# Patient Record
Sex: MALE | Race: WHITE | NOT HISPANIC OR LATINO | Employment: OTHER | URBAN - METROPOLITAN AREA
[De-identification: names, ages, dates, MRNs, and addresses within clinical notes are randomized per-mention and may not be internally consistent; named-entity substitution may affect disease eponyms.]

---

## 2017-03-08 ENCOUNTER — NON-PROVIDER VISIT (OUTPATIENT)
Dept: CARDIOLOGY | Facility: MEDICAL CENTER | Age: 67
End: 2017-03-08
Attending: FAMILY MEDICINE
Payer: MEDICARE

## 2017-03-08 DIAGNOSIS — I10 ESSENTIAL HYPERTENSION, MALIGNANT: ICD-10-CM

## 2017-03-08 DIAGNOSIS — Z13.6 SCREENING FOR ISCHEMIC HEART DISEASE: ICD-10-CM

## 2017-03-08 LAB — TREADMILL STRESS: NORMAL

## 2017-03-08 PROCEDURE — 93015 CV STRESS TEST SUPVJ I&R: CPT | Performed by: INTERNAL MEDICINE

## 2017-04-05 ENCOUNTER — OFFICE VISIT (OUTPATIENT)
Dept: CARDIOLOGY | Facility: MEDICAL CENTER | Age: 67
End: 2017-04-05
Payer: MEDICARE

## 2017-04-05 VITALS
HEART RATE: 59 BPM | HEIGHT: 71 IN | SYSTOLIC BLOOD PRESSURE: 150 MMHG | BODY MASS INDEX: 34.02 KG/M2 | DIASTOLIC BLOOD PRESSURE: 82 MMHG | OXYGEN SATURATION: 94 % | WEIGHT: 243 LBS

## 2017-04-05 DIAGNOSIS — I10 ESSENTIAL HYPERTENSION: ICD-10-CM

## 2017-04-05 DIAGNOSIS — I49.3 PVC (PREMATURE VENTRICULAR CONTRACTION): ICD-10-CM

## 2017-04-05 DIAGNOSIS — R94.39 ABNORMAL STRESS TEST: Primary | ICD-10-CM

## 2017-04-05 DIAGNOSIS — R53.81 PHYSICAL DECONDITIONING: ICD-10-CM

## 2017-04-05 PROCEDURE — G8432 DEP SCR NOT DOC, RNG: HCPCS | Performed by: INTERNAL MEDICINE

## 2017-04-05 PROCEDURE — 4004F PT TOBACCO SCREEN RCVD TLK: CPT | Mod: 8P | Performed by: INTERNAL MEDICINE

## 2017-04-05 PROCEDURE — 1101F PT FALLS ASSESS-DOCD LE1/YR: CPT | Mod: 8P | Performed by: INTERNAL MEDICINE

## 2017-04-05 PROCEDURE — 4040F PNEUMOC VAC/ADMIN/RCVD: CPT | Mod: 8P | Performed by: INTERNAL MEDICINE

## 2017-04-05 PROCEDURE — G8419 CALC BMI OUT NRM PARAM NOF/U: HCPCS | Performed by: INTERNAL MEDICINE

## 2017-04-05 PROCEDURE — 99204 OFFICE O/P NEW MOD 45 MIN: CPT | Performed by: INTERNAL MEDICINE

## 2017-04-05 RX ORDER — METOPROLOL SUCCINATE 25 MG/1
TABLET, EXTENDED RELEASE ORAL
Refills: 2 | COMMUNITY
Start: 2017-03-20 | End: 2018-10-30

## 2017-04-05 RX ORDER — ERGOCALCIFEROL 1.25 MG/1
CAPSULE ORAL
Refills: 11 | COMMUNITY
Start: 2017-03-10 | End: 2019-03-26

## 2017-04-05 RX ORDER — LOSARTAN POTASSIUM 25 MG/1
50 TABLET ORAL DAILY
Qty: 30 TAB | Refills: 3 | Status: SHIPPED | OUTPATIENT
Start: 2017-04-05 | End: 2018-07-26 | Stop reason: SDUPTHER

## 2017-04-05 NOTE — MR AVS SNAPSHOT
"        Laureen Munoz   2017 8:40 AM   Office Visit   MRN: [id number]    Department:  [facility]   Dept Phone:  [phone]    Description:  Male : 1950   Provider:  Dima Wilks M.D.           Reason for Visit     New Patient           Allergies as of 2017     Allergen Noted Reactions    Nkda [No Known Drug Allergy] 2007         You were diagnosed with     Essential hypertension   [[id number]]         Vital Signs     Blood Pressure Pulse Height Weight Body Mass Index Oxygen Saturation    150/82 mmHg 59 1.803 m (5' 10.98\") 110.224 kg (243 lb) 33.91 kg/m2 94%    Smoking Status                   Light Tobacco Smoker           Basic Information     Date Of Birth Sex Race Ethnicity Preferred Language    1950 Male White Non- English      Problem List              ICD-10-CM Priority Class Noted - Resolved    Neuropathy of foot G57.90   2012 - Present    HTN (hypertension) I10   2012 - Present    Hypercholesteremia E78.00   2012 - Present    BPH (benign prostatic hyperplasia) N40.0   2012 - Present    Tinea inguinalis B35.6   Unknown - Present    Vitamin D deficiency E55.9   2014 - Present    Migraine aura without headache G43.109   2014 - Present      Health Maintenance        Date Due Completion Dates    IMM ZOSTER VACCINE 2011 ---    IMM PNEUMOCOCCAL 65+ (ADULT) LOW/MEDIUM RISK SERIES (1 of 2 - PCV13) 2016 ---    IMM DTaP/Tdap/Td Vaccine (2 - Td) 2023    COLONOSCOPY 2024            Results       Current Immunizations     Influenza TIV (IM) 2014    Tdap Vaccine 2013      Below and/or attached are the medications your provider expects you to take. Review all of your home medications and newly ordered medications with your provider and/or pharmacist. Follow medication instructions as directed by your provider and/or pharmacist. Please keep your medication list with you and share with your provider. Update " the information when medications are discontinued, doses are changed, or new medications (including over-the-counter products) are added; and carry medication information at all times in the event of emergency situations     Allergies:  NKDA - (reactions not documented)               Medications  Valid as of: April 05, 2017 -  9:13 AM    Generic Name Brand Name Tablet Size Instructions for use    Cyclobenzaprine HCl (Tab) FLEXERIL 5 MG Take 1-2 Tabs by mouth 3 times a day as needed for Mild Pain.        Doxazosin Mesylate (Tab) CARDURA 2 MG TAKE 1 TABLET BY MOUTH EVERY DAY        Dutasteride (Cap) AVODART 0.5 MG Take 1 Cap by mouth every day.        Ergocalciferol (Cap) DRISDOL 69153 UNITS         Metoprolol Succinate (TABLET SR 24 HR) TOPROL XL 25 MG         Mupirocin (Ointment) BACTROBAN 2 % Apply bid to effected toenail.        Naproxen (Tab) NAPROSYN 500 MG Take 1 Tab by mouth 2 times a day, with meals.        Tadalafil (Tab) CIALIS 20 MG Take 1 Tab by mouth as needed for Erectile Dysfunction. Take one q 2-3 days prn ED.        Tamsulosin HCl (Cap) FLOMAX 0.4 MG Take 1 Cap by mouth ONE-HALF HOUR AFTER BREAKFAST.        Zolpidem Tartrate (Tab) AMBIEN 10 MG Take 1 Tab by mouth at bedtime as needed for Sleep.        .                 Medicines prescribed today were sent to:     [facility] [address] [address]    Phone: [phone] Fax: [phone]    Open 24 Hours?: No      Medication refill instructions:       If your prescription bottle indicates you have medication refills left, it is not necessary to call your provider’s office. Please contact your pharmacy and they will refill your medication.    If your prescription bottle indicates you do not have any refills left, you may request refills at any time through one of the following ways: The online [employer] system (except Urgent Care), by calling your provider’s office, or by asking your pharmacy to contact your  provider’s office with a refill request. Medication refills are processed only during regular business hours and may not be available until the next business day. Your provider may request additional information or to have a follow-up visit with you prior to refilling your medication.   *Please Note: Medication refills are assigned a new Rx number when refilled electronically. Your pharmacy may indicate that no refills were authorized even though a new prescription for the same medication is available at the pharmacy. Please request the medicine by name with the pharmacy before contacting your provider for a refill.           MyChart Access Code: Activation code not generated  Current MyChart Status: Active          Quit Tobacco Information     Do you want to quit using tobacco?    Quitting tobacco decreases risks of cancer, heart and lung disease, increases life expectancy, improves sense of taste and smell, and increases spending money, among other benefits.    If you are thinking about quitting, we can help.  • [facility] Quit Tobacco Program: [phone]  o Program occurs weekly for four weeks and includes pharmacist consultation on products to support quitting smoking or chewing tobacco. A provider referral is needed for pharmacist consultation.  • Tobacco Users Help Hotline: 4-795-QUIT-NOW (691-4473) or https://[place].quitlogix.org/  o Free, confidential telephone and online coaching for [place] residents. Sessions are designed on a schedule that is convenient for you. Eligible clients receive free nicotine replacement therapy.  • Nationally: www.smokefree.gov  o Information and professional assistance to support both immediate and long-term needs as you become, and remain, a non-smoker. Smokefree.gov allows you to choose the help that best fits your needs.

## 2017-04-05 NOTE — PROGRESS NOTES
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 He recently got a ETT done for feeling more fatigued with exertion. The ETT was \"abnormal\" in that it showed he had a low exercise tolerance (reaching just > 7 METS) with HTNsive response. He had a few rare PVCs. He was referred for abnormal stress. He denies chest pain. Says since the stress test he has been exercise more and this has improved his energy quite a bit. Says he was also feeling occasional dizziness which has also improved with exercise. Says he just takes metoprolol at home for BP. Had been on lisinopril but could not tolerate due to cough.    ROS (+ highlighted in red):  Constitutional: Fevers/chills/fatigue/weightloss  HEENT: Blurry vision/eye pain/sore throat/hearing loss  Respiratory: Shortness of breath/cough  Cardiovascular: Chest pain/palpitations/edema/orthopnea/syncope  GI: Nausea/vomitting/diarrhea  MSK: Arthralgias/myagias/muscle weakness  Skin: Rash/sores  Neurological: Numbness/tremors/vertigo  Endocrine: Excessive thirst/polyuria/cold intolerance/heat intolerance  Psych: Depression/anxiety    Past Medical History   Diagnosis Date   • Hypertension    • Tinea inguinalis        Past Surgical History   Procedure Laterality Date   • Other abdominal surgery       gallbladder       Social History     Social History   • Marital Status:      Spouse Name: N/A   • Number of Children: N/A   • Years of Education: N/A     Occupational History   • Not on file.     Social History Main Topics   • Smoking status: Light Tobacco Smoker     Types: Cigars   • Smokeless tobacco: Never Used      Comment: cigar, smokes one a day.   • Alcohol Use: Yes      Comment: minimal   • Drug Use: No   • Sexual Activity: Not on file " "    Other Topics Concern   • Not on file     Social History Narrative       History reviewed. No pertinent family history.    Allergies   Allergen Reactions   • Nkda [No Known Drug Allergy]        Current Outpatient Prescriptions   Medication Sig Dispense Refill   • metoprolol SR (TOPROL XL) 25 MG TABLET SR 24 HR   2   • vitamin D, Ergocalciferol, (DRISDOL) 88114 UNITS Cap capsule   11   • losartan (COZAAR) 25 MG Tab Take 2 Tabs by mouth every day. 30 Tab 3   • tamsulosin (FLOMAX) 0.4 MG capsule Take 1 Cap by mouth ONE-HALF HOUR AFTER BREAKFAST. 30 Cap 0   • tadalafil (CIALIS) 20 MG tablet Take 1 Tab by mouth as needed for Erectile Dysfunction. Take one q 2-3 days prn ED. 30 Tab 3   • cyclobenzaprine (FLEXERIL) 5 MG tablet Take 1-2 Tabs by mouth 3 times a day as needed for Mild Pain. 30 Tab 0   • naproxen (NAPROSYN) 500 MG TABS Take 1 Tab by mouth 2 times a day, with meals. 60 Tab 0   • zolpidem (AMBIEN) 10 MG TABS Take 1 Tab by mouth at bedtime as needed for Sleep. 30 Tab 1   • mupirocin (BACTROBAN) 2 % OINT Apply bid to effected toenail. 1 Tube 1   • dutasteride (AVODART) 0.5 MG capsule Take 1 Cap by mouth every day. 30 Each 11   • doxazosin (CARDURA) 2 MG TABS TAKE 1 TABLET BY MOUTH EVERY DAY 30 Tab 7     No current facility-administered medications for this visit.       Physical Exam:  Filed Vitals:    04/05/17 0828   BP: 150/82   Pulse: 59   Height: 1.803 m (5' 10.98\")   Weight: 110.224 kg (243 lb)   SpO2: 94%     General appearance: NAD, conversant   Eyes: anicteric sclerae, moist conjunctivae; no lid-lag; PERRLA  HENT: Atraumatic; oropharynx clear with moist mucous membranes and no mucosal ulcerations; normal hard and soft palate  Neck: Trachea midline; FROM, supple, no thyromegaly or lymphadenopathy  Lungs: CTA, with normal respiratory effort and no intercostal retractions  CV: RRR, no MRGs, no JVD   Abdomen: Soft, non-tender; no masses or HSM  Extremities: No peripheral edema or extremity " lymphadenopathy  Skin: Normal temperature, turgor and texture; no rash, ulcers or subcutaneous nodules  Psych: Appropriate affect, alert and oriented to person, place and time    Data:  Labs reviewed    Prior stress test reviewed    EKG interpreted by me:  Sinus, PAC    Impression/Plan:  1. Abnormal stress test     2. Physical deconditioning     3. PVC (premature ventricular contraction)     4. Essential hypertension  EKG    losartan (COZAAR) 25 MG Tab     -I have reviewed the tracings in the ETT  -There is no evidence of ischemia  -The test is abnormal for HTNsive response and low exercise tolerance suggesting physical deconditioning  -I will add losartan to his BP regimen  -I recommended weight loss and exercise  -He can f/u with his PCP    Dima Wilks MD

## 2017-04-06 LAB — EKG IMPRESSION: NORMAL

## 2017-08-15 ENCOUNTER — HOSPITAL ENCOUNTER (OUTPATIENT)
Dept: RADIOLOGY | Facility: MEDICAL CENTER | Age: 67
End: 2017-08-15
Attending: FAMILY MEDICINE
Payer: MEDICARE

## 2017-08-15 DIAGNOSIS — R22.9 LOCALIZED SUPERFICIAL SWELLING, MASS, OR LUMP: ICD-10-CM

## 2017-08-15 PROCEDURE — 76536 US EXAM OF HEAD AND NECK: CPT

## 2018-04-11 ENCOUNTER — APPOINTMENT (RX ONLY)
Dept: URBAN - METROPOLITAN AREA CLINIC 20 | Facility: CLINIC | Age: 68
Setting detail: DERMATOLOGY
End: 2018-04-11

## 2018-04-11 DIAGNOSIS — D18.0 HEMANGIOMA: ICD-10-CM

## 2018-04-11 DIAGNOSIS — D22 MELANOCYTIC NEVI: ICD-10-CM

## 2018-04-11 DIAGNOSIS — L57.0 ACTINIC KERATOSIS: ICD-10-CM

## 2018-04-11 DIAGNOSIS — Z85.828 PERSONAL HISTORY OF OTHER MALIGNANT NEOPLASM OF SKIN: ICD-10-CM

## 2018-04-11 DIAGNOSIS — L82.1 OTHER SEBORRHEIC KERATOSIS: ICD-10-CM

## 2018-04-11 DIAGNOSIS — L57.8 OTHER SKIN CHANGES DUE TO CHRONIC EXPOSURE TO NONIONIZING RADIATION: ICD-10-CM

## 2018-04-11 DIAGNOSIS — L21.8 OTHER SEBORRHEIC DERMATITIS: ICD-10-CM

## 2018-04-11 DIAGNOSIS — L30.0 NUMMULAR DERMATITIS: ICD-10-CM

## 2018-04-11 DIAGNOSIS — L81.4 OTHER MELANIN HYPERPIGMENTATION: ICD-10-CM

## 2018-04-11 PROBLEM — I10 ESSENTIAL (PRIMARY) HYPERTENSION: Status: ACTIVE | Noted: 2018-04-11

## 2018-04-11 PROBLEM — D18.01 HEMANGIOMA OF SKIN AND SUBCUTANEOUS TISSUE: Status: ACTIVE | Noted: 2018-04-11

## 2018-04-11 PROBLEM — D22.5 MELANOCYTIC NEVI OF TRUNK: Status: ACTIVE | Noted: 2018-04-11

## 2018-04-11 PROCEDURE — ? PRESCRIPTION

## 2018-04-11 PROCEDURE — ? PRESCRIPTION SAMPLES PROVIDED

## 2018-04-11 PROCEDURE — ? LIQUID NITROGEN

## 2018-04-11 PROCEDURE — 99214 OFFICE O/P EST MOD 30 MIN: CPT | Mod: 25

## 2018-04-11 PROCEDURE — 17000 DESTRUCT PREMALG LESION: CPT

## 2018-04-11 PROCEDURE — ? COUNSELING

## 2018-04-11 PROCEDURE — 17003 DESTRUCT PREMALG LES 2-14: CPT

## 2018-04-11 RX ORDER — KETOCONAZOLE 20.5 MG/ML
SHAMPOO, SUSPENSION TOPICAL BID
Qty: 1 | Refills: 6 | Status: ERX | COMMUNITY
Start: 2018-04-11

## 2018-04-11 RX ORDER — KETOCONAZOLE 20 MG/G
CREAM TOPICAL BID
Qty: 1 | Refills: 6 | Status: ERX | COMMUNITY
Start: 2018-04-11

## 2018-04-11 RX ORDER — TRIAMCINOLONE ACETONIDE 1 MG/G
CREAM TOPICAL BID
Qty: 1 | Refills: 3 | Status: ERX | COMMUNITY
Start: 2018-04-11

## 2018-04-11 RX ADMIN — KETOCONAZOLE: 20 CREAM TOPICAL at 00:00

## 2018-04-11 RX ADMIN — TRIAMCINOLONE ACETONIDE: 1 CREAM TOPICAL at 00:00

## 2018-04-11 RX ADMIN — KETOCONAZOLE: 20.5 SHAMPOO, SUSPENSION TOPICAL at 00:00

## 2018-04-11 ASSESSMENT — LOCATION DETAILED DESCRIPTION DERM
LOCATION DETAILED: LEFT MEDIAL FRONTAL SCALP
LOCATION DETAILED: LEFT ANTERIOR DISTAL THIGH
LOCATION DETAILED: LEFT MEDIAL SUPERIOR CHEST
LOCATION DETAILED: RIGHT ANTERIOR DISTAL THIGH
LOCATION DETAILED: RIGHT SUPERIOR LATERAL FOREHEAD
LOCATION DETAILED: RIGHT MID TEMPLE
LOCATION DETAILED: LEFT INFERIOR LATERAL FOREHEAD
LOCATION DETAILED: RIGHT SUPERIOR MEDIAL UPPER BACK
LOCATION DETAILED: RIGHT CENTRAL MALAR CHEEK
LOCATION DETAILED: LEFT CENTRAL EYEBROW
LOCATION DETAILED: RIGHT LATERAL ABDOMEN
LOCATION DETAILED: RIGHT CENTRAL EYEBROW
LOCATION DETAILED: RIGHT SUPERIOR FOREHEAD
LOCATION DETAILED: LEFT PROXIMAL DORSAL FOREARM
LOCATION DETAILED: RIGHT MID-UPPER BACK
LOCATION DETAILED: LEFT SUPERIOR LATERAL FOREHEAD
LOCATION DETAILED: RIGHT ANTERIOR PROXIMAL UPPER ARM
LOCATION DETAILED: LEFT LATERAL ABDOMEN
LOCATION DETAILED: LEFT LATERAL TEMPLE
LOCATION DETAILED: RIGHT PROXIMAL DORSAL FOREARM
LOCATION DETAILED: LEFT INFERIOR CENTRAL MALAR CHEEK
LOCATION DETAILED: RIGHT FOREHEAD
LOCATION DETAILED: LEFT ANTERIOR PROXIMAL UPPER ARM
LOCATION DETAILED: RIGHT INFERIOR UPPER BACK

## 2018-04-11 ASSESSMENT — LOCATION SIMPLE DESCRIPTION DERM
LOCATION SIMPLE: RIGHT TEMPLE
LOCATION SIMPLE: RIGHT FOREARM
LOCATION SIMPLE: LEFT EYEBROW
LOCATION SIMPLE: RIGHT CHEEK
LOCATION SIMPLE: LEFT TEMPLE
LOCATION SIMPLE: LEFT CHEEK
LOCATION SIMPLE: LEFT UPPER ARM
LOCATION SIMPLE: ABDOMEN
LOCATION SIMPLE: RIGHT EYEBROW
LOCATION SIMPLE: RIGHT UPPER BACK
LOCATION SIMPLE: LEFT SCALP
LOCATION SIMPLE: LEFT THIGH
LOCATION SIMPLE: RIGHT THIGH
LOCATION SIMPLE: RIGHT FOREHEAD
LOCATION SIMPLE: LEFT FOREHEAD
LOCATION SIMPLE: RIGHT UPPER ARM
LOCATION SIMPLE: CHEST
LOCATION SIMPLE: LEFT FOREARM

## 2018-04-11 ASSESSMENT — LOCATION ZONE DERM
LOCATION ZONE: TRUNK
LOCATION ZONE: LEG
LOCATION ZONE: ARM
LOCATION ZONE: FACE
LOCATION ZONE: SCALP

## 2018-04-26 ENCOUNTER — RX ONLY (OUTPATIENT)
Age: 68
Setting detail: RX ONLY
End: 2018-04-26

## 2018-04-26 RX ORDER — EMOLLIENT COMBINATION NO.60
SPRAY GEL TOPICAL
Qty: 1 | Refills: 3 | Status: ERX | COMMUNITY
Start: 2018-04-26

## 2018-06-19 ENCOUNTER — OFFICE VISIT (OUTPATIENT)
Dept: URGENT CARE | Facility: CLINIC | Age: 68
End: 2018-06-19
Payer: MEDICARE

## 2018-06-19 VITALS
BODY MASS INDEX: 35.06 KG/M2 | HEART RATE: 71 BPM | SYSTOLIC BLOOD PRESSURE: 140 MMHG | WEIGHT: 250.4 LBS | OXYGEN SATURATION: 95 % | HEIGHT: 71 IN | RESPIRATION RATE: 18 BRPM | DIASTOLIC BLOOD PRESSURE: 84 MMHG | TEMPERATURE: 98.4 F

## 2018-06-19 DIAGNOSIS — I10 HYPERTENSION, UNSPECIFIED TYPE: ICD-10-CM

## 2018-06-19 PROCEDURE — 99214 OFFICE O/P EST MOD 30 MIN: CPT | Performed by: PHYSICIAN ASSISTANT

## 2018-06-19 RX ORDER — METOPROLOL SUCCINATE 25 MG/1
25 TABLET, EXTENDED RELEASE ORAL DAILY
Qty: 30 TAB | Refills: 0 | Status: SHIPPED | OUTPATIENT
Start: 2018-06-19 | End: 2018-07-26 | Stop reason: SDUPTHER

## 2018-06-19 ASSESSMENT — ENCOUNTER SYMPTOMS
FATIGUE: 0
HEADACHES: 0
SORE THROAT: 0
PALPITATIONS: 0
CHILLS: 0
VOMITING: 0
DIARRHEA: 0
VISUAL CHANGE: 0
BLURRED VISION: 0
ABDOMINAL PAIN: 0
FEVER: 0
DIAPHORESIS: 0
SHORTNESS OF BREATH: 0
EYE REDNESS: 0
DIZZINESS: 0
COUGH: 0

## 2018-06-19 NOTE — PROGRESS NOTES
Subjective:      Laureen Munoz is a 67 y.o. male who presents with Blood Pressure Problem (high primary Dr closed office 3 months ago)            Patient is a 67-year-old male who presents today with concern regarding elevated blood pressure yesterday. Patient reports that he used to be a patient of Dr. Lester however he has since closed his practice approx. 3 months ago.   Patient readily admits that he feels that his an active lifestyle and recent weight gain has made his blood pressure increased over time. Patient is currently on metoprolol 25 mg daily along with losartan 500 mg daily. He does report that his blood pressure is been consistently 140-130 /90-80- however yesterday his blood pressure was 190/110. He readily admits that he took his blood pressure after a few cups of coffee and was watching the news of which he was getting frustrated with the news story. He denies any chest pain, shortness of breath, VA changes, lightheadedness or dizziness. Patient does not have a primary care appointment at this time. Patient will run out of metoprolol in the next few days. He denies any other medical problems of DMII, or CAD.      Other   This is a new problem. The current episode started yesterday. The problem occurs constantly. The problem has been resolved. Pertinent negatives include no abdominal pain, chills, congestion, coughing, diaphoresis, fatigue, fever, headaches, sore throat, visual change or vomiting. Treatments tried: As above.        Review of Systems   Constitutional: Negative for chills, diaphoresis, fatigue and fever.   HENT: Negative for congestion and sore throat.    Eyes: Negative for blurred vision and redness.   Respiratory: Negative for cough and shortness of breath.    Cardiovascular: Negative for palpitations and leg swelling.   Gastrointestinal: Negative for abdominal pain, diarrhea and vomiting.   Neurological: Negative for dizziness and headaches.   All other systems reviewed and are  "negative.         Objective:     /84 Comment: /90  Pulse 71   Temp 36.9 °C (98.4 °F)   Resp 18   Ht 1.803 m (5' 11\")   Wt 113.6 kg (250 lb 6.4 oz)   SpO2 95%   BMI 34.92 kg/m²    PMH:  has a past medical history of Hypertension and Tinea inguinalis.  MEDS:   Current Outpatient Prescriptions:   •  metoprolol SR (TOPROL XL) 25 MG TABLET SR 24 HR, Take 1 Tab by mouth every day., Disp: 30 Tab, Rfl: 0  •  metoprolol SR (TOPROL XL) 25 MG TABLET SR 24 HR, , Disp: , Rfl: 2  •  vitamin D, Ergocalciferol, (DRISDOL) 16529 UNITS Cap capsule, , Disp: , Rfl: 11  •  losartan (COZAAR) 25 MG Tab, Take 2 Tabs by mouth every day., Disp: 30 Tab, Rfl: 3  •  tadalafil (CIALIS) 20 MG tablet, Take 1 Tab by mouth as needed for Erectile Dysfunction. Take one q 2-3 days prn ED., Disp: 30 Tab, Rfl: 3  •  tamsulosin (FLOMAX) 0.4 MG capsule, Take 1 Cap by mouth ONE-HALF HOUR AFTER BREAKFAST., Disp: 30 Cap, Rfl: 0  •  cyclobenzaprine (FLEXERIL) 5 MG tablet, Take 1-2 Tabs by mouth 3 times a day as needed for Mild Pain., Disp: 30 Tab, Rfl: 0  •  naproxen (NAPROSYN) 500 MG TABS, Take 1 Tab by mouth 2 times a day, with meals., Disp: 60 Tab, Rfl: 0  •  zolpidem (AMBIEN) 10 MG TABS, Take 1 Tab by mouth at bedtime as needed for Sleep., Disp: 30 Tab, Rfl: 1  •  mupirocin (BACTROBAN) 2 % OINT, Apply bid to effected toenail., Disp: 1 Tube, Rfl: 1  •  dutasteride (AVODART) 0.5 MG capsule, Take 1 Cap by mouth every day., Disp: 30 Each, Rfl: 11  •  doxazosin (CARDURA) 2 MG TABS, TAKE 1 TABLET BY MOUTH EVERY DAY, Disp: 30 Tab, Rfl: 7  ALLERGIES:   Allergies   Allergen Reactions   • Nkda [No Known Drug Allergy]      SURGHX:   Past Surgical History:   Procedure Laterality Date   • OTHER ABDOMINAL SURGERY      gallbladder     SOCHX:  reports that he has been smoking Cigars.  He has never used smokeless tobacco. He reports that he drinks alcohol. He reports that he does not use drugs.  FH: Family history was reviewed, no pertinent findings to " report    Physical Exam   Constitutional: He is oriented to person, place, and time. He appears well-developed and well-nourished. No distress.   HENT:   Head: Normocephalic and atraumatic.   Right Ear: External ear normal.   Left Ear: External ear normal.   Mouth/Throat: Oropharynx is clear and moist. No oropharyngeal exudate.   Eyes: Conjunctivae and EOM are normal. Pupils are equal, round, and reactive to light.   Neck: Normal range of motion. Neck supple. No tracheal deviation present.   Cardiovascular: Normal rate and regular rhythm.    No murmur heard.  Pulmonary/Chest: Effort normal and breath sounds normal. No respiratory distress.   Abdominal:   Obese     Musculoskeletal: Normal range of motion. He exhibits no edema.   Neurological: He is alert and oriented to person, place, and time. Coordination normal.   Skin: Skin is warm. No rash noted.   Psychiatric: He has a normal mood and affect. His behavior is normal. Judgment and thought content normal.   Vitals reviewed.              Assessment/Plan:     1. Hypertension, unspecified type  - metoprolol SR (TOPROL XL) 25 MG TABLET SR 24 HR; Take 1 Tab by mouth every day.  Dispense: 30 Tab; Refill: 0    I'm happy to refill patient's metoprolol at this time. I discussed the importance of diet changes and routine exercise to include a low-sodium diet. Patient currently has plenty of Losartan, of which I encouraged patient to increase dose from 50 mg to 100 mg to take at nighttime. Patient is to make a primary care appointment [facility] and is to bring with him his blood pressure log.  Patient given precautionary s/sx that mandate immediate follow up and evaluation in the ED. Advised of risks of not doing so.    DDX, Supportive care, and indications for immediate follow-up discussed with patient.    Instructed to return to clinic or nearest emergency department if we are not available for any change in condition, further concerns, or worsening of symptoms.    The patient  demonstrated a good understanding and agreed with the treatment plan.  Please note that this dictation was created using voice recognition software. I have made every reasonable attempt to correct obvious errors, but I expect that there are errors of grammar and possibly content that I did not discover before finalizing the note.

## 2018-07-26 ENCOUNTER — OFFICE VISIT (OUTPATIENT)
Dept: MEDICAL GROUP | Facility: MEDICAL CENTER | Age: 68
End: 2018-07-26
Payer: MEDICARE

## 2018-07-26 VITALS
HEIGHT: 70 IN | OXYGEN SATURATION: 94 % | DIASTOLIC BLOOD PRESSURE: 74 MMHG | HEART RATE: 60 BPM | TEMPERATURE: 98 F | BODY MASS INDEX: 35.63 KG/M2 | WEIGHT: 248.9 LBS | SYSTOLIC BLOOD PRESSURE: 132 MMHG

## 2018-07-26 DIAGNOSIS — E55.9 HYPOVITAMINOSIS D: ICD-10-CM

## 2018-07-26 DIAGNOSIS — Z00.00 HEALTH CARE MAINTENANCE: ICD-10-CM

## 2018-07-26 DIAGNOSIS — Z12.5 SCREENING FOR MALIGNANT NEOPLASM OF PROSTATE: ICD-10-CM

## 2018-07-26 DIAGNOSIS — E78.00 HYPERCHOLESTEREMIA: ICD-10-CM

## 2018-07-26 DIAGNOSIS — E66.9 OBESITY (BMI 30-39.9): ICD-10-CM

## 2018-07-26 DIAGNOSIS — I10 ESSENTIAL HYPERTENSION: ICD-10-CM

## 2018-07-26 DIAGNOSIS — Z76.89 ENCOUNTER TO ESTABLISH CARE: ICD-10-CM

## 2018-07-26 DIAGNOSIS — N40.0 BENIGN PROSTATIC HYPERPLASIA, UNSPECIFIED WHETHER LOWER URINARY TRACT SYMPTOMS PRESENT: ICD-10-CM

## 2018-07-26 PROBLEM — N52.9 ERECTILE DYSFUNCTION: Status: RESOLVED | Noted: 2018-07-26 | Resolved: 2018-07-26

## 2018-07-26 PROBLEM — N52.9 ERECTILE DYSFUNCTION: Status: ACTIVE | Noted: 2018-07-26

## 2018-07-26 PROCEDURE — 99204 OFFICE O/P NEW MOD 45 MIN: CPT | Performed by: INTERNAL MEDICINE

## 2018-07-26 RX ORDER — LOSARTAN POTASSIUM 50 MG/1
50 TABLET ORAL DAILY
Qty: 90 TAB | Refills: 1 | Status: SHIPPED | OUTPATIENT
Start: 2018-07-26 | End: 2018-10-30 | Stop reason: SDUPTHER

## 2018-07-26 RX ORDER — METOPROLOL SUCCINATE 25 MG/1
25 TABLET, EXTENDED RELEASE ORAL DAILY
Qty: 90 TAB | Refills: 1 | Status: SHIPPED | OUTPATIENT
Start: 2018-07-26 | End: 2018-10-30 | Stop reason: SDUPTHER

## 2018-07-26 RX ORDER — TADALAFIL 5 MG/1
5 TABLET ORAL PRN
Qty: 90 TAB | Refills: 1 | Status: SHIPPED | OUTPATIENT
Start: 2018-07-26 | End: 2018-10-30

## 2018-07-26 ASSESSMENT — PATIENT HEALTH QUESTIONNAIRE - PHQ9: CLINICAL INTERPRETATION OF PHQ2 SCORE: 0

## 2018-07-26 NOTE — PROGRESS NOTES
CHIEF COMPLAINT  Chief Complaint   Patient presents with   • Establish Care   • Medication Refill     metoprolol     HPI  Patient is a 67 y.o. male patient who presents today for the following     HYPERTENSION  Meds: Metoprolol 25 mg QD, losartan, 50 mg QD; taking as prescribed.   Measuring BP at home: yes. It has been < 140/90  Denies:  -  headaches, vision problems, tinnitus.                 -  chest pain/pressure, palpitations, irregular heart beats, exertional, dyspnea, peripheral edema.      - medication side effect: unusual fatigue, slow heartbeat, foot/leg swelling, cough.  Low salt diet: no  Diet: regular  Exercise: < 4 d/w  BMI: 35.63 kg/m².  FH of HTN: neg    Hypercholesterolemia  The patient had slightly elevated cholesterol/triglycerides.   No meds.  Diet / Exercise/BMI:  As above  FH:     BPH  Nocturia: x 1  Urgency  Weak stream/dribbling  Bladder fullness / incomplete emptying  Hesitancy.    Denies:   · Frequency    · On cialis.     Hypovitaminosis D  Patient had low vitamin D level.  On prescription dose of vitamin D.    OBESITY, Body mass index is 35.63 kg/m².  Onset: Remote  Diet: Regular; likes to, especially after retired  Exercise: Limited  No temperature intolerance. No change in hair/skin quality, BMs.   No HTN, buffalo hump, purple striae, flushing.  FH of obesity: Unknown    Reviewed PMH, PSH, FH, SH, ALL, HCM/IMM  Reviewed MEDs    Patient Active Problem List    Diagnosis Date Noted   • Erectile dysfunction 07/26/2018   • Vitamin D deficiency 04/11/2014   • Migraine aura without headache 04/11/2014   • Tinea inguinalis    • Neuropathy of foot 07/20/2012   • Hypercholesteremia 07/20/2012   • BPH (benign prostatic hyperplasia) 07/20/2012     CURRENT MEDICATIONS  Current Outpatient Prescriptions   Medication Sig Dispense Refill   • metoprolol SR (TOPROL XL) 25 MG TABLET SR 24 HR Take 1 Tab by mouth every day. 30 Tab 0   • metoprolol SR (TOPROL XL) 25 MG TABLET SR 24 HR   2   • vitamin D,  Ergocalciferol, (DRISDOL) 80740 UNITS Cap capsule   11   • losartan (COZAAR) 25 MG Tab Take 2 Tabs by mouth every day. 30 Tab 3   • tamsulosin (FLOMAX) 0.4 MG capsule Take 1 Cap by mouth ONE-HALF HOUR AFTER BREAKFAST. 30 Cap 0   • tadalafil (CIALIS) 20 MG tablet Take 1 Tab by mouth as needed for Erectile Dysfunction. Take one q 2-3 days prn ED. 30 Tab 3   • cyclobenzaprine (FLEXERIL) 5 MG tablet Take 1-2 Tabs by mouth 3 times a day as needed for Mild Pain. 30 Tab 0   • naproxen (NAPROSYN) 500 MG TABS Take 1 Tab by mouth 2 times a day, with meals. 60 Tab 0   • zolpidem (AMBIEN) 10 MG TABS Take 1 Tab by mouth at bedtime as needed for Sleep. 30 Tab 1   • mupirocin (BACTROBAN) 2 % OINT Apply bid to effected toenail. 1 Tube 1   • dutasteride (AVODART) 0.5 MG capsule Take 1 Cap by mouth every day. 30 Each 11   • doxazosin (CARDURA) 2 MG TABS TAKE 1 TABLET BY MOUTH EVERY DAY 30 Tab 7     No current facility-administered medications for this visit.      ALLERGIES  Allergies: Nkda [no known drug allergy]  PAST MEDICAL HISTORY  Past Medical History:   Diagnosis Date   • Hypercholesterolemia    • Hypertension    • Tinea inguinalis      SURGICAL HISTORY  He  has a past surgical history that includes other abdominal surgery.  SOCIAL HISTORY  Social History   Substance Use Topics   • Smoking status: Former Smoker     Types: Cigars   • Smokeless tobacco: Never Used      Comment: cigar, smokes one a day.   • Alcohol use Yes      Comment: minimal     Social History     Social History Narrative   • No narrative on file     FAMILY HISTORY  Family History   Problem Relation Age of Onset   • Heart Disease Mother    • Heart Disease Father    • Cancer Sister         leukemia, breast cancer   • Stroke Sister    • Hypertension Neg Hx    • Hyperlipidemia Neg Hx      Family Status   Relation Status   • Mo    • Fa    • Sis    • Sis (Not Specified)   • Neg Hx (Not Specified)     ROS   Constitutional: Negative for fever,  "chills.  HENT: Negative for congestion, sore throat.  Eyes: Negative for blurred vision.   Respiratory: Negative for cough, shortness of breath.  Cardiovascular: Negative for chest pain, palpitations.  And per HPI.  Gastrointestinal: Negative for heartburn, nausea, abdominal pain.   Genitourinary: Negative for dysuria. And per HPI.  Musculoskeletal: Negative for significant myalgias, back pain and joint pain.   Skin: Negative for rash and itching.   Neuro: Negative for dizziness, weakness and headaches.   Endo/Heme/Allergies: Does not bruise/bleed easily.   Psychiatric/Behavioral: Negative for depression, anxiety    PHYSICAL EXAM   Blood pressure 132/74, pulse 60, temperature 36.7 °C (98 °F), height 1.78 m (5' 10.08\"), weight 112.9 kg (248 lb 14.4 oz), SpO2 94 %. Body mass index is 35.63 kg/m².  General:  NAD, well appearing  HEENT:   NC/AT, PERRLA, EOMI, TMs are clear. Oropharyngeal mucosa is pink,  without lesions;  no cervical / supraclavicular  lymphadenopathy, no thyromegaly.    Cardiovascular: RRR.   No m/r/g. No carotid bruits .      Lungs:   CTAB, no w/r/r, no respiratory distress.  Abdomen: Soft, NT/ND + BS; unable to palpate liver/spleen due to obesity   Extremities:  2+ DP and radial pulses bilaterally.  No c/c/e.   Skin:  Warm, dry.  No erythema. No rash.   Neurologic: Alert & oriented x 3. No focal deficits.  Psychiatric:  Affect normal, mood normal, judgment normal.    LABS     Labs are reviewed and discussed with a patient  Lab Results   Component Value Date/Time    CHOLSTRLTOT 205 (H) 04/11/2014 09:21 AM     (H) 04/11/2014 09:21 AM    HDL 45 04/11/2014 09:21 AM    TRIGLYCERIDE 164 (H) 04/11/2014 09:21 AM       Lab Results   Component Value Date/Time    SODIUM 135 04/11/2014 09:21 AM    POTASSIUM 4.0 04/11/2014 09:21 AM    CHLORIDE 104 04/11/2014 09:21 AM    CO2 23 04/11/2014 09:21 AM    GLUCOSE 86 04/11/2014 09:21 AM    BUN 13 04/11/2014 09:21 AM    CREATININE 0.74 04/11/2014 09:21 AM    " CREATININE 0.92 03/01/2010 04:54 PM    BUNCREATRAT 21 03/01/2010 04:54 PM    GLOMRATE >59 03/01/2010 04:54 PM     Lab Results   Component Value Date/Time    ALKPHOSPHAT 83 04/11/2014 09:21 AM    ASTSGOT 21 04/11/2014 09:21 AM    ALTSGPT 23 04/11/2014 09:21 AM    TBILIRUBIN 1.6 (H) 04/11/2014 09:21 AM      Lab Results   Component Value Date/Time    WBC 10.3 04/11/2014 09:21 AM    WBC 9.4 03/01/2010 04:54 PM    RBC 5.08 04/11/2014 09:21 AM    RBC 5.39 03/01/2010 04:54 PM    HEMOGLOBIN 15.5 04/11/2014 09:21 AM    HEMATOCRIT 44.8 04/11/2014 09:21 AM    MCV 88.2 04/11/2014 09:21 AM    MCV 89 03/01/2010 04:54 PM    MCH 30.5 04/11/2014 09:21 AM    MCH 29.3 03/01/2010 04:54 PM    MCHC 34.6 04/11/2014 09:21 AM    MPV 9.2 04/11/2014 09:21 AM    NEUTSPOLYS 78.3 (H) 04/11/2014 09:21 AM    LYMPHOCYTES 12.7 (L) 04/11/2014 09:21 AM    MONOCYTES 8.1 04/11/2014 09:21 AM    EOSINOPHILS 0.5 04/11/2014 09:21 AM    BASOPHILS 0.4 04/11/2014 09:21 AM    HYPOCHROMIA 1+ 07/15/2013 10:35 AM      IMAGING     None    ASSESMENT AND PLAN        1. Essential hypertension  Controlled, continue current treatment  - metoprolol SR (TOPROL XL) 25 MG TABLET SR 24 HR; Take 1 Tab by mouth every day.  Dispense: 90 Tab; Refill: 1  - losartan (COZAAR) 50 MG Tab; Take 1 Tab by mouth every day.  Dispense: 90 Tab; Refill: 1  - COMP METABOLIC PANEL; Future    2. Hypercholesteremia  Pending labs, continue current treatment  - COMP METABOLIC PANEL; Future  - LIPID PROFILE; Future    3. Benign prostatic hyperplasia, unspecified whether lower urinary tract symptoms present  Controlled, continue current treatment    4. Hypovitaminosis D  Finish current supply of prescription dose of vitamin D, follow-up labs  - VITAMIN D,25 HYDROXY; Future    5. Obesity (BMI 30-39.9)  - Patient identified as having weight management issue.  Appropriate orders and counseling given.    6. Screening for malignant neoplasm of prostate  - PROSTATE SPECIFIC AG SCREENING; Future    7.  Health care maintenance  Advised pneumonia shot    8. Encounter to establish care  Reviewed PMH, PSH, FH, SH, ALL, MEDS, HCM/IMM.     Counseling:   - Smoking:  Nonsmoker    Followup: Return in about 3 months (around 10/26/2018) for Short.    All questions are answered.    Please note that this dictation was created using voice recognition software, and that there might be errors of barbara and possibly content.

## 2018-10-27 ENCOUNTER — HOSPITAL ENCOUNTER (OUTPATIENT)
Dept: LAB | Facility: MEDICAL CENTER | Age: 68
End: 2018-10-27
Attending: INTERNAL MEDICINE
Payer: MEDICARE

## 2018-10-27 DIAGNOSIS — I10 ESSENTIAL HYPERTENSION: ICD-10-CM

## 2018-10-27 DIAGNOSIS — E78.00 HYPERCHOLESTEREMIA: ICD-10-CM

## 2018-10-27 DIAGNOSIS — E55.9 HYPOVITAMINOSIS D: ICD-10-CM

## 2018-10-27 DIAGNOSIS — Z12.5 SCREENING FOR MALIGNANT NEOPLASM OF PROSTATE: ICD-10-CM

## 2018-10-27 LAB
25(OH)D3 SERPL-MCNC: 37 NG/ML (ref 30–100)
ALBUMIN SERPL BCP-MCNC: 4.3 G/DL (ref 3.2–4.9)
ALBUMIN/GLOB SERPL: 1.4 G/DL
ALP SERPL-CCNC: 90 U/L (ref 30–99)
ALT SERPL-CCNC: 30 U/L (ref 2–50)
ANION GAP SERPL CALC-SCNC: 7 MMOL/L (ref 0–11.9)
AST SERPL-CCNC: 27 U/L (ref 12–45)
BILIRUB SERPL-MCNC: 1 MG/DL (ref 0.1–1.5)
BUN SERPL-MCNC: 28 MG/DL (ref 8–22)
CALCIUM SERPL-MCNC: 9.3 MG/DL (ref 8.5–10.5)
CHLORIDE SERPL-SCNC: 106 MMOL/L (ref 96–112)
CHOLEST SERPL-MCNC: 173 MG/DL (ref 100–199)
CO2 SERPL-SCNC: 25 MMOL/L (ref 20–33)
CREAT SERPL-MCNC: 0.91 MG/DL (ref 0.5–1.4)
GLOBULIN SER CALC-MCNC: 3 G/DL (ref 1.9–3.5)
GLUCOSE SERPL-MCNC: 104 MG/DL (ref 65–99)
HDLC SERPL-MCNC: 43 MG/DL
LDLC SERPL CALC-MCNC: 111 MG/DL
POTASSIUM SERPL-SCNC: 4.3 MMOL/L (ref 3.6–5.5)
PROT SERPL-MCNC: 7.3 G/DL (ref 6–8.2)
PSA SERPL-MCNC: 1.74 NG/ML (ref 0–4)
SODIUM SERPL-SCNC: 138 MMOL/L (ref 135–145)
TRIGL SERPL-MCNC: 96 MG/DL (ref 0–149)

## 2018-10-27 PROCEDURE — 84153 ASSAY OF PSA TOTAL: CPT | Mod: GA

## 2018-10-27 PROCEDURE — 36415 COLL VENOUS BLD VENIPUNCTURE: CPT

## 2018-10-27 PROCEDURE — 80061 LIPID PANEL: CPT

## 2018-10-27 PROCEDURE — 80053 COMPREHEN METABOLIC PANEL: CPT

## 2018-10-27 PROCEDURE — 82306 VITAMIN D 25 HYDROXY: CPT

## 2018-10-30 ENCOUNTER — OFFICE VISIT (OUTPATIENT)
Dept: MEDICAL GROUP | Facility: MEDICAL CENTER | Age: 68
End: 2018-10-30
Payer: MEDICARE

## 2018-10-30 VITALS
SYSTOLIC BLOOD PRESSURE: 158 MMHG | HEIGHT: 70 IN | HEART RATE: 57 BPM | TEMPERATURE: 98.8 F | OXYGEN SATURATION: 94 % | WEIGHT: 253.31 LBS | BODY MASS INDEX: 36.26 KG/M2 | DIASTOLIC BLOOD PRESSURE: 92 MMHG

## 2018-10-30 DIAGNOSIS — Z72.0 TOBACCO USE: ICD-10-CM

## 2018-10-30 DIAGNOSIS — E78.00 HYPERCHOLESTEREMIA: ICD-10-CM

## 2018-10-30 DIAGNOSIS — G57.93 NEUROPATHY OF BOTH FEET: ICD-10-CM

## 2018-10-30 DIAGNOSIS — E55.9 VITAMIN D DEFICIENCY: ICD-10-CM

## 2018-10-30 DIAGNOSIS — E66.9 OBESITY (BMI 30-39.9): ICD-10-CM

## 2018-10-30 DIAGNOSIS — N40.0 BENIGN PROSTATIC HYPERPLASIA, UNSPECIFIED WHETHER LOWER URINARY TRACT SYMPTOMS PRESENT: ICD-10-CM

## 2018-10-30 DIAGNOSIS — Z00.00 HEALTH CARE MAINTENANCE: ICD-10-CM

## 2018-10-30 DIAGNOSIS — Z23 NEED FOR INFLUENZA VACCINATION: ICD-10-CM

## 2018-10-30 DIAGNOSIS — N52.9 ERECTILE DYSFUNCTION, UNSPECIFIED ERECTILE DYSFUNCTION TYPE: ICD-10-CM

## 2018-10-30 DIAGNOSIS — I10 ESSENTIAL HYPERTENSION: ICD-10-CM

## 2018-10-30 PROCEDURE — 99214 OFFICE O/P EST MOD 30 MIN: CPT | Mod: 25 | Performed by: INTERNAL MEDICINE

## 2018-10-30 PROCEDURE — G0008 ADMIN INFLUENZA VIRUS VAC: HCPCS | Performed by: INTERNAL MEDICINE

## 2018-10-30 PROCEDURE — 90662 IIV NO PRSV INCREASED AG IM: CPT | Performed by: INTERNAL MEDICINE

## 2018-10-30 RX ORDER — METOPROLOL SUCCINATE 25 MG/1
25 TABLET, EXTENDED RELEASE ORAL DAILY
Qty: 90 TAB | Refills: 1 | Status: SHIPPED | OUTPATIENT
Start: 2018-10-30 | End: 2019-04-30 | Stop reason: SDUPTHER

## 2018-10-30 RX ORDER — LOSARTAN POTASSIUM 50 MG/1
50 TABLET ORAL DAILY
Qty: 90 TAB | Refills: 1 | Status: SHIPPED | OUTPATIENT
Start: 2018-10-30 | End: 2019-04-30 | Stop reason: SDUPTHER

## 2018-10-30 RX ORDER — AMOXICILLIN 500 MG/1
CAPSULE ORAL
COMMUNITY
Start: 2018-10-27 | End: 2018-10-30

## 2018-10-30 NOTE — PROGRESS NOTES
CHIEF COMPLAINT  Chief Complaint   Patient presents with   • Results     Lab Results   • Hypertension     HPI  Patient is a 68 y.o. male patient who presents today for the following     HYPERTENSION  Meds: Metoprolol 25 mg QD, losartan, 50 mg QD; taking as prescribed.   Measuring BP at home: yes. It has been < 140/90  Denies: - headaches, vision problems, tinnitus.  - chest pain/pressure, palpitations, irregular heart beats, exertional, dyspnea, peripheral edema.              - medication side effect: unusual fatigue, slow heartbeat, foot/leg swelling.  Low salt diet: no  Diet: regular  Exercise: < 4 d/w  BMI: 36  FH of HTN: neg     Hypercholesterolemia  The patient had slightly elevated cholesterol/triglycerides.   No meds.  Diet / Exercise/BMI: As above  FH: neg     BPH  Symptoms:  · Nocturia: x 1  · Urgency  · Weak stream/dribbling  · Bladder fullness / incomplete emptying  · Hesitancy.     Denies:   • Frequency  • Stopped cialis.     ED  Patient has a problem to get and sustain an erection.  He used cilais prn.    Feet neuropathy  He has had toes neuropathy for many yrs, no change.   No meds.     Hypovitaminosis D  Patient had low vitamin D level.  On high dose of vitamin D.     OBESITY, BMI 36, was 35  Onset: Remote  Diet: Regular; likes to, especially after retired  Exercise: Limited  No temperature intolerance. No change in hair/skin quality, BMs.   No HTN, buffalo hump, purple striae, flushing.  FH of obesity: Unknown    Reviewed PMH, PSH, FH, SH, ALL, HCM/IMM, no changes  Reviewed MEDS, updated    Patient Active Problem List    Diagnosis Date Noted   • Erectile dysfunction 10/30/2018   • Obesity (BMI 30-39.9) 07/26/2018   • Vitamin D deficiency 04/11/2014   • Neuropathy of both feet 07/20/2012   • Essential hypertension 07/20/2012   • Hypercholesteremia 07/20/2012   • Benign prostatic hyperplasia 07/20/2012     CURRENT MEDICATIONS  Current Outpatient Prescriptions   Medication Sig Dispense Refill   •  metoprolol SR (TOPROL XL) 25 MG TABLET SR 24 HR Take 1 Tab by mouth every day. 90 Tab 1   • losartan (COZAAR) 50 MG Tab Take 1 Tab by mouth every day. 90 Tab 1   • vitamin D, Ergocalciferol, (DRISDOL) 68879 UNITS Cap capsule   11   • tadalafil (CIALIS) 20 MG tablet Take 1 Tab by mouth as needed for Erectile Dysfunction. Take one q 2-3 days prn ED. 30 Tab 3   • naproxen (NAPROSYN) 500 MG TABS Take 1 Tab by mouth 2 times a day, with meals. 60 Tab 0   • mupirocin (BACTROBAN) 2 % OINT Apply bid to effected toenail. 1 Tube 1     No current facility-administered medications for this visit.      ALLERGIES  Allergies: Nkda [no known drug allergy]  PAST MEDICAL HISTORY  Past Medical History:   Diagnosis Date   • Hypercholesterolemia    • Hypertension    • Tinea inguinalis      SURGICAL HISTORY  He  has a past surgical history that includes other abdominal surgery.  SOCIAL HISTORY  Social History   Substance Use Topics   • Smoking status: Light Tobacco Smoker     Packs/day: 0.25     Types: Cigars, Cigarettes   • Smokeless tobacco: Never Used      Comment: cigar, smokes one a day.   • Alcohol use Yes      Comment: minimal     Social History     Social History Narrative   • No narrative on file     FAMILY HISTORY  Family History   Problem Relation Age of Onset   • Heart Disease Mother    • Heart Disease Father    • Cancer Sister         leukemia, breast cancer   • Stroke Sister    • Hypertension Neg Hx    • Hyperlipidemia Neg Hx      Family Status   Relation Status   • Mo    • Fa    • Sis    • Sis (Not Specified)   • Neg Hx (Not Specified)       ROS   Constitutional: Negative for fever, chills.  HENT: Negative for congestion, sore throat.  Eyes: Negative for blurred vision.   Respiratory: Negative for cough, shortness of breath.  Cardiovascular: Negative for chest pain, palpitations. And per HPI.  Gastrointestinal: Negative for heartburn, nausea, abdominal pain.   Genitourinary: Negative for dysuria. And  "per HPI.  Musculoskeletal: Negative for significant myalgias, back pain and joint pain.   Skin: Negative for rash and itching.   Neuro: Negative for dizziness, weakness and headaches. And per HPI.  Endo/Heme/Allergies: Does not bruise/bleed easily.   Psychiatric/Behavioral: Negative for depression, anxiety    PHYSICAL EXAM   Blood pressure 158/92, pulse (!) 57, temperature 37.1 °C (98.8 °F), temperature source Temporal, height 1.778 m (5' 10\"), weight 114.9 kg (253 lb 4.9 oz), SpO2 94 %. Body mass index is 36.35 kg/m².  General:  NAD, well appearing  HEENT:   NC/AT, PERRLA, EOMI, TMs are clear. Oropharyngeal mucosa is pink,  without lesions;  no cervical / supraclavicular  lymphadenopathy, no thyromegaly.    Cardiovascular: RRR.   No m/r/g. No carotid bruits .      Lungs:   CTAB, no w/r/r, no respiratory distress.  Abdomen: Soft, NT/ND + BS; unable to palpate liver/spleen due to obesity.  Extremities:  2+ DP and radial pulses bilaterally.  No c/c/e.   Skin:  Warm, dry.  No erythema. No rash.   Neurologic: Alert & oriented x 3. No focal deficits.  Psychiatric:  Affect normal, mood normal, judgment normal.    LABS     Labs are reviewed and discussed with a patient  Lab Results   Component Value Date/Time    CHOLSTRLTOT 173 10/27/2018 11:15 AM     (H) 10/27/2018 11:15 AM    HDL 43 10/27/2018 11:15 AM    TRIGLYCERIDE 96 10/27/2018 11:15 AM       Lab Results   Component Value Date/Time    SODIUM 138 10/27/2018 11:15 AM    POTASSIUM 4.3 10/27/2018 11:15 AM    CHLORIDE 106 10/27/2018 11:15 AM    CO2 25 10/27/2018 11:15 AM    GLUCOSE 104 (H) 10/27/2018 11:15 AM    BUN 28 (H) 10/27/2018 11:15 AM    CREATININE 0.91 10/27/2018 11:15 AM    CREATININE 0.92 03/01/2010 04:54 PM    BUNCREATRAT 21 03/01/2010 04:54 PM    GLOMRATE >59 03/01/2010 04:54 PM     Lab Results   Component Value Date/Time    ALKPHOSPHAT 90 10/27/2018 11:15 AM    ASTSGOT 27 10/27/2018 11:15 AM    ALTSGPT 30 10/27/2018 11:15 AM    TBILIRUBIN 1.0 " 10/27/2018 11:15 AM      Lab Results   Component Value Date/Time    WBC 10.3 04/11/2014 09:21 AM    WBC 9.4 03/01/2010 04:54 PM    RBC 5.08 04/11/2014 09:21 AM    RBC 5.39 03/01/2010 04:54 PM    HEMOGLOBIN 15.5 04/11/2014 09:21 AM    HEMATOCRIT 44.8 04/11/2014 09:21 AM    MCV 88.2 04/11/2014 09:21 AM    MCV 89 03/01/2010 04:54 PM    MCH 30.5 04/11/2014 09:21 AM    MCH 29.3 03/01/2010 04:54 PM    MCHC 34.6 04/11/2014 09:21 AM    MPV 9.2 04/11/2014 09:21 AM    NEUTSPOLYS 78.3 (H) 04/11/2014 09:21 AM    LYMPHOCYTES 12.7 (L) 04/11/2014 09:21 AM    MONOCYTES 8.1 04/11/2014 09:21 AM    EOSINOPHILS 0.5 04/11/2014 09:21 AM    BASOPHILS 0.4 04/11/2014 09:21 AM    HYPOCHROMIA 1+ 07/15/2013 10:35 AM      IMAGING     None    ASSESMENT AND PLAN        1. Essential hypertension  Controlled, continue with current treatment, monitoring blood pressure at home  - metoprolol SR (TOPROL XL) 25 MG TABLET SR 24 HR; Take 1 Tab by mouth every day.  Dispense: 90 Tab; Refill: 1  - losartan (COZAAR) 50 MG Tab; Take 1 Tab by mouth every day.  Dispense: 90 Tab; Refill: 1  - COMP METABOLIC PANEL; Future    2. Hypercholesteremia  Mild, continue low calorie diet, increase exercise, weight control.  - COMP METABOLIC PANEL; Future  - LIPID PROFILE; Future    3. Benign prostatic hyperplasia, unspecified whether lower urinary tract symptoms present  Stable, no medications    4. Erectile dysfunction, unspecified erectile dysfunction type  Controlled, continue current treatment as needed    5. Neuropathy of both feet  Stable, no medications    6. Vitamin D deficiency  Advised to continue 2000 units of vitamin D daily, follow-up labs  - VITAMIN D,25 HYDROXY; Future    7. Obesity (BMI 30-39.9)  - OBESITY COUNSELING (No Charge): Patient identified as having weight management issue.  Appropriate orders and counseling given.    8. Need for influenza vaccination  - INFLUENZA VACCINE, HIGH DOSE (65+ ONLY)  Information was provided to the patient regarding the  vaccine, including side effects. Vaccine was given by my medical assistant under my supervision.    9. Health care maintenance  Advised pneumonia shot, shingrix.    Counseling:   - Smoking: Advised to quit smoking    Followup: Return in about 6 months (around 4/30/2019), or wellness.    All questions are answered.    Please note that this dictation was created using voice recognition software, and that there might be errors of barbara and possibly content.

## 2019-01-21 ENCOUNTER — OFFICE VISIT (OUTPATIENT)
Dept: URGENT CARE | Facility: CLINIC | Age: 69
End: 2019-01-21
Payer: MEDICARE

## 2019-01-21 VITALS
OXYGEN SATURATION: 94 % | HEART RATE: 65 BPM | TEMPERATURE: 97.9 F | DIASTOLIC BLOOD PRESSURE: 74 MMHG | SYSTOLIC BLOOD PRESSURE: 110 MMHG | RESPIRATION RATE: 16 BRPM | HEIGHT: 70 IN | WEIGHT: 254 LBS | BODY MASS INDEX: 36.36 KG/M2

## 2019-01-21 DIAGNOSIS — R10.84 GENERALIZED ABDOMINAL PAIN: ICD-10-CM

## 2019-01-21 LAB
APPEARANCE UR: CLEAR
BILIRUB UR STRIP-MCNC: NEGATIVE MG/DL
COLOR UR AUTO: NORMAL
GLUCOSE UR STRIP.AUTO-MCNC: NEGATIVE MG/DL
KETONES UR STRIP.AUTO-MCNC: NEGATIVE MG/DL
LEUKOCYTE ESTERASE UR QL STRIP.AUTO: NEGATIVE
NITRITE UR QL STRIP.AUTO: NEGATIVE
PH UR STRIP.AUTO: 5 [PH] (ref 5–8)
PROT UR QL STRIP: NEGATIVE MG/DL
RBC UR QL AUTO: NEGATIVE
SP GR UR STRIP.AUTO: 1.02
UROBILINOGEN UR STRIP-MCNC: 0.2 MG/DL

## 2019-01-21 PROCEDURE — 99214 OFFICE O/P EST MOD 30 MIN: CPT | Performed by: PHYSICIAN ASSISTANT

## 2019-01-21 PROCEDURE — 81002 URINALYSIS NONAUTO W/O SCOPE: CPT | Performed by: PHYSICIAN ASSISTANT

## 2019-01-21 RX ORDER — GLUCOSAMINE SULFATE 500 MG
CAPSULE ORAL
COMMUNITY
End: 2020-09-28

## 2019-01-23 ASSESSMENT — ENCOUNTER SYMPTOMS
MUSCULOSKELETAL NEGATIVE: 1
CONSTIPATION: 1
NAUSEA: 0
DIARRHEA: 0
VOMITING: 0
SHORTNESS OF BREATH: 0
BLOOD IN STOOL: 0
ABDOMINAL PAIN: 1
DIZZINESS: 0
COUGH: 0
CHILLS: 0
FEVER: 0

## 2019-01-23 NOTE — PROGRESS NOTES
"Subjective:      Laureen Munoz is a 68 y.o. male who presents with Abdominal Pain (lower abd pain, woke today around 4am with pain, normal urination, constipation, gas and pressure, past hour had 3waves of pain)            Abdominal Pain   This is a new problem. The current episode started today. The problem occurs intermittently. The problem has been waxing and waning. The pain is located in the generalized abdominal region. The pain is at a severity of 4/10. The pain is moderate. The quality of the pain is aching and sharp. The abdominal pain does not radiate. Associated symptoms include constipation. Pertinent negatives include no diarrhea, dysuria, fever, nausea or vomiting.     Patient presents to urgent care reporting intermittent abdominal pain and cramping starting this morning. The pain comes about 3 times per hour and last for a few seconds before resolving. He reports having a large meal of sushi last night and hasn't had a full bowel movement since that time No fevers, chills, body aches, nausea, vomiting, diarrhea, bloody stools, or urinary symptoms. No personal or family history of colon disease. He is UTD on screening colonoscopies and reports they have all been normal. No history of diverticulitis.     Review of Systems   Constitutional: Negative for chills and fever.   HENT: Negative for congestion.    Respiratory: Negative for cough and shortness of breath.    Cardiovascular: Negative for chest pain.   Gastrointestinal: Positive for abdominal pain and constipation. Negative for blood in stool, diarrhea, nausea and vomiting.   Genitourinary: Negative.  Negative for dysuria.   Musculoskeletal: Negative.    Skin: Negative for rash.   Neurological: Negative for dizziness.        Objective:     /74 (BP Location: Left arm, Patient Position: Sitting, BP Cuff Size: Large adult)   Pulse 65   Temp 36.6 °C (97.9 °F) (Temporal)   Resp 16   Ht 1.778 m (5' 10\")   Wt 115.2 kg (254 lb)   SpO2 94%   " BMI 36.45 kg/m²      Physical Exam   Constitutional: He is oriented to person, place, and time. He appears well-developed and well-nourished. No distress.   HENT:   Head: Normocephalic and atraumatic.   Eyes: Pupils are equal, round, and reactive to light.   Neck: Normal range of motion.   Cardiovascular: Normal rate.    Pulmonary/Chest: Effort normal.   Abdominal: Soft. He exhibits no distension. Bowel sounds are decreased. There is no tenderness. There is no rebound, no guarding, no CVA tenderness, no tenderness at McBurney's point and negative Lewis's sign.   No CVAT bilaterally   Musculoskeletal: Normal range of motion.   Neurological: He is alert and oriented to person, place, and time.   Skin: Skin is warm and dry. He is not diaphoretic.   Psychiatric: He has a normal mood and affect. His behavior is normal.   Nursing note and vitals reviewed.         PMH:  has a past medical history of Hypercholesterolemia; Hypertension; and Tinea inguinalis.  MEDS:   Current Outpatient Prescriptions:   •  vitamin D (CHOLECALCIFEROL) 1000 UNIT Tab, Take 1,000 Units by mouth every day., Disp: , Rfl:   •  glucosamine 500 MG Cap, Take  by mouth., Disp: , Rfl:   •  metoprolol SR (TOPROL XL) 25 MG TABLET SR 24 HR, Take 1 Tab by mouth every day., Disp: 90 Tab, Rfl: 1  •  losartan (COZAAR) 50 MG Tab, Take 1 Tab by mouth every day., Disp: 90 Tab, Rfl: 1  •  vitamin D, Ergocalciferol, (DRISDOL) 28601 UNITS Cap capsule, , Disp: , Rfl: 11  •  tadalafil (CIALIS) 20 MG tablet, Take 1 Tab by mouth as needed for Erectile Dysfunction. Take one q 2-3 days prn ED., Disp: 30 Tab, Rfl: 3  •  naproxen (NAPROSYN) 500 MG TABS, Take 1 Tab by mouth 2 times a day, with meals., Disp: 60 Tab, Rfl: 0  •  mupirocin (BACTROBAN) 2 % OINT, Apply bid to effected toenail., Disp: 1 Tube, Rfl: 1  ALLERGIES:   Allergies   Allergen Reactions   • Nkda [No Known Drug Allergy]      SURGHX:   Past Surgical History:   Procedure Laterality Date   • OTHER ABDOMINAL  SURGERY      gallbladder     SOCHX:  reports that he has been smoking Cigars and Cigarettes.  He has been smoking about 0.25 packs per day. He has never used smokeless tobacco. He reports that he drinks alcohol. He reports that he does not use drugs.  FH: family history includes Cancer in his sister; Heart Disease in his father and mother; Stroke in his sister.    POCT Urinalysis:  Component Results     Component Value Ref Range & Units Status   POC Color ANA  Negative Final   POC Appearance CLEAR  Negative Final   POC Leukocyte Esterase NEGATIVE  Negative Final   POC Nitrites NEGATIVE  Negative Final   POC Urobiligen 0.2  Negative (0.2) mg/dL Final   POC Protein NEGATIVE  Negative mg/dL Final   POC Urine PH 5.0  5.0 - 8.0 Final   POC Blood NEGATIVE  Negative Final   POC Specific Gravity 1.025  <1.005 - >1.030 Final   POC Ketones NEGATIVE  Negative mg/dL Final   POC Bilirubin NEGATIVE  Negative mg/dL Final   POC Glucose NEGATIVE  Negative mg/dL Final   Last Resulted Time   Mon Jan 21, 2019  6:08 PM      Assessment/Plan:     1. Generalized abdominal pain  - POCT Urinalysis --> normal    No abdominal tenderness on exam at today's visit. He was asymptomatic during the entire exam, low suspicion of acute abdomen at this time. Encouraged to increase fluids and discussed taking OTC stool softeners. Monitor symptoms closely and RTC or go to the ED if symptoms persist/worsen. The patient demonstrated a good understanding and agreed with the treatment plan.

## 2019-03-26 ENCOUNTER — HOSPITAL ENCOUNTER (OUTPATIENT)
Dept: LAB | Facility: MEDICAL CENTER | Age: 69
End: 2019-03-26
Attending: INTERNAL MEDICINE
Payer: MEDICARE

## 2019-03-26 ENCOUNTER — OFFICE VISIT (OUTPATIENT)
Dept: MEDICAL GROUP | Facility: MEDICAL CENTER | Age: 69
End: 2019-03-26
Payer: MEDICARE

## 2019-03-26 VITALS
DIASTOLIC BLOOD PRESSURE: 78 MMHG | HEART RATE: 58 BPM | HEIGHT: 70 IN | SYSTOLIC BLOOD PRESSURE: 138 MMHG | OXYGEN SATURATION: 93 % | WEIGHT: 253.31 LBS | BODY MASS INDEX: 36.26 KG/M2 | TEMPERATURE: 98.8 F

## 2019-03-26 DIAGNOSIS — E78.00 HYPERCHOLESTEREMIA: ICD-10-CM

## 2019-03-26 DIAGNOSIS — N52.9 ERECTILE DYSFUNCTION, UNSPECIFIED ERECTILE DYSFUNCTION TYPE: ICD-10-CM

## 2019-03-26 DIAGNOSIS — L43.8 ORAL LICHEN PLANUS: ICD-10-CM

## 2019-03-26 DIAGNOSIS — Z72.0 TOBACCO USE: ICD-10-CM

## 2019-03-26 DIAGNOSIS — E66.9 OBESITY (BMI 30-39.9): ICD-10-CM

## 2019-03-26 DIAGNOSIS — Z11.4 ENCOUNTER FOR SCREENING FOR HIV: ICD-10-CM

## 2019-03-26 DIAGNOSIS — Z00.00 HEALTH CARE MAINTENANCE: ICD-10-CM

## 2019-03-26 DIAGNOSIS — I10 ESSENTIAL HYPERTENSION: ICD-10-CM

## 2019-03-26 DIAGNOSIS — E55.9 VITAMIN D DEFICIENCY: ICD-10-CM

## 2019-03-26 DIAGNOSIS — N40.1 BENIGN PROSTATIC HYPERPLASIA WITH LOWER URINARY TRACT SYMPTOMS, SYMPTOM DETAILS UNSPECIFIED: ICD-10-CM

## 2019-03-26 LAB
25(OH)D3 SERPL-MCNC: 23 NG/ML (ref 30–100)
ALBUMIN SERPL BCP-MCNC: 4.2 G/DL (ref 3.2–4.9)
ALBUMIN/GLOB SERPL: 1.3 G/DL
ALP SERPL-CCNC: 87 U/L (ref 30–99)
ALT SERPL-CCNC: 30 U/L (ref 2–50)
ANION GAP SERPL CALC-SCNC: 9 MMOL/L (ref 0–11.9)
AST SERPL-CCNC: 28 U/L (ref 12–45)
BILIRUB SERPL-MCNC: 1.7 MG/DL (ref 0.1–1.5)
BUN SERPL-MCNC: 22 MG/DL (ref 8–22)
CALCIUM SERPL-MCNC: 9.1 MG/DL (ref 8.5–10.5)
CHLORIDE SERPL-SCNC: 104 MMOL/L (ref 96–112)
CHOLEST SERPL-MCNC: 196 MG/DL (ref 100–199)
CO2 SERPL-SCNC: 24 MMOL/L (ref 20–33)
CREAT SERPL-MCNC: 0.83 MG/DL (ref 0.5–1.4)
FASTING STATUS PATIENT QL REPORTED: NORMAL
GLOBULIN SER CALC-MCNC: 3.2 G/DL (ref 1.9–3.5)
GLUCOSE SERPL-MCNC: 87 MG/DL (ref 65–99)
HDLC SERPL-MCNC: 46 MG/DL
HIV 1+2 AB+HIV1 P24 AG SERPL QL IA: NON REACTIVE
LDLC SERPL CALC-MCNC: 107 MG/DL
POTASSIUM SERPL-SCNC: 4.2 MMOL/L (ref 3.6–5.5)
PROT SERPL-MCNC: 7.4 G/DL (ref 6–8.2)
SODIUM SERPL-SCNC: 137 MMOL/L (ref 135–145)
TRIGL SERPL-MCNC: 213 MG/DL (ref 0–149)

## 2019-03-26 PROCEDURE — 99214 OFFICE O/P EST MOD 30 MIN: CPT | Performed by: INTERNAL MEDICINE

## 2019-03-26 PROCEDURE — 80061 LIPID PANEL: CPT

## 2019-03-26 PROCEDURE — G0475 HIV COMBINATION ASSAY: HCPCS | Mod: GA

## 2019-03-26 PROCEDURE — 36415 COLL VENOUS BLD VENIPUNCTURE: CPT

## 2019-03-26 PROCEDURE — 82306 VITAMIN D 25 HYDROXY: CPT

## 2019-03-26 PROCEDURE — 80053 COMPREHEN METABOLIC PANEL: CPT

## 2019-03-26 RX ORDER — AMOXICILLIN 500 MG/1
CAPSULE ORAL
COMMUNITY
Start: 2019-02-19 | End: 2019-03-26

## 2019-03-26 RX ORDER — HYDROCODONE BITARTRATE AND ACETAMINOPHEN 7.5; 325 MG/1; MG/1
TABLET ORAL
COMMUNITY
Start: 2019-01-15 | End: 2019-04-30

## 2019-03-26 RX ORDER — FLUOCINONIDE GEL 0.5 MG/G
GEL TOPICAL
Qty: 1 TUBE | Refills: 1 | Status: SHIPPED | OUTPATIENT
Start: 2019-03-26 | End: 2020-09-28

## 2019-03-26 ASSESSMENT — PATIENT HEALTH QUESTIONNAIRE - PHQ9: CLINICAL INTERPRETATION OF PHQ2 SCORE: 0

## 2019-03-26 NOTE — PROGRESS NOTES
CHIEF COMPLAINT  Chief Complaint   Patient presents with   • Mouth Lesions     HPI  Patient is a 68 y.o. male patient who presents today for the following     Oral lesion  Tobacco use  The patient was found to have widespread whitish/Hoa lesions over the mouth and tongue mucosa in December 2018, by dentist.  Smoker.  He has been sexually active, with 2 partners in the last 2 years, used condoms or safe self practices, as he said.  He had negative HIV test in 2011.  Denies:  -Fever, chills, anorexia, weight loss   -Lymphadenopathy, skin rash  -Diarrhea.    Hypertension  Meds: Metoprolol 25 mg QD, losartan, 50 mg QD; taking as prescribed.   Measuring BP at home: yes. It has been < 140/90  Denies: - headaches, vision problems, tinnitus.  - chest pain/pressure, palpitations, irregular heart beats, exertional, dyspnea, peripheral edema.  - medication side effect: unusual fatigue, slow heartbeat, foot/leg swelling.  Low salt diet: no  Diet: regular  Exercise: < 4 d/w  BMI: 36  FH of HTN: neg     Hypercholesterolemia  The patient had slightly elevated cholesterol/triglycerides.   No meds.  Diet / Exercise/BMI: As above  FH: neg     BPH  Symptoms:  • Nocturia: x 1  • Urgency  • Weak stream/dribbling  • Bladder fullness / incomplete emptying  • Hesitancy.     Denies:   • Frequency    No current medications.  In the past he was on Flomax, finasteride, Cialis 5 mg daily     ED  Patient has a problem to get and sustain an erection.  He used cilais prn.    Reviewed PMH, PSH, FH, SH, ALL, HCM/IMM, no changes  Reviewed MEDS, no changes    Patient Active Problem List    Diagnosis Date Noted   • Erectile dysfunction 10/30/2018   • Health care maintenance 10/30/2018   • Tobacco use 10/30/2018   • Obesity (BMI 30-39.9) 07/26/2018   • Neuropathy of both feet 07/20/2012   • Essential hypertension 07/20/2012   • Hypercholesteremia 07/20/2012   • Benign prostatic hyperplasia 07/20/2012     CURRENT MEDICATIONS  Current Outpatient  Prescriptions   Medication Sig Dispense Refill   • HYDROcodone-acetaminophen (NORCO) 7.5-325 MG per tablet      • fluocinonide (LIDEX) 0.05 % gel Apply BID over MOUTH MUCOSA 1 Tube 1   • vitamin D (CHOLECALCIFEROL) 1000 UNIT Tab Take 1,000 Units by mouth every day.     • glucosamine 500 MG Cap Take  by mouth.     • metoprolol SR (TOPROL XL) 25 MG TABLET SR 24 HR Take 1 Tab by mouth every day. 90 Tab 1   • losartan (COZAAR) 50 MG Tab Take 1 Tab by mouth every day. 90 Tab 1   • tadalafil (CIALIS) 20 MG tablet Take 1 Tab by mouth as needed for Erectile Dysfunction. Take one q 2-3 days prn ED. 30 Tab 3   • naproxen (NAPROSYN) 500 MG TABS Take 1 Tab by mouth 2 times a day, with meals. 60 Tab 0   • mupirocin (BACTROBAN) 2 % OINT Apply bid to effected toenail. 1 Tube 1     No current facility-administered medications for this visit.      ALLERGIES  Allergies: Nkda [no known drug allergy]  PAST MEDICAL HISTORY  Past Medical History:   Diagnosis Date   • Hypercholesterolemia    • Hypertension    • Tinea inguinalis      SURGICAL HISTORY  He  has a past surgical history that includes other abdominal surgery.  SOCIAL HISTORY  Social History   Substance Use Topics   • Smoking status: Light Tobacco Smoker     Packs/day: 0.25     Types: Cigars, Cigarettes   • Smokeless tobacco: Never Used      Comment: cigar, smokes one a day.   • Alcohol use Yes      Comment: minimal     Social History     Social History Narrative   • No narrative on file     FAMILY HISTORY  Family History   Problem Relation Age of Onset   • Heart Disease Mother    • Heart Disease Father    • Cancer Sister         leukemia, breast cancer   • Stroke Sister    • Hypertension Neg Hx    • Hyperlipidemia Neg Hx      Family Status   Relation Status   • Mo    • Fa    • Sis    • Sis (Not Specified)   • Neg Hx (Not Specified)       ROS   Constitutional: Negative for fever, chills, fatigue.  HENT: Negative for congestion, sore throat.  Eyes:  "Negative for blurred vision.   Respiratory: Negative for cough, shortness of breath.  Cardiovascular: Negative for chest pain, palpitations. And per HPI.  Gastrointestinal: Negative for heartburn, abdominal pain.   Genitourinary: As above.  Musculoskeletal: Negative for significant muscle, back and joint pain.   Skin: Negative for rash.   Neuro: Negative for dizziness, weakness and headaches.   Endo/Heme/Allergies: Does not bruise/bleed easily.   Psychiatric/Behavioral: Negative for depression.    PHYSICAL EXAM   Blood pressure 138/78, pulse (!) 58, temperature 37.1 °C (98.8 °F), temperature source Temporal, height 1.778 m (5' 10\"), weight 114.9 kg (253 lb 4.9 oz), SpO2 93 %. Body mass index is 36.35 kg/m².  General:  NAD, well appearing  HEENT:   NC/AT, PERRLA, EOMI, TMs are clear. Oropharyngeal mucosa is pink, with white lacy mucosal changes in the whole mouth including palate and tongue;  no cervical / supraclavicular  lymphadenopathy, no thyromegaly.    Cardiovascular: RRR.   No m/r/g. No carotid bruits .      Lungs:   CTAB, no w/r/r, no respiratory distress.  Abdomen: Soft, NT/ND + BS; no hepatosplenomegaly.  Extremities:  2+ DP and radial pulses bilaterally.  No c/c/e.   Skin:  Warm, dry.  No erythema. No rash.   Neurologic: Alert & oriented x 3. CN II-XII grossly intact. No focal deficits.  Psychiatric:  Affect normal, mood normal, judgment normal.    LABS     Labs are reviewed and discussed with a patient  Lab Results   Component Value Date/Time    CHOLSTRLTOT 173 10/27/2018 11:15 AM     (H) 10/27/2018 11:15 AM    HDL 43 10/27/2018 11:15 AM    TRIGLYCERIDE 96 10/27/2018 11:15 AM       Lab Results   Component Value Date/Time    SODIUM 138 10/27/2018 11:15 AM    POTASSIUM 4.3 10/27/2018 11:15 AM    CHLORIDE 106 10/27/2018 11:15 AM    CO2 25 10/27/2018 11:15 AM    GLUCOSE 104 (H) 10/27/2018 11:15 AM    BUN 28 (H) 10/27/2018 11:15 AM    CREATININE 0.91 10/27/2018 11:15 AM    CREATININE 0.92 03/01/2010 " 04:54 PM    BUNCREATRAT 21 03/01/2010 04:54 PM    GLOMRATE >59 03/01/2010 04:54 PM     Lab Results   Component Value Date/Time    ALKPHOSPHAT 90 10/27/2018 11:15 AM    ASTSGOT 27 10/27/2018 11:15 AM    ALTSGPT 30 10/27/2018 11:15 AM    TBILIRUBIN 1.0 10/27/2018 11:15 AM      Lab Results   Component Value Date/Time    WBC 10.3 04/11/2014 09:21 AM    WBC 9.4 03/01/2010 04:54 PM    RBC 5.08 04/11/2014 09:21 AM    RBC 5.39 03/01/2010 04:54 PM    HEMOGLOBIN 15.5 04/11/2014 09:21 AM    HEMATOCRIT 44.8 04/11/2014 09:21 AM    MCV 88.2 04/11/2014 09:21 AM    MCV 89 03/01/2010 04:54 PM    MCH 30.5 04/11/2014 09:21 AM    MCH 29.3 03/01/2010 04:54 PM    MCHC 34.6 04/11/2014 09:21 AM    MPV 9.2 04/11/2014 09:21 AM    NEUTSPOLYS 78.3 (H) 04/11/2014 09:21 AM    LYMPHOCYTES 12.7 (L) 04/11/2014 09:21 AM    MONOCYTES 8.1 04/11/2014 09:21 AM    EOSINOPHILS 0.5 04/11/2014 09:21 AM    BASOPHILS 0.4 04/11/2014 09:21 AM    HYPOCHROMIA 1+ 07/15/2013 10:35 AM      IMAGING     None    ASSESMENT AND PLAN        1. Oral lichen planus  Which he says advised spread lesions in the mouth, may start topical steroid  - REFERRAL TO ENT  - fluocinonide (LIDEX) 0.05 % gel; Apply BID over MOUTH MUCOSA  Dispense: 1 Tube; Refill: 1    2. Tobacco use  Advised to quit smoking, may be a risk for the above lesion    3. Essential hypertension  Controlled, continue current treatment    4. Hypercholesteremia  Pending labs, continue current treatment    5. Benign prostatic hyperplasia with lower urinary tract symptoms, symptom details unspecified  He was on multiple medications in the past but he stopped.  PSA has been low    6. Erectile dysfunction, unspecified erectile dysfunction type  Stable, continue current treatment    7. Obesity (BMI 30-39.9)  - OBESITY COUNSELING (No Charge): Patient identified as having weight management issue.  Appropriate orders and counseling given.    8. Health care maintenance  9. Encounter for screening for HIV  - HIV AG/AB COMBO  ASSAY SCREENING; Future    Counseling:   - Smoking: As above    Followup: Return in about 2 weeks (around 4/9/2019), or if symptoms worsen or fail to improve.    All questions are answered.    Please note that this dictation was created using voice recognition software, and that there might be errors of barbara and possibly content.

## 2019-04-11 ENCOUNTER — APPOINTMENT (RX ONLY)
Dept: URBAN - METROPOLITAN AREA CLINIC 20 | Facility: CLINIC | Age: 69
Setting detail: DERMATOLOGY
End: 2019-04-11

## 2019-04-11 DIAGNOSIS — L21.8 OTHER SEBORRHEIC DERMATITIS: ICD-10-CM | Status: WELL CONTROLLED

## 2019-04-11 DIAGNOSIS — L57.0 ACTINIC KERATOSIS: ICD-10-CM | Status: INADEQUATELY CONTROLLED

## 2019-04-11 DIAGNOSIS — L57.8 OTHER SKIN CHANGES DUE TO CHRONIC EXPOSURE TO NONIONIZING RADIATION: ICD-10-CM

## 2019-04-11 DIAGNOSIS — Z85.828 PERSONAL HISTORY OF OTHER MALIGNANT NEOPLASM OF SKIN: ICD-10-CM

## 2019-04-11 DIAGNOSIS — L81.4 OTHER MELANIN HYPERPIGMENTATION: ICD-10-CM

## 2019-04-11 DIAGNOSIS — D18.0 HEMANGIOMA: ICD-10-CM

## 2019-04-11 DIAGNOSIS — D22 MELANOCYTIC NEVI: ICD-10-CM

## 2019-04-11 DIAGNOSIS — L94.2 CALCINOSIS CUTIS: ICD-10-CM

## 2019-04-11 DIAGNOSIS — L82.1 OTHER SEBORRHEIC KERATOSIS: ICD-10-CM

## 2019-04-11 PROBLEM — D18.01 HEMANGIOMA OF SKIN AND SUBCUTANEOUS TISSUE: Status: ACTIVE | Noted: 2019-04-11

## 2019-04-11 PROBLEM — D22.5 MELANOCYTIC NEVI OF TRUNK: Status: ACTIVE | Noted: 2019-04-11

## 2019-04-11 PROCEDURE — ? ADDITIONAL NOTES

## 2019-04-11 PROCEDURE — 99214 OFFICE O/P EST MOD 30 MIN: CPT

## 2019-04-11 PROCEDURE — ? COUNSELING

## 2019-04-11 PROCEDURE — ? DIAGNOSIS COMMENT

## 2019-04-11 ASSESSMENT — LOCATION SIMPLE DESCRIPTION DERM
LOCATION SIMPLE: CHEST
LOCATION SIMPLE: RIGHT UPPER BACK
LOCATION SIMPLE: LEFT CHEEK
LOCATION SIMPLE: LEFT THIGH
LOCATION SIMPLE: LEFT UPPER ARM
LOCATION SIMPLE: LEFT INDEX FINGER
LOCATION SIMPLE: RIGHT THIGH
LOCATION SIMPLE: RIGHT UPPER ARM
LOCATION SIMPLE: LEFT FOREARM
LOCATION SIMPLE: INFERIOR FOREHEAD
LOCATION SIMPLE: LEFT TEMPLE
LOCATION SIMPLE: RIGHT FOREARM
LOCATION SIMPLE: RIGHT CHEEK
LOCATION SIMPLE: SCALP

## 2019-04-11 ASSESSMENT — LOCATION DETAILED DESCRIPTION DERM
LOCATION DETAILED: LEFT INFERIOR CENTRAL MALAR CHEEK
LOCATION DETAILED: RIGHT PROXIMAL DORSAL FOREARM
LOCATION DETAILED: RIGHT MID-UPPER BACK
LOCATION DETAILED: RIGHT SUPERIOR MEDIAL UPPER BACK
LOCATION DETAILED: RIGHT INFERIOR UPPER BACK
LOCATION DETAILED: RIGHT ANTERIOR PROXIMAL UPPER ARM
LOCATION DETAILED: LEFT SUPERIOR PARIETAL SCALP
LOCATION DETAILED: RIGHT CENTRAL MALAR CHEEK
LOCATION DETAILED: LEFT PROXIMAL DORSAL FOREARM
LOCATION DETAILED: LEFT LATERAL TEMPLE
LOCATION DETAILED: INFERIOR MID FOREHEAD
LOCATION DETAILED: LEFT MEDIAL SUPERIOR CHEST
LOCATION DETAILED: LEFT INDEX FINGERTIP
LOCATION DETAILED: RIGHT ANTERIOR DISTAL THIGH
LOCATION DETAILED: LEFT ANTERIOR PROXIMAL UPPER ARM
LOCATION DETAILED: LEFT ANTERIOR DISTAL THIGH

## 2019-04-11 ASSESSMENT — LOCATION ZONE DERM
LOCATION ZONE: TRUNK
LOCATION ZONE: FACE
LOCATION ZONE: ARM
LOCATION ZONE: SCALP
LOCATION ZONE: FINGER
LOCATION ZONE: LEG

## 2019-04-11 NOTE — PROCEDURE: ADDITIONAL NOTES
Additional Notes: Provided samples of Ovace foam, instructed patient to contact office if he likes; send to [place] ([facility]) patient educated on process. \\n\\nRecommend promiseb if unresolved with Ovace, can send to [facility] with ins will be $60.
Detail Level: Simple
Additional Notes: Will start auth for red light therapy to [facility], plan to do arms after.

## 2019-04-24 ENCOUNTER — TELEPHONE (OUTPATIENT)
Dept: MEDICAL GROUP | Facility: MEDICAL CENTER | Age: 69
End: 2019-04-24

## 2019-04-24 NOTE — TELEPHONE ENCOUNTER
ANNUAL WELLNESS VISIT PRE-VISIT PLANNING WITHOUT OUTREACH    1.  Reviewed note from last office visit with PCP: YES    2.  If any orders were placed at last visit, do we have Results/Consult Notes?        •  Labs - Labs ordered, completed on 3/26/19 and results are in chart.  Note: If patient appointment is for lab review and patient did not complete labs, check with provider if OK to reschedule patient until labs completed.       •  Imaging - Imaging was not ordered at last office visit.       •  Referrals - Referral ordered, patient has NOT been seen.    3.  Immunizations were updated in [employer] using WebIZ?: Yes       •  WebIZ Recommendations: PREVNAR (PCV13)  and SHINGRIX (Shingles)        •  Is patient due for Tdap? NO       •  Is patient due for Shingrix? YES. Patient was not notified of copay/out of pocket cost.     4.  Patient is due for the following Health Maintenance Topics:   Health Maintenance Due   Topic Date Due   • Annual Wellness Visit  1950   • IMM PNEUMOCOCCAL 65+ (ADULT) LOW/MEDIUM RISK SERIES (1 of 2 - PCV13) 08/14/2016   • IMM ZOSTER VACCINES (2 of 3) 11/10/2016       - Patient already has appointment scheduled for Annual Wellness Visit (AWV).    5.  Reviewed/Updated the following with patient:       •   Preferred Pharmacy? NO       •   Preferred Lab? NO       •   Preferred Communication? NO       •   Allergies? NO       •   Medications? NO       •   Social History? NO       •   Family History (document living status of immediate family members and if + hx of  cancer, diabetes, hypertension, hyperlipidemia, heart attack, stroke) NO    6.  Care Team Updated:       •   DME Company (gait device, O2, CPAP, etc.): NO       •   Other Specialists (eye doctor, derm, GYN, cardiology, endo, etc): NO    7. Orders for overdue Health Maintenance topics pended in Pre-Charting? NO    8.  Patient has the following Care Path diagnoses on Problem List:  NONE    9.  Patient was not advised: “This is a free  wellness visit. The provider will screen for medical conditions to help you stay healthy. If you have other concerns to address you may be asked to discuss these at a separate visit or there may be an additional fee.”     10.  Patient was NOT informed to arrive 15 min prior to their scheduled appointment and bring in their medication bottles.

## 2019-04-30 ENCOUNTER — OFFICE VISIT (OUTPATIENT)
Dept: MEDICAL GROUP | Facility: MEDICAL CENTER | Age: 69
End: 2019-04-30
Payer: MEDICARE

## 2019-04-30 VITALS
WEIGHT: 251.32 LBS | HEART RATE: 58 BPM | BODY MASS INDEX: 35.98 KG/M2 | RESPIRATION RATE: 14 BRPM | SYSTOLIC BLOOD PRESSURE: 130 MMHG | DIASTOLIC BLOOD PRESSURE: 75 MMHG | HEIGHT: 70 IN | OXYGEN SATURATION: 93 % | TEMPERATURE: 96.8 F

## 2019-04-30 DIAGNOSIS — G57.93 NEUROPATHY OF BOTH FEET: ICD-10-CM

## 2019-04-30 DIAGNOSIS — I10 ESSENTIAL HYPERTENSION: ICD-10-CM

## 2019-04-30 DIAGNOSIS — Z72.0 TOBACCO USE: ICD-10-CM

## 2019-04-30 DIAGNOSIS — Z00.00 HEALTH CARE MAINTENANCE: ICD-10-CM

## 2019-04-30 DIAGNOSIS — E55.9 HYPOVITAMINOSIS D: ICD-10-CM

## 2019-04-30 DIAGNOSIS — N40.1 BENIGN PROSTATIC HYPERPLASIA WITH LOWER URINARY TRACT SYMPTOMS, SYMPTOM DETAILS UNSPECIFIED: ICD-10-CM

## 2019-04-30 DIAGNOSIS — L43.8 ORAL LICHEN PLANUS: ICD-10-CM

## 2019-04-30 DIAGNOSIS — Z00.00 MEDICARE ANNUAL WELLNESS VISIT, INITIAL: ICD-10-CM

## 2019-04-30 DIAGNOSIS — N52.9 ERECTILE DYSFUNCTION, UNSPECIFIED ERECTILE DYSFUNCTION TYPE: ICD-10-CM

## 2019-04-30 DIAGNOSIS — E66.9 OBESITY (BMI 30-39.9): ICD-10-CM

## 2019-04-30 DIAGNOSIS — E80.4 GILBERT SYNDROME: ICD-10-CM

## 2019-04-30 DIAGNOSIS — E78.00 HYPERCHOLESTEREMIA: ICD-10-CM

## 2019-04-30 PROCEDURE — 99214 OFFICE O/P EST MOD 30 MIN: CPT | Mod: 25 | Performed by: INTERNAL MEDICINE

## 2019-04-30 PROCEDURE — G0439 PPPS, SUBSEQ VISIT: HCPCS | Performed by: INTERNAL MEDICINE

## 2019-04-30 RX ORDER — ERGOCALCIFEROL 1.25 MG/1
50000 CAPSULE ORAL
Qty: 12 CAP | Refills: 1 | Status: SHIPPED | OUTPATIENT
Start: 2019-04-30 | End: 2019-10-30 | Stop reason: SDUPTHER

## 2019-04-30 RX ORDER — METOPROLOL SUCCINATE 25 MG/1
25 TABLET, EXTENDED RELEASE ORAL DAILY
Qty: 90 TAB | Refills: 1 | Status: SHIPPED | OUTPATIENT
Start: 2019-04-30 | End: 2019-10-30 | Stop reason: SDUPTHER

## 2019-04-30 RX ORDER — LOSARTAN POTASSIUM 50 MG/1
50 TABLET ORAL DAILY
Qty: 90 TAB | Refills: 1 | Status: SHIPPED | OUTPATIENT
Start: 2019-04-30 | End: 2019-10-30 | Stop reason: SDUPTHER

## 2019-04-30 ASSESSMENT — ENCOUNTER SYMPTOMS: GENERAL WELL-BEING: EXCELLENT

## 2019-04-30 ASSESSMENT — ACTIVITIES OF DAILY LIVING (ADL): BATHING_REQUIRES_ASSISTANCE: 0

## 2019-04-30 ASSESSMENT — PATIENT HEALTH QUESTIONNAIRE - PHQ9: CLINICAL INTERPRETATION OF PHQ2 SCORE: 0

## 2019-04-30 NOTE — PROGRESS NOTES
CC:   had concerns including Annual Exam. and Health Risk Assessment Exam, labs    HPI:  Laureen is a 68 y.o. here for Health Risk Assessment and Annual Exam  Labs done    Hypertension  Meds: Metoprolol 25 mg QD, losartan, 50 mg QD; taking as prescribed.   Measuring BP at home: yes. It has been < 140/90  Denies: - headaches, vision problems, tinnitus.  - chest pain/pressure, palpitations, irregular heart beats, exertional, dyspnea, peripheral edema.  - medication side effect: unusual fatigue, slow heartbeat, foot/leg swelling.  Low salt diet: no  Diet: regular  Exercise: < 4 d/w  BMI: 36  FH of HTN: neg     Hypercholesterolemia  The patient had slightly elevated cholesterol/triglycerides.   No meds.  Diet / Exercise/BMI: As above  FH: neg     BPH  Symptoms:  • Nocturia: x 1  • Urgency  • Weak stream/dribbling  • Bladder fullness / incomplete emptying  • Hesitancy.     Denies:   • Frequency  • Stopped cialis that he used in the past.     PSA was 1.74 in 10/18.     ED  Patient has a problem to get and sustain an erection.  He used cilais prn.     Feet neuropathy  He has had toes neuropathy for many yrs.  It has been gradually worsening, especially on the left side.  He was evaluated by podiatry.  No meds.    Oral lichen planus  Tobacco use  The patient was found to have widespread whitish/Hoa lesions over the mouth and tongue mucosa in December 2018, by dentist.  Smoker.  He has been sexually active, with 2 partners in the last 2 years, used condoms or safe self practices, as he said.  He had negative HIV test in 2011.  Denies:  -Fever, chills, anorexia, weight loss   -Lymphadenopathy, skin rash  -Diarrhea.  He was referred to ENT for biopsy, pending appointment.    Hypovitaminosis D  Patient had low vitamin D level.  On high dose of vitamin D.     OBESITY, BMI 36  Onset: Remote  Diet: Regular; likes to, especially after retired  Exercise: Limited  No temperature intolerance. No change in hair/skin quality, BMs.   No  HTN, buffalo hump, purple striae, flushing.  FH of obesity: Unknown     PCP: Madonna Swain M.D.  Other Care Team Members: PCP  Patient Care Team:  Madonna Swain M.D. as PCP - General (Family Medicine)    Patient Active Problem List    Diagnosis Date Noted   • Oral lichen planus 04/30/2019   • Gilbert syndrome 04/30/2019   • Erectile dysfunction 10/30/2018   • Health care maintenance 10/30/2018   • Tobacco use 10/30/2018   • Obesity (BMI 30-39.9) 07/26/2018   • Hypovitaminosis D 04/11/2014   • Neuropathy of both feet 07/20/2012   • Essential hypertension 07/20/2012   • Hypercholesteremia 07/20/2012   • Benign prostatic hyperplasia 07/20/2012     Current Outpatient Prescriptions   Medication Sig Dispense Refill   • metoprolol SR (TOPROL XL) 25 MG TABLET SR 24 HR Take 1 Tab by mouth every day. 90 Tab 1   • losartan (COZAAR) 50 MG Tab Take 1 Tab by mouth every day. 90 Tab 1   • vitamin D, Ergocalciferol, (DRISDOL) 22660 units Cap capsule Take 1 Cap by mouth every 7 days. (Patient not taking: Reported on 4/30/2019) 12 Cap 1   • fluocinonide (LIDEX) 0.05 % gel Apply BID over MOUTH MUCOSA (Patient not taking: Reported on 4/30/2019) 1 Tube 1   • vitamin D (CHOLECALCIFEROL) 1000 UNIT Tab Take 1,000 Units by mouth every day.     • glucosamine 500 MG Cap Take  by mouth.     • tadalafil (CIALIS) 20 MG tablet Take 1 Tab by mouth as needed for Erectile Dysfunction. Take one q 2-3 days prn ED. (Patient not taking: Reported on 4/30/2019) 30 Tab 3   • naproxen (NAPROSYN) 500 MG TABS Take 1 Tab by mouth 2 times a day, with meals. (Patient not taking: Reported on 4/30/2019) 60 Tab 0   • mupirocin (BACTROBAN) 2 % OINT Apply bid to effected toenail. (Patient not taking: Reported on 4/30/2019) 1 Tube 1     No current facility-administered medications for this visit.       Current supplements: as above.  Chronic narcotic pain medicines: no  Allergies: Nkda [no known drug allergy]  Current social contact/activities:  friends, family    Screening:  Depression Screening  Little interest or pleasure in doing things?  0 - not at all  Feeling down, depressed , or hopeless? 0 - not at all  Patient Health Questionnaire Score: 0     Screening for Cognitive Impairment  Three Minute Recall (village, kitchen, baby) 3/3    Baldemar clock face with all 12 numbers and set the hands to show 10 past 10.  Yes    Cognitive concerns identified deferred for follow up unless specifically addressed in assessment and plan.    Fall Risk Assessment  Has the patient had two or more falls in the last year or any fall with injury in the last year?  No    Safety Assessment  Throw rugs on floor.  Yes  Handrails on all stairs.  Yes  Good lighting in all hallways.  Yes  Difficulty hearing.  Yes  Patient counseled about all safety risks that were identified.    Functional Assessment ADLs  Are there any barriers preventing you from cooking for yourself or meeting nutritional needs?  No.    Are there any barriers preventing you from driving safely or obtaining transportation?  No.    Are there any barriers preventing you from using a telephone or calling for help?  No.    Are there any barriers preventing you from shopping?  No.    Are there any barriers preventing you from taking care of your own finances?  No.    Are there any barriers preventing you from managing your medications?  No.    Are there any barriers preventing you from showering, bathing or dressing yourself?  No.    Are you currently engaging in any exercise or physical activity?  Yes.     What is your perception of your health?  Excellent.    Health Maintenance Summary                IMM PNEUMOCOCCAL 65+ (ADULT) LOW/MEDIUM RISK SERIES Overdue 8/14/2016     IMM ZOSTER VACCINES Overdue 11/10/2016      Done 9/15/2016 Imm Admin: Zoster Vaccine Live (ZVL) (Zostavax)    IMM DTaP/Tdap/Td Vaccine Next Due 1/16/2023      Done 1/16/2013 Imm Admin: Tdap Vaccine    COLONOSCOPY Next Due 5/20/2024      Done  5/20/2014 AMB REFERRAL TO GI FOR COLONOSCOPY        Patient Care Team:  Madonna Swain M.D. as PCP - General (Family Medicine)    Allergies: Nkda [no known drug allergy]  Current Outpatient Prescriptions Ordered in [facility]   Medication Sig Dispense Refill   • metoprolol SR (TOPROL XL) 25 MG TABLET SR 24 HR Take 1 Tab by mouth every day. 90 Tab 1   • losartan (COZAAR) 50 MG Tab Take 1 Tab by mouth every day. 90 Tab 1   • vitamin D, Ergocalciferol, (DRISDOL) 18635 units Cap capsule Take 1 Cap by mouth every 7 days. (Patient not taking: Reported on 4/30/2019) 12 Cap 1   • fluocinonide (LIDEX) 0.05 % gel Apply BID over MOUTH MUCOSA (Patient not taking: Reported on 4/30/2019) 1 Tube 1   • vitamin D (CHOLECALCIFEROL) 1000 UNIT Tab Take 1,000 Units by mouth every day.     • glucosamine 500 MG Cap Take  by mouth.     • tadalafil (CIALIS) 20 MG tablet Take 1 Tab by mouth as needed for Erectile Dysfunction. Take one q 2-3 days prn ED. (Patient not taking: Reported on 4/30/2019) 30 Tab 3   • naproxen (NAPROSYN) 500 MG TABS Take 1 Tab by mouth 2 times a day, with meals. (Patient not taking: Reported on 4/30/2019) 60 Tab 0   • mupirocin (BACTROBAN) 2 % OINT Apply bid to effected toenail. (Patient not taking: Reported on 4/30/2019) 1 Tube 1     No current Epic-ordered facility-administered medications on file.      Past Medical History:   Diagnosis Date   • Hypercholesterolemia    • Hypertension    • Tinea inguinalis      Past Surgical History:   Procedure Laterality Date   • OTHER ABDOMINAL SURGERY      gallbladder     Social History   Substance Use Topics   • Smoking status: Light Tobacco Smoker     Packs/day: 0.25     Types: Cigars, Cigarettes   • Smokeless tobacco: Never Used      Comment: cigar, smokes one a day.   • Alcohol use Yes      Comment: minimal     Family History   Problem Relation Age of Onset   • Heart Disease Mother    • Heart Disease Father    • Cancer Sister         leukemia, breast cancer   • Stroke  "Sister    • Hypertension Neg Hx    • Hyperlipidemia Neg Hx      ROS:    Ostomy or other tubes or amputations: no  Chronic oxygen use no  : Denies incontinence.  Gait: Uses no assistive device   Hearing good.    Dentition good  Constitutional: Negative for fever, chills/sweats   Respiratory: Negative for shortness of breath, MARTINEZ  Cardiovascular: Negative for chest pain or pressure  GI/: Negative for diarrhea/constipation / urinary difficulty  All other systems reviewed and are negative.     Exam: /75   Pulse (!) 58   Temp 36 °C (96.8 °F) (Temporal)   Resp 14   Ht 1.778 m (5' 10\")   Wt 114 kg (251 lb 5.2 oz)   SpO2 93%  Body mass index is 36.06 kg/m².  Alert, oriented in no acute distress.  Eye contact is good, speech goal directed, affect calm  HEENT: conjunctiva non-injected, sclera non-icteric.  Pinna normal. No concerning lesions.   Oral mucous membranes pink and moist with no lesions.  Neck supple with no cervical lymphadenopathy  Lungs: clear to auscultation bilaterally with good excursion.  CV: regular rate and rhythm.  Abdomen No CVAT  Ext: no edema.     Labs  Reviewed and discussed  Lab Results   Component Value Date/Time    CHOLSTRLTOT 196 03/26/2019 11:40 AM     (H) 03/26/2019 11:40 AM    HDL 46 03/26/2019 11:40 AM    TRIGLYCERIDE 213 (H) 03/26/2019 11:40 AM       Lab Results   Component Value Date/Time    SODIUM 137 03/26/2019 11:40 AM    POTASSIUM 4.2 03/26/2019 11:40 AM    CHLORIDE 104 03/26/2019 11:40 AM    CO2 24 03/26/2019 11:40 AM    GLUCOSE 87 03/26/2019 11:40 AM    BUN 22 03/26/2019 11:40 AM    CREATININE 0.83 03/26/2019 11:40 AM    CREATININE 0.92 03/01/2010 04:54 PM    BUNCREATRAT 21 03/01/2010 04:54 PM    GLOMRATE >59 03/01/2010 04:54 PM     Lab Results   Component Value Date/Time    ALKPHOSPHAT 87 03/26/2019 11:40 AM    ASTSGOT 28 03/26/2019 11:40 AM    ALTSGPT 30 03/26/2019 11:40 AM    TBILIRUBIN 1.7 (H) 03/26/2019 11:40 AM      No results found for: HBA1C  No results " found for: TSH  No results found for: FREET4  Lab Results   Component Value Date/Time    WBC 10.3 04/11/2014 09:21 AM    WBC 9.4 03/01/2010 04:54 PM    RBC 5.08 04/11/2014 09:21 AM    RBC 5.39 03/01/2010 04:54 PM    HEMOGLOBIN 15.5 04/11/2014 09:21 AM    HEMATOCRIT 44.8 04/11/2014 09:21 AM    MCV 88.2 04/11/2014 09:21 AM    MCV 89 03/01/2010 04:54 PM    MCH 30.5 04/11/2014 09:21 AM    MCH 29.3 03/01/2010 04:54 PM    MCHC 34.6 04/11/2014 09:21 AM    MPV 9.2 04/11/2014 09:21 AM    NEUTSPOLYS 78.3 (H) 04/11/2014 09:21 AM    LYMPHOCYTES 12.7 (L) 04/11/2014 09:21 AM    MONOCYTES 8.1 04/11/2014 09:21 AM    EOSINOPHILS 0.5 04/11/2014 09:21 AM    BASOPHILS 0.4 04/11/2014 09:21 AM    HYPOCHROMIA 1+ 07/15/2013 10:35 AM      Assessment and Plan    1. Medicare annual wellness visit, initial  Reviewed PMH, PSH, FH, SH, ALL, MEDS, HCM/IMM.   Advised healthy habits, diet, exercise.  - Initial Annual Wellness Visit - Includes PPPS ()    2. Health care maintenance  Advised immunizations X2  - Initial Annual Wellness Visit - Includes PPPS ()    3. Essential hypertension  Controlled, continue current treatment  - Initial Annual Wellness Visit - Includes PPPS ()  - Comp Metabolic Panel; Future  - metoprolol SR (TOPROL XL) 25 MG TABLET SR 24 HR; Take 1 Tab by mouth every day.  Dispense: 90 Tab; Refill: 1  - losartan (COZAAR) 50 MG Tab; Take 1 Tab by mouth every day.  Dispense: 90 Tab; Refill: 1    4. Hypercholesteremia  Uncontrolled.  Discussed treatment options.  Patient would like lifestyle modification with healthy diet, increased exercise, weight control.  - Initial Annual Wellness Visit - Includes PPPS ()  - Comp Metabolic Panel; Future  - Lipid Profile; Future    5. Benign prostatic hyperplasia with lower urinary tract symptoms, symptom details unspecified  Stable, no medications  - Initial Annual Wellness Visit - Includes PPPS ()    6. Erectile dysfunction, unspecified erectile dysfunction type  Used  Cialis as needed    7. Neuropathy of both feet  Worsening, referred to neurology  - Initial Annual Wellness Visit - Includes PPPS ()  - REFERRAL TO NEUROLOGY    8. Oral lichen planus  Pending biopsy by ENT  - Initial Annual Wellness Visit - Includes PPPS ()    9. Tobacco use  Advised to quit smoking  - Initial Annual Wellness Visit - Includes PPPS ()    10. Hypovitaminosis D  Start high-dose of vitamin D  - vitamin D, Ergocalciferol, (DRISDOL) 72261 units Cap capsule; Take 1 Cap by mouth every 7 days. (Patient not taking: Reported on 4/30/2019)  Dispense: 12 Cap; Refill: 1    11. Obesity (BMI 30-39.9)  Appropriate counseling given  - Initial Annual Wellness Visit - Includes PPPS ()    12. Gilbert syndrome  Explained pathophysiology, reassurance.    Health Care Screening recommendations reviewed with patient today:    - Depression: no issues   - ADL/IADL: independent   - Feeding/nutrition: advised healthy diet.    - Hearing: good    Referrals for PT/OT/Nutrition counseling/Behavioral Health/Smoking cessation as per orders.  Discussion today about general wellness and lifestyle habits:    · Prevent falls and reduce trip hazards;   · Have a working fire alarm and carbon monoxide detector;   · Engage in regular physical activity and social activities.    Next office visit is due in 4 months

## 2019-10-16 ENCOUNTER — HOSPITAL ENCOUNTER (OUTPATIENT)
Dept: LAB | Facility: MEDICAL CENTER | Age: 69
End: 2019-10-16
Attending: INTERNAL MEDICINE
Payer: MEDICARE

## 2019-10-16 DIAGNOSIS — E78.00 HYPERCHOLESTEREMIA: ICD-10-CM

## 2019-10-16 DIAGNOSIS — I10 ESSENTIAL HYPERTENSION: ICD-10-CM

## 2019-10-16 LAB
ALBUMIN SERPL BCP-MCNC: 4.3 G/DL (ref 3.2–4.9)
ALBUMIN/GLOB SERPL: 1.5 G/DL
ALP SERPL-CCNC: 89 U/L (ref 30–99)
ALT SERPL-CCNC: 30 U/L (ref 2–50)
ANION GAP SERPL CALC-SCNC: 7 MMOL/L (ref 0–11.9)
AST SERPL-CCNC: 26 U/L (ref 12–45)
BILIRUB SERPL-MCNC: 1.4 MG/DL (ref 0.1–1.5)
BUN SERPL-MCNC: 22 MG/DL (ref 8–22)
CALCIUM SERPL-MCNC: 9.5 MG/DL (ref 8.5–10.5)
CHLORIDE SERPL-SCNC: 106 MMOL/L (ref 96–112)
CHOLEST SERPL-MCNC: 211 MG/DL (ref 100–199)
CO2 SERPL-SCNC: 23 MMOL/L (ref 20–33)
CREAT SERPL-MCNC: 0.99 MG/DL (ref 0.5–1.4)
FASTING STATUS PATIENT QL REPORTED: NORMAL
GLOBULIN SER CALC-MCNC: 2.9 G/DL (ref 1.9–3.5)
GLUCOSE SERPL-MCNC: 90 MG/DL (ref 65–99)
HDLC SERPL-MCNC: 49 MG/DL
LDLC SERPL CALC-MCNC: 127 MG/DL
POTASSIUM SERPL-SCNC: 4 MMOL/L (ref 3.6–5.5)
PROT SERPL-MCNC: 7.2 G/DL (ref 6–8.2)
SODIUM SERPL-SCNC: 136 MMOL/L (ref 135–145)
TRIGL SERPL-MCNC: 176 MG/DL (ref 0–149)

## 2019-10-16 PROCEDURE — 80061 LIPID PANEL: CPT

## 2019-10-16 PROCEDURE — 80053 COMPREHEN METABOLIC PANEL: CPT

## 2019-10-16 PROCEDURE — 36415 COLL VENOUS BLD VENIPUNCTURE: CPT

## 2019-10-30 ENCOUNTER — OFFICE VISIT (OUTPATIENT)
Dept: MEDICAL GROUP | Facility: MEDICAL CENTER | Age: 69
End: 2019-10-30
Payer: MEDICARE

## 2019-10-30 VITALS
HEART RATE: 64 BPM | BODY MASS INDEX: 33.93 KG/M2 | TEMPERATURE: 97.3 F | SYSTOLIC BLOOD PRESSURE: 140 MMHG | HEIGHT: 70 IN | WEIGHT: 236.99 LBS | DIASTOLIC BLOOD PRESSURE: 62 MMHG | OXYGEN SATURATION: 93 %

## 2019-10-30 DIAGNOSIS — E66.9 OBESITY (BMI 30.0-34.9): ICD-10-CM

## 2019-10-30 DIAGNOSIS — E78.00 HYPERCHOLESTEREMIA: ICD-10-CM

## 2019-10-30 DIAGNOSIS — Z23 NEEDS FLU SHOT: ICD-10-CM

## 2019-10-30 DIAGNOSIS — I10 ESSENTIAL HYPERTENSION: ICD-10-CM

## 2019-10-30 DIAGNOSIS — Z11.59 NEED FOR HEPATITIS C SCREENING TEST: ICD-10-CM

## 2019-10-30 DIAGNOSIS — L71.9 ROSACEA: ICD-10-CM

## 2019-10-30 DIAGNOSIS — E55.9 HYPOVITAMINOSIS D: ICD-10-CM

## 2019-10-30 PROCEDURE — G0008 ADMIN INFLUENZA VIRUS VAC: HCPCS | Performed by: INTERNAL MEDICINE

## 2019-10-30 PROCEDURE — 99214 OFFICE O/P EST MOD 30 MIN: CPT | Mod: 25 | Performed by: INTERNAL MEDICINE

## 2019-10-30 PROCEDURE — 90662 IIV NO PRSV INCREASED AG IM: CPT | Performed by: INTERNAL MEDICINE

## 2019-10-30 RX ORDER — LOSARTAN POTASSIUM 50 MG/1
50 TABLET ORAL DAILY
Qty: 90 TAB | Refills: 1 | Status: SHIPPED | OUTPATIENT
Start: 2019-10-30 | End: 2020-05-14 | Stop reason: SDUPTHER

## 2019-10-30 RX ORDER — METOPROLOL SUCCINATE 25 MG/1
25 TABLET, EXTENDED RELEASE ORAL DAILY
Qty: 90 TAB | Refills: 1 | Status: SHIPPED | OUTPATIENT
Start: 2019-10-30 | End: 2020-06-11

## 2019-10-30 RX ORDER — ERGOCALCIFEROL 1.25 MG/1
50000 CAPSULE ORAL
Qty: 12 CAP | Refills: 3 | Status: SHIPPED | OUTPATIENT
Start: 2019-10-30 | End: 2020-09-28 | Stop reason: SDUPTHER

## 2019-10-30 NOTE — PROGRESS NOTES
CHIEF COMPLAINT  Chief Complaint   Patient presents with   • Follow-Up     6 months. HTN   • Results     labs  10/16/2019     HPI  Laureen Munoz is a 69 y.o. male who presents today for the following     Hypertension  Meds: Metoprolol 25 mg QD, losartan, 50 mg QD; taking as prescribed.   Measuring BP at home: yes. It has been < 140/90  Denies: - headaches, vision problems, tinnitus.  - chest pain/pressure, palpitations, irregular heart beats, exertional, dyspnea, peripheral edema.  - medication side effect: unusual fatigue, slow heartbeat, foot/leg swelling.  Low salt diet: no  Diet: regular  Exercise: < 4 d/w  BMI: 34  FH of HTN: neg     Hypercholesterolemia  The patient had slightly elevated cholesterol/triglycerides.   No meds.  Diet / Exercise/BMI: As above  FH: neg     Hypovitaminosis D  The patient had low vitamin D level.  Vitamin D supplement: OTC.    Rosacea  Onset; remote, > decade:  Redness of the nose. Face, forehead.  On topical metronidazole, controlled.  He has been followed up by dermatology.    OBESITY, Body mass index is 34.01  Interval course:  -Improved exercise and diet, lost weight and BMI by 2 points    Onset: Remote  Diet: Improved  Exercise: Improved  No temperature intolerance. No change in hair/skin quality, BMs.   No HTN, buffalo hump, purple striae, flushing.  FH of obesity: Unknown    Reviewed PMH, PSH, FH, SH, ALL, HCM/IMM, no changes  Reviewed MEDS, no changes    Patient Active Problem List    Diagnosis Date Noted   • Rosacea 10/30/2019   • Oral lichen planus 04/30/2019   • Gilbert syndrome 04/30/2019   • Erectile dysfunction 10/30/2018   • Health care maintenance 10/30/2018   • Tobacco use 10/30/2018   • Obesity (BMI 30-39.9) 07/26/2018   • Hypovitaminosis D 04/11/2014   • Neuropathy of both feet 07/20/2012   • Essential hypertension 07/20/2012   • Hypercholesteremia 07/20/2012   • Benign prostatic hyperplasia 07/20/2012     CURRENT MEDICATIONS  Current Outpatient Medications    Medication Sig Dispense Refill   • vitamin D, Ergocalciferol, (DRISDOL) 82815 units Cap capsule Take 1 Cap by mouth every 7 days. 12 Cap 3   • metoprolol SR (TOPROL XL) 25 MG TABLET SR 24 HR Take 1 Tab by mouth every day. 90 Tab 1   • losartan (COZAAR) 50 MG Tab Take 1 Tab by mouth every day. 90 Tab 1   • fluocinonide (LIDEX) 0.05 % gel Apply BID over MOUTH MUCOSA (Patient not taking: Reported on 2019) 1 Tube 1   • vitamin D (CHOLECALCIFEROL) 1000 UNIT Tab Take 1,000 Units by mouth every day.     • glucosamine 500 MG Cap Take  by mouth.     • naproxen (NAPROSYN) 500 MG TABS Take 1 Tab by mouth 2 times a day, with meals. (Patient not taking: Reported on 2019) 60 Tab 0   • mupirocin (BACTROBAN) 2 % OINT Apply bid to effected toenail. (Patient not taking: Reported on 2019) 1 Tube 1     No current facility-administered medications for this visit.      ALLERGIES  Allergies: Nkda [no known drug allergy]  PAST MEDICAL HISTORY  Past Medical History:   Diagnosis Date   • Hypercholesterolemia    • Hypertension    • Tinea inguinalis      SURGICAL HISTORY  He  has a past surgical history that includes other abdominal surgery.  SOCIAL HISTORY  Social History     Tobacco Use   • Smoking status: Light Tobacco Smoker     Packs/day: 0.25     Types: Cigars, Cigarettes   • Smokeless tobacco: Never Used   • Tobacco comment: cigar, smokes one a day.   Substance Use Topics   • Alcohol use: Yes     Comment: minimal   • Drug use: No     Social History     Social History Narrative   • Not on file     FAMILY HISTORY  Family History   Problem Relation Age of Onset   • Heart Disease Mother    • Heart Disease Father    • Cancer Sister         leukemia, breast cancer   • Stroke Sister    • Hypertension Neg Hx    • Hyperlipidemia Neg Hx      Family Status   Relation Name Status   • Mo     • Fa     • Sis     • Sis  (Not Specified)   • Neg Hx  (Not Specified)     ROS   Constitutional: Negative for  "fatigue.  HENT: Negative for congestion, sore throat.  Eyes: Negative for vision problems.   Respiratory: Negative for cough, shortness of breath.  Cardiovascular: Negative for chest pain, palpitations.   Gastrointestinal: Negative for heartburn, nausea, abdominal pain.   Genitourinary: Negative for dysuria.  Musculoskeletal: Negative for back and joint pain.   Skin: As above.   Neuro: Negative for dizziness, weakness and headaches.   Endo/Heme/Allergies: Does not bruise/bleed easily.   Psychiatric/Behavioral: Negative for depression.    PHYSICAL EXAM   Blood Pressure 140/62   Pulse 64   Temperature 36.3 °C (97.3 °F) (Temporal)   Height 1.778 m (5' 10\")   Weight 107.5 kg (236 lb 15.9 oz)   Oxygen Saturation 93%  Body mass index is 34.01 kg/m².  General:  NAD, well appearing  HEENT:   NC/AT, PERRLA, EOMI, TMs are clear. Oropharyngeal mucosa is pink,  without lesions;  no cervical / supraclavicular  lymphadenopathy, no thyromegaly.    Cardiovascular: RRR.   No m/r/g.       Lungs:   CTAB, no w/r/r, no respiratory distress.  Abdomen: Soft, NT/ND;unable to palpate liver/spleen due to WT.  Extremities:  2+ DP and radial pulses bilaterally.  No c/c/e.   Skin:  Warm, dry.  No erythema. No rash.   Neurologic: Alert & oriented x 3. CN II-XII grossly intact. No focal deficits.  Psychiatric:  Affect normal, mood normal, judgment normal.    Labs     Labs are reviewed and discussed with a patient  Lab Results   Component Value Date/Time    CHOLSTRLTOT 211 (H) 10/16/2019 11:06 AM     (H) 10/16/2019 11:06 AM    HDL 49 10/16/2019 11:06 AM    TRIGLYCERIDE 176 (H) 10/16/2019 11:06 AM       Lab Results   Component Value Date/Time    SODIUM 136 10/16/2019 11:06 AM    POTASSIUM 4.0 10/16/2019 11:06 AM    CHLORIDE 106 10/16/2019 11:06 AM    CO2 23 10/16/2019 11:06 AM    GLUCOSE 90 10/16/2019 11:06 AM    BUN 22 10/16/2019 11:06 AM    CREATININE 0.99 10/16/2019 11:06 AM    CREATININE 0.92 03/01/2010 04:54 PM    BUNCREATRAT 21 " 03/01/2010 04:54 PM    GLOMRATE >59 03/01/2010 04:54 PM     Lab Results   Component Value Date/Time    ALKPHOSPHAT 89 10/16/2019 11:06 AM    ASTSGOT 26 10/16/2019 11:06 AM    ALTSGPT 30 10/16/2019 11:06 AM    TBILIRUBIN 1.4 10/16/2019 11:06 AM      No results found for: HBA1C  No results found for: TSH  No results found for: FREET4    Lab Results   Component Value Date/Time    WBC 10.3 04/11/2014 09:21 AM    WBC 9.4 03/01/2010 04:54 PM    RBC 5.08 04/11/2014 09:21 AM    RBC 5.39 03/01/2010 04:54 PM    HEMOGLOBIN 15.5 04/11/2014 09:21 AM    HEMATOCRIT 44.8 04/11/2014 09:21 AM    MCV 88.2 04/11/2014 09:21 AM    MCV 89 03/01/2010 04:54 PM    MCH 30.5 04/11/2014 09:21 AM    MCH 29.3 03/01/2010 04:54 PM    MCHC 34.6 04/11/2014 09:21 AM    MPV 9.2 04/11/2014 09:21 AM    NEUTSPOLYS 78.3 (H) 04/11/2014 09:21 AM    LYMPHOCYTES 12.7 (L) 04/11/2014 09:21 AM    MONOCYTES 8.1 04/11/2014 09:21 AM    EOSINOPHILS 0.5 04/11/2014 09:21 AM    BASOPHILS 0.4 04/11/2014 09:21 AM    HYPOCHROMIA 1+ 07/15/2013 10:35 AM        Imaging     None    Assessment and Plan     Laureen Munoz is a 69 y.o. male    1. Essential hypertension  Controlled, continue current treatment  - Comp Metabolic Panel; Future  - metoprolol SR (TOPROL XL) 25 MG TABLET SR 24 HR; Take 1 Tab by mouth every day.  Dispense: 90 Tab; Refill: 1  - losartan (COZAAR) 50 MG Tab; Take 1 Tab by mouth every day.  Dispense: 90 Tab; Refill: 1    2. Hypercholesteremia  Controlled, continue current treatment  - Comp Metabolic Panel; Future  - Lipid Profile; Future    3. Hypovitaminosis D  Given high-dose vitamin D  - VITAMIN D,25 HYDROXY; Future  - vitamin D, Ergocalciferol, (DRISDOL) 92770 units Cap capsule; Take 1 Cap by mouth every 7 days.  Dispense: 12 Cap; Refill: 3    4. Rosacea  Controlled, continue current treatment and dermatology follow-up    5. Obesity (BMI 30.0-34.9)  - OBESITY COUNSELING (No Charge): Patient identified as having weight management issue.  Appropriate  orders and counseling given.    6. Needs flu shot  - INFLUENZA VACCINE, HIGH DOSE (65+ ONLY)  Information was provided to the patient regarding the vaccine, including side effects. Vaccine was given by my medical assistant under my supervision.    7. Need for hepatitis C screening test  - HEP C VIRUS ANTIBODY; Future    Counseling:   - Smoking:  Nonsmoker    Followup: Return for Annual, Labs. in 6 months    All questions are answered.    Please note that this dictation was created using voice recognition software, and that there might be errors of barbara and possibly content.

## 2020-04-22 SDOH — HEALTH STABILITY: MENTAL HEALTH: HOW OFTEN DO YOU HAVE A DRINK CONTAINING ALCOHOL?: 2-3 TIMES A WEEK

## 2020-04-22 SDOH — HEALTH STABILITY: MENTAL HEALTH: HOW OFTEN DO YOU HAVE 6 OR MORE DRINKS ON ONE OCCASION?: NEVER

## 2020-04-22 SDOH — HEALTH STABILITY: MENTAL HEALTH: HOW MANY STANDARD DRINKS CONTAINING ALCOHOL DO YOU HAVE ON A TYPICAL DAY?: 1 OR 2

## 2020-04-22 NOTE — PROGRESS NOTES
ANNUAL WELLNESS VISIT PRE-VISIT PLANNING WITHOUT OUTREACH    1.  Reviewed note from last office visit with PCP: YES    2.  If any orders were placed at last visit, do we have Results/Consult Notes?        •  Labs - Labs ordered, but not to be completed until Future.  Note: If patient appointment is for lab review and patient did not complete labs, check with provider if OK to reschedule patient until labs completed.       •  Imaging - Imaging was not ordered at last office visit.       •  Referrals - No referrals were ordered at last office visit.    3.  Immunizations were updated in Epic using WebIZ?: Epic matches WebIZ       •  WebIZ Recommendations: PREVNAR (PCV13)  and SHINGRIX (Shingles)        •  Is patient due for Tdap? NO       •  Is patient due for Shingrix? YES. Patient was not notified of copay/out of pocket cost.     4.  Patient is due for the following Health Maintenance Topics:   Health Maintenance Due   Topic Date Due   • HEPATITIS C SCREENING  1950   • IMM PNEUMOCOCCAL VACCINE: 65+ Years (1 of 2 - PCV13) 08/14/2016   • IMM ZOSTER VACCINES (2 of 3) 11/10/2016     5.  Reviewed/Updated the following with patient:       •   Preferred Pharmacy? YES       •   Preferred Lab? YES       •   Preferred Communication? YES       •   Allergies? YES       •   Medications? YES. Was Abstract Encounter opened and chart updated? YES       •   Social History? YES. Was Abstract Encounter opened and chart updated? YES       •   Family History (document living status of immediate family members and if + hx of  cancer, diabetes, hypertension, hyperlipidemia, heart attack, stroke) YES. Was Abstract Encounter opened and chart updated? YES    6.  Care Team Updated:       •   DME Company (gait device, O2, CPAP, etc.): N\A       •   Other Specialists (eye doctor, derm, GYN, cardiology, endo, etc): YES    7. Orders for overdue Health Maintenance topics pended in Pre-Charting? N\A    8.  Patient has the following Care Path  diagnoses on Problem List:  NONE    9.  Patient was advised: “This is a free wellness visit. The provider will screen for medical conditions to help you stay healthy. If you have other concerns to address you may be asked to discuss these at a separate visit or there may be an additional fee.”     10.  Patient was informed to be available 30 min prior to their scheduled appointment and bring in their medication bottles.

## 2020-04-22 NOTE — LETTER
[facility]  Madonna Swain M.D.  [address] [place]   [place] [address]  Fax: [phone]   Authorization for Release/Disclosure of   Protected Health Information   Name: HONG PALACIO : 1950 SSN: [id number]   Address: [facility] [address]  [place] [address] Phone:    [phone] (home)    I authorize the entity listed below to release/disclose the PHI below to:   [facility]/Madonna Swain M.D. and Madonna Swain M.D.   Provider or Entity Name:  [facility]   Address   [facility]  [address], [place] [address] Phone:  [phone]      Fax:     Reason for request: continuity of care   Information to be released:    [  ] LAST COLONOSCOPY,  including any PATH REPORT and follow-up  [  ] LAST FIT/COLOGUARD RESULT [  ] LAST DEXA  [  ] LAST MAMMOGRAM  [  ] LAST PAP  [  ] LAST LABS [XXX] RETINA EXAM REPORT  [  ] IMMUNIZATION RECORDS  [  ] Release all info      [  ] Check here and initial the line next to each item to release ALL health information INCLUDING  _____ Care and treatment for drug and / or alcohol abuse  _____ HIV testing, infection status, or AIDS  _____ Genetic Testing    DATES OF SERVICE OR TIME PERIOD TO BE DISCLOSED: _____________  I understand and acknowledge that:  * This Authorization may be revoked at any time by you in writing, except if your health information has already been used or disclosed.  * Your health information that will be used or disclosed as a result of you signing this authorization could be re-disclosed by the recipient. If this occurs, your re-disclosed health information may no longer be protected by State or Federal laws.  * You may refuse to sign this Authorization. Your refusal will not affect your ability to obtain treatment.  * This Authorization becomes effective upon signing and will  on (date) __________.      If no date is indicated, this Authorization will  one (1) year from the signature  date.    Name: Laureen Munoz    Signature:   Date:     4/22/2020       PLEASE FAX REQUESTED RECORDS BACK TO: [phone]

## 2020-05-14 ENCOUNTER — HOSPITAL ENCOUNTER (OUTPATIENT)
Dept: RADIOLOGY | Facility: MEDICAL CENTER | Age: 70
End: 2020-05-14
Attending: INTERNAL MEDICINE
Payer: MEDICARE

## 2020-05-14 ENCOUNTER — OFFICE VISIT (OUTPATIENT)
Dept: MEDICAL GROUP | Age: 70
End: 2020-05-14
Payer: MEDICARE

## 2020-05-14 ENCOUNTER — HOSPITAL ENCOUNTER (OUTPATIENT)
Dept: LAB | Facility: MEDICAL CENTER | Age: 70
End: 2020-05-14
Attending: INTERNAL MEDICINE
Payer: MEDICARE

## 2020-05-14 VITALS
BODY MASS INDEX: 36.25 KG/M2 | HEIGHT: 70 IN | OXYGEN SATURATION: 92 % | WEIGHT: 253.2 LBS | HEART RATE: 59 BPM | SYSTOLIC BLOOD PRESSURE: 150 MMHG | TEMPERATURE: 97.9 F | DIASTOLIC BLOOD PRESSURE: 95 MMHG

## 2020-05-14 DIAGNOSIS — E55.9 HYPOVITAMINOSIS D: ICD-10-CM

## 2020-05-14 DIAGNOSIS — E66.9 OBESITY (BMI 30-39.9): ICD-10-CM

## 2020-05-14 DIAGNOSIS — Z11.59 NEED FOR HEPATITIS C SCREENING TEST: ICD-10-CM

## 2020-05-14 DIAGNOSIS — E78.00 HYPERCHOLESTEREMIA: ICD-10-CM

## 2020-05-14 DIAGNOSIS — M79.661 RIGHT CALF PAIN: ICD-10-CM

## 2020-05-14 DIAGNOSIS — G57.93 NEUROPATHY OF BOTH FEET: ICD-10-CM

## 2020-05-14 DIAGNOSIS — M25.561 CHRONIC PAIN OF RIGHT KNEE: ICD-10-CM

## 2020-05-14 DIAGNOSIS — I10 ESSENTIAL HYPERTENSION: ICD-10-CM

## 2020-05-14 DIAGNOSIS — G89.29 CHRONIC PAIN OF RIGHT KNEE: ICD-10-CM

## 2020-05-14 LAB
25(OH)D3 SERPL-MCNC: 29 NG/ML (ref 30–100)
ALBUMIN SERPL BCP-MCNC: 4.2 G/DL (ref 3.2–4.9)
ALBUMIN/GLOB SERPL: 1.4 G/DL
ALP SERPL-CCNC: 98 U/L (ref 30–99)
ALT SERPL-CCNC: 26 U/L (ref 2–50)
ANION GAP SERPL CALC-SCNC: 14 MMOL/L (ref 7–16)
AST SERPL-CCNC: 21 U/L (ref 12–45)
BILIRUB SERPL-MCNC: 1.6 MG/DL (ref 0.1–1.5)
BUN SERPL-MCNC: 23 MG/DL (ref 8–22)
CALCIUM SERPL-MCNC: 9.6 MG/DL (ref 8.5–10.5)
CHLORIDE SERPL-SCNC: 101 MMOL/L (ref 96–112)
CHOLEST SERPL-MCNC: 183 MG/DL (ref 100–199)
CO2 SERPL-SCNC: 22 MMOL/L (ref 20–33)
CREAT SERPL-MCNC: 0.91 MG/DL (ref 0.5–1.4)
FASTING STATUS PATIENT QL REPORTED: NORMAL
GLOBULIN SER CALC-MCNC: 3.1 G/DL (ref 1.9–3.5)
GLUCOSE SERPL-MCNC: 103 MG/DL (ref 65–99)
HCV AB SER QL: NORMAL
HDLC SERPL-MCNC: 50 MG/DL
LDLC SERPL CALC-MCNC: 100 MG/DL
POTASSIUM SERPL-SCNC: 4.2 MMOL/L (ref 3.6–5.5)
PROT SERPL-MCNC: 7.3 G/DL (ref 6–8.2)
SODIUM SERPL-SCNC: 137 MMOL/L (ref 135–145)
TRIGL SERPL-MCNC: 164 MG/DL (ref 0–149)

## 2020-05-14 PROCEDURE — 36415 COLL VENOUS BLD VENIPUNCTURE: CPT

## 2020-05-14 PROCEDURE — 80053 COMPREHEN METABOLIC PANEL: CPT

## 2020-05-14 PROCEDURE — 82306 VITAMIN D 25 HYDROXY: CPT

## 2020-05-14 PROCEDURE — 86803 HEPATITIS C AB TEST: CPT

## 2020-05-14 PROCEDURE — 80061 LIPID PANEL: CPT

## 2020-05-14 PROCEDURE — 73560 X-RAY EXAM OF KNEE 1 OR 2: CPT | Mod: RT

## 2020-05-14 PROCEDURE — 99214 OFFICE O/P EST MOD 30 MIN: CPT | Performed by: INTERNAL MEDICINE

## 2020-05-14 RX ORDER — LOSARTAN POTASSIUM 100 MG/1
50 TABLET ORAL DAILY
Qty: 90 TAB | Refills: 0 | Status: SHIPPED
Start: 2020-05-14 | End: 2020-05-14

## 2020-05-14 ASSESSMENT — PATIENT HEALTH QUESTIONNAIRE - PHQ9: CLINICAL INTERPRETATION OF PHQ2 SCORE: 0

## 2020-05-14 NOTE — PROGRESS NOTES
CHIEF COMPLAINT  Dizziness    HPI  Laureen Munoz is a 69 y.o. male who presents today for the following     Hypertension, dizziness  C/o dizziness on/off in the last few months, in rest, the last was ~ 3 weeks ago.    Meds: Metoprolol 25 mg QD, losartan, 50 mg QD; taking as prescribed.   He did not check BP at home.  Denies: - headaches, vision problems, tinnitus.  - chest pain/pressure, palpitations, irregular heart beats, exertional, dyspnea, peripheral edema.  - medication side effect: unusual fatigue, slow heartbeat, foot/leg swelling.  Low salt diet: no  Diet: regular  Exercise: < 4 d/w  BMI: 36  FH of HTN: neg     Hypercholesterolemia  The patient had slightly elevated cholesterol/triglycerides.   No meds.  Diet / Exercise/BMI: As above  FH: neg    RT knee pain  - Onset: few months  - Triger: no trauma  - located in: RT knee  - intensity:  mild to moderate  - quality:  dull and sharp   - radiation:  no  - alleviating factors are:  rest, knee brace; tylenol, naproxen  -  exacerbating factors are:  activity  - accompanied:    - no numbness, weakness, tingling, fever, chills  - course: on/off  - imaging: pending  - treatment: as above    No reported history of:  - chronic immune suppression, alcoholism, IV drug abuse, indwelling catheter, diabetes.    - No history of recent spinal surgery or injection.    RT calf pain  - Onset: ~ 2-3 weeks  - Triger: no trauma   - sedentary in corona epidemics  - located in: RT  calf  - intensity:  mild to moderate  - quality:  dull   - radiation:  no  - alleviating factors are: none  -  exacerbating factors are:    - palpation  - accompanied:    - tension in calf, TTP, swelling  - course: persistent  - imaging: pending  - treatment: as above    OBESITY, Body mass index is 36  Onset: Remote  Diet: Improved  Exercise: Improved  No temperature intolerance. No change in hair/skin quality, BMs.   No HTN, buffalo hump, purple striae, flushing.  FH of obesity: Unknown    Reviewed  PMH, PSH, FH, SH, ALL, HCM/IMM, no changes  Reviewed MEDS, no changes    Patient Active Problem List    Diagnosis Date Noted   • Rosacea 10/30/2019   • Oral lichen planus 2019   • Gilbert syndrome 2019   • Erectile dysfunction 10/30/2018   • Health care maintenance 10/30/2018   • Tobacco use 10/30/2018   • Obesity (BMI 30-39.9) 2018   • Hypovitaminosis D 2014   • Neuropathy of both feet 2012   • Essential hypertension 2012   • Hypercholesteremia 2012   • Benign prostatic hyperplasia 2012     CURRENT MEDICATIONS  Current Outpatient Medications   Medication Sig Dispense Refill   • vitamin D, Ergocalciferol, (DRISDOL) 75469 units Cap capsule Take 1 Cap by mouth every 7 days. 12 Cap 3   • metoprolol SR (TOPROL XL) 25 MG TABLET SR 24 HR Take 1 Tab by mouth every day. 90 Tab 1   • losartan (COZAAR) 50 MG Tab Take 1 Tab by mouth every day. 90 Tab 1   • fluocinonide (LIDEX) 0.05 % gel Apply BID over MOUTH MUCOSA (Patient not taking: Reported on 2019) 1 Tube 1   • vitamin D (CHOLECALCIFEROL) 1000 UNIT Tab Take 1,000 Units by mouth every day.     • glucosamine 500 MG Cap Take  by mouth.     • naproxen (NAPROSYN) 500 MG TABS Take 1 Tab by mouth 2 times a day, with meals. (Patient not taking: Reported on 2019) 60 Tab 0   • mupirocin (BACTROBAN) 2 % OINT Apply bid to effected toenail. (Patient not taking: Reported on 2019) 1 Tube 1     No current facility-administered medications for this visit.      ALLERGIES  Allergies: Nkda [no known drug allergy]  PAST MEDICAL HISTORY  Past Medical History:   Diagnosis Date   • Hypercholesterolemia    • Hypertension    • Tinea inguinalis      SURGICAL HISTORY  He  has a past surgical history that includes other abdominal surgery.  SOCIAL HISTORY  Social History     Tobacco Use   • Smoking status: Former Smoker     Packs/day: 0.25     Types: Cigars, Cigarettes     Last attempt to quit: 1985     Years since quittin.3   •  "Smokeless tobacco: Never Used   • Tobacco comment: cigar, smokes one a day.   Substance Use Topics   • Alcohol use: Yes     Frequency: 2-3 times a week     Drinks per session: 1 or 2     Binge frequency: Never     Comment: minimal   • Drug use: Yes     Types: Marijuana, Inhaled     Comment: occasionally      Social History     Social History Narrative   • Not on file     FAMILY HISTORY  Family History   Problem Relation Age of Onset   • Heart Disease Mother    • Heart Disease Father    • Cancer Sister         leukemia, breast cancer   • Stroke Sister    • No Known Problems Maternal Grandmother    • No Known Problems Maternal Grandfather    • No Known Problems Paternal Grandmother    • Dementia Paternal Grandfather    • Hypertension Neg Hx    • Hyperlipidemia Neg Hx      Family Status   Relation Name Status   • Mo     • Fa     • Sis     • Sis  Alive   • MGMo     • MGFa     • PGMo     • PGFa     • Neg Hx  (Not Specified)     ROS   Constitutional: Negative for fever, chills.  HENT: Negative for congestion, sore throat.  Eyes: Negative for vision problems.   Respiratory: Negative for cough, shortness of breath.  Cardiovascular: Negative for chest pain, palpitations.   Gastrointestinal: Negative for heartburn, nausea, abdominal pain.   Genitourinary: Negative for dysuria.  Musculoskeletal: Negative for significant back pain.   Skin: Negative for rash.   Neuro: Negative for dizziness, weakness and headaches.   Endo/Heme/Allergies: Does not bruise/bleed easily.   Psychiatric/Behavioral: Negative for depression.    PHYSICAL EXAM   Blood Pressure 150/95 (BP Location: Right arm, Patient Position: Sitting, BP Cuff Size: Adult)   Pulse (Abnormal) 59   Temperature 36.6 °C (97.9 °F) (Temporal)   Height 1.778 m (5' 10\")   Weight 114.9 kg (253 lb 3.2 oz)   Oxygen Saturation 92%   Body Mass Index 36.33 kg/m²   General:  NAD, well appearing  HEENT:   NC/AT, PERRLA, " EOMI.  Cardiovascular: RRR.   No m/r/g.       Lungs:   CTAB, no w/r/r, no respiratory distress.  Abdomen: Soft NT/ND.  Extremities:  No peripheral swelling.  Skin:  Warm, dry.  No erythema. No rash.   Neurologic: Alert & oriented x 3. CN II-XII grossly intact. No focal deficits.  Psychiatric:  Affect normal, mood normal, judgment normal.    Labs     Labs are reviewed and discussed with a patient  Lab Results   Component Value Date/Time    CHOLSTRLTOT 211 (H) 10/16/2019 11:06 AM     (H) 10/16/2019 11:06 AM    HDL 49 10/16/2019 11:06 AM    TRIGLYCERIDE 176 (H) 10/16/2019 11:06 AM       Lab Results   Component Value Date/Time    SODIUM 136 10/16/2019 11:06 AM    POTASSIUM 4.0 10/16/2019 11:06 AM    CHLORIDE 106 10/16/2019 11:06 AM    CO2 23 10/16/2019 11:06 AM    GLUCOSE 90 10/16/2019 11:06 AM    BUN 22 10/16/2019 11:06 AM    CREATININE 0.99 10/16/2019 11:06 AM    CREATININE 0.92 03/01/2010 04:54 PM    BUNCREATRAT 21 03/01/2010 04:54 PM    GLOMRATE >59 03/01/2010 04:54 PM     Lab Results   Component Value Date/Time    ALKPHOSPHAT 89 10/16/2019 11:06 AM    ASTSGOT 26 10/16/2019 11:06 AM    ALTSGPT 30 10/16/2019 11:06 AM    TBILIRUBIN 1.4 10/16/2019 11:06 AM      Lab Results   Component Value Date/Time    WBC 10.3 04/11/2014 09:21 AM    WBC 9.4 03/01/2010 04:54 PM    RBC 5.08 04/11/2014 09:21 AM    RBC 5.39 03/01/2010 04:54 PM    HEMOGLOBIN 15.5 04/11/2014 09:21 AM    HEMATOCRIT 44.8 04/11/2014 09:21 AM    MCV 88.2 04/11/2014 09:21 AM    MCV 89 03/01/2010 04:54 PM    MCH 30.5 04/11/2014 09:21 AM    MCH 29.3 03/01/2010 04:54 PM    MCHC 34.6 04/11/2014 09:21 AM    MPV 9.2 04/11/2014 09:21 AM    NEUTSPOLYS 78.3 (H) 04/11/2014 09:21 AM    LYMPHOCYTES 12.7 (L) 04/11/2014 09:21 AM    MONOCYTES 8.1 04/11/2014 09:21 AM    EOSINOPHILS 0.5 04/11/2014 09:21 AM    BASOPHILS 0.4 04/11/2014 09:21 AM    HYPOCHROMIA 1+ 07/15/2013 10:35 AM     Imaging     XR of the RT knee, by my interpretation, and consistent with  radiology:  There is medial compartment joint space narrowing. There is posterior patellar spurring. There is a small joint effusion.    Assessment and Plan     Laureen Munoz is a 69 y.o. male    1. Essential hypertension  BP was higher, but, he did not take meds; advised to have BP log/monitoring.  - losartan (COZAAR) 100 MG Tab; Take 0.5 Tabs by mouth every day.  Dispense: 90 Tab; Refill: 0    2. Hypercholesteremia  Pending labs, +advised low brenda diet, daily exercise, WT control.    3. Neuropathy of both feet  Stable, no meds    4. Chronic pain of right knee  Advised naproxen 2 tabs BID for 7-14 days, OTC, brace, activity as tolerated  - DX-KNEE 2- RIGHT; Future    5. Right calf pain  R/o DVT, sedentary recently  - US-EXTREMITY VENOUS UPPER UNILAT RIGHT; Future    6. Obesity (BMI 30-39.9)  - OBESITY COUNSELING (No Charge): Patient identified as having weight management issue.  Appropriate orders and counseling given.    Followup: in 2 weeks with labs    All questions are answered.    Please note that this dictation was created using voice recognition software, and that there might be errors of barbara and possibly content.

## 2020-05-18 ENCOUNTER — HOSPITAL ENCOUNTER (OUTPATIENT)
Dept: RADIOLOGY | Facility: MEDICAL CENTER | Age: 70
End: 2020-05-18
Attending: INTERNAL MEDICINE
Payer: MEDICARE

## 2020-05-18 DIAGNOSIS — M79.661 RIGHT CALF PAIN: ICD-10-CM

## 2020-05-18 PROCEDURE — 93971 EXTREMITY STUDY: CPT | Mod: RT

## 2020-05-28 ENCOUNTER — OFFICE VISIT (OUTPATIENT)
Dept: MEDICAL GROUP | Age: 70
End: 2020-05-28
Payer: MEDICARE

## 2020-05-28 VITALS
TEMPERATURE: 98.6 F | SYSTOLIC BLOOD PRESSURE: 154 MMHG | HEART RATE: 58 BPM | WEIGHT: 252 LBS | HEIGHT: 70 IN | OXYGEN SATURATION: 92 % | BODY MASS INDEX: 36.08 KG/M2 | DIASTOLIC BLOOD PRESSURE: 74 MMHG

## 2020-05-28 DIAGNOSIS — E80.4 GILBERT SYNDROME: ICD-10-CM

## 2020-05-28 DIAGNOSIS — M79.661 RIGHT CALF PAIN: ICD-10-CM

## 2020-05-28 DIAGNOSIS — I10 ESSENTIAL HYPERTENSION: ICD-10-CM

## 2020-05-28 DIAGNOSIS — R73.01 IFG (IMPAIRED FASTING GLUCOSE): ICD-10-CM

## 2020-05-28 DIAGNOSIS — Z12.5 SCREENING FOR MALIGNANT NEOPLASM OF PROSTATE: ICD-10-CM

## 2020-05-28 DIAGNOSIS — Z00.00 HEALTH CARE MAINTENANCE: ICD-10-CM

## 2020-05-28 DIAGNOSIS — E78.5 DYSLIPIDEMIA: ICD-10-CM

## 2020-05-28 DIAGNOSIS — M25.561 RIGHT KNEE PAIN, UNSPECIFIED CHRONICITY: ICD-10-CM

## 2020-05-28 DIAGNOSIS — M17.11 PRIMARY OSTEOARTHRITIS OF RIGHT KNEE: ICD-10-CM

## 2020-05-28 PROCEDURE — 99214 OFFICE O/P EST MOD 30 MIN: CPT | Performed by: INTERNAL MEDICINE

## 2020-05-28 NOTE — PROGRESS NOTES
CHIEF COMPLAINT   HTN,labs    HPI  Laureen Munoz is a 69 y.o. male who presents today for the following     Hypertension  Meds: Metoprolol 25 mg QD, losartan, 100 mg QD; taking as prescribed.   He did not check BP at home.  Denies: - headaches, vision problems, tinnitus.  - chest pain/pressure, palpitations, irregular heart beats, exertional, dyspnea, peripheral edema.  - medication side effect: unusual fatigue, slow heartbeat, foot/leg swelling.  Low salt diet: no  Diet: regular  Exercise: < 4 d/w  BMI: 36  FH of HTN: neg     Hypercholesterolemia, Gilbert sy  The patient had slightly elevated cholesterol/triglycerides.   No meds.  Diet / Exercise/BMI: As above  FH: neg  LFTs were normal, except slightly elevated total bilirubin.    IFG  The patient had elevated FBG.  No polydipsia, polyphagia, polyuria.  No abdominal pain, weight loss, fatigue.  Diet / Exercise/BMI: As above  FH of DM: neg     Hypovitaminosis D  The patient had low vitamin D level.  Vitamin D supplement: started high dose.     RT knee pain  Interval course:  - XR showed OA with mild effusion    - Onset: few months  - Triger: no trauma  - located in: RT knee  - intensity:  mild to moderate  - quality:  dull and sharp   - radiation:  no  - alleviating factors are:  rest, knee brace; tylenol, naproxen  -  exacerbating factors are:  activity  - accompanied:               - no numbness, weakness, tingling, fever, chills  - course: on/off  - treatment: as above     No reported history of:  - chronic immune suppression, alcoholism, IV drug abuse, indwelling catheter, diabetes.    - No history of recent spinal surgery or injection.    XR RT knee, 5/14/2020  Mild osteoarthritis and small joint effusion.    RT calf pain  Interval course:  - improved  - US did not show DVT    - Onset: in 4/2020  - Triger: no trauma              - sedentary in corona epidemics  - located in: RT  calf  - intensity:  mild to moderate  - quality:  dull   - radiation:  no  -  alleviating factors are: none  -  exacerbating factors are:               - palpation  - accompanied:               - tension in calf, TTP, swelling  - course: persistent  - imaging: pending  - treatment: as above    Venous Doppler ultrasound, 5/18/2020  No evidence of right lower extremity deep venous thrombosis.     Reviewed PMH, PSH, FH, SH, ALL, HCM/IMM, no changes  Reviewed MEDS, no changes    Patient Active Problem List    Diagnosis Date Noted   • Rosacea 10/30/2019   • Oral lichen planus 04/30/2019   • Gilbert syndrome 04/30/2019   • Erectile dysfunction 10/30/2018   • Health care maintenance 10/30/2018   • Tobacco use 10/30/2018   • Obesity (BMI 30-39.9) 07/26/2018   • Hypovitaminosis D 04/11/2014   • Neuropathy of both feet 07/20/2012   • Essential hypertension 07/20/2012   • Hypercholesteremia 07/20/2012   • Benign prostatic hyperplasia 07/20/2012     CURRENT MEDICATIONS  Current Outpatient Medications   Medication Sig Dispense Refill   • vitamin D, Ergocalciferol, (DRISDOL) 72883 units Cap capsule Take 1 Cap by mouth every 7 days. 12 Cap 3   • metoprolol SR (TOPROL XL) 25 MG TABLET SR 24 HR Take 1 Tab by mouth every day. 90 Tab 1   • fluocinonide (LIDEX) 0.05 % gel Apply BID over MOUTH MUCOSA (Patient not taking: Reported on 4/30/2019) 1 Tube 1   • vitamin D (CHOLECALCIFEROL) 1000 UNIT Tab Take 1,000 Units by mouth every day.     • glucosamine 500 MG Cap Take  by mouth.     • naproxen (NAPROSYN) 500 MG TABS Take 1 Tab by mouth 2 times a day, with meals. (Patient not taking: Reported on 4/30/2019) 60 Tab 0   • mupirocin (BACTROBAN) 2 % OINT Apply bid to effected toenail. (Patient not taking: Reported on 4/30/2019) 1 Tube 1     No current facility-administered medications for this visit.      ALLERGIES  Allergies: Nkda [no known drug allergy]  PAST MEDICAL HISTORY  Past Medical History:   Diagnosis Date   • Hypercholesterolemia    • Hypertension    • Tinea inguinalis      SURGICAL HISTORY  He  has a past  surgical history that includes other abdominal surgery.  SOCIAL HISTORY  Social History     Tobacco Use   • Smoking status: Former Smoker     Packs/day: 0.25     Types: Cigars, Cigarettes     Last attempt to quit: 1985     Years since quittin.4   • Smokeless tobacco: Never Used   • Tobacco comment: cigar, smokes one a day.   Substance Use Topics   • Alcohol use: Yes     Frequency: 2-3 times a week     Drinks per session: 1 or 2     Binge frequency: Never     Comment: minimal   • Drug use: Yes     Types: Marijuana, Inhaled     Comment: occasionally      Social History     Social History Narrative   • Not on file     FAMILY HISTORY  Family History   Problem Relation Age of Onset   • Heart Disease Mother    • Heart Disease Father    • Cancer Sister         leukemia, breast cancer   • Stroke Sister    • No Known Problems Maternal Grandmother    • No Known Problems Maternal Grandfather    • No Known Problems Paternal Grandmother    • Dementia Paternal Grandfather    • Hypertension Neg Hx    • Hyperlipidemia Neg Hx      Family Status   Relation Name Status   • Mo     • Fa     • Sis     • Sis  Alive   • MGMo     • MGFa     • PGMo     • PGFa     • Neg Hx  (Not Specified)     ROS   Constitutional: Negative for fever, chills, fatigue.  HENT: Negative for congestion, sore throat.  Eyes: Negative for vision problems.   Respiratory: Negative for cough, shortness of breath.  Cardiovascular: Negative for chest pain, palpitations.   Gastrointestinal: Negative for heartburn, nausea, abdominal pain.   Genitourinary: Negative for dysuria.  Musculoskeletal: as above.   Skin: Negative for rash.   Neuro: Negative for dizziness, weakness and headaches.   Endo/Heme/Allergies: Does not bruise/bleed easily.   Psychiatric/Behavioral: Negative for depression.    Objective   Blood Pressure 154/74 (BP Location: Left arm, Patient Position: Sitting, BP Cuff Size: Adult)   Pulse  "(Abnormal) 58   Temperature 37 °C (98.6 °F) (Temporal)   Height 1.778 m (5' 10\")   Weight 114.3 kg (252 lb)   Oxygen Saturation 92%   Body Mass Index 36.16 kg/m²   Physical Exam:  Constitutional: Alert, no distress, well-groomed.  Skin: No rash in visible areas.  Eye: Round. Conjunctiva clear, lids normal.  ENMT: Lips pink without lesions, good dentition. Phonation normal.  Neck: No visible masses or thyromegaly. Moves freely without pain.  CV: no peripheral cyanosis, tachycardia.  Respiratory: Unlabored respiratory effort, no cough or audible wheezing.  Psych: Alert and oriented x3, normal affect and mood.     Labs     Labs are reviewed and discussed with a patient  Lab Results   Component Value Date/Time    CHOLSTRLTOT 183 05/14/2020 08:37 AM     (H) 05/14/2020 08:37 AM    HDL 50 05/14/2020 08:37 AM    TRIGLYCERIDE 164 (H) 05/14/2020 08:37 AM       Lab Results   Component Value Date/Time    SODIUM 137 05/14/2020 08:37 AM    POTASSIUM 4.2 05/14/2020 08:37 AM    CHLORIDE 101 05/14/2020 08:37 AM    CO2 22 05/14/2020 08:37 AM    GLUCOSE 103 (H) 05/14/2020 08:37 AM    BUN 23 (H) 05/14/2020 08:37 AM    CREATININE 0.91 05/14/2020 08:37 AM    CREATININE 0.92 03/01/2010 04:54 PM    BUNCREATRAT 21 03/01/2010 04:54 PM    GLOMRATE >59 03/01/2010 04:54 PM     Lab Results   Component Value Date/Time    ALKPHOSPHAT 98 05/14/2020 08:37 AM    ASTSGOT 21 05/14/2020 08:37 AM    ALTSGPT 26 05/14/2020 08:37 AM    TBILIRUBIN 1.6 (H) 05/14/2020 08:37 AM      Lab Results   Component Value Date/Time    WBC 10.3 04/11/2014 09:21 AM    WBC 9.4 03/01/2010 04:54 PM    RBC 5.08 04/11/2014 09:21 AM    RBC 5.39 03/01/2010 04:54 PM    HEMOGLOBIN 15.5 04/11/2014 09:21 AM    HEMATOCRIT 44.8 04/11/2014 09:21 AM    MCV 88.2 04/11/2014 09:21 AM    MCV 89 03/01/2010 04:54 PM    MCH 30.5 04/11/2014 09:21 AM    MCH 29.3 03/01/2010 04:54 PM    MCHC 34.6 04/11/2014 09:21 AM    MPV 9.2 04/11/2014 09:21 AM    NEUTSPOLYS 78.3 (H) 04/11/2014 09:21 " AM    LYMPHOCYTES 12.7 (L) 04/11/2014 09:21 AM    MONOCYTES 8.1 04/11/2014 09:21 AM    EOSINOPHILS 0.5 04/11/2014 09:21 AM    BASOPHILS 0.4 04/11/2014 09:21 AM    HYPOCHROMIA 1+ 07/15/2013 10:35 AM      Imaging      Reviewed and discussed per HPI    Assessment and Plan     Laureen Munoz is a 69 y.o. male    1. Essential hypertension  Controlled, continue with current treatment.  - Comp Metabolic Panel; Future    2. Dyslipidemia  Discussed treatment, patient declined prescription medication; he improved diet and exercise, lost weight, and will continue current lifestyle  - Comp Metabolic Panel; Future  - Lipid Profile; Future    3. Gilbert syndrome  Reassurance  - Comp Metabolic Panel; Future    4. IFG (impaired fasting glucose)  Discussed about risk to develop DM.   Advised low carb diet, exercise, watch for WT.   - Comp Metabolic Panel; Future  - HEMOGLOBIN A1C; Future    5. Right knee pain, unspecified chronicity  Improved we did naproxen, continue activity as tolerated, naproxen/Tylenol as needed, brace  - CBC WITH DIFFERENTIAL; Future  6. Primary osteoarthritis of right knee  As above    7. Right calf pain  No DVT    8. Health care maintenance  Due immunizations x 2    9. Screening for malignant neoplasm of prostate  - PROSTATE SPECIFIC AG SCREENING; Future    Follow-up: In 3 months and as needed

## 2020-06-11 DIAGNOSIS — I10 ESSENTIAL HYPERTENSION: ICD-10-CM

## 2020-06-11 RX ORDER — METOPROLOL SUCCINATE 25 MG/1
TABLET, EXTENDED RELEASE ORAL
Qty: 90 TAB | Refills: 0 | Status: SHIPPED | OUTPATIENT
Start: 2020-06-11 | End: 2020-09-28 | Stop reason: SDUPTHER

## 2020-09-25 ENCOUNTER — HOSPITAL ENCOUNTER (OUTPATIENT)
Dept: LAB | Facility: MEDICAL CENTER | Age: 70
End: 2020-09-25
Attending: INTERNAL MEDICINE
Payer: MEDICARE

## 2020-09-25 DIAGNOSIS — R73.01 IFG (IMPAIRED FASTING GLUCOSE): ICD-10-CM

## 2020-09-25 DIAGNOSIS — Z12.5 SCREENING FOR MALIGNANT NEOPLASM OF PROSTATE: ICD-10-CM

## 2020-09-25 DIAGNOSIS — I10 ESSENTIAL HYPERTENSION: ICD-10-CM

## 2020-09-25 DIAGNOSIS — E78.5 DYSLIPIDEMIA: ICD-10-CM

## 2020-09-25 DIAGNOSIS — M25.561 RIGHT KNEE PAIN, UNSPECIFIED CHRONICITY: ICD-10-CM

## 2020-09-25 DIAGNOSIS — E80.4 GILBERT SYNDROME: ICD-10-CM

## 2020-09-25 LAB
ALBUMIN SERPL BCP-MCNC: 4.2 G/DL (ref 3.2–4.9)
ALBUMIN/GLOB SERPL: 1.4 G/DL
ALP SERPL-CCNC: 109 U/L (ref 30–99)
ALT SERPL-CCNC: 33 U/L (ref 2–50)
ANION GAP SERPL CALC-SCNC: 14 MMOL/L (ref 7–16)
AST SERPL-CCNC: 32 U/L (ref 12–45)
BASOPHILS # BLD AUTO: 0.4 % (ref 0–1.8)
BASOPHILS # BLD: 0.03 K/UL (ref 0–0.12)
BILIRUB SERPL-MCNC: 0.9 MG/DL (ref 0.1–1.5)
BUN SERPL-MCNC: 21 MG/DL (ref 8–22)
CALCIUM SERPL-MCNC: 9.5 MG/DL (ref 8.5–10.5)
CHLORIDE SERPL-SCNC: 103 MMOL/L (ref 96–112)
CHOLEST SERPL-MCNC: 218 MG/DL (ref 100–199)
CO2 SERPL-SCNC: 21 MMOL/L (ref 20–33)
CREAT SERPL-MCNC: 0.84 MG/DL (ref 0.5–1.4)
EOSINOPHIL # BLD AUTO: 0.1 K/UL (ref 0–0.51)
EOSINOPHIL NFR BLD: 1.4 % (ref 0–6.9)
ERYTHROCYTE [DISTWIDTH] IN BLOOD BY AUTOMATED COUNT: 43.6 FL (ref 35.9–50)
EST. AVERAGE GLUCOSE BLD GHB EST-MCNC: 128 MG/DL
FASTING STATUS PATIENT QL REPORTED: NORMAL
GLOBULIN SER CALC-MCNC: 2.9 G/DL (ref 1.9–3.5)
GLUCOSE SERPL-MCNC: 113 MG/DL (ref 65–99)
HBA1C MFR BLD: 6.1 % (ref 0–5.6)
HCT VFR BLD AUTO: 46.9 % (ref 42–52)
HDLC SERPL-MCNC: 47 MG/DL
HGB BLD-MCNC: 15.7 G/DL (ref 14–18)
IMM GRANULOCYTES # BLD AUTO: 0.06 K/UL (ref 0–0.11)
IMM GRANULOCYTES NFR BLD AUTO: 0.9 % (ref 0–0.9)
LDLC SERPL CALC-MCNC: 136 MG/DL
LYMPHOCYTES # BLD AUTO: 1.45 K/UL (ref 1–4.8)
LYMPHOCYTES NFR BLD: 20.8 % (ref 22–41)
MCH RBC QN AUTO: 30.4 PG (ref 27–33)
MCHC RBC AUTO-ENTMCNC: 33.5 G/DL (ref 33.7–35.3)
MCV RBC AUTO: 90.9 FL (ref 81.4–97.8)
MONOCYTES # BLD AUTO: 0.74 K/UL (ref 0–0.85)
MONOCYTES NFR BLD AUTO: 10.6 % (ref 0–13.4)
NEUTROPHILS # BLD AUTO: 4.58 K/UL (ref 1.82–7.42)
NEUTROPHILS NFR BLD: 65.9 % (ref 44–72)
NRBC # BLD AUTO: 0 K/UL
NRBC BLD-RTO: 0 /100 WBC
PLATELET # BLD AUTO: 154 K/UL (ref 164–446)
PMV BLD AUTO: 10.6 FL (ref 9–12.9)
POTASSIUM SERPL-SCNC: 4.7 MMOL/L (ref 3.6–5.5)
PROT SERPL-MCNC: 7.1 G/DL (ref 6–8.2)
PSA SERPL-MCNC: 1.28 NG/ML (ref 0–4)
RBC # BLD AUTO: 5.16 M/UL (ref 4.7–6.1)
SODIUM SERPL-SCNC: 138 MMOL/L (ref 135–145)
TRIGL SERPL-MCNC: 175 MG/DL (ref 0–149)
WBC # BLD AUTO: 7 K/UL (ref 4.8–10.8)

## 2020-09-25 PROCEDURE — 85025 COMPLETE CBC W/AUTO DIFF WBC: CPT

## 2020-09-25 PROCEDURE — 36415 COLL VENOUS BLD VENIPUNCTURE: CPT | Mod: GA

## 2020-09-25 PROCEDURE — 84153 ASSAY OF PSA TOTAL: CPT | Mod: GA

## 2020-09-25 PROCEDURE — 80061 LIPID PANEL: CPT

## 2020-09-25 PROCEDURE — 80053 COMPREHEN METABOLIC PANEL: CPT

## 2020-09-25 PROCEDURE — 83036 HEMOGLOBIN GLYCOSYLATED A1C: CPT | Mod: GA

## 2020-09-28 ENCOUNTER — OFFICE VISIT (OUTPATIENT)
Dept: MEDICAL GROUP | Age: 70
End: 2020-09-28
Payer: MEDICARE

## 2020-09-28 VITALS
HEART RATE: 56 BPM | WEIGHT: 256.4 LBS | TEMPERATURE: 97.8 F | SYSTOLIC BLOOD PRESSURE: 142 MMHG | OXYGEN SATURATION: 93 % | DIASTOLIC BLOOD PRESSURE: 70 MMHG | HEIGHT: 71 IN | BODY MASS INDEX: 35.9 KG/M2

## 2020-09-28 DIAGNOSIS — E55.9 HYPOVITAMINOSIS D: ICD-10-CM

## 2020-09-28 DIAGNOSIS — Z00.00 MEDICARE ANNUAL WELLNESS VISIT, SUBSEQUENT: ICD-10-CM

## 2020-09-28 DIAGNOSIS — N40.1 BENIGN PROSTATIC HYPERPLASIA WITH LOWER URINARY TRACT SYMPTOMS, SYMPTOM DETAILS UNSPECIFIED: ICD-10-CM

## 2020-09-28 DIAGNOSIS — E66.9 OBESITY (BMI 30-39.9): ICD-10-CM

## 2020-09-28 DIAGNOSIS — I10 ESSENTIAL HYPERTENSION: ICD-10-CM

## 2020-09-28 DIAGNOSIS — R73.01 IFG (IMPAIRED FASTING GLUCOSE): ICD-10-CM

## 2020-09-28 DIAGNOSIS — E78.00 HYPERCHOLESTEREMIA: ICD-10-CM

## 2020-09-28 DIAGNOSIS — L43.8 ORAL LICHEN PLANUS: ICD-10-CM

## 2020-09-28 DIAGNOSIS — G57.93 NEUROPATHY OF BOTH FEET: ICD-10-CM

## 2020-09-28 DIAGNOSIS — L71.9 ROSACEA: ICD-10-CM

## 2020-09-28 DIAGNOSIS — E80.4 GILBERT SYNDROME: ICD-10-CM

## 2020-09-28 DIAGNOSIS — D69.6 THROMBOCYTOPENIA (HCC): ICD-10-CM

## 2020-09-28 DIAGNOSIS — G89.29 CHRONIC PAIN OF RIGHT KNEE: ICD-10-CM

## 2020-09-28 DIAGNOSIS — Z00.00 HEALTH CARE MAINTENANCE: ICD-10-CM

## 2020-09-28 DIAGNOSIS — Z72.0 TOBACCO USE: ICD-10-CM

## 2020-09-28 DIAGNOSIS — N52.9 ERECTILE DYSFUNCTION, UNSPECIFIED ERECTILE DYSFUNCTION TYPE: ICD-10-CM

## 2020-09-28 DIAGNOSIS — M17.11 PRIMARY OSTEOARTHRITIS OF RIGHT KNEE: ICD-10-CM

## 2020-09-28 DIAGNOSIS — M25.561 CHRONIC PAIN OF RIGHT KNEE: ICD-10-CM

## 2020-09-28 PROCEDURE — G0439 PPPS, SUBSEQ VISIT: HCPCS | Performed by: INTERNAL MEDICINE

## 2020-09-28 RX ORDER — LOSARTAN POTASSIUM 100 MG/1
TABLET ORAL
COMMUNITY
Start: 2020-05-14 | End: 2020-09-28 | Stop reason: SDUPTHER

## 2020-09-28 RX ORDER — ERGOCALCIFEROL 1.25 MG/1
50000 CAPSULE ORAL
Qty: 12 CAP | Refills: 3 | Status: SHIPPED
Start: 2020-09-28 | End: 2021-03-09

## 2020-09-28 RX ORDER — METOPROLOL SUCCINATE 25 MG/1
TABLET, EXTENDED RELEASE ORAL
Qty: 90 TAB | Refills: 3 | Status: SHIPPED | OUTPATIENT
Start: 2020-09-28 | End: 2021-03-09 | Stop reason: SDUPTHER

## 2020-09-28 RX ORDER — LOSARTAN POTASSIUM 100 MG/1
100 TABLET ORAL DAILY
Qty: 90 TAB | Refills: 3 | Status: SHIPPED | OUTPATIENT
Start: 2020-09-28 | End: 2021-03-09 | Stop reason: SDUPTHER

## 2020-09-28 SDOH — ECONOMIC STABILITY: HOUSING INSECURITY
IN THE LAST 12 MONTHS, WAS THERE A TIME WHEN YOU DID NOT HAVE A STEADY PLACE TO SLEEP OR SLEPT IN A SHELTER (INCLUDING NOW)?: NO

## 2020-09-28 SDOH — SOCIAL STABILITY: SOCIAL NETWORK: HOW OFTEN DO YOU GET TOGETHER WITH FRIENDS OR RELATIVES?: TWICE A WEEK

## 2020-09-28 SDOH — HEALTH STABILITY: MENTAL HEALTH
STRESS IS WHEN SOMEONE FEELS TENSE, NERVOUS, ANXIOUS, OR CAN'T SLEEP AT NIGHT BECAUSE THEIR MIND IS TROUBLED. HOW STRESSED ARE YOU?: ONLY A LITTLE

## 2020-09-28 SDOH — ECONOMIC STABILITY: HOUSING INSECURITY: IN THE LAST 12 MONTHS, HOW MANY PLACES HAVE YOU LIVED?: 1

## 2020-09-28 SDOH — ECONOMIC STABILITY: FOOD INSECURITY: WITHIN THE PAST 12 MONTHS, THE FOOD YOU BOUGHT JUST DIDN'T LAST AND YOU DIDN'T HAVE MONEY TO GET MORE.: NEVER TRUE

## 2020-09-28 SDOH — ECONOMIC STABILITY: INCOME INSECURITY: HOW HARD IS IT FOR YOU TO PAY FOR THE VERY BASICS LIKE FOOD, HOUSING, MEDICAL CARE, AND HEATING?: NOT VERY HARD

## 2020-09-28 SDOH — SOCIAL STABILITY: SOCIAL NETWORK
DO YOU BELONG TO ANY CLUBS OR ORGANIZATIONS SUCH AS CHURCH GROUPS UNIONS, FRATERNAL OR ATHLETIC GROUPS, OR SCHOOL GROUPS?: NO

## 2020-09-28 SDOH — SOCIAL STABILITY: SOCIAL NETWORK: ARE YOU MARRIED, WIDOWED, DIVORCED, SEPARATED, NEVER MARRIED, OR LIVING WITH A PARTNER?: DIVORCED

## 2020-09-28 SDOH — SOCIAL STABILITY: SOCIAL NETWORK
IN A TYPICAL WEEK, HOW MANY TIMES DO YOU TALK ON THE PHONE WITH FAMILY, FRIENDS, OR NEIGHBORS?: MORE THAN THREE TIMES A WEEK

## 2020-09-28 SDOH — ECONOMIC STABILITY: INCOME INSECURITY: IN THE LAST 12 MONTHS, WAS THERE A TIME WHEN YOU WERE NOT ABLE TO PAY THE MORTGAGE OR RENT ON TIME?: NO

## 2020-09-28 SDOH — ECONOMIC STABILITY: FOOD INSECURITY: WITHIN THE PAST 12 MONTHS, YOU WORRIED THAT YOUR FOOD WOULD RUN OUT BEFORE YOU GOT MONEY TO BUY MORE.: NEVER TRUE

## 2020-09-28 SDOH — ECONOMIC STABILITY: TRANSPORTATION INSECURITY
IN THE PAST 12 MONTHS, HAS LACK OF RELIABLE TRANSPORTATION KEPT YOU FROM MEDICAL APPOINTMENTS, MEETINGS, WORK OR FROM GETTING THINGS NEEDED FOR DAILY LIVING?: NO

## 2020-09-28 SDOH — HEALTH STABILITY: MENTAL HEALTH: HOW OFTEN DO YOU HAVE A DRINK CONTAINING ALCOHOL?: 2-3 TIMES A WEEK

## 2020-09-28 SDOH — HEALTH STABILITY: PHYSICAL HEALTH: ON AVERAGE, HOW MANY DAYS PER WEEK DO YOU ENGAGE IN MODERATE TO STRENUOUS EXERCISE (LIKE A BRISK WALK)?: 0 DAYS

## 2020-09-28 SDOH — ECONOMIC STABILITY: TRANSPORTATION INSECURITY
IN THE PAST 12 MONTHS, HAS LACK OF TRANSPORTATION KEPT YOU FROM MEETINGS, WORK, OR FROM GETTING THINGS NEEDED FOR DAILY LIVING?: NO

## 2020-09-28 SDOH — ECONOMIC STABILITY: TRANSPORTATION INSECURITY
IN THE PAST 12 MONTHS, HAS THE LACK OF TRANSPORTATION KEPT YOU FROM MEDICAL APPOINTMENTS OR FROM GETTING MEDICATIONS?: NO

## 2020-09-28 SDOH — SOCIAL STABILITY: SOCIAL NETWORK: HOW OFTEN DO YOU ATTEND CHURCH OR RELIGIOUS SERVICES?: NEVER

## 2020-09-28 SDOH — SOCIAL STABILITY: SOCIAL NETWORK: HOW OFTEN DO YOU ATTENT MEETINGS OF THE CLUB OR ORGANIZATION YOU BELONG TO?: NEVER

## 2020-09-28 SDOH — HEALTH STABILITY: PHYSICAL HEALTH: ON AVERAGE, HOW MANY MINUTES DO YOU ENGAGE IN EXERCISE AT THIS LEVEL?: 0 MIN

## 2020-09-28 SDOH — HEALTH STABILITY: PHYSICAL HEALTH: ON AVERAGE, HOW MANY MINUTES DO YOU ENGAGE IN EXERCISE AT THIS LEVEL?: 0 MINUTES

## 2020-09-28 ASSESSMENT — SOCIAL DETERMINANTS OF HEALTH (SDOH)
IN A TYPICAL WEEK, HOW MANY TIMES DO YOU TALK ON THE PHONE WITH FAMILY, FRIENDS, OR NEIGHBORS?: MORE THAN THREE TIMES A WEEK
HOW HARD IS IT FOR YOU TO PAY FOR THE VERY BASICS LIKE FOOD, HOUSING, MEDICAL CARE, AND HEATING?: NOT VERY HARD
WITHIN THE PAST 12 MONTHS, THE FOOD YOU BOUGHT JUST DIDN'T LAST AND YOU DIDN'T HAVE MONEY TO GET MORE: NEVER TRUE
DO YOU BELONG TO ANY CLUBS OR ORGANIZATIONS SUCH AS CHURCH GROUPS UNIONS, FRATERNAL OR ATHLETIC GROUPS, OR SCHOOL GROUPS?: NO
HOW OFTEN DO YOU ATTENT MEETINGS OF THE CLUB OR ORGANIZATION YOU BELONG TO?: NEVER
HOW OFTEN DO YOU GET TOGETHER WITH FRIENDS OR RELATIVES?: TWICE A WEEK
HOW OFTEN DO YOU HAVE SIX OR MORE DRINKS ON ONE OCCASION: NEVER
WITHIN THE PAST 12 MONTHS, YOU WORRIED THAT YOUR FOOD WOULD RUN OUT BEFORE YOU GOT THE MONEY TO BUY MORE: NEVER TRUE
HOW MANY DRINKS CONTAINING ALCOHOL DO YOU HAVE ON A TYPICAL DAY WHEN YOU ARE DRINKING: 1 OR 2
HOW OFTEN DO YOU HAVE A DRINK CONTAINING ALCOHOL: 2-4 TIMES A MONTH
HOW OFTEN DO YOU ATTEND CHURCH OR RELIGIOUS SERVICES?: NEVER

## 2020-09-28 ASSESSMENT — ACTIVITIES OF DAILY LIVING (ADL): BATHING_REQUIRES_ASSISTANCE: 0

## 2020-09-28 ASSESSMENT — FIBROSIS 4 INDEX: FIB4 SCORE: 2.5

## 2020-09-28 ASSESSMENT — PAIN SCALES - GENERAL: PAINLEVEL: NO PAIN

## 2020-09-28 ASSESSMENT — ENCOUNTER SYMPTOMS: GENERAL WELL-BEING: FAIR

## 2020-09-28 ASSESSMENT — PATIENT HEALTH QUESTIONNAIRE - PHQ9: CLINICAL INTERPRETATION OF PHQ2 SCORE: 0

## 2020-09-28 NOTE — PROGRESS NOTES
Chief Complaint   Patient presents with   • Annual Wellness Visit   • HTN     HPI:  Laureen is a 70 y.o. here for Medicare Annual Wellness Visit    Hypertension  Meds: Metoprolol 25 mg QD, losartan, 100 mg QD; taking as prescribed.   He did not check BP at home.  Denies: - headaches, vision problems, tinnitus.  - chest pain/pressure, palpitations, irregular heart beats, exertional, dyspnea, peripheral edema.  - medication side effect: unusual fatigue, slow heartbeat, foot/leg swelling.  Low salt diet: no  Diet: regular  Exercise: < 4 d/w  BMI: 35  FH of HTN: neg     Hypercholesterolemia, Gilbert sy  The patient had slightly elevated cholesterol/triglycerides.   No meds.  Diet / Exercise/BMI: As above  FH: neg  LFTs were normal, except slightly elevated total bilirubin.     IFG  The patient had elevated FBG.  No polydipsia, polyphagia, polyuria.  No abdominal pain, weight loss, fatigue.  Diet / Exercise/BMI: As above  FH of DM: neg     Hypovitaminosis D  The patient had low vitamin D level.  Vitamin D supplement: started high dose.    Thrombocytopenia  The patient had borderline low PLT count.  Denies easy bleeding or bruising.     RT knee OA /  pain  Interval course:  - XR showed OA with mild effusion     - Onset: few months  - Triger: no trauma  - located in: RT knee  - intensity:  mild to moderate  - quality:  dull and sharp   - radiation:  no  - alleviating factors are:  rest, knee brace; tylenol, naproxen  -  exacerbating factors are:  activity  - accompanied:               - no numbness, weakness, tingling, fever, chills  - course: on/off  - treatment: as above     No reported history of:  - chronic immune suppression, alcoholism, IV drug abuse, indwelling catheter, diabetes.    - No history of recent spinal surgery or injection.     XR RT knee, 5/14/2020  Mild osteoarthritis and small joint effusion.     Patient Active Problem List    Diagnosis Date Noted   • Right knee pain 05/28/2020   • Primary osteoarthritis  of right knee 05/28/2020   • IFG (impaired fasting glucose) 05/28/2020   • Rosacea 10/30/2019   • Oral lichen planus 04/30/2019   • Gilbert syndrome 04/30/2019   • Erectile dysfunction 10/30/2018   • Health care maintenance 10/30/2018   • Tobacco use 10/30/2018   • Obesity (BMI 30-39.9) 07/26/2018   • Hypovitaminosis D 04/11/2014   • Neuropathy of both feet 07/20/2012   • Essential hypertension 07/20/2012   • Hypercholesteremia 07/20/2012   • Benign prostatic hyperplasia 07/20/2012     Current Outpatient Medications   Medication Sig Dispense Refill   • losartan (COZAAR) 100 MG Tab      • Cyanocobalamin (VITAMIN B-12 PO) Take  by mouth.     • Saw Palmetto, Serenoa repens, (SAW PALMETTO PO) Take  by mouth.     • metoprolol SR (TOPROL XL) 25 MG TABLET SR 24 HR TAKE ONE TABLET BY MOUTH ONE TIME DAILY  90 Tab 0   • vitamin D, Ergocalciferol, (DRISDOL) 31251 units Cap capsule Take 1 Cap by mouth every 7 days. 12 Cap 3   • fluocinonide (LIDEX) 0.05 % gel Apply BID over MOUTH MUCOSA (Patient not taking: Reported on 4/30/2019) 1 Tube 1   • vitamin D (CHOLECALCIFEROL) 1000 UNIT Tab Take 1,000 Units by mouth every day.     • glucosamine 500 MG Cap Take  by mouth.     • naproxen (NAPROSYN) 500 MG TABS Take 1 Tab by mouth 2 times a day, with meals. (Patient not taking: Reported on 4/30/2019) 60 Tab 0     No current facility-administered medications for this visit.       Patient is taking medications as noted in medication list.  Current supplements as per medication list.     Allergies: Nkda [no known drug allergy] and Seasonal    Current social contact/activities: Walking less than a mile every other day,     Is patient current with immunizations? No, due for FLU, PNEUMOVAX (PPSV23) and SHINGRIX (Shingles). Patient is interested in receiving NONE today.    He  reports that he has been smoking cigars and cigarettes. He has been smoking about 0.25 packs per day. He has never used smokeless tobacco. He reports current alcohol use.  He reports previous drug use. Drugs: Marijuana and Inhaled.  Ready to quit: Not Answered  Counseling given: Not Answered  Comment: cigar, smokes one a day but no cigarettes    DPA/Advanced directive: Patient does not have an Advanced Directive.  A packet and workshop information was given on Advanced Directives.    ROS:    Gait: Uses no assistive device   Ostomy: No   Other tubes: No   Amputations: No   Chronic oxygen use No   Last eye exam: Year 2019  Wears hearing aids: No   : Reports urinary leakage during the last 6 months that has not interfered at all with their daily activities or sleep.    Screening:    Depression Screening  Little interest or pleasure in doing things?  0 - not at all  Feeling down, depressed, or hopeless? 0 - not at all  Patient Health Questionnaire Score: 0    Screening for Cognitive Impairment  Three Minute Recall (river, nation, finger)  3/3 River, Finger, Nation  [place] clock face with all 12 numbers and set the hands to show 10 past 11.  Yes 5/5  If cognitive concerns identified, deferred for follow up unless specifically addressed in assessment and plan.    Fall Risk Assessment  Has the patient had two or more falls in the last year or any fall with injury in the last year?  No  If fall risk identified, deferred for follow up unless specifically addressed in assessment and plan.    Safety Assessment  Throw rugs on floor.  Yes  Handrails on all stairs.  Yes  Good lighting in all hallways.  Yes  Difficulty hearing.  No  Patient counseled about all safety risks that were identified.    Functional Assessment ADLs  Are there any barriers preventing you from cooking for yourself or meeting nutritional needs?  No.    Are there any barriers preventing you from driving safely or obtaining transportation?  No.    Are there any barriers preventing you from using a telephone or calling for help?  No.    Are there any barriers preventing you from shopping?  No.    Are there any barriers preventing  you from taking care of your own finances?  No.    Are there any barriers preventing you from managing your medications?  No.    Are there any barriers preventing you from showering, bathing or dressing yourself?  No.    Are you currently engaging in any exercise or physical activity?  Yes.  Walking less than a mile every other day  What is your perception of your health?  Fair.    Health Maintenance Summary                IMM PNEUMOCOCCAL VACCINE: 65+ Years Overdue 2016     IMM ZOSTER VACCINES Overdue 11/10/2016      Done 9/15/2016 Imm Admin: Zoster Vaccine Live (ZVL) (Zostavax) - HISTORICAL DATA    Annual Wellness Visit Overdue 2020      Done 2019      Patient has more history with this topic...    IMM INFLUENZA Overdue 2020      Done 10/30/2019 Imm Admin: Influenza Vaccine Adult HD     Patient has more history with this topic...    IMM DTaP/Tdap/Td Vaccine Next Due 2023      Done 2013 Imm Admin: Tdap Vaccine    COLONOSCOPY Next Due 2024      Done 2014 AMB REFERRAL TO GI FOR COLONOSCOPY        Patient Care Team:  Madonna Swain M.D. as PCP - General (Family Medicine)  Julio Ayon O.D. as Attending Team Physician (Optometry)    Social History     Tobacco Use   • Smoking status: Current Every Day Smoker     Packs/day: 0.25     Types: Cigars, Cigarettes     Last attempt to quit: 1985     Years since quittin.7   • Smokeless tobacco: Never Used   • Tobacco comment: cigar, smokes one a day but no cigarettes   Substance Use Topics   • Alcohol use: Yes     Frequency: 2-3 times a week     Drinks per session: 1 or 2     Binge frequency: Never     Comment: minimal   • Drug use: Not Currently     Types: Marijuana, Inhaled     Comment: rare occasions,occasionally      Family History   Problem Relation Age of Onset   • Heart Disease Mother    • Heart Attack Mother    • Heart Disease Father    • Heart Attack Father    • Cancer Sister         leukemia, breast cancer   •  "Stroke Sister    • No Known Problems Maternal Grandmother    • No Known Problems Maternal Grandfather    • No Known Problems Paternal Grandmother    • Dementia Paternal Grandfather    • Hypertension Neg Hx    • Hyperlipidemia Neg Hx    • Diabetes Neg Hx      He  has a past medical history of Hypercholesterolemia, Hypertension, and Tinea inguinalis.   Past Surgical History:   Procedure Laterality Date   • OTHER ABDOMINAL SURGERY      gallbladder     Exam:   /40 (BP Location: Right arm, Patient Position: Sitting, BP Cuff Size: Adult)   Pulse (!) 56   Temp 36.6 °C (97.8 °F) (Temporal)   Ht 1.803 m (5' 11\")   Wt 116.3 kg (256 lb 6.4 oz)   SpO2 93%  Body mass index is 35.76 kg/m².  Hearing good.    Dentition good  Alert, oriented in no acute distress.  Eye contact is good, speech goal directed, affect calm    Labs  Reviewed and discussed  Lab Results   Component Value Date/Time    CHOLSTRLTOT 218 (H) 09/25/2020 08:55 AM     (H) 09/25/2020 08:55 AM    HDL 47 09/25/2020 08:55 AM    TRIGLYCERIDE 175 (H) 09/25/2020 08:55 AM       Lab Results   Component Value Date/Time    SODIUM 138 09/25/2020 08:55 AM    POTASSIUM 4.7 09/25/2020 08:55 AM    CHLORIDE 103 09/25/2020 08:55 AM    CO2 21 09/25/2020 08:55 AM    GLUCOSE 113 (H) 09/25/2020 08:55 AM    BUN 21 09/25/2020 08:55 AM    CREATININE 0.84 09/25/2020 08:55 AM    CREATININE 0.92 03/01/2010 04:54 PM    BUNCREATRAT 21 03/01/2010 04:54 PM    GLOMRATE >59 03/01/2010 04:54 PM     Lab Results   Component Value Date/Time    ALKPHOSPHAT 109 (H) 09/25/2020 08:55 AM    ASTSGOT 32 09/25/2020 08:55 AM    ALTSGPT 33 09/25/2020 08:55 AM    TBILIRUBIN 0.9 09/25/2020 08:55 AM      Lab Results   Component Value Date/Time    HBA1C 6.1 (H) 09/25/2020 08:55 AM     No results found for: TSH  No results found for: FREET4  Lab Results   Component Value Date/Time    WBC 7.0 09/25/2020 08:55 AM    WBC 9.4 03/01/2010 04:54 PM    RBC 5.16 09/25/2020 08:55 AM    RBC 5.39 03/01/2010 " 04:54 PM    HEMOGLOBIN 15.7 09/25/2020 08:55 AM    HEMATOCRIT 46.9 09/25/2020 08:55 AM    MCV 90.9 09/25/2020 08:55 AM    MCV 89 03/01/2010 04:54 PM    MCH 30.4 09/25/2020 08:55 AM    MCH 29.3 03/01/2010 04:54 PM    MCHC 33.5 (L) 09/25/2020 08:55 AM    MPV 10.6 09/25/2020 08:55 AM    NEUTSPOLYS 65.90 09/25/2020 08:55 AM    LYMPHOCYTES 20.80 (L) 09/25/2020 08:55 AM    MONOCYTES 10.60 09/25/2020 08:55 AM    EOSINOPHILS 1.40 09/25/2020 08:55 AM    BASOPHILS 0.40 09/25/2020 08:55 AM    HYPOCHROMIA 1+ 07/15/2013 10:35 AM      Imaging  Reviewed and discussed per HPI    Assessment and Plan.     1. Medicare annual wellness visit, subsequent  Reviewed PMH, PSH, FH, SH, ALL, MEDS, HCM/IMM.   - Subsequent Annual Wellness Visit - Includes PPPS ()    2. Health care maintenance  Per HPI  - Subsequent Annual Wellness Visit - Includes PPPS ()    3. Essential hypertension  Controlled, continue with current treatment.  - Subsequent Annual Wellness Visit - Includes PPPS ()  - losartan (COZAAR) 100 MG Tab; Take 1 Tab by mouth every day.  Dispense: 90 Tab; Refill: 3  - metoprolol SR (TOPROL XL) 25 MG TABLET SR 24 HR; TAKE ONE TABLET BY MOUTH ONE TIME DAILY  Dispense: 90 Tab; Refill: 3  - Comp Metabolic Panel; Future    4. Hypercholesteremia  Controlled, continue with current treatment.  - Subsequent Annual Wellness Visit - Includes PPPS ()  - Comp Metabolic Panel; Future  - Lipid Profile; Future    5. Gilbert syndrome  Reassurance  - Subsequent Annual Wellness Visit - Includes PPPS ()  - Comp Metabolic Panel; Future    6. IFG (impaired fasting glucose)  Discussed about risk to develop DM.   Advised low carb diet, exercise, watch for WT.   - Subsequent Annual Wellness Visit - Includes PPPS ()  - MICROALBUMIN CREAT RATIO URINE; Future  - Comp Metabolic Panel; Future  - HEMOGLOBIN A1C; Future    7. Hypovitaminosis D  Controlled, continue current supplement  - Subsequent Annual Wellness Visit - Includes PPPS  ()  - VITAMIN D,25 HYDROXY; Future  - vitamin D, Ergocalciferol, (DRISDOL) 1.25 MG (36832 UT) Cap capsule; Take 1 Cap by mouth every 7 days.  Dispense: 12 Cap; Refill: 3    8. Thrombocytopenia (HCC)  Borderline, follow-up labs  - Subsequent Annual Wellness Visit - Includes PPPS ()    9. Primary osteoarthritis of right knee  Advised to continue activity as tolerated, brace may help  - Subsequent Annual Wellness Visit - Includes PPPS ()  10. Chronic pain of right knee  - Subsequent Annual Wellness Visit - Includes PPPS ()    11. Benign prostatic hyperplasia with lower urinary tract symptoms, symptom details unspecified  Stable, on saw  [place]   -Advised to stop due to thrombocytopenia  - Subsequent Annual Wellness Visit - Includes PPPS ()    12. Erectile dysfunction, unspecified erectile dysfunction type  Controlled, continue current treatment as needed  - Subsequent Annual Wellness Visit - Includes PPPS ()    13. Neuropathy of both feet  Improved after chiropractic manipulation  - Subsequent Annual Wellness Visit - Includes PPPS ()    14. Obesity (BMI 30-39.9)  Appropriate counseling given  - Subsequent Annual Wellness Visit - Includes PPPS ()    15. Oral lichen planus  Evaluated by dentist  - Subsequent Annual Wellness Visit - Includes PPPS ()    16. Rosacea  Continue topical treatment as needed  - Subsequent Annual Wellness Visit - Includes PPPS ()    17. Tobacco use  Advised to quit smoking  - Subsequent Annual Wellness Visit - Includes PPPS ()    Services suggested: No services needed at this time  Health Care Screening recommendations as per orders if indicated.  Referrals offered: PT/OT/Nutrition counseling/Behavioral Health/Smoking cessation as per orders if indicated.    Discussion today about general wellness and lifestyle habits:    · Prevent falls and reduce trip hazards; Cautioned about securing or removing rugs.  · Have a working fire alarm and  carbon monoxide detector;   · Engage in regular physical activity and social activities       Follow-up: in 6 months and prn    I attest that I saw this patient on this date and due to technical issues am not showing as the author of the note.

## 2020-09-30 ENCOUNTER — APPOINTMENT (RX ONLY)
Dept: URBAN - METROPOLITAN AREA CLINIC 20 | Facility: CLINIC | Age: 70
Setting detail: DERMATOLOGY
End: 2020-09-30

## 2020-09-30 DIAGNOSIS — L82.1 OTHER SEBORRHEIC KERATOSIS: ICD-10-CM

## 2020-09-30 DIAGNOSIS — L30.4 ERYTHEMA INTERTRIGO: ICD-10-CM

## 2020-09-30 DIAGNOSIS — L57.0 ACTINIC KERATOSIS: ICD-10-CM

## 2020-09-30 DIAGNOSIS — L21.8 OTHER SEBORRHEIC DERMATITIS: ICD-10-CM | Status: INADEQUATELY CONTROLLED

## 2020-09-30 DIAGNOSIS — L30.0 NUMMULAR DERMATITIS: ICD-10-CM | Status: INADEQUATELY CONTROLLED

## 2020-09-30 DIAGNOSIS — L81.4 OTHER MELANIN HYPERPIGMENTATION: ICD-10-CM

## 2020-09-30 DIAGNOSIS — D22 MELANOCYTIC NEVI: ICD-10-CM

## 2020-09-30 DIAGNOSIS — Z85.828 PERSONAL HISTORY OF OTHER MALIGNANT NEOPLASM OF SKIN: ICD-10-CM

## 2020-09-30 DIAGNOSIS — D18.0 HEMANGIOMA: ICD-10-CM

## 2020-09-30 DIAGNOSIS — B07.8 OTHER VIRAL WARTS: ICD-10-CM

## 2020-09-30 DIAGNOSIS — L57.8 OTHER SKIN CHANGES DUE TO CHRONIC EXPOSURE TO NONIONIZING RADIATION: ICD-10-CM

## 2020-09-30 PROBLEM — D18.01 HEMANGIOMA OF SKIN AND SUBCUTANEOUS TISSUE: Status: ACTIVE | Noted: 2020-09-30

## 2020-09-30 PROBLEM — D22.5 MELANOCYTIC NEVI OF TRUNK: Status: ACTIVE | Noted: 2020-09-30

## 2020-09-30 PROCEDURE — 17004 DESTROY PREMAL LESIONS 15/>: CPT

## 2020-09-30 PROCEDURE — ? ADDITIONAL NOTES

## 2020-09-30 PROCEDURE — 17110 DESTRUCTION B9 LES UP TO 14: CPT | Mod: 59

## 2020-09-30 PROCEDURE — ? PRESCRIPTION

## 2020-09-30 PROCEDURE — 99214 OFFICE O/P EST MOD 30 MIN: CPT | Mod: 25

## 2020-09-30 PROCEDURE — ? LIQUID NITROGEN

## 2020-09-30 PROCEDURE — ? COUNSELING

## 2020-09-30 RX ORDER — KETOCONAZOLE 20 MG/G
CREAM TOPICAL QD
Qty: 1 | Refills: 3 | Status: ERX | COMMUNITY
Start: 2020-09-30

## 2020-09-30 RX ORDER — TRIAMCINOLONE ACETONIDE 1 MG/G
CREAM TOPICAL BID
Qty: 1 | Refills: 3 | Status: ERX | COMMUNITY
Start: 2020-09-30

## 2020-09-30 RX ADMIN — KETOCONAZOLE 1: 20 CREAM TOPICAL at 00:00

## 2020-09-30 RX ADMIN — TRIAMCINOLONE ACETONIDE 1: 1 CREAM TOPICAL at 00:00

## 2020-09-30 ASSESSMENT — LOCATION DETAILED DESCRIPTION DERM
LOCATION DETAILED: INFERIOR MID FOREHEAD
LOCATION DETAILED: RIGHT ULNAR DORSAL HAND
LOCATION DETAILED: RIGHT MID-UPPER BACK
LOCATION DETAILED: LEFT ANTERIOR DISTAL THIGH
LOCATION DETAILED: LEFT LATERAL TEMPLE
LOCATION DETAILED: LEFT PROXIMAL DORSAL FOREARM
LOCATION DETAILED: RIGHT SUPERIOR FOREHEAD
LOCATION DETAILED: RIGHT PROXIMAL DORSAL FOREARM
LOCATION DETAILED: LEFT MEDIAL SUPERIOR CHEST
LOCATION DETAILED: RIGHT INFERIOR UPPER BACK
LOCATION DETAILED: RIGHT PROXIMAL RADIAL DORSAL FOREARM
LOCATION DETAILED: LEFT ANTERIOR PROXIMAL UPPER ARM
LOCATION DETAILED: RIGHT DISTAL PALMAR MIDDLE FINGER
LOCATION DETAILED: RIGHT FOREHEAD
LOCATION DETAILED: LEFT CENTRAL POSTERIOR NECK
LOCATION DETAILED: RIGHT ANTERIOR PROXIMAL UPPER ARM
LOCATION DETAILED: RIGHT MEDIAL FOREHEAD
LOCATION DETAILED: RIGHT DORSAL MIDDLE METACARPOPHALANGEAL JOINT
LOCATION DETAILED: LEFT MID TEMPLE
LOCATION DETAILED: LEFT DISTAL CALF
LOCATION DETAILED: RIGHT CENTRAL EYEBROW
LOCATION DETAILED: LEFT MEDIAL FRONTAL SCALP
LOCATION DETAILED: RIGHT DISTAL DORSAL FOREARM
LOCATION DETAILED: RIGHT DORSAL WRIST
LOCATION DETAILED: RIGHT ANTERIOR DISTAL THIGH
LOCATION DETAILED: LEFT TRAGUS
LOCATION DETAILED: RIGHT SUPERIOR MEDIAL UPPER BACK
LOCATION DETAILED: LEFT INFERIOR CENTRAL MALAR CHEEK
LOCATION DETAILED: LEFT INFERIOR FOREHEAD
LOCATION DETAILED: LEFT INDEX FINGERTIP
LOCATION DETAILED: RIGHT CENTRAL MALAR CHEEK
LOCATION DETAILED: RIGHT MID PREAURICULAR CHEEK
LOCATION DETAILED: LEFT DISTAL DORSAL FOREARM
LOCATION DETAILED: 2ND WEB SPACE RIGHT HAND
LOCATION DETAILED: LEFT SUPERIOR LATERAL NECK

## 2020-09-30 ASSESSMENT — LOCATION SIMPLE DESCRIPTION DERM
LOCATION SIMPLE: LEFT CALF
LOCATION SIMPLE: RIGHT FOREARM
LOCATION SIMPLE: LEFT THIGH
LOCATION SIMPLE: LEFT EAR
LOCATION SIMPLE: RIGHT THIGH
LOCATION SIMPLE: LEFT FOREARM
LOCATION SIMPLE: RIGHT EYEBROW
LOCATION SIMPLE: RIGHT FOREHEAD
LOCATION SIMPLE: RIGHT HAND
LOCATION SIMPLE: RIGHT CHEEK
LOCATION SIMPLE: LEFT UPPER ARM
LOCATION SIMPLE: LEFT FOREHEAD
LOCATION SIMPLE: INFERIOR FOREHEAD
LOCATION SIMPLE: RIGHT WRIST
LOCATION SIMPLE: CHEST
LOCATION SIMPLE: LEFT INDEX FINGER
LOCATION SIMPLE: RIGHT UPPER ARM
LOCATION SIMPLE: NECK
LOCATION SIMPLE: LEFT CHEEK
LOCATION SIMPLE: LEFT SCALP
LOCATION SIMPLE: LEFT TEMPLE
LOCATION SIMPLE: RIGHT MIDDLE FINGER
LOCATION SIMPLE: RIGHT UPPER BACK

## 2020-09-30 ASSESSMENT — LOCATION ZONE DERM
LOCATION ZONE: NECK
LOCATION ZONE: FACE
LOCATION ZONE: ARM
LOCATION ZONE: HAND
LOCATION ZONE: EAR
LOCATION ZONE: TRUNK
LOCATION ZONE: LEG
LOCATION ZONE: SCALP
LOCATION ZONE: FINGER

## 2020-09-30 NOTE — PROCEDURE: ADDITIONAL NOTES
Detail Level: Simple
Additional Notes: Pt to use ketoconazole cream on head and neck once a day for 4 weeks.\\n\\nStop aloe Vera, start cera ve lotion
Additional Notes: Pt to use ketoconazole on intertrigo infected areas.

## 2020-09-30 NOTE — PROCEDURE: LIQUID NITROGEN
Render Note In Bullet Format When Appropriate: No
Consent: The patient's consent was obtained including but not limited to risks of crusting, scabbing, blistering, scarring, darker or lighter pigmentary change, recurrence, incomplete removal and infection.
Detail Level: Detailed
Duration Of Freeze Thaw-Cycle (Seconds): 0
Post-Care Instructions: I reviewed with the patient in detail post-care instructions. Patient is to wear sunprotection, and avoid picking at any of the treated lesions. Pt may apply Vaseline to crusted or scabbing areas.
Medical Necessity Clause: This procedure was medically necessary because the lesions that were treated were:
Medical Necessity Information: It is in your best interest to select a reason for this procedure from the list below. All of these items fulfill various CMS LCD requirements except the new and changing color options.

## 2020-12-03 ENCOUNTER — OFFICE VISIT (OUTPATIENT)
Dept: URGENT CARE | Facility: CLINIC | Age: 70
End: 2020-12-03
Payer: MEDICARE

## 2020-12-03 VITALS
SYSTOLIC BLOOD PRESSURE: 126 MMHG | RESPIRATION RATE: 16 BRPM | TEMPERATURE: 96.7 F | BODY MASS INDEX: 37.24 KG/M2 | HEIGHT: 71 IN | WEIGHT: 266 LBS | HEART RATE: 62 BPM | OXYGEN SATURATION: 93 % | DIASTOLIC BLOOD PRESSURE: 74 MMHG

## 2020-12-03 DIAGNOSIS — M54.50 ACUTE LEFT-SIDED LOW BACK PAIN WITHOUT SCIATICA: ICD-10-CM

## 2020-12-03 DIAGNOSIS — M62.830 LUMBAR PARASPINAL MUSCLE SPASM: ICD-10-CM

## 2020-12-03 PROCEDURE — 99214 OFFICE O/P EST MOD 30 MIN: CPT | Performed by: NURSE PRACTITIONER

## 2020-12-03 RX ORDER — CYCLOBENZAPRINE HCL 10 MG
10 TABLET ORAL NIGHTLY PRN
Qty: 20 TAB | Refills: 0 | Status: SHIPPED
Start: 2020-12-03 | End: 2021-03-09

## 2020-12-03 ASSESSMENT — ENCOUNTER SYMPTOMS
BOWEL INCONTINENCE: 0
NUMBNESS: 0
LEG PAIN: 0
BACK PAIN: 1

## 2020-12-03 ASSESSMENT — FIBROSIS 4 INDEX: FIB4 SCORE: 2.5

## 2020-12-03 NOTE — PROGRESS NOTES
Subjective:      Laureen Munoz is a 70 y.o. male who presents with Side Pain (x 4-5 days, Lt. side pain,)            Back Pain  This is a new problem. Episode onset: pt reports new onset of sharp lower back pain that started 5 days ago. He states it is localized to his left side. Reports intermittent sharp pain on his left side. reports hx of low back issues but not sharp pain. The problem occurs intermittently. The problem is unchanged. The pain is present in the lumbar spine. The quality of the pain is described as shooting. The pain does not radiate. The pain is worse during the night. The symptoms are aggravated by position, twisting and sitting. Pertinent negatives include no bladder incontinence, bowel incontinence, leg pain or numbness. (He states he has been packing boxes recently to move and bending over more than he is used to) Risk factors include sedentary lifestyle. He has tried heat (salon pas patches) for the symptoms. The treatment provided mild relief.       Review of Systems   Gastrointestinal: Negative for bowel incontinence.   Genitourinary: Negative for bladder incontinence.   Musculoskeletal: Positive for back pain.   Neurological: Negative for numbness.   All other systems reviewed and are negative.    Past Medical History:   Diagnosis Date   • Hypercholesterolemia    • Hypertension    • Tinea inguinalis       Past Surgical History:   Procedure Laterality Date   • OTHER ABDOMINAL SURGERY      gallbladder      Social History     Socioeconomic History   • Marital status:      Spouse name: Not on file   • Number of children: Not on file   • Years of education: Not on file   • Highest education level: Associate degree: academic program   Occupational History   • Not on file   Social Needs   • Financial resource strain: Not very hard   • Food insecurity     Worry: Never true     Inability: Never true   • Transportation needs     Medical: No     Non-medical: No   Tobacco Use   • Smoking  "status: Current Every Day Smoker     Packs/day: 0.25     Types: Cigars, Cigarettes     Last attempt to quit: 1985     Years since quittin.9   • Smokeless tobacco: Never Used   • Tobacco comment: cigar, smokes one a day but no cigarettes   Substance and Sexual Activity   • Alcohol use: Yes     Frequency: 2-3 times a week     Drinks per session: 1 or 2     Binge frequency: Never     Comment: minimal   • Drug use: Not Currently     Types: Marijuana, Inhaled     Comment: rare occasions,occasionally    • Sexual activity: Not Currently   Lifestyle   • Physical activity     Days per week: 0 days     Minutes per session: 0 min   • Stress: Only a little   Relationships   • Social connections     Talks on phone: More than three times a week     Gets together: Twice a week     Attends Yazidism service: Never     Active member of club or organization: No     Attends meetings of clubs or organizations: Never     Relationship status:    • Intimate partner violence     Fear of current or ex partner: Not on file     Emotionally abused: Not on file     Physically abused: Not on file     Forced sexual activity: Not on file   Other Topics Concern   • Not on file   Social History Narrative   • Not on file          Objective:     /74 (BP Location: Left arm, Patient Position: Sitting, BP Cuff Size: Large adult)   Pulse 62   Temp 35.9 °C (96.7 °F) (Temporal)   Resp 16   Ht 1.803 m (5' 11\")   Wt 120.7 kg (266 lb)   SpO2 93%   BMI 37.10 kg/m²      Physical Exam  Vitals signs and nursing note reviewed.   Constitutional:       Appearance: Normal appearance.   HENT:      Head: Normocephalic and atraumatic.      Nose: Nose normal.      Mouth/Throat:      Mouth: Mucous membranes are moist.   Eyes:      Extraocular Movements: Extraocular movements intact.      Pupils: Pupils are equal, round, and reactive to light.   Neck:      Musculoskeletal: Normal range of motion.   Cardiovascular:      Rate and Rhythm: Normal rate " and regular rhythm.   Pulmonary:      Effort: Pulmonary effort is normal.   Musculoskeletal: Normal range of motion.      Lumbar back: He exhibits tenderness, pain and spasm.        Back:    Skin:     General: Skin is warm and dry.      Capillary Refill: Capillary refill takes less than 2 seconds.   Neurological:      General: No focal deficit present.      Mental Status: He is alert and oriented to person, place, and time. Mental status is at baseline.   Psychiatric:         Mood and Affect: Mood normal.         Speech: Speech normal.         Thought Content: Thought content normal.         Judgment: Judgment normal.                 Assessment/Plan:        1. Acute left-sided low back pain without sciatica    2. Lumbar paraspinal muscle spasm  - cyclobenzaprine (FLEXERIL) 10 mg Tab; Take 1 Tab by mouth at bedtime as needed for Muscle Spasms.  Dispense: 20 Tab; Refill: 0    Sedating effects of flexeril discussed  Encouraged pt to continue to use heat and salon pas patches  Light walking to promote circulation and discourage stiffness  No heavy lifting or excessive bending  May use tylenol and ibuprofen as needed for pain  Supportive care, differential diagnoses, and indications for immediate follow-up discussed with patient.    Pathogenesis of diagnosis discussed including typical length and natural progression.      Instructed to return to  or nearest emergency department if symptoms fail to improve, for any change in condition, further concerns, or new concerning symptoms.  Patient states understanding of the plan of care and discharge instructions.

## 2021-03-03 ENCOUNTER — HOSPITAL ENCOUNTER (OUTPATIENT)
Dept: LAB | Facility: MEDICAL CENTER | Age: 71
End: 2021-03-03
Attending: INTERNAL MEDICINE
Payer: MEDICARE

## 2021-03-03 DIAGNOSIS — I10 ESSENTIAL HYPERTENSION: ICD-10-CM

## 2021-03-03 DIAGNOSIS — R73.01 IFG (IMPAIRED FASTING GLUCOSE): ICD-10-CM

## 2021-03-03 DIAGNOSIS — E55.9 HYPOVITAMINOSIS D: ICD-10-CM

## 2021-03-03 DIAGNOSIS — E80.4 GILBERT SYNDROME: ICD-10-CM

## 2021-03-03 DIAGNOSIS — D69.6 THROMBOCYTOPENIA (HCC): ICD-10-CM

## 2021-03-03 DIAGNOSIS — Z23 NEED FOR VACCINATION: ICD-10-CM

## 2021-03-03 DIAGNOSIS — E78.00 HYPERCHOLESTEREMIA: ICD-10-CM

## 2021-03-03 LAB
25(OH)D3 SERPL-MCNC: 31 NG/ML (ref 30–100)
ALBUMIN SERPL BCP-MCNC: 4 G/DL (ref 3.2–4.9)
ALBUMIN/GLOB SERPL: 1.3 G/DL
ALP SERPL-CCNC: 97 U/L (ref 30–99)
ALT SERPL-CCNC: 36 U/L (ref 2–50)
ANION GAP SERPL CALC-SCNC: 11 MMOL/L (ref 7–16)
AST SERPL-CCNC: 26 U/L (ref 12–45)
BASOPHILS # BLD AUTO: 0.5 % (ref 0–1.8)
BASOPHILS # BLD: 0.04 K/UL (ref 0–0.12)
BILIRUB SERPL-MCNC: 1 MG/DL (ref 0.1–1.5)
BUN SERPL-MCNC: 28 MG/DL (ref 8–22)
CALCIUM SERPL-MCNC: 9.2 MG/DL (ref 8.5–10.5)
CHLORIDE SERPL-SCNC: 103 MMOL/L (ref 96–112)
CHOLEST SERPL-MCNC: 192 MG/DL (ref 100–199)
CO2 SERPL-SCNC: 22 MMOL/L (ref 20–33)
CREAT SERPL-MCNC: 0.86 MG/DL (ref 0.5–1.4)
CREAT UR-MCNC: 109.09 MG/DL
EOSINOPHIL # BLD AUTO: 0.09 K/UL (ref 0–0.51)
EOSINOPHIL NFR BLD: 1.1 % (ref 0–6.9)
ERYTHROCYTE [DISTWIDTH] IN BLOOD BY AUTOMATED COUNT: 45.1 FL (ref 35.9–50)
EST. AVERAGE GLUCOSE BLD GHB EST-MCNC: 131 MG/DL
FASTING STATUS PATIENT QL REPORTED: NORMAL
GLOBULIN SER CALC-MCNC: 3.1 G/DL (ref 1.9–3.5)
GLUCOSE SERPL-MCNC: 98 MG/DL (ref 65–99)
HBA1C MFR BLD: 6.2 % (ref 4–5.6)
HCT VFR BLD AUTO: 47.5 % (ref 42–52)
HDLC SERPL-MCNC: 42 MG/DL
HGB BLD-MCNC: 15.6 G/DL (ref 14–18)
IMM GRANULOCYTES # BLD AUTO: 0.07 K/UL (ref 0–0.11)
IMM GRANULOCYTES NFR BLD AUTO: 0.9 % (ref 0–0.9)
LDLC SERPL CALC-MCNC: 124 MG/DL
LYMPHOCYTES # BLD AUTO: 1.39 K/UL (ref 1–4.8)
LYMPHOCYTES NFR BLD: 17 % (ref 22–41)
MCH RBC QN AUTO: 30.5 PG (ref 27–33)
MCHC RBC AUTO-ENTMCNC: 32.8 G/DL (ref 33.7–35.3)
MCV RBC AUTO: 92.8 FL (ref 81.4–97.8)
MICROALBUMIN UR-MCNC: 1.9 MG/DL
MICROALBUMIN/CREAT UR: 17 MG/G (ref 0–30)
MONOCYTES # BLD AUTO: 0.95 K/UL (ref 0–0.85)
MONOCYTES NFR BLD AUTO: 11.6 % (ref 0–13.4)
NEUTROPHILS # BLD AUTO: 5.64 K/UL (ref 1.82–7.42)
NEUTROPHILS NFR BLD: 68.9 % (ref 44–72)
NRBC # BLD AUTO: 0 K/UL
NRBC BLD-RTO: 0 /100 WBC
PLATELET # BLD AUTO: 157 K/UL (ref 164–446)
PMV BLD AUTO: 11.4 FL (ref 9–12.9)
POTASSIUM SERPL-SCNC: 4.3 MMOL/L (ref 3.6–5.5)
PROT SERPL-MCNC: 7.1 G/DL (ref 6–8.2)
RBC # BLD AUTO: 5.12 M/UL (ref 4.7–6.1)
SODIUM SERPL-SCNC: 136 MMOL/L (ref 135–145)
TRIGL SERPL-MCNC: 130 MG/DL (ref 0–149)
WBC # BLD AUTO: 8.2 K/UL (ref 4.8–10.8)

## 2021-03-03 PROCEDURE — 82570 ASSAY OF URINE CREATININE: CPT

## 2021-03-03 PROCEDURE — 82306 VITAMIN D 25 HYDROXY: CPT

## 2021-03-03 PROCEDURE — 80061 LIPID PANEL: CPT

## 2021-03-03 PROCEDURE — 36415 COLL VENOUS BLD VENIPUNCTURE: CPT

## 2021-03-03 PROCEDURE — 85025 COMPLETE CBC W/AUTO DIFF WBC: CPT

## 2021-03-03 PROCEDURE — 82043 UR ALBUMIN QUANTITATIVE: CPT

## 2021-03-03 PROCEDURE — 83036 HEMOGLOBIN GLYCOSYLATED A1C: CPT | Mod: GA

## 2021-03-03 PROCEDURE — 80053 COMPREHEN METABOLIC PANEL: CPT

## 2021-03-08 ENCOUNTER — TELEPHONE (OUTPATIENT)
Dept: MEDICAL GROUP | Age: 71
End: 2021-03-08

## 2021-03-08 NOTE — TELEPHONE ENCOUNTER
ESTABLISHED PATIENT PRE-VISIT PLANNING     03/08/21@11:20AM Called & spoke to pt. Offered Virtual-pt declined. Advised office visit scheduled Tuesday, 03/09/21@9:30AM Dr. Julito Fleming.    Patient was contacted to complete PVP.     Note: Patient will not be contacted if there is no indication to call.     1.  Reviewed notes from the last few office visits within the medical group: Yes    2.  If any orders were placed at last visit or intended to be done for this visit (i.e. 6 mos follow-up), do we have Results/Consult Notes?         •  Labs - Labs ordered, completed on 03/03/21 and results are in chart.  Note: If patient appointment is for lab review and patient did not complete labs, check with provider if OK to reschedule patient until labs completed.       •  Imaging - Imaging was not ordered at last office visit.       •  Referrals - No referrals were ordered at last office visit.    3. Is this appointment scheduled as a Hospital Follow-Up? No    4.  Immunizations were updated in Epic using Reconcile Outside Information activity? Yes    5.  Patient is due for the following Health Maintenance Topics:   Health Maintenance Due   Topic Date Due   • COVID-19 Vaccine (1 of 2) Never done   • IMM PNEUMOCOCCAL VACCINE: 65+ Years (1 of 1 - PPSV23) Never done   • IMM ZOSTER VACCINES (2 of 3) 11/10/2016       6.  AHA (Pulse8) form printed for Provider? N/A

## 2021-03-09 ENCOUNTER — OFFICE VISIT (OUTPATIENT)
Dept: MEDICAL GROUP | Age: 71
End: 2021-03-09
Payer: MEDICARE

## 2021-03-09 VITALS
SYSTOLIC BLOOD PRESSURE: 138 MMHG | BODY MASS INDEX: 35.56 KG/M2 | OXYGEN SATURATION: 95 % | DIASTOLIC BLOOD PRESSURE: 72 MMHG | HEIGHT: 71 IN | WEIGHT: 254 LBS | HEART RATE: 54 BPM | TEMPERATURE: 98.9 F

## 2021-03-09 DIAGNOSIS — E78.00 HYPERCHOLESTEREMIA: ICD-10-CM

## 2021-03-09 DIAGNOSIS — E80.4 GILBERT SYNDROME: ICD-10-CM

## 2021-03-09 DIAGNOSIS — I10 ESSENTIAL HYPERTENSION: ICD-10-CM

## 2021-03-09 DIAGNOSIS — N40.1 BPH ASSOCIATED WITH NOCTURIA: ICD-10-CM

## 2021-03-09 DIAGNOSIS — R35.1 BPH ASSOCIATED WITH NOCTURIA: ICD-10-CM

## 2021-03-09 DIAGNOSIS — D69.6 THROMBOCYTOPENIA (HCC): ICD-10-CM

## 2021-03-09 DIAGNOSIS — R73.01 IFG (IMPAIRED FASTING GLUCOSE): ICD-10-CM

## 2021-03-09 PROCEDURE — 99214 OFFICE O/P EST MOD 30 MIN: CPT | Performed by: INTERNAL MEDICINE

## 2021-03-09 RX ORDER — METOPROLOL SUCCINATE 25 MG/1
TABLET, EXTENDED RELEASE ORAL
Qty: 90 TABLET | Refills: 3 | Status: SHIPPED | OUTPATIENT
Start: 2021-03-09 | End: 2022-04-19 | Stop reason: SDUPTHER

## 2021-03-09 RX ORDER — LOSARTAN POTASSIUM 100 MG/1
100 TABLET ORAL DAILY
Qty: 90 TABLET | Refills: 3 | Status: SHIPPED | OUTPATIENT
Start: 2021-03-09 | End: 2022-04-19 | Stop reason: SDUPTHER

## 2021-03-09 RX ORDER — FINASTERIDE 5 MG/1
5 TABLET, FILM COATED ORAL DAILY
Qty: 90 TABLET | Refills: 3 | Status: SHIPPED | OUTPATIENT
Start: 2021-03-09 | End: 2022-07-08

## 2021-03-09 ASSESSMENT — ANXIETY QUESTIONNAIRES
7. FEELING AFRAID AS IF SOMETHING AWFUL MIGHT HAPPEN: NOT AT ALL
4. TROUBLE RELAXING: NOT AT ALL
3. WORRYING TOO MUCH ABOUT DIFFERENT THINGS: NOT AT ALL
GAD7 TOTAL SCORE: 2
1. FEELING NERVOUS, ANXIOUS, OR ON EDGE: MORE THAN HALF THE DAYS
5. BEING SO RESTLESS THAT IT IS HARD TO SIT STILL: NOT AT ALL
2. NOT BEING ABLE TO STOP OR CONTROL WORRYING: NOT AT ALL
6. BECOMING EASILY ANNOYED OR IRRITABLE: NOT AT ALL

## 2021-03-09 ASSESSMENT — PATIENT HEALTH QUESTIONNAIRE - PHQ9: CLINICAL INTERPRETATION OF PHQ2 SCORE: 0

## 2021-03-09 ASSESSMENT — FIBROSIS 4 INDEX: FIB4 SCORE: 1.9

## 2021-03-12 ENCOUNTER — IMMUNIZATION (OUTPATIENT)
Dept: FAMILY PLANNING/WOMEN'S HEALTH CLINIC | Facility: IMMUNIZATION CENTER | Age: 71
End: 2021-03-12
Attending: INTERNAL MEDICINE
Payer: MEDICARE

## 2021-03-12 DIAGNOSIS — Z23 NEED FOR VACCINATION: ICD-10-CM

## 2021-03-12 DIAGNOSIS — Z23 ENCOUNTER FOR VACCINATION: Primary | ICD-10-CM

## 2021-03-12 PROCEDURE — 0011A MODERNA SARS-COV-2 VACCINE: CPT

## 2021-03-12 PROCEDURE — 91301 MODERNA SARS-COV-2 VACCINE: CPT

## 2021-04-10 ENCOUNTER — IMMUNIZATION (OUTPATIENT)
Dept: FAMILY PLANNING/WOMEN'S HEALTH CLINIC | Facility: IMMUNIZATION CENTER | Age: 71
End: 2021-04-10
Attending: INTERNAL MEDICINE
Payer: MEDICARE

## 2021-04-10 DIAGNOSIS — Z23 ENCOUNTER FOR VACCINATION: Primary | ICD-10-CM

## 2021-04-10 PROCEDURE — 0012A MODERNA SARS-COV-2 VACCINE: CPT

## 2021-04-10 PROCEDURE — 91301 MODERNA SARS-COV-2 VACCINE: CPT

## 2021-06-07 ENCOUNTER — TELEPHONE (OUTPATIENT)
Dept: MEDICAL GROUP | Age: 71
End: 2021-06-07

## 2021-06-08 ENCOUNTER — HOSPITAL ENCOUNTER (INPATIENT)
Facility: MEDICAL CENTER | Age: 71
LOS: 1 days | DRG: 645 | End: 2021-06-09
Attending: EMERGENCY MEDICINE | Admitting: STUDENT IN AN ORGANIZED HEALTH CARE EDUCATION/TRAINING PROGRAM
Payer: MEDICARE

## 2021-06-08 ENCOUNTER — OFFICE VISIT (OUTPATIENT)
Dept: MEDICAL GROUP | Age: 71
End: 2021-06-08
Payer: MEDICARE

## 2021-06-08 ENCOUNTER — APPOINTMENT (OUTPATIENT)
Dept: RADIOLOGY | Facility: MEDICAL CENTER | Age: 71
DRG: 645 | End: 2021-06-08
Attending: EMERGENCY MEDICINE
Payer: MEDICARE

## 2021-06-08 ENCOUNTER — APPOINTMENT (OUTPATIENT)
Dept: RADIOLOGY | Facility: MEDICAL CENTER | Age: 71
DRG: 645 | End: 2021-06-08
Attending: STUDENT IN AN ORGANIZED HEALTH CARE EDUCATION/TRAINING PROGRAM
Payer: MEDICARE

## 2021-06-08 VITALS
DIASTOLIC BLOOD PRESSURE: 62 MMHG | BODY MASS INDEX: 34.72 KG/M2 | SYSTOLIC BLOOD PRESSURE: 128 MMHG | OXYGEN SATURATION: 95 % | WEIGHT: 248 LBS | TEMPERATURE: 97.9 F | HEIGHT: 71 IN | HEART RATE: 42 BPM

## 2021-06-08 DIAGNOSIS — I63.9 ACUTE CVA (CEREBROVASCULAR ACCIDENT) (HCC): ICD-10-CM

## 2021-06-08 DIAGNOSIS — R42 DIZZINESS: ICD-10-CM

## 2021-06-08 DIAGNOSIS — I10 ESSENTIAL HYPERTENSION: ICD-10-CM

## 2021-06-08 DIAGNOSIS — E78.00 HYPERCHOLESTEREMIA: ICD-10-CM

## 2021-06-08 DIAGNOSIS — R42 VERTIGO: ICD-10-CM

## 2021-06-08 DIAGNOSIS — R41.0 CONFUSION: ICD-10-CM

## 2021-06-08 DIAGNOSIS — R53.1 WEAKNESS: ICD-10-CM

## 2021-06-08 DIAGNOSIS — R73.01 IFG (IMPAIRED FASTING GLUCOSE): ICD-10-CM

## 2021-06-08 DIAGNOSIS — R60.0 EDEMA OF BOTH FEET: ICD-10-CM

## 2021-06-08 DIAGNOSIS — R00.1 BRADYCARDIA: ICD-10-CM

## 2021-06-08 PROBLEM — E23.6 ENLARGED PITUITARY GLAND (HCC): Status: ACTIVE | Noted: 2021-06-08

## 2021-06-08 PROBLEM — L71.9 ROSACEA: Chronic | Status: ACTIVE | Noted: 2019-10-30

## 2021-06-08 LAB
ALBUMIN SERPL BCP-MCNC: 4.2 G/DL (ref 3.2–4.9)
ALBUMIN/GLOB SERPL: 1.5 G/DL
ALP SERPL-CCNC: 100 U/L (ref 30–99)
ALT SERPL-CCNC: 27 U/L (ref 2–50)
ANION GAP SERPL CALC-SCNC: 13 MMOL/L (ref 7–16)
AST SERPL-CCNC: 24 U/L (ref 12–45)
BASOPHILS # BLD AUTO: 0.4 % (ref 0–1.8)
BASOPHILS # BLD: 0.03 K/UL (ref 0–0.12)
BILIRUB SERPL-MCNC: 1.2 MG/DL (ref 0.1–1.5)
BUN SERPL-MCNC: 26 MG/DL (ref 8–22)
CALCIUM SERPL-MCNC: 8.7 MG/DL (ref 8.4–10.2)
CHLORIDE SERPL-SCNC: 107 MMOL/L (ref 96–112)
CO2 SERPL-SCNC: 21 MMOL/L (ref 20–33)
CREAT SERPL-MCNC: 0.93 MG/DL (ref 0.5–1.4)
EKG IMPRESSION: NORMAL
EOSINOPHIL # BLD AUTO: 0.07 K/UL (ref 0–0.51)
EOSINOPHIL NFR BLD: 1 % (ref 0–6.9)
ERYTHROCYTE [DISTWIDTH] IN BLOOD BY AUTOMATED COUNT: 44 FL (ref 35.9–50)
GLOBULIN SER CALC-MCNC: 2.8 G/DL (ref 1.9–3.5)
GLUCOSE SERPL-MCNC: 113 MG/DL (ref 65–99)
HCT VFR BLD AUTO: 46.8 % (ref 42–52)
HGB BLD-MCNC: 15.2 G/DL (ref 14–18)
IMM GRANULOCYTES # BLD AUTO: 0.05 K/UL (ref 0–0.11)
IMM GRANULOCYTES NFR BLD AUTO: 0.7 % (ref 0–0.9)
LYMPHOCYTES # BLD AUTO: 1.69 K/UL (ref 1–4.8)
LYMPHOCYTES NFR BLD: 23.9 % (ref 22–41)
MCH RBC QN AUTO: 30.2 PG (ref 27–33)
MCHC RBC AUTO-ENTMCNC: 32.5 G/DL (ref 33.7–35.3)
MCV RBC AUTO: 93 FL (ref 81.4–97.8)
MONOCYTES # BLD AUTO: 0.65 K/UL (ref 0–0.85)
MONOCYTES NFR BLD AUTO: 9.2 % (ref 0–13.4)
NEUTROPHILS # BLD AUTO: 4.59 K/UL (ref 1.82–7.42)
NEUTROPHILS NFR BLD: 64.8 % (ref 44–72)
NRBC # BLD AUTO: 0 K/UL
NRBC BLD-RTO: 0 /100 WBC
PLATELET # BLD AUTO: 142 K/UL (ref 164–446)
PMV BLD AUTO: 10.6 FL (ref 9–12.9)
POTASSIUM SERPL-SCNC: 4.3 MMOL/L (ref 3.6–5.5)
PROT SERPL-MCNC: 7 G/DL (ref 6–8.2)
RBC # BLD AUTO: 5.03 M/UL (ref 4.7–6.1)
SARS-COV-2 RNA RESP QL NAA+PROBE: NOTDETECTED
SODIUM SERPL-SCNC: 141 MMOL/L (ref 135–145)
SPECIMEN SOURCE: NORMAL
TROPONIN T SERPL-MCNC: 18 NG/L (ref 6–19)
WBC # BLD AUTO: 7.1 K/UL (ref 4.8–10.8)

## 2021-06-08 PROCEDURE — 99215 OFFICE O/P EST HI 40 MIN: CPT | Performed by: INTERNAL MEDICINE

## 2021-06-08 PROCEDURE — A9576 INJ PROHANCE MULTIPACK: HCPCS | Performed by: STUDENT IN AN ORGANIZED HEALTH CARE EDUCATION/TRAINING PROGRAM

## 2021-06-08 PROCEDURE — 70450 CT HEAD/BRAIN W/O DYE: CPT | Mod: ME

## 2021-06-08 PROCEDURE — 99222 1ST HOSP IP/OBS MODERATE 55: CPT | Performed by: STUDENT IN AN ORGANIZED HEALTH CARE EDUCATION/TRAINING PROGRAM

## 2021-06-08 PROCEDURE — 84484 ASSAY OF TROPONIN QUANT: CPT

## 2021-06-08 PROCEDURE — 85025 COMPLETE CBC W/AUTO DIFF WBC: CPT

## 2021-06-08 PROCEDURE — 80053 COMPREHEN METABOLIC PANEL: CPT

## 2021-06-08 PROCEDURE — 700117 HCHG RX CONTRAST REV CODE 255: Performed by: STUDENT IN AN ORGANIZED HEALTH CARE EDUCATION/TRAINING PROGRAM

## 2021-06-08 PROCEDURE — 770020 HCHG ROOM/CARE - TELE (206)

## 2021-06-08 PROCEDURE — 93005 ELECTROCARDIOGRAM TRACING: CPT | Performed by: EMERGENCY MEDICINE

## 2021-06-08 PROCEDURE — U0005 INFEC AGEN DETEC AMPLI PROBE: HCPCS

## 2021-06-08 PROCEDURE — 70553 MRI BRAIN STEM W/O & W/DYE: CPT | Mod: ME

## 2021-06-08 PROCEDURE — 36415 COLL VENOUS BLD VENIPUNCTURE: CPT

## 2021-06-08 PROCEDURE — 99285 EMERGENCY DEPT VISIT HI MDM: CPT

## 2021-06-08 PROCEDURE — 700102 HCHG RX REV CODE 250 W/ 637 OVERRIDE(OP): Performed by: STUDENT IN AN ORGANIZED HEALTH CARE EDUCATION/TRAINING PROGRAM

## 2021-06-08 PROCEDURE — A9270 NON-COVERED ITEM OR SERVICE: HCPCS | Performed by: STUDENT IN AN ORGANIZED HEALTH CARE EDUCATION/TRAINING PROGRAM

## 2021-06-08 PROCEDURE — U0003 INFECTIOUS AGENT DETECTION BY NUCLEIC ACID (DNA OR RNA); SEVERE ACUTE RESPIRATORY SYNDROME CORONAVIRUS 2 (SARS-COV-2) (CORONAVIRUS DISEASE [COVID-19]), AMPLIFIED PROBE TECHNIQUE, MAKING USE OF HIGH THROUGHPUT TECHNOLOGIES AS DESCRIBED BY CMS-2020-01-R: HCPCS

## 2021-06-08 RX ORDER — HYDRALAZINE HYDROCHLORIDE 10 MG/1
10 TABLET, FILM COATED ORAL EVERY 8 HOURS
Status: DISCONTINUED | OUTPATIENT
Start: 2021-06-08 | End: 2021-06-08

## 2021-06-08 RX ORDER — METOPROLOL SUCCINATE 25 MG/1
25 TABLET, EXTENDED RELEASE ORAL EVERY MORNING
Status: DISCONTINUED | OUTPATIENT
Start: 2021-06-09 | End: 2021-06-09 | Stop reason: HOSPADM

## 2021-06-08 RX ORDER — GABAPENTIN 300 MG/1
300 CAPSULE ORAL 3 TIMES DAILY
Status: DISCONTINUED | OUTPATIENT
Start: 2021-06-08 | End: 2021-06-09 | Stop reason: HOSPADM

## 2021-06-08 RX ORDER — ASPIRIN 325 MG
325 TABLET ORAL DAILY
Status: DISCONTINUED | OUTPATIENT
Start: 2021-06-08 | End: 2021-06-08

## 2021-06-08 RX ORDER — MELATONIN 5 MG
15 TABLET,CHEWABLE ORAL
COMMUNITY
End: 2022-02-08

## 2021-06-08 RX ORDER — ERGOCALCIFEROL 1.25 MG/1
50000 CAPSULE ORAL
COMMUNITY
Start: 2021-05-19 | End: 2021-09-08 | Stop reason: SDUPTHER

## 2021-06-08 RX ORDER — ASPIRIN 81 MG/1
324 TABLET, CHEWABLE ORAL DAILY
Status: DISCONTINUED | OUTPATIENT
Start: 2021-06-08 | End: 2021-06-08

## 2021-06-08 RX ORDER — LOSARTAN POTASSIUM 25 MG/1
50 TABLET ORAL
Status: DISCONTINUED | OUTPATIENT
Start: 2021-06-09 | End: 2021-06-09 | Stop reason: HOSPADM

## 2021-06-08 RX ORDER — ATORVASTATIN CALCIUM 40 MG/1
40 TABLET, FILM COATED ORAL EVERY EVENING
Status: DISCONTINUED | OUTPATIENT
Start: 2021-06-08 | End: 2021-06-09 | Stop reason: HOSPADM

## 2021-06-08 RX ORDER — ASPIRIN 600 MG/1
300 SUPPOSITORY RECTAL DAILY
Status: DISCONTINUED | OUTPATIENT
Start: 2021-06-08 | End: 2021-06-08

## 2021-06-08 RX ORDER — HYDRALAZINE HYDROCHLORIDE 20 MG/ML
10 INJECTION INTRAMUSCULAR; INTRAVENOUS EVERY 6 HOURS PRN
Status: DISCONTINUED | OUTPATIENT
Start: 2021-06-08 | End: 2021-06-09 | Stop reason: HOSPADM

## 2021-06-08 RX ORDER — CHOLECALCIFEROL (VITAMIN D3) 125 MCG
10 CAPSULE ORAL NIGHTLY PRN
Status: DISCONTINUED | OUTPATIENT
Start: 2021-06-08 | End: 2021-06-09 | Stop reason: HOSPADM

## 2021-06-08 RX ORDER — ERGOCALCIFEROL 1.25 MG/1
50000 CAPSULE ORAL
Status: DISCONTINUED | OUTPATIENT
Start: 2021-06-11 | End: 2021-06-09 | Stop reason: HOSPADM

## 2021-06-08 RX ORDER — MELATONIN 5 MG
15 TABLET,CHEWABLE ORAL
Status: DISCONTINUED | OUTPATIENT
Start: 2021-06-08 | End: 2021-06-08

## 2021-06-08 RX ORDER — FLUTICASONE PROPIONATE 50 MCG
1 SPRAY, SUSPENSION (ML) NASAL EVERY MORNING
Status: DISCONTINUED | OUTPATIENT
Start: 2021-06-09 | End: 2021-06-09 | Stop reason: HOSPADM

## 2021-06-08 RX ORDER — FLUTICASONE PROPIONATE 50 MCG
1 SPRAY, SUSPENSION (ML) NASAL EVERY MORNING
COMMUNITY

## 2021-06-08 RX ORDER — FINASTERIDE 5 MG/1
5 TABLET, FILM COATED ORAL DAILY
Status: DISCONTINUED | OUTPATIENT
Start: 2021-06-09 | End: 2021-06-09 | Stop reason: HOSPADM

## 2021-06-08 RX ADMIN — ATORVASTATIN CALCIUM 40 MG: 40 TABLET, FILM COATED ORAL at 17:20

## 2021-06-08 RX ADMIN — GABAPENTIN 300 MG: 300 CAPSULE ORAL at 11:59

## 2021-06-08 RX ADMIN — GABAPENTIN 300 MG: 300 CAPSULE ORAL at 17:19

## 2021-06-08 RX ADMIN — GADOTERIDOL 20 ML: 279.3 INJECTION, SOLUTION INTRAVENOUS at 16:21

## 2021-06-08 RX ADMIN — ASPIRIN 81 MG: 81 TABLET, COATED ORAL at 11:59

## 2021-06-08 ASSESSMENT — ENCOUNTER SYMPTOMS
DIZZINESS: 0
SPUTUM PRODUCTION: 0
WEAKNESS: 1
FEVER: 0
PALPITATIONS: 0
MYALGIAS: 0
FOCAL WEAKNESS: 0
HEADACHES: 0
SINUS PAIN: 0
SHORTNESS OF BREATH: 0
CONSTIPATION: 0
DOUBLE VISION: 0
BLURRED VISION: 0
WHEEZING: 0
NAUSEA: 0
DIARRHEA: 0
CHILLS: 0
SENSORY CHANGE: 1
NERVOUS/ANXIOUS: 0
ABDOMINAL PAIN: 0
COUGH: 0
SORE THROAT: 0
VOMITING: 0

## 2021-06-08 ASSESSMENT — COGNITIVE AND FUNCTIONAL STATUS - GENERAL
SUGGESTED CMS G CODE MODIFIER DAILY ACTIVITY: CH
SUGGESTED CMS G CODE MODIFIER MOBILITY: CH
DAILY ACTIVITIY SCORE: 24
MOBILITY SCORE: 24

## 2021-06-08 ASSESSMENT — LIFESTYLE VARIABLES
EVER FELT BAD OR GUILTY ABOUT YOUR DRINKING: NO
ON A TYPICAL DAY WHEN YOU DRINK ALCOHOL HOW MANY DRINKS DO YOU HAVE: 1
AVERAGE NUMBER OF DAYS PER WEEK YOU HAVE A DRINK CONTAINING ALCOHOL: 3
TOTAL SCORE: 0
EVER HAD A DRINK FIRST THING IN THE MORNING TO STEADY YOUR NERVES TO GET RID OF A HANGOVER: NO
HAVE YOU EVER FELT YOU SHOULD CUT DOWN ON YOUR DRINKING: NO
TOTAL SCORE: 0
HAVE PEOPLE ANNOYED YOU BY CRITICIZING YOUR DRINKING: NO
ALCOHOL_USE: YES
TOTAL SCORE: 0
CONSUMPTION TOTAL: NEGATIVE
HOW MANY TIMES IN THE PAST YEAR HAVE YOU HAD 5 OR MORE DRINKS IN A DAY: 0

## 2021-06-08 ASSESSMENT — PATIENT HEALTH QUESTIONNAIRE - PHQ9
2. FEELING DOWN, DEPRESSED, IRRITABLE, OR HOPELESS: NOT AT ALL
1. LITTLE INTEREST OR PLEASURE IN DOING THINGS: NOT AT ALL
SUM OF ALL RESPONSES TO PHQ9 QUESTIONS 1 AND 2: 0
1. LITTLE INTEREST OR PLEASURE IN DOING THINGS: NOT AT ALL
2. FEELING DOWN, DEPRESSED, IRRITABLE, OR HOPELESS: NOT AT ALL
SUM OF ALL RESPONSES TO PHQ9 QUESTIONS 1 AND 2: 0

## 2021-06-08 ASSESSMENT — FIBROSIS 4 INDEX
FIB4 SCORE: 1.9
FIB4 SCORE: 1.9

## 2021-06-08 NOTE — CARE PLAN
The patient is Stable - Low risk of patient condition declining or worsening    Shift Goals  Clinical Goals: MRI and maintain neurological status  Patient Goals: get MRI    Progress made toward(s) clinical / shift goals: Patient is down for MRI at this time, awaiting on results. Patient has maintained alert and oriented x 4, patient states he has numbness on toes but that is baseline due to neuropathy, will continue with q4h neuro checks    Patient is not progressing towards the following goals:    Problem: Neuro Status  Goal: Neuro status will remain stable or improve  Outcome: Progressing  Patient has maintained alert and oriented x 4, patient states he has numbness on toes but that is baseline due to neuropathy, will continue with q4h neuro checks. NIHSS is 0.    Problem: Knowledge Deficit - Stroke Education  Goal: Patient's knowledge of stroke and risk factors will improve  Outcome: Progressing   Discussed plan of care with patient including neuro checks, MRI, NIHSS and safety precautions. Allowed time for questions, patient agreed and verbalized understanding.

## 2021-06-08 NOTE — PROGRESS NOTES
1200 Pt arrived to unit via gurney. Ambulated from gurney to bed, standby assist. Tele monitor applied, vitals taken. Pt assessed. A&O x 4. Admit profile and med rec complete. Discussed POC with pt, including MRI, neuro checks and safety precautions. Welcome folder provided and discussed. Communication board filled out. Questions and concerns addressed, verbalized understanding. Fall precautions in place. Pt demonstrates ability to use call light appropriately. Pt left in lowest position. Bed locked and low, bed alarm on.     1500 Patient down for MRI.

## 2021-06-08 NOTE — ASSESSMENT & PLAN NOTE
CT head showed no brain mass or intracranial hemorrhage, but there is finding of enlarged pituitary.  Will be admitted for MRI brain/pituitary with contrast.  Will follow MRI

## 2021-06-08 NOTE — ED PROVIDER NOTES
ED Provider Note    CHIEF COMPLAINT  Chief Complaint   Patient presents with   • Possible Stroke     pt sent from his PCP for diziness, confusion with R arm weakness since last Wednesday which subsided on Friday.  pt also c/o hypertention. pt on losaartan. pt is immunized with Mederna.       HPI  Laureen Munoz is a 70 y.o. male who presents to the emergency department after being referred from primary doctor's office with concern for stroke.  Patient states that for quite a while he has had intermittent dizziness.  He describes last Wednesday 6 days ago he was going to work at about 3 PM.  The air conditioner did not work in his car and he felt like he was hot.  He walked into work.  When he arrived he could not recall his password.  This is very atypical for him.  Ultimately he was in work.  He was feeling generally weak.  He had some tunnel vision and then horizontal spinning around him.  Did not feel right.  He ended up leaving work and driving himself home.  Since that time he has not felt well.  He has had off-and-on dizziness consisting of a few seconds of spinning that resolves.  It can come on while at rest without movement or with movement.  No ringing or roaring.  He never gets headaches, but has been feeling a headache over his sinuses.  He feels a pressure sensation.  He states that periodically he will have weakness in his left arm.  This is characterized as it falling asleep.  He has a hard time moving it.  If he moves the shoulders around to get better.  He has not had any chest pain, shortness of breath, cough, fevers.  He has had leg swelling in both of his feet which was most pronounced last week after his episode of confusion.    REVIEW OF SYSTEMS  As per HPI, otherwise a 10 point review of systems is negative    PAST MEDICAL HISTORY  Past Medical History:   Diagnosis Date   • Hypercholesterolemia    • Hypertension    • Tinea inguinalis        SOCIAL HISTORY  Social History     Tobacco Use   •  "Smoking status: Current Every Day Smoker     Packs/day: 0.25     Types: Cigars, Cigarettes     Last attempt to quit:      Years since quittin.4   • Smokeless tobacco: Never Used   • Tobacco comment: cigar, smokes one a day but no cigarettes   Vaping Use   • Vaping Use: Never used   Substance Use Topics   • Alcohol use: Yes     Comment: minimal   • Drug use: Not Currently     Types: Marijuana, Inhaled     Comment: rare occasions,occasionally        SURGICAL HISTORY  Past Surgical History:   Procedure Laterality Date   • OTHER ABDOMINAL SURGERY      gallbladder       CURRENT MEDICATIONS  Home Medications    **Home medications have not yet been reviewed for this encounter**         ALLERGIES  Allergies   Allergen Reactions   • Nkda [No Known Drug Allergy]    • Seasonal        PHYSICAL EXAM  VITAL SIGNS: /77   Pulse (!) 55   Temp 36.6 °C (97.9 °F) (Temporal)   Resp 16   Ht 1.778 m (5' 10\")   Wt 114 kg (251 lb 5.2 oz)   SpO2 93%   BMI 36.06 kg/m²    Constitutional: Awake and alert  HENT: Normal inspection  Eyes: Normal inspection  Neck: Grossly normal range of motion.  Cardiovascular: Normal heart rate, irregular rhythm.  Symmetric peripheral pulses.   Thorax & Lungs: No respiratory distress, No wheezing, No rales, No rhonchi, No chest tenderness.   Abdomen: Bowel sounds normal, soft, non-distended, nontender, no mass  Skin: No obvious rash.  Back: No tenderness, No CVA tenderness.   Extremities: No clubbing, cyanosis, edema, no Homans or cords.  Neurologic: Awake alert oriented.  Clear speech.  Cranial nerves intact.  Motor 5/5 throughout.  Normal sensory.  No ataxia.  Psychiatric: Normal for situation    RADIOLOGY/PROCEDURES  CT-HEAD W/O   Final Result      No acute intracranial intracranial hemorrhage is identified.      Enlarged pituitary gland for which a pituitary MRI with contrast is recommended.      Mild atrophy and white matter hypodensity is present.  This is a nonspecific finding which " usually is found to represent chronic microvascular disease in patient's of this demographic.  Demyelination, age indeterminant ischemia and gliosis are also    common possibilities.      Findings were discussed with AFTAB DUKES on 6/8/2021 9:13 AM.      MR-BRAIN PITUITARY W/WO    (Results Pending)   MR-BRAIN-W/O    (Results Pending)        Imaging is interpreted by radiologist    Labs:  Results for orders placed or performed during the hospital encounter of 06/08/21   CBC WITH DIFFERENTIAL   Result Value Ref Range    WBC 7.1 4.8 - 10.8 K/uL    RBC 5.03 4.70 - 6.10 M/uL    Hemoglobin 15.2 14.0 - 18.0 g/dL    Hematocrit 46.8 42.0 - 52.0 %    MCV 93.0 81.4 - 97.8 fL    MCH 30.2 27.0 - 33.0 pg    MCHC 32.5 (L) 33.7 - 35.3 g/dL    RDW 44.0 35.9 - 50.0 fL    Platelet Count 142 (L) 164 - 446 K/uL    MPV 10.6 9.0 - 12.9 fL    Neutrophils-Polys 64.80 44.00 - 72.00 %    Lymphocytes 23.90 22.00 - 41.00 %    Monocytes 9.20 0.00 - 13.40 %    Eosinophils 1.00 0.00 - 6.90 %    Basophils 0.40 0.00 - 1.80 %    Immature Granulocytes 0.70 0.00 - 0.90 %    Nucleated RBC 0.00 /100 WBC    Neutrophils (Absolute) 4.59 1.82 - 7.42 K/uL    Lymphs (Absolute) 1.69 1.00 - 4.80 K/uL    Monos (Absolute) 0.65 0.00 - 0.85 K/uL    Eos (Absolute) 0.07 0.00 - 0.51 K/uL    Baso (Absolute) 0.03 0.00 - 0.12 K/uL    Immature Granulocytes (abs) 0.05 0.00 - 0.11 K/uL    NRBC (Absolute) 0.00 K/uL   COMP METABOLIC PANEL   Result Value Ref Range    Sodium 141 135 - 145 mmol/L    Potassium 4.3 3.6 - 5.5 mmol/L    Chloride 107 96 - 112 mmol/L    Co2 21 20 - 33 mmol/L    Anion Gap 13.0 7.0 - 16.0    Glucose 113 (H) 65 - 99 mg/dL    Bun 26 (H) 8 - 22 mg/dL    Creatinine 0.93 0.50 - 1.40 mg/dL    Calcium 8.7 8.4 - 10.2 mg/dL    AST(SGOT) 24 12 - 45 U/L    ALT(SGPT) 27 2 - 50 U/L    Alkaline Phosphatase 100 (H) 30 - 99 U/L    Total Bilirubin 1.2 0.1 - 1.5 mg/dL    Albumin 4.2 3.2 - 4.9 g/dL    Total Protein 7.0 6.0 - 8.2 g/dL    Globulin 2.8 1.9 - 3.5 g/dL     A-G Ratio 1.5 g/dL   TROPONIN   Result Value Ref Range    Troponin T 18 6 - 19 ng/L   ESTIMATED GFR   Result Value Ref Range    GFR If African American >60 >60 mL/min/1.73 m 2    GFR If Non African American >60 >60 mL/min/1.73 m 2   SARS-CoV-2 PCR (24 hour In-House): Collect NP swab in VTM    Specimen: Nasopharyngeal; Respirate   Result Value Ref Range    SARS-CoV-2 Source NP Swab    EKG   Result Value Ref Range    Report       [facility] Emergency Dept.    Test Date:  2021  Pt Name:    HONG PALACIO                  Department: EDS  MRN:        [id number]                      Room:       -ROOM 3  Gender:     Male                         Technician: MULU  :        1950                   Requested By:ER TRIAGE PROTOCOL  Order #:    [id number]                    Reading MD: AFTAB DUKES MD    Measurements  Intervals                                Axis  Rate:       72                           P:          38  MA:         206                          QRS:        -6  QRSD:       146                          T:          -21  QT:         415  QTc:        455    Interpretive Statements  Sinus rhythm  Multiple PVCs  Right bundle branch block  Compared to ECG 2017 08:33:56  Ventricular premature complex(es) now present  Sinus bradycardia no longer present  Atrial premature complex(es) no longer present  Electronically Signed On 2021 8:42:44 PDT by AFTAB DUKES MD         COURSE & MEDICAL DECISION MAKING  Patient presents history and physical as above.  Has no neurologic findings, but there was concern for stroke.  Obtain work-up as noted.  Patient will be admitted to the hospital for MRI brain and pituitary.  Additional stroke evaluation is indicated.  I consulted hospitalist.    FINAL IMPRESSION  1.  Rule out cerebrovascular accident  2.  Enlarged pituitary gland      This dictation was created using voice recognition software. The accuracy of the dictation is limited to  the abilities of the software.  The nursing notes were reviewed and certain aspects of this information were incorporated into this note.      Electronically signed by: Claudio Acosta M.D., 6/8/2021 8:39 AM

## 2021-06-08 NOTE — ASSESSMENT & PLAN NOTE
On face & nose   Follow-up with outpatient dermatology  Based sunscreen, alcohol avoidance  Dermatology recommended red light therapy but the patient refused

## 2021-06-08 NOTE — ASSESSMENT & PLAN NOTE
Patient was sent to ER from PCP office with a concern of stroke/TIA.  CT head showed no brain mass or intracranial hemorrhage, but incidental finding of enlarged pituitary.   Patient stated that last Wednesday, the weather was very hot and seems like his car is overheated , then patient had a brief episode of confusion (forgetting his passwords), patient was pale and feeling dizzy, not vision and feeling flushed at the time. He drove home after that event & felt better after taking a nap.   Possible vaso-vagal , listed hypertension, TIA  Tele  ASA , statin   MRI brain   Neuro check Q4   Fall precautions   PT/OT   Orthostatic vital once  A1c  Lipid panel

## 2021-06-08 NOTE — H&P
Hospital Medicine History & Physical Note    Date of Service  6/8/2021    Primary Care Physician  Madonna Swain M.D.    Consultants  None    Code Status  No Order    Chief Complaint  Chief Complaint   Patient presents with   • Possible Stroke     pt sent from his PCP for diziness, confusion with R arm weakness since last Wednesday which subsided on Friday.  pt also c/o hypertention. pt on losaartan. pt is immunized with Mederna.       History of Presenting Illness  A 70 y.o. male with a current medical history of hypertension, seasonal allergies, BPH, bilateral lower extremity neuropathy who presented 6/8/2021 with brief episode of confusion.  Patient was evaluated by his primary care physician today and he was sent to ER with a concern of stroke/TIA.  Patient stated that last Wednesday, the weather was very hot and seems like his car is overheated , then patient had a brief episode of confusion (forgetting his passwords), patient was pale and feeling dizzy, not vision and feeling flushed at the time. He drove home after that event & felt better after taking a nap.  Denies syncope, loss of consciousness, new onset tingling/numbness/weakness in upper and lower extremities, chest pain or palpitation. Pt does have chronic bilateral lower extremities tingling/numbness, following up with podiatry.  The patient is following up with dermatology for Rosacea.   Patient was seen by cardiology 2 years ago with concerns of high blood pressure and stress test done 2 years ago, discharged from cardiology clinic, no regular cardiology follow-up needs.    At ER, vitals stable.  No acute abnormality on labs.  CT head showed no brain mass or intracranial hemorrhage, but there is finding of enlarged pituitary.  Will be admitted for MRI brain/pituitary with contrast.    Review of Systems  Review of Systems   Constitutional: Negative for chills, fever and malaise/fatigue.   HENT: Negative for congestion, ear discharge, ear pain,  sinus pain and sore throat.    Eyes: Negative for blurred vision and double vision.   Respiratory: Negative for cough, sputum production, shortness of breath and wheezing.    Cardiovascular: Negative for chest pain, palpitations and leg swelling.   Gastrointestinal: Negative for abdominal pain, constipation, diarrhea, nausea and vomiting.   Genitourinary: Negative for dysuria, frequency and urgency.   Musculoskeletal: Negative for myalgias.   Neurological: Positive for sensory change (Baseline B/L L/Es Tingling/Numbness ) and weakness. Negative for dizziness, focal weakness and headaches.   Psychiatric/Behavioral: The patient is not nervous/anxious.        Past Medical History   has a past medical history of Hypercholesterolemia, Hypertension, and Tinea inguinalis.    Surgical History   has a past surgical history that includes other abdominal surgery.     Family History  family history includes Cancer in his sister; Dementia in his paternal grandfather; Heart Attack in his father and mother; Heart Disease in his father and mother; No Known Problems in his maternal grandfather, maternal grandmother, and paternal grandmother; Stroke in his sister.     Social History   reports that he has been smoking cigars and cigarettes. He has been smoking about 0.25 packs per day. He has never used smokeless tobacco. He reports current alcohol use. He reports previous drug use. Drugs: Marijuana and Inhaled.    Allergies  Allergies   Allergen Reactions   • Nkda [No Known Drug Allergy]    • Seasonal        Medications  Prior to Admission Medications   Prescriptions Last Dose Informant Patient Reported? Taking?   Fexofenadine HCl (ALLERGY 24-HR PO) 6/8/2021 at AM Patient Yes Yes   Sig: Take 1 tablet by mouth every morning.   Melatonin 5 MG Chew Tab 6/7/2021 at PM Patient Yes Yes   Sig: Chew 15 mg at bedtime as needed (Sleep).   finasteride (PROSCAR) 5 MG Tab 6/8/2021 at AM Patient No No   Sig: Take 1 tablet by mouth every day.    fluticasone (FLONASE) 50 MCG/ACT nasal spray 6/8/2021 at AM Patient Yes Yes   Sig: Administer 1 Spray into affected nostril(S) every morning.   losartan (COZAAR) 100 MG Tab 6/8/2021 at AM Patient No No   Sig: Take 1 tablet by mouth every day.   metoprolol SR (TOPROL XL) 25 MG TABLET SR 24 HR 6/8/2021 at AM Patient No No   Sig: TAKE ONE TABLET BY MOUTH ONE TIME DAILY   Patient taking differently: Take 25 mg by mouth every morning.   vitamin D, Ergocalciferol, (DRISDOL) 1.25 MG (69107 UT) Cap capsule 6/5/2021 at Q7D Patient Yes No   Sig: Take 50,000 Units by mouth every 7 days.      Facility-Administered Medications: None       Physical Exam  Temp:  [36.6 °C (97.9 °F)] 36.6 °C (97.9 °F)  Pulse:  [53-62] 55  Resp:  [16] 16  BP: (143-176)/(77-97) 143/77  SpO2:  [90 %-93 %] 91 %    Physical Exam  Constitutional:       General: He is not in acute distress.     Appearance: He is obese.   HENT:      Head: Normocephalic and atraumatic.      Nose: Nose normal.      Mouth/Throat:      Mouth: Mucous membranes are moist.      Pharynx: No posterior oropharyngeal erythema.   Eyes:      General: No scleral icterus.     Extraocular Movements: Extraocular movements intact.      Conjunctiva/sclera: Conjunctivae normal.      Pupils: Pupils are equal, round, and reactive to light.   Cardiovascular:      Rate and Rhythm: Normal rate and regular rhythm.      Pulses: Normal pulses.      Heart sounds: Normal heart sounds. No murmur heard.   No gallop.    Pulmonary:      Effort: Pulmonary effort is normal.      Breath sounds: Normal breath sounds. No stridor. No wheezing, rhonchi or rales.   Abdominal:      General: Bowel sounds are normal.      Palpations: Abdomen is soft.   Musculoskeletal:         General: No swelling or tenderness.      Cervical back: Normal range of motion and neck supple. No rigidity.   Skin:     General: Skin is warm.      Findings: Rash present.      Comments: Rosacea on face and nose   Neurological:      General:  No focal deficit present.      Mental Status: He is alert and oriented to person, place, and time.   Psychiatric:         Mood and Affect: Mood normal.         Behavior: Behavior normal.         Laboratory:  Recent Labs     06/08/21 0827   WBC 7.1   RBC 5.03   HEMOGLOBIN 15.2   HEMATOCRIT 46.8   MCV 93.0   MCH 30.2   MCHC 32.5*   RDW 44.0   PLATELETCT 142*   MPV 10.6     Recent Labs     06/08/21 0827   SODIUM 141   POTASSIUM 4.3   CHLORIDE 107   CO2 21   GLUCOSE 113*   BUN 26*   CREATININE 0.93   CALCIUM 8.7     Recent Labs     06/08/21 0827   ALTSGPT 27   ASTSGOT 24   ALKPHOSPHAT 100*   TBILIRUBIN 1.2   GLUCOSE 113*         No results for input(s): NTPROBNP in the last 72 hours.      Recent Labs     06/08/21 0827   TROPONINT 18       Imaging:  CT-HEAD W/O   Final Result      No acute intracranial intracranial hemorrhage is identified.      Enlarged pituitary gland for which a pituitary MRI with contrast is recommended.      Mild atrophy and white matter hypodensity is present.  This is a nonspecific finding which usually is found to represent chronic microvascular disease in patient's of this demographic.  Demyelination, age indeterminant ischemia and gliosis are also    common possibilities.      Findings were discussed with AFTAB DUKES on 6/8/2021 9:13 AM.      MR-BRAIN PITUITARY W/WO    (Results Pending)   MR-BRAIN-W/O    (Results Pending)         Assessment/Plan:  I anticipate this patient will require at least two midnights for appropriate medical management, necessitating inpatient admission.    Enlarged pituitary gland (HCC)  Assessment & Plan  CT head showed no brain mass or intracranial hemorrhage, but there is finding of enlarged pituitary.  Will be admitted for MRI brain/pituitary with contrast.  Will follow MRI     Confusion  Assessment & Plan  Patient was sent to ER from PCP office with a concern of stroke/TIA.  CT head showed no brain mass or intracranial hemorrhage, but incidental finding of  enlarged pituitary.   Patient stated that last Wednesday, the weather was very hot and seems like his car is overheated , then patient had a brief episode of confusion (forgetting his passwords), patient was pale and feeling dizzy, not vision and feeling flushed at the time. He drove home after that event & felt better after taking a nap.   Possible vaso-vagal , listed hypertension, TIA  Tele  ASA , statin   MRI brain   Neuro check Q4   Fall precautions   PT/OT   Orthostatic vital once  A1c  Lipid panel       Neuropathic pain- (present on admission)  Assessment & Plan  chronic bilateral lower extremities tingling/numbness, following up with podiatry.  Trial of gabapentin 300 mg 3 times daily  Will check A1c    Benign prostatic hyperplasia- (present on admission)  Assessment & Plan  Continue home finasteride    Essential hypertension- (present on admission)  Assessment & Plan  To new home metoprolol  Continue home losartan in half dose 50 mg  Will monitor   PRN BP meds     DVT Px with SCDs   Hold pharmacological DVT prophylaxis - MRI Brain pending

## 2021-06-08 NOTE — ASSESSMENT & PLAN NOTE
chronic bilateral lower extremities tingling/numbness, following up with podiatry.  Trial of gabapentin 300 mg 3 times daily  Will check A1c

## 2021-06-08 NOTE — DISCHARGE PLANNING
ER CM met with pt at bedside. Very pleasant .AOx4. Reports he has had confusion over the past weak, especially at work. 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 He lives in a 2 story home that he moved into about 3 months ago and notes that he does sometimes feel he has issues negotiating the stairs due to dizziness and his right knee concerns His Bedroom is upstairs.  No DME or O2 at home. Has had a full hernandez scan in 2007 no findings. Son is his emergency contact. PCP Dr Tomeka Vila and RX filled at [facility] on [facility] Transition Team Assessment    Information Source  Orientation Level: Oriented X4  Information Given By: Patient  Informant's Name: Laureen  Who is responsible for making decisions for patient? : Patient         Elopement Risk  Legal Hold: No  Ambulatory or Self Mobile in Wheelchair: No-Not an Elopement Risk    Interdisciplinary Discharge Planning  Primary Care Physician: Dr Tomeka Vila  Lives with - Patient's Self Care Capacity: Adult Children  Support Systems: Children  Housing / Facility: 2 Story House (Reports some difficulty with stairs. Bedroom upstairs)  Do You Take your Prescribed Medications Regularly: Yes  Able to Return to Previous ADL's: Future Time w/Therapy  Mobility Issues: Yes  Prior Services: None  Patient Prefers to be Discharged to:: Home  Assistance Needed: Yes  Durable Medical Equipment: Not Applicable    Discharge Preparedness  What is your plan after discharge?: Uncertain - pending medical team collaboration  What are your discharge supports?: Child  Prior Functional Level: Ambulatory (Stairs a concerns sometimes)  Difficulity with ADLs: Walking    Functional Assesment  Prior Functional Level: Ambulatory (Stairs a concerns sometimes)         Vision / Hearing Impairment  Vision Impairment : Yes  Right Eye Vision: Impaired, Wears Glasses  Left Eye Vision: Impaired, Wears Glasses  Hearing Impairment : No               Domestic Abuse  Have you ever been the victim of abuse or violence?: No  Physical Abuse or Sexual Abuse: No  Verbal Abuse or Emotional Abuse: No  Possible Abuse/Neglect Reported to:: Not Applicable    Psychological Assessment  History of Substance Abuse: None    Discharge Risks or Barriers  Discharge risks or barriers?: Complex medical needs    Anticipated Discharge Information  Discharge Disposition: Still a Patient (30)

## 2021-06-08 NOTE — ED TRIAGE NOTES
"Chief Complaint   Patient presents with   • Dizziness     pt sent from his PCP for diziness, confusion with R arm weakness since last Wednesday which subsided on Friday.  pt also c/o hypertention. pt on losaartan. pt is immunized with Mederna.     BP (!) 176/97   Pulse 62   Temp 36.6 °C (97.9 °F) (Temporal)   Resp 16   Ht 1.778 m (5' 10\")   Wt 114 kg (251 lb 5.2 oz)   SpO2 93%   BMI 36.06 kg/m²     "

## 2021-06-08 NOTE — PROGRESS NOTES
CHIEF COMPLAINT  Chief Complaint   Patient presents with   • Foot Swelling   • Dizziness     spots with vision, X over 3 months   • Altered Mental Status few days ago   • Difficulty Walking / weakness   • Difficulty Sleeping     HPI  Laureen Munoz is a 70 y.o. male who presents today for the following     Dizziness, Confusion, Feet swelling, Weakness, Hypertension  69 y/o, M, with history of hypertension, hypercholesterolemia, IFG    He developed recurrent dizziness on/off in the last year (spinning in head), in rest and activity, confusion. Accompanied with occasional tunnel vision and subsequent  horizontal spinning around him.  6 days ago, he had brief memory loss (forgot password for work), confusion, disorientation to  place.  Other compliants:  - feet swelling for ~ 1 year, up/down  - weakness in the last 1-2 weeks    He has not have:  - trauma  - headaches, (c/o headache over sinuses)  - chest pain, shortness of breath  - cough, fever.    Reviewed PMH, PSH, FH, SH, ALL, HCM/IMM, no changes  Reviewed MEDS, no changes    Patient Active Problem List    Diagnosis Date Noted   • Thrombocytopenia (HCC) 03/09/2021   • Right knee pain 05/28/2020   • Primary osteoarthritis of right knee 05/28/2020   • IFG (impaired fasting glucose) 05/28/2020   • Rosacea 10/30/2019   • Oral lichen planus 04/30/2019   • Gilbert syndrome 04/30/2019   • Erectile dysfunction 10/30/2018   • Health care maintenance 10/30/2018   • Tobacco use 10/30/2018   • Obesity (BMI 30-39.9) 07/26/2018   • Hypovitaminosis D 04/11/2014   • Neuropathy of both feet 07/20/2012   • Essential hypertension 07/20/2012   • Hypercholesteremia 07/20/2012   • Benign prostatic hyperplasia 07/20/2012     CURRENT MEDICATIONS  Current Outpatient Medications   Medication Sig Dispense Refill   • losartan (COZAAR) 100 MG Tab Take 1 tablet by mouth every day. 90 tablet 3   • metoprolol SR (TOPROL XL) 25 MG TABLET SR 24 HR TAKE ONE TABLET BY MOUTH ONE TIME DAILY 90 tablet  3   • finasteride (PROSCAR) 5 MG Tab Take 1 tablet by mouth every day. 90 tablet 3     No current facility-administered medications for this visit.     ALLERGIES  Allergies: Nkda [no known drug allergy] and Seasonal  PAST MEDICAL HISTORY  Past Medical History:   Diagnosis Date   • Hypercholesterolemia    • Hypertension    • Tinea inguinalis      SURGICAL HISTORY  He  has a past surgical history that includes other abdominal surgery.  SOCIAL HISTORY  Social History     Tobacco Use   • Smoking status: Current Every Day Smoker     Packs/day: 0.25     Types: Cigars, Cigarettes     Last attempt to quit:      Years since quittin.4   • Smokeless tobacco: Never Used   • Tobacco comment: cigar, smokes one a day but no cigarettes   Vaping Use   • Vaping Use: Never used   Substance Use Topics   • Alcohol use: Yes     Comment: minimal   • Drug use: Not Currently     Types: Marijuana, Inhaled     Comment: rare occasions,occasionally      Social History     Social History Narrative   • Not on file     FAMILY HISTORY  Family History   Problem Relation Age of Onset   • Heart Disease Mother    • Heart Attack Mother    • Heart Disease Father    • Heart Attack Father    • Cancer Sister         leukemia, breast cancer   • Stroke Sister    • No Known Problems Maternal Grandmother    • No Known Problems Maternal Grandfather    • No Known Problems Paternal Grandmother    • Dementia Paternal Grandfather    • Hypertension Neg Hx    • Hyperlipidemia Neg Hx    • Diabetes Neg Hx      Family Status   Relation Name Status   • Mo     • Fa     • Sis     • Sis  Alive   • MGMo     • MGFa     • PGMo     • PGFa     • Neg Hx  (Not Specified)     ROS   Constitutional: Negative for fever, chills.  HENT: Negative for congestion, sore throat.  Eyes: Per HPI.  Respiratory: Negative for cough, shortness of breath.  Cardiovascular: Negative for chest pain, palpitations.   Gastrointestinal:  "Negative for heartburn, nausea, abdominal pain.   Musculoskeletal: Negative for muscle twitching..   Skin: Negative for rash.   Neuro: Per HPI.   Endo/Heme/Allergies: Does not bruise/bleed easily.   Psychiatric/Behavioral: Negative for depression.    PHYSICAL EXAM   Blood Pressure 128/62 (BP Location: Left arm, Patient Position: Sitting, BP Cuff Size: Adult)   Pulse (Abnormal) 42   Temperature 36.6 °C (97.9 °F) (Temporal)   Height 1.803 m (5' 11\")   Weight 112 kg (248 lb)   Oxygen Saturation 95%   Body Mass Index 34.59 kg/m²   General:  NAD, well appearing  HEENT:   NC/AT, PERRLA, EOMI.  Cardiovascular: unlabored breathing, no peripheral cyanosis or swelling.  Lungs:   no respiratory distress.  Abdomen: non- distended.  Extremities:  No LE swelling.  Skin:  Warm, dry.  No erythema. No rash.   Neurologic: Alert & oriented x 3. CN II-XII grossly intact. No focal deficits.  Psychiatric:  Affect normal, mood normal, judgment normal.    Labs     Labs are reviewed and discussed with a patient  Lab Results   Component Value Date/Time    CHOLSTRLTOT 192 03/03/2021 08:52 AM     (H) 03/03/2021 08:52 AM    HDL 42 03/03/2021 08:52 AM    TRIGLYCERIDE 130 03/03/2021 08:52 AM       Lab Results   Component Value Date/Time    SODIUM 136 03/03/2021 08:52 AM    POTASSIUM 4.3 03/03/2021 08:52 AM    CHLORIDE 103 03/03/2021 08:52 AM    CO2 22 03/03/2021 08:52 AM    GLUCOSE 98 03/03/2021 08:52 AM    BUN 28 (H) 03/03/2021 08:52 AM    CREATININE 0.86 03/03/2021 08:52 AM    CREATININE 0.92 03/01/2010 04:54 PM    BUNCREATRAT 21 03/01/2010 04:54 PM    GLOMRATE >59 03/01/2010 04:54 PM     Lab Results   Component Value Date/Time    ALKPHOSPHAT 97 03/03/2021 08:52 AM    ASTSGOT 26 03/03/2021 08:52 AM    ALTSGPT 36 03/03/2021 08:52 AM    TBILIRUBIN 1.0 03/03/2021 08:52 AM      Lab Results   Component Value Date/Time    HBA1C 6.2 (H) 03/03/2021 08:52 AM    HBA1C 6.1 (H) 09/25/2020 08:55 AM     No results found for: TSH  No results " found for: FREET4    Lab Results   Component Value Date/Time    WBC 8.2 03/03/2021 08:52 AM    WBC 9.4 03/01/2010 04:54 PM    RBC 5.12 03/03/2021 08:52 AM    RBC 5.39 03/01/2010 04:54 PM    HEMOGLOBIN 15.6 03/03/2021 08:52 AM    HEMATOCRIT 47.5 03/03/2021 08:52 AM    MCV 92.8 03/03/2021 08:52 AM    MCV 89 03/01/2010 04:54 PM    MCH 30.5 03/03/2021 08:52 AM    MCH 29.3 03/01/2010 04:54 PM    MCHC 32.8 (L) 03/03/2021 08:52 AM    MPV 11.4 03/03/2021 08:52 AM    NEUTSPOLYS 68.90 03/03/2021 08:52 AM    LYMPHOCYTES 17.00 (L) 03/03/2021 08:52 AM    MONOCYTES 11.60 03/03/2021 08:52 AM    EOSINOPHILS 1.10 03/03/2021 08:52 AM    BASOPHILS 0.50 03/03/2021 08:52 AM    HYPOCHROMIA 1+ 07/15/2013 10:35 AM      Imaging     None    Assessment and Plan     Laureen Munoz is a 70 y.o. male    1. Vertigo  2. Confusion  3. Edema of both feet  4. Weakness  5. Bradycardia  6. Essential hypertension  7. Hypercholesteremia  8. IFG (impaired fasting glucose)    HR was from 3-40 in the office.  R/o heart failure / MI, TIA    He was sent to the ER, higher level of care.    Counseling:   - Smoking:  Nonsmoker    Followup: Return if symptoms worsen or fail to improve.    All questions are answered.    Please note that this dictation was created using voice recognition software, and that there might be errors of barbara and possibly content.

## 2021-06-08 NOTE — ED NOTES
"Patients states he has been dizzy since March 2020, has a hx of high blood pressure, has seen a cardiologist Dr. Wilks. States \"last week I overheated in my car due to no air conditioning, and couldn't remember what was happening, and out of it.\" Saw PCP today, was sent to ER.   "

## 2021-06-09 ENCOUNTER — APPOINTMENT (OUTPATIENT)
Dept: CARDIOLOGY | Facility: MEDICAL CENTER | Age: 71
DRG: 645 | End: 2021-06-09
Attending: STUDENT IN AN ORGANIZED HEALTH CARE EDUCATION/TRAINING PROGRAM
Payer: MEDICARE

## 2021-06-09 VITALS
HEIGHT: 70 IN | WEIGHT: 257.5 LBS | OXYGEN SATURATION: 92 % | SYSTOLIC BLOOD PRESSURE: 122 MMHG | TEMPERATURE: 97.8 F | BODY MASS INDEX: 36.86 KG/M2 | HEART RATE: 60 BPM | RESPIRATION RATE: 18 BRPM | DIASTOLIC BLOOD PRESSURE: 67 MMHG

## 2021-06-09 PROBLEM — R41.0 CONFUSION: Status: RESOLVED | Noted: 2021-06-08 | Resolved: 2021-06-09

## 2021-06-09 PROBLEM — L71.9 ROSACEA: Chronic | Status: RESOLVED | Noted: 2019-10-30 | Resolved: 2021-06-09

## 2021-06-09 PROBLEM — E66.812 CLASS 2 OBESITY IN ADULT: Status: ACTIVE | Noted: 2018-07-26

## 2021-06-09 PROBLEM — D35.2 PITUITARY MACROADENOMA (HCC): Status: ACTIVE | Noted: 2021-06-09

## 2021-06-09 LAB
ALBUMIN SERPL BCP-MCNC: 3.8 G/DL (ref 3.2–4.9)
ALBUMIN/GLOB SERPL: 1.4 G/DL
ALP SERPL-CCNC: 105 U/L (ref 30–99)
ALT SERPL-CCNC: 22 U/L (ref 2–50)
ANION GAP SERPL CALC-SCNC: 14 MMOL/L (ref 7–16)
AST SERPL-CCNC: 21 U/L (ref 12–45)
BASOPHILS # BLD AUTO: 0.5 % (ref 0–1.8)
BASOPHILS # BLD: 0.04 K/UL (ref 0–0.12)
BILIRUB SERPL-MCNC: 1.7 MG/DL (ref 0.1–1.5)
BUN SERPL-MCNC: 17 MG/DL (ref 8–22)
CALCIUM SERPL-MCNC: 8.8 MG/DL (ref 8.4–10.2)
CHLORIDE SERPL-SCNC: 100 MMOL/L (ref 96–112)
CHOLEST SERPL-MCNC: 174 MG/DL (ref 100–199)
CO2 SERPL-SCNC: 22 MMOL/L (ref 20–33)
CORTIS SERPL-MCNC: 10.9 UG/DL (ref 0–23)
CREAT SERPL-MCNC: 0.81 MG/DL (ref 0.5–1.4)
EOSINOPHIL # BLD AUTO: 0.05 K/UL (ref 0–0.51)
EOSINOPHIL NFR BLD: 0.6 % (ref 0–6.9)
ERYTHROCYTE [DISTWIDTH] IN BLOOD BY AUTOMATED COUNT: 43.2 FL (ref 35.9–50)
FSH SERPL-ACNC: 29.4 MIU/ML (ref 1.5–12.4)
GLOBULIN SER CALC-MCNC: 2.8 G/DL (ref 1.9–3.5)
GLUCOSE SERPL-MCNC: 88 MG/DL (ref 65–99)
HCT VFR BLD AUTO: 47.6 % (ref 42–52)
HDLC SERPL-MCNC: 37 MG/DL
HGB BLD-MCNC: 15.4 G/DL (ref 14–18)
IMM GRANULOCYTES # BLD AUTO: 0.04 K/UL (ref 0–0.11)
IMM GRANULOCYTES NFR BLD AUTO: 0.5 % (ref 0–0.9)
LDLC SERPL CALC-MCNC: 107 MG/DL
LH SERPL-ACNC: 17.1 IU/L (ref 1.7–8.6)
LV EJECT FRACT MOD 2C 99903: 61.34
LV EJECT FRACT MOD 4C 99902: 66.25
LV EJECT FRACT MOD BP 99901: 65.2
LYMPHOCYTES # BLD AUTO: 1.07 K/UL (ref 1–4.8)
LYMPHOCYTES NFR BLD: 12.1 % (ref 22–41)
MCH RBC QN AUTO: 30.6 PG (ref 27–33)
MCHC RBC AUTO-ENTMCNC: 32.4 G/DL (ref 33.7–35.3)
MCV RBC AUTO: 94.4 FL (ref 81.4–97.8)
MONOCYTES # BLD AUTO: 0.72 K/UL (ref 0–0.85)
MONOCYTES NFR BLD AUTO: 8.2 % (ref 0–13.4)
NEUTROPHILS # BLD AUTO: 6.91 K/UL (ref 1.82–7.42)
NEUTROPHILS NFR BLD: 78.1 % (ref 44–72)
NRBC # BLD AUTO: 0 K/UL
NRBC BLD-RTO: 0 /100 WBC
PLATELET # BLD AUTO: 142 K/UL (ref 164–446)
PMV BLD AUTO: 11.3 FL (ref 9–12.9)
POTASSIUM SERPL-SCNC: 4.4 MMOL/L (ref 3.6–5.5)
PROLACTIN SERPL-MCNC: 12.9 NG/ML (ref 2.1–17.7)
PROT SERPL-MCNC: 6.6 G/DL (ref 6–8.2)
RBC # BLD AUTO: 5.04 M/UL (ref 4.7–6.1)
SODIUM SERPL-SCNC: 136 MMOL/L (ref 135–145)
TRIGL SERPL-MCNC: 152 MG/DL (ref 0–149)
TSH SERPL DL<=0.005 MIU/L-ACNC: 1.77 UIU/ML (ref 0.38–5.33)
WBC # BLD AUTO: 8.8 K/UL (ref 4.8–10.8)

## 2021-06-09 PROCEDURE — 83001 ASSAY OF GONADOTROPIN (FSH): CPT

## 2021-06-09 PROCEDURE — A9270 NON-COVERED ITEM OR SERVICE: HCPCS | Performed by: STUDENT IN AN ORGANIZED HEALTH CARE EDUCATION/TRAINING PROGRAM

## 2021-06-09 PROCEDURE — 84443 ASSAY THYROID STIM HORMONE: CPT

## 2021-06-09 PROCEDURE — 84146 ASSAY OF PROLACTIN: CPT

## 2021-06-09 PROCEDURE — 83036 HEMOGLOBIN GLYCOSYLATED A1C: CPT

## 2021-06-09 PROCEDURE — 700102 HCHG RX REV CODE 250 W/ 637 OVERRIDE(OP): Performed by: STUDENT IN AN ORGANIZED HEALTH CARE EDUCATION/TRAINING PROGRAM

## 2021-06-09 PROCEDURE — 99407 BEHAV CHNG SMOKING > 10 MIN: CPT

## 2021-06-09 PROCEDURE — 93306 TTE W/DOPPLER COMPLETE: CPT

## 2021-06-09 PROCEDURE — 83002 ASSAY OF GONADOTROPIN (LH): CPT

## 2021-06-09 PROCEDURE — 93306 TTE W/DOPPLER COMPLETE: CPT | Mod: 26 | Performed by: INTERNAL MEDICINE

## 2021-06-09 PROCEDURE — 85025 COMPLETE CBC W/AUTO DIFF WBC: CPT

## 2021-06-09 PROCEDURE — 97161 PT EVAL LOW COMPLEX 20 MIN: CPT

## 2021-06-09 PROCEDURE — 80061 LIPID PANEL: CPT

## 2021-06-09 PROCEDURE — 99239 HOSP IP/OBS DSCHRG MGMT >30: CPT | Performed by: INTERNAL MEDICINE

## 2021-06-09 PROCEDURE — 82533 TOTAL CORTISOL: CPT

## 2021-06-09 PROCEDURE — 80053 COMPREHEN METABOLIC PANEL: CPT

## 2021-06-09 RX ORDER — GABAPENTIN 300 MG/1
300 CAPSULE ORAL 3 TIMES DAILY
Qty: 90 CAPSULE | Refills: 0 | Status: SHIPPED | OUTPATIENT
Start: 2021-06-09 | End: 2022-05-09 | Stop reason: SDUPTHER

## 2021-06-09 RX ADMIN — GABAPENTIN 300 MG: 300 CAPSULE ORAL at 12:09

## 2021-06-09 RX ADMIN — LOSARTAN POTASSIUM 50 MG: 25 TABLET, FILM COATED ORAL at 05:36

## 2021-06-09 RX ADMIN — ASPIRIN 81 MG: 81 TABLET, COATED ORAL at 05:36

## 2021-06-09 RX ADMIN — GABAPENTIN 300 MG: 300 CAPSULE ORAL at 05:36

## 2021-06-09 RX ADMIN — FINASTERIDE 5 MG: 5 TABLET, FILM COATED ORAL at 05:36

## 2021-06-09 RX ADMIN — METOPROLOL SUCCINATE 25 MG: 25 TABLET, EXTENDED RELEASE ORAL at 05:36

## 2021-06-09 ASSESSMENT — COGNITIVE AND FUNCTIONAL STATUS - GENERAL
MOBILITY SCORE: 24
SUGGESTED CMS G CODE MODIFIER MOBILITY: CH

## 2021-06-09 ASSESSMENT — GAIT ASSESSMENTS
DISTANCE (FEET): 250
DEVIATION: NO DEVIATION
GAIT LEVEL OF ASSIST: SUPERVISED

## 2021-06-09 ASSESSMENT — PAIN DESCRIPTION - PAIN TYPE: TYPE: ACUTE PAIN

## 2021-06-09 ASSESSMENT — FIBROSIS 4 INDEX: FIB4 SCORE: 2.24

## 2021-06-09 NOTE — RESPIRATORY CARE
"   COPD EDUCATION by COPD CLINICAL EDUCATOR  6/9/2021 at 2:26 PM by Charlotte Mathews, RRT     Patient reviewed by COPD education team. Patient does not have a history or diagnosis of COPD. Patient is a smoker, smoking cessation education done. A comprehensive packet with tips to quit and information on outpatient classes given to patient. Smoking Cessation Intervention and education completed, >10 minutes spent on smoking cessation education with patient.    Provided smoking cessation packet with \"Tips to Quit\" and brochure for \"Free Smoking Cessation Classes\".       COPD Screen  COPD Risk Screening  Do you have a history of COPD?: No    COPD Assessment  COPD Clinical Specialists ONLY  COPD Education Initiated: Yes--Short Intervention (Smoking Cessation Education Provided, pt quit smoking cigarettes 35yrs ago but picked up smoking cigars, went over the health risks involved in cigar smoking)  Is this a COPD exacerbation patient?: No    Meds to Beds  Would the patient like to opt in for Bedside Medication Delivery at Discharge?: No     MY COPD ACTION PLAN     It is recommended that patients and physicians /healthcare providers complete this action plan together. This plan should be discussed at each physician visit and updated as needed.    The green, yellow and red zones show groups of symptoms of COPD. This list of symptoms is not comprehensive, and you may experience other symptoms. In the \"Actions\" column, your healthcare provider has recommended actions for you to take based on your symptoms.    Patient Name: Laureen Munoz   YOB: 1950   Last Updated on:     Green Zone:  I am doing well today Actions   •  Usual activitiy and exercise level •  Take daily medications   •  Usual amounts of cough and phlegm/mucus •  Use oxygen as prescribed   •  Sleep well at night •  Continue regular exercise/diet plan   •  Appetite is good •  At all times avoid cigarette smoke, inhaled irritants     Daily " "Medications (these medications are taken every day):                Yellow Zone:  I am having a bad day or a COPD flare Actions   •  More breathless than usual •  Continue daily medications   •  I have less energy for my daily activities •  Use quick relief inhaler as ordered   •  Increased or thicker phlegm/mucus •  Use oxygen as prescribed   •  Using quick relief inhaler/nebulizer more often •  Get plenty of rest   •  Swelling of ankles more than usual •  Use pursed lip breathing   •  More coughing than usual •  At all times avoid cigarette smoke, inhaled irritants   •  I feel like I have a \"chest cold\"   •  Poor sleep and my symptoms woke me up   •  My appetite is not good   •  My medicine is not helping    •  Call provider immediately if symptoms don’t improve     Continue daily medications, add rescue medications:               Medications to be used during a flare up, (as Discussed with Provider):              Red Zone:  I need urgent medical care Actions   •  Severe shortness of breath even at rest •  Call 911 or seek medical care immediately   •  Not able to do any activity because of breathing    •  Fever or shaking chills    •  Feeling confused or very drowsy     •  Chest pains    •  Coughing up blood              "

## 2021-06-09 NOTE — THERAPY
Occupational Therapy   Contact Note    Patient Name: Laureen Munoz  Age:  70 y.o., Sex:  male  Medical Record #: [id number]  Today's Date: 6/9/2021    OT eval order received and acknowledged, chart reviewed. Per RN and PT, pt is up self and able to perform ADLs/IADLs. Screened pt in room for appropriateness, pt states he has been walking himself to bathroom, and able to get dressed without assistance. No acute OT needs. Will dc OT order.    Roopa Koenig, OT  [id number]

## 2021-06-09 NOTE — PROGRESS NOTES
Report given to Genesis MCDANIEL. Plan of care discussed. Patient resting comfortably in bed. Safety precautions in place.

## 2021-06-09 NOTE — PROGRESS NOTES
Rounded with Dr. Velásquez at bedside, patient safe for discharge..     Pt discharged to home. Discharge instructions provided to pt. Pt verbalizes understanding. Pt states all questions have been answered. Signed copy in chart. Prescriptions sent to [facility]. Pt states that all personal belongings are in possession. Pt off unit via walking, escorted by this RN.

## 2021-06-09 NOTE — DISCHARGE INSTRUCTIONS
Discharge Instructions    Discharged to home by car with relative. Discharged via wheelchair, hospital escort: Yes.  Special equipment needed: Not Applicable    Be sure to schedule a follow-up appointment with your primary care doctor or any specialists as instructed.     Discharge Plan:   Diet Plan: Discussed  Activity Level: Discussed  Confirmed Follow up Appointment: Patient to Call and Schedule Appointment  Confirmed Symptoms Management: Discussed  Medication Reconciliation Updated: Yes    I understand that a diet low in cholesterol, fat, and sodium is recommended for good health. Unless I have been given specific instructions below for another diet, I accept this instruction as my diet prescription.   Other diet: Regular    Special Instructions: None    · Is patient discharged on Warfarin / Coumadin?   No       Pituitary Tumors    Pituitary tumors are abnormal growths found in the pituitary gland. The pituitary gland is a small organ in the center of the brain. It makes hormones that affect growth and the functions of other glands in the body. In most cases, pituitary tumors grow slowly, are not cancerous (benign), and do not spread to other parts of the body. These tumors are best treated when they are found and diagnosed early.  A pituitary tumor may produce hormones (functioning tumor) or not (non-functioning tumor). A pituitary tumor may cause:  · Cushing disease. In this disease, the pituitary gland produces too much of a hormone called cortisol. This causes fat to build up in the face, back, and chest while the arms and legs become thin.  · Acromegaly. This is a condition in which the hands, feet, and face are larger than normal.  · Breast milk production, even when there is no pregnancy.  What are the causes?  The cause of most pituitary tumors is not known. In some cases, pituitary tumors may be passed from parent to child (inherited).  What increases the risk?  You are more likely to develop this  condition if:  · You have a family history of pituitary tumors.  · You have certain syndromes caused by unwanted changes (mutations) in your genes.  What are the signs or symptoms?  Symptoms of this condition include:  · Headaches.  · Loss of consciousness.  · Vision problems and eye muscle weakness.  · Weakness or low energy.  · Clear fluid draining from the nose.  · Changes in the sense of smell.  · Loss of body hair.  · Nausea and vomiting.  · Problems caused by the production of too many hormones, such as:  ? Inability to get pregnant after a year of having sex regularly without using birth control (infertility).  ? Loss of menstrual periods in women.  ? Abnormal growth.  ? Diabetes insipidus or diabetes mellitus.  ? High blood pressure (hypertension).  ? Inability to tolerate heat or cold.  ? Increase in sweating.  ? Joint pain.  ? Other skin and body changes.  ? Nipple discharge.  ? Changes in mood, or depression.  ? Decreased sexual function.  How is this diagnosed?  This condition may be diagnosed based on:  · Your symptoms.  · Your medical history.  · Blood or urine tests to check your hormone levels.  · CT scan.  · MRI.  · Removal and examination of a small tumor tissue (biopsy).  How is this treated?  Treatment depends on the type of pituitary tumor you have and your overall health. Treatments may include:  · Surgical removal of the tumor.  · Using high doses of X-ray energy to kill tumor cells (radiation).  · Using certain medicines to stop the pituitary gland from producing too many hormones (drug therapy).  If you have a family history of pituitary tumors, you may need to have regular blood tests to monitor pituitary hormone levels.  Follow these instructions at home:  · Drink enough fluid to keep your urine clear or pale yellow.  · If directed, follow instructions from your health care provider about measuring how much urine you pass.  · Do not pick your nose or remove any crusting, if your nose is  draining clear fluid. Tell your health care provider if this condition worsens.  · Do not do any activities that require straining, such as heavy lifting. Ask your health care provider what activities are safe for you.  · Take over-the-counter and prescription medicines only as told by your health care provider.  · Keep all follow-up visits as told by your health care provider. This is important, especially if you have a family history of pituitary tumors and you need regular blood tests.  Contact a health care provider if:  · You have sudden, unusual thirst.  · You are urinating more often than usual.  · You have a headache that does not go away.  · You develop new changes in your vision.  · You have clear fluid leaking from your nose or ears.  · You have a sensation of fluid trickling down the back of your throat.  · You have a salty taste in your mouth.  · You have trouble concentrating.  Get help right away if:  · Your symptoms suddenly become severe.  · You have a nosebleed that does not stop after a few minutes.  · You have a fever of over 101°F (38.3°C).  · You have a severe headache.  · You have a stiff neck.  · You are confused or not as alert as usual.  · You have chest pain.  · You have shortness of breath.  Summary  · Pituitary tumors are abnormal growths found in the pituitary gland.  · Treatment depends on the type of pituitary tumor you have and your overall health.  · Keep all follow-up visits as told by your health care provider. This is important, especially if you have a family history of pituitary tumors and you need regular blood tests.  This information is not intended to replace advice given to you by your health care provider. Make sure you discuss any questions you have with your health care provider.  Document Released: 12/08/2003 Document Revised: 04/09/2020 Document Reviewed: 03/06/2018  Elsevier Patient Education © 2020 [employer]      Depression / Suicide Risk    As you are  discharged from this [facility] facility, it is important to learn how to keep safe from harming yourself.    Recognize the warning signs:  · Abrupt changes in personality, positive or negative- including increase in energy   · Giving away possessions  · Change in eating patterns- significant weight changes-  positive or negative  · Change in sleeping patterns- unable to sleep or sleeping all the time   · Unwillingness or inability to communicate  · Depression  · Unusual sadness, discouragement and loneliness  · Talk of wanting to die  · Neglect of personal appearance   · Rebelliousness- reckless behavior  · Withdrawal from people/activities they love  · Confusion- inability to concentrate     If you or a loved one observes any of these behaviors or has concerns about self-harm, here's what you can do:  · Talk about it- your feelings and reasons for harming yourself  · Remove any means that you might use to hurt yourself (examples: pills, rope, extension cords, firearm)  · Get professional help from the community (Mental Health, Substance Abuse, psychological counseling)  · Do not be alone:Call your Safe Contact- someone whom you trust who will be there for you.  · Call your local CRISIS HOTLINE 282-1108 or 608-703-2599  · Call your local Children's Mobile Crisis Response Team [place] (210) 253-1487 or www.[place]  · Call the toll free National Suicide Prevention Hotlines   · National Suicide Prevention Lifeline 147-709-QDKY (0508)  · National Hope Line Network 800-SUICIDE (624-9293)

## 2021-06-09 NOTE — PROGRESS NOTES
Report received from Genesis MCDANIEL. Plan of care discussed. Patient resting comfortably in bed, denies any needs at this time. Safety precautions in place.

## 2021-06-09 NOTE — CARE PLAN
The patient is Stable - Low risk of patient condition declining or worsening    Shift Goals  Clinical Goals: Maintain neuro status  Patient Goals: go home    Progress made toward(s) clinical / shift goals:  Patient is making progress towards goals    Patient is not progressing towards the following goals:      Problem: Knowledge Deficit - Stroke Education  Goal: Patient's knowledge of stroke and risk factors will improve  Patient demonstrates appropriate knowledge of disease process/condition, treatment plan, diagnostic tests, and medications.Patient is compliant and asks appropriate questions about disease process and POC.   Outcome: Progressing     Problem: Hemodynamic Monitoring  Goal: Patient's hemodynamics, fluid balance and neurologic status will be stable or improve  Patient's hemodynamics are stable at this time  Outcome: Progressing     Problem: Fall Risk  Goal: Patient will remain free from falls  Safety measures in place, bed in lowest position, side rails up x2, bed alarm on, call light in place, patient is free from falls at this time.   Outcome: Progressing

## 2021-06-09 NOTE — PROGRESS NOTES
Telemetry Shift Summary    Rhythm SR w/BBB  HR Range 63-70  Ectopy frequent PACs, PVCs, occasional Couplets, bigem, trigem  Measurements .20/.16/.40        Normal Values  Rhythm SR  HR Range    Measurements 0.12-0.20 / 0.06-0.10  / 0.30-0.52

## 2021-06-09 NOTE — PROGRESS NOTES
Telemetry Shift Summary    Rhythm SA/SR w/BBB  HR Range 59-61  Ectopy frequent bigeminal PACs, rare Couplets and Bigem, occasional PVCs, 4 beats of V tach , MD aware.   Measurements .20/.14/.44        Normal Values  Rhythm SR  HR Range    Measurements 0.12-0.20 / 0.06-0.10  / 0.30-0.52

## 2021-06-09 NOTE — PROGRESS NOTES
Telemetry Shift Summary     Measurement for strip printed at:  Rhythm: SA/SB  Rate: 50-60  Measurements: .20/.12/.46  Ectopy (reported by Monitor Tech and verified by RN):   (f) Bigem, PAC (o-f) PVC 4bts V-tach  Normal Values  Rhythm: Sinus  HR:   Measurements: 0.12-0.20/0.06-0.10/0.30-0.52

## 2021-06-09 NOTE — PROGRESS NOTES
0800 Assessment completed, patient is alert and oriented x 4, patient states he is having 4/10 headache but declines any intervention at this time. Patient states it could be related to lack of caffeine and wants to have his coffee with breakfast. Safety precautions in place.     0915 PT at bedside.     0930 Echo tech at bedside.     1045 Patient sitting on the edge of the bed, patient states headache continues to be there, states he is going to take a nap and declines intervention at this time. Safety precautions are in place.

## 2021-06-09 NOTE — CARE PLAN
The patient is Stable - Low risk of patient condition declining or worsening    Shift Goals  Clinical Goals: get echo and neuro checks   Patient Goals: go home    Progress made toward(s) clinical / shift goals:  patient has completed echo. Neuro checks completed and patient is alert and oriented x 4, patient states neuropathy is better now that he is taking gabapentin.     Patient is not progressing towards the following goals:    Problem: Neuro Status  Goal: Neuro status will remain stable or improve  Outcome: Progressing  Neuro checks completed and patient is alert and oriented x 4, patient states neuropathy is better now that he is taking gabapentin.     Problem: Knowledge Deficit - Standard  Goal: Patient and family/care givers will demonstrate understanding of plan of care, disease process/condition, diagnostic tests and medications  Outcome: Progressing  Discussed plan with patient including neuro checks, echo and PT/OT ordered as well as neurosurgery consult. Allowed time for questions, patient agreed and verbalized understanding.

## 2021-06-09 NOTE — DISCHARGE PLANNING
"Anticipated Discharge Disposition:   Home when medically cleared     Action:   Chart review complete.    Per MD, if patient is medically cleared by neurology, then patient can discharge today.    Per PT note, \"Anticipate that the patient will have no further physical therapy needs after discharge from the hospital.\"     OT pending. No anticipated discharge needs. RN CM will continue to follow and assist as needed.     Barriers to Discharge:   Medical Clearance/Neurology     Plan:   Hospital care management will continue to assist with discharge planning needs.     "

## 2021-06-09 NOTE — PROGRESS NOTES
Discharge paperwork completed, RN spoke with Dr. Velásquez and MD will round with patient at bedside and then can be discharged.

## 2021-06-09 NOTE — THERAPY
Physical Therapy   Initial Evaluation     Patient Name: Laureen Munoz  Age:  70 y.o., Sex:  male  Medical Record #: [id number]  Today's Date: 6/9/2021     Precautions: Other (seizure)    Assessment  Patient is 70 y.o. 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 Pt educated on safe mobility, stair training, and activity tolerance with good return demonstration and mobilized per chart below. Pt with no functional deficits noted throughout session and no symptoms noted by pt. Pt reports chronic neuropathy that sometimes makes him take the stairs slower, but otherwise has no complaints. At this time pt has no further acute PT needs.     Plan    Recommend Physical Therapy for Evaluation only     DC Equipment Recommendations: None  Discharge Recommendations: Anticipate that the patient will have no further physical therapy needs after discharge from the hospital       Objective       06/09/21 0916   Prior Living Situation   Prior Services Home-Independent   Housing / Facility 2 Story House   Steps Into Home 1   Steps In Home 12   Rail Right Rail  (Steps in Home)   Bathroom Set up Walk In Shower   Equipment Owned None   Lives with - Patient's Self Care Capacity Adult Children;Other (Comments)  (lives with ex wife and adult son )   Comments pts ex wife and son both work during the day   Prior Level of Functional Mobility   Bed Mobility Independent   Transfer Status Independent   Ambulation Independent   Distance Ambulation (Feet)   (community)   Assistive Devices Used None   Stairs Independent   Comments pt [place] desk [place] at [employer] [place]; reports he is independent, his neuropathy makes stairs more difficult, he sometimes has to take them slow    History of Falls   History of Falls No   Cognition    Cognition / Consciousness WDL   Level of Consciousness Alert   Comments pleasant and cooperative   Passive ROM Lower Body   Passive ROM Lower Body WDL   Active ROM  Lower Body    Active ROM Lower Body  WDL   Strength Lower Body   Lower Body Strength  WDL   Sensation Lower Body   Lower Extremity Sensation   WDL   Comments to light touch; pt reports LLE neuropathy worse than RLE   Lower Body Muscle Tone   Lower Body Muscle Tone  WDL   Neurological Concerns   Neurological Concerns No   Coordination Lower Body    Coordination Lower Body  WDL   Balance Assessment   Sitting Balance (Static) Normal   Sitting Balance (Dynamic) Normal   Standing Balance (Static) Good   Standing Balance (Dynamic) Good   Weight Shift Sitting Normal   Weight Shift Standing Good   Comments no device; no loss of balance    Gait Analysis   Gait Level Of Assist Supervised   Assistive Device None   Distance (Feet) 250   # of Times Distance was Traveled 1   Deviation No deviation   # of Stairs Climbed 4   Level of Assist with Stairs Supervised   Weight Bearing Status FWB   Comments pt with safe gait throughout session and stair navigation, no deficits noted    Bed Mobility    Supine to Sit   (not tested, pt washing hands at sink upon arrival )   Sit to Supine   (NT, left standing with echo tech headed for the bed )   Scooting   (not tested )   Rolling   (not tested )   Functional Mobility   Sit to Stand Supervised   Bed, Chair, Wheelchair Transfer Supervised   Activity Tolerance   Sitting in Chair NT   Sitting Edge of Bed 1min   Standing 12mins   Education Group   Education Provided Role of Physical Therapist;Gait Training;Stair Training   Role of Physical Therapist Patient Response Patient;Acceptance;Explanation;Demonstration;Verbal Demonstration;Action Demonstration   Gait Training Patient Response Patient;Acceptance;Explanation;Demonstration;Verbal Demonstration;Action Demonstration   Stair Training Patient Response Patient;Acceptance;Explanation;Demonstration;Verbal Demonstration;Action Demonstration

## 2021-06-09 NOTE — DISCHARGE SUMMARY
"Discharge Summary    CHIEF COMPLAINT ON ADMISSION  Chief Complaint   Patient presents with   • Possible Stroke     pt sent from his PCP for diziness, confusion with R arm weakness since last Wednesday which subsided on Friday.  pt also c/o hypertention. pt on losaartan. pt is immunized with Mederna.       Reason for Admission  Sent by MD,Dizziness     Admission Date  6/8/2021    CODE STATUS  Full Code    HPI & HOSPITAL COURSE  Per notes, \"70 y.o. male with a current medical history of hypertension, seasonal allergies, BPH, bilateral lower extremity neuropathy who presented 6/8/2021 with brief episode of confusion.  Patient was evaluated by his primary care physician today and he was sent to ER with a concern of stroke/TIA.  Patient stated that last Wednesday, the weather was very hot and seems like his car is overheated , then patient had a brief episode of confusion (forgetting his passwords), patient was pale and feeling dizzy, not vision and feeling flushed at the time. He drove home after that event & felt better after taking a nap.  Denies syncope, loss of consciousness, new onset tingling/numbness/weakness in upper and lower extremities, chest pain or palpitation. Pt does have chronic bilateral lower extremities tingling/numbness, following up with podiatry.  The patient is following up with dermatology for Rosacea.   Patient was seen by cardiology 2 years ago with concerns of high blood pressure and stress test done 2 years ago, discharged from cardiology clinic, no regular cardiology follow-up needs.     At ER, vitals stable.  No acute abnormality on labs.  CT head showed no brain mass or intracranial hemorrhage, but there is finding of enlarged pituitary.  Will be admitted for MRI brain/pituitary with contrast.\"    Patient was admitted and monitored overnight for headache, confusion and weakness. MRI brain/pituitary was completed and showed a [id number] mm pituitary macroadenoma. Patient did report recent " worsening headaches and intermittent vision changes. It is unlcear if these are related to adenoma or other etiology. Case was discussed with Dr. Médnez, neurosurgery, who recommended outpatient follow-up within 1 week. Endocrine labs were sent and patient will need to FU with PCP and possible endocrinology should there be any underlying endocrine disorders. All findings and treatment plans were discussed in detail with the patient.      Therefore, he is discharged in good and stable condition to home with close outpatient follow-up.    The patient met 2-midnight criteria for an inpatient stay at the time of discharge.    Discharge Date  06/09/2021    FOLLOW UP ITEMS POST DISCHARGE  FU with PCP  FU with neurosurgery     DISCHARGE DIAGNOSES  Active Problems:    Neuropathic pain (Chronic) POA: Yes    Essential hypertension (Chronic) POA: Yes    Benign prostatic hyperplasia (Chronic) POA: Yes    Class 2 obesity in adult POA: Yes    Rosacea (Chronic) POA: Yes    Enlarged pituitary gland (HCC) POA: Unknown    Pituitary macroadenoma (HCC) POA: Yes  Resolved Problems:    Confusion POA: Unknown      FOLLOW UP  Future Appointments   Date Time Provider Department Center   6/22/2021  9:20 AM Kenrick Skinner D.O. [facility] None   9/8/2021  8:00 AM Madonna Swain M.D. [address] M.D.  [address] [facility] [address]  [phone]    In 1 week        MEDICATIONS ON DISCHARGE     Medication List      CHANGE how you take these medications      Instructions   metoprolol SR 25 MG Tb24  What changed:   · how much to take  · how to take this  · when to take this  · additional instructions  Commonly known as: TOPROL XL   TAKE ONE TABLET BY MOUTH ONE TIME DAILY        CONTINUE taking these medications      Instructions   ALLERGY 24-HR PO   Take 1 tablet by mouth every morning.  Dose: 1 tablet     finasteride 5 MG Tabs  Commonly known as: PROSCAR   Take 1 tablet by mouth every day.  Dose: 5 mg      fluticasone 50 MCG/ACT nasal spray  Commonly known as: FLONASE   Administer 1 Spray into affected nostril(S) every morning.  Dose: 1 Spray     losartan 100 MG Tabs  Commonly known as: COZAAR   Take 1 tablet by mouth every day.  Dose: 100 mg     Melatonin 5 MG Chew   Chew 15 mg at bedtime as needed (Sleep).  Dose: 15 mg     vitamin D (Ergocalciferol) 1.25 MG (52247 UT) Caps capsule  Commonly known as: DRISDOL   Take 50,000 Units by mouth every 7 days.  Dose: 50,000 Units            Allergies  Allergies   Allergen Reactions   • Nkda [No Known Drug Allergy]    • Seasonal        DIET  Orders Placed This Encounter   Procedures   • Diet Order Diet: Regular     Standing Status:   Standing     Number of Occurrences:   1     Order Specific Question:   Diet:     Answer:   Regular [1]       ACTIVITY  As tolerated.  Weight bearing as tolerated    CONSULTATIONS  Neurosurgery    PROCEDURES  None    LABORATORY  Lab Results   Component Value Date    SODIUM 136 06/09/2021    POTASSIUM 4.4 06/09/2021    CHLORIDE 100 06/09/2021    CO2 22 06/09/2021    GLUCOSE 88 06/09/2021    BUN 17 06/09/2021    CREATININE 0.81 06/09/2021    CREATININE 0.92 03/01/2010    GLOMRATE >59 03/01/2010        Lab Results   Component Value Date    WBC 8.8 06/09/2021    WBC 9.4 03/01/2010    HEMOGLOBIN 15.4 06/09/2021    HEMATOCRIT 47.6 06/09/2021    PLATELETCT 142 (L) 06/09/2021        Total time of the discharge process exceeds 35 minutes.

## 2021-06-10 ENCOUNTER — PATIENT OUTREACH (OUTPATIENT)
Dept: MEDICAL GROUP | Age: 71
End: 2021-06-10

## 2021-06-10 LAB
EST. AVERAGE GLUCOSE BLD GHB EST-MCNC: 120 MG/DL
HBA1C MFR BLD: 5.8 % (ref 4–5.6)

## 2021-06-11 ENCOUNTER — HOSPITAL ENCOUNTER (OUTPATIENT)
Dept: LAB | Facility: MEDICAL CENTER | Age: 71
End: 2021-06-11
Attending: PHYSICIAN ASSISTANT
Payer: MEDICARE

## 2021-06-11 LAB
FSH SERPL-ACNC: 29.5 MIU/ML (ref 1.5–12.4)
LH SERPL-ACNC: 16.7 IU/L (ref 1.7–8.6)
PROLACTIN SERPL-MCNC: 11.9 NG/ML (ref 2.1–17.7)
T4 FREE SERPL-MCNC: 1.23 NG/DL (ref 0.93–1.7)
TESTOST SERPL-MCNC: 500 NG/DL (ref 175–781)
TSH SERPL DL<=0.005 MIU/L-ACNC: 1.37 UIU/ML (ref 0.38–5.33)

## 2021-06-11 PROCEDURE — 84305 ASSAY OF SOMATOMEDIN: CPT

## 2021-06-11 PROCEDURE — 84403 ASSAY OF TOTAL TESTOSTERONE: CPT

## 2021-06-11 PROCEDURE — 84439 ASSAY OF FREE THYROXINE: CPT

## 2021-06-11 PROCEDURE — 83002 ASSAY OF GONADOTROPIN (LH): CPT

## 2021-06-11 PROCEDURE — 83001 ASSAY OF GONADOTROPIN (FSH): CPT

## 2021-06-11 PROCEDURE — 84443 ASSAY THYROID STIM HORMONE: CPT

## 2021-06-11 PROCEDURE — 84146 ASSAY OF PROLACTIN: CPT

## 2021-06-11 PROCEDURE — 36415 COLL VENOUS BLD VENIPUNCTURE: CPT

## 2021-06-11 PROCEDURE — 82024 ASSAY OF ACTH: CPT

## 2021-06-14 LAB — ACTH PLAS-MCNC: 19.8 PG/ML (ref 7.2–63.3)

## 2021-06-15 LAB
IGF-I SERPL-MCNC: 102 NG/ML (ref 29–245)
IGF-I Z-SCORE SERPL: -0.1

## 2021-06-22 ENCOUNTER — OFFICE VISIT (OUTPATIENT)
Dept: NEUROLOGY | Facility: MEDICAL CENTER | Age: 71
End: 2021-06-22
Attending: PSYCHIATRY & NEUROLOGY
Payer: MEDICARE

## 2021-06-22 VITALS
TEMPERATURE: 96.8 F | WEIGHT: 252.65 LBS | OXYGEN SATURATION: 90 % | RESPIRATION RATE: 16 BRPM | SYSTOLIC BLOOD PRESSURE: 132 MMHG | HEART RATE: 47 BPM | BODY MASS INDEX: 35.37 KG/M2 | HEIGHT: 71 IN | DIASTOLIC BLOOD PRESSURE: 78 MMHG

## 2021-06-22 DIAGNOSIS — D35.2 PITUITARY MACROADENOMA (HCC): ICD-10-CM

## 2021-06-22 DIAGNOSIS — H81.10 BENIGN PAROXYSMAL POSITIONAL VERTIGO, UNSPECIFIED LATERALITY: Primary | ICD-10-CM

## 2021-06-22 PROCEDURE — 99204 OFFICE O/P NEW MOD 45 MIN: CPT | Performed by: PSYCHIATRY & NEUROLOGY

## 2021-06-22 PROCEDURE — 99212 OFFICE O/P EST SF 10 MIN: CPT | Performed by: PSYCHIATRY & NEUROLOGY

## 2021-06-22 RX ORDER — IBUPROFEN 200 MG
500 CAPSULE ORAL DAILY
COMMUNITY
End: 2022-02-08

## 2021-06-22 ASSESSMENT — ENCOUNTER SYMPTOMS
DOUBLE VISION: 1
FOCAL WEAKNESS: 0
HEADACHES: 1
SENSORY CHANGE: 1
TINGLING: 1

## 2021-06-22 ASSESSMENT — FIBROSIS 4 INDEX: FIB4 SCORE: 2.18

## 2021-06-22 NOTE — PROGRESS NOTES
"Chief Complaint   Patient presents with   • New Patient     Dizziness       History of present illness:  Laureen Munoz 70 y.o. male with HTN presented to the ED after being sent by his PCP for forgetting passwords, disorientation and recurrent dizziness and tunnel vision.   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  He has had recurrent dizziness for the last year where he has to grab hold of objects. He has tunnel vision that resolves in 20 seconds and feels disoriented when this occurs. It is not triggered by standing. He feels like the room does a \"quick flip\". He does not fall from this. This occurs up to 3-4 times per week.   There is no hearing loss.    Past medical history:   Past Medical History:   Diagnosis Date   • Hypercholesterolemia    • Hypertension    • Tinea inguinalis        Past surgical history:   Past Surgical History:   Procedure Laterality Date   • OTHER ABDOMINAL SURGERY      gallbladder       Family history:   Family History   Problem Relation Age of Onset   • Heart Disease Mother    • Heart Attack Mother    • Heart Disease Father    • Heart Attack Father    • Cancer Sister         leukemia, breast cancer   • Stroke Sister    • No Known Problems Maternal Grandmother    • No Known Problems Maternal Grandfather    • No Known Problems Paternal Grandmother    • Dementia Paternal Grandfather    • Hypertension Neg Hx    • Hyperlipidemia Neg Hx    • Diabetes Neg Hx        Social history:   Social History     Socioeconomic History   • Marital status:      Spouse name: Not on file   • Number of children: Not on file   • Years of education: Not on file   • Highest education level: Associate degree: academic program   Occupational History   • Not on file   Tobacco Use   • Smoking status: Current Every Day Smoker     Packs/day: 0.25     Types: Cigars, Cigarettes     Last attempt to quit:      Years since quittin.4   • Smokeless tobacco: Never Used   • " Tobacco comment: cigar, smokes one a day but no cigarettes   Vaping Use   • Vaping Use: Never used   Substance and Sexual Activity   • Alcohol use: Yes     Comment: minimal   • Drug use: Not Currently     Types: Marijuana, Inhaled     Comment: rare occasions,occasionally    • Sexual activity: Not Currently   Other Topics Concern   • Not on file   Social History Narrative   • Not on file     Social Determinants of Health     Financial Resource Strain: Low Risk    • Difficulty of Paying Living Expenses: Not very hard   Food Insecurity: No Food Insecurity   • Worried About Running Out of Food in the Last Year: Never true   • Ran Out of Food in the Last Year: Never true   Transportation Needs: No Transportation Needs   • Lack of Transportation (Medical): No   • Lack of Transportation (Non-Medical): No   Physical Activity: Inactive   • Days of Exercise per Week: 0 days   • Minutes of Exercise per Session: 0 min   Stress: No Stress Concern Present   • Feeling of Stress : Only a little   Social Connections: Socially Isolated   • Frequency of Communication with Friends and Family: More than three times a week   • Frequency of Social Gatherings with Friends and Family: Twice a week   • Attends [employer] Services: Never   • Active Member of Clubs or Organizations: No   • Attends Club or Organization Meetings: Never   • Marital Status:    Intimate Partner Violence:    • Fear of Current or Ex-Partner:    • Emotionally Abused:    • Physically Abused:    • Sexually Abused:        Current medications:   Current Outpatient Medications   Medication   • calcium carbonate (OS-BRANDON 500) 1250 (500 Ca) MG Tab   • Multiple Vitamin (MULTI-VITAMINS PO)   • gabapentin (NEURONTIN) 300 MG Cap   • vitamin D, Ergocalciferol, (DRISDOL) 1.25 MG (27569 UT) Cap capsule   • Melatonin 5 MG Chew Tab   • fluticasone (FLONASE) 50 MCG/ACT nasal spray   • Fexofenadine HCl (ALLERGY 24-HR PO)   • losartan (COZAAR) 100 MG Tab   • metoprolol SR (TOPROL  "XL) 25 MG TABLET SR 24 HR   • finasteride (PROSCAR) 5 MG Tab     No current facility-administered medications for this visit.       Medication Allergy:  Allergies   Allergen Reactions   • Nkda [No Known Drug Allergy]    • Seasonal        Review of systems:   Review of Systems   HENT: Negative for hearing loss.    Eyes: Positive for double vision (Occasionally when he is reading. ).   Neurological: Positive for tingling (Has intermittent tingling on his arms that resolves with movement of his shoulder.), sensory change (Has neuropathy in his feet. ) and headaches (He has mild 2/10 behind the eyes pain. ). Negative for focal weakness.        Physical examination:   Vitals:    06/22/21 0905   BP: 132/78   BP Location: Left arm   Patient Position: Sitting   BP Cuff Size: Adult   Pulse: (!) 47   Resp: 16   Temp: 36 °C (96.8 °F)   TempSrc: Temporal   SpO2: 90%   Weight: 115 kg (252 lb 10.4 oz)   Height: 1.803 m (5' 11\")     Neurological Exam  Mental Status  Awake and alert. Speech is normal. Language is fluent with no aphasia.    Cranial Nerves  CN II: Right normal visual field. Left normal visual field.  CN III, IV, VI: Extraocular movements intact bilaterally. No nystagmus. Normal saccades. Normal smooth pursuit.    Motor                                               Right                     Left   Shoulder abduction               5                          5  Elbow flexion                         5                          5  Elbow extension                    5                          5  Knee extension                      5                          5  Dorsiflexion                            5                          5    Coordination  Right: Finger-to-nose normal. Rapid alternating movement normal.  Left: Finger-to-nose normal. Rapid alternating movement normal.    Gait  Casual gait is normal including stance, stride, and arm swing. Normal tandem gait.      Labs:  I reviewed the following labs personally:  FSH: " 29.5  LH: 16.7    Imagin/8/21 MRI BRAIN PITUITARY:  I reviewed the images personally and agree with the following read:     IMPRESSION:     1.  There is an approximately 16 x 13 x 12 mm sized hypoenhancing lesion in the right side of the pituitary gland likely representing pituitary macroadenoma. The lesion is abutting the right cavernous sinus without infiltration.  2.  Mild cerebral atrophy.  3.  Minimal chronic microvascular ischemic disease.    ASSESSMENT AND PLAN:  Problem List Items Addressed This Visit     Pituitary macroadenoma (HCC)    Relevant Orders    REFERRAL TO ENDOCRINOLOGY      Other Visit Diagnoses     Benign paroxysmal positional vertigo, unspecified laterality    -  Primary    Relevant Orders    REFERRAL TO PHYSICAL THERAPY          1. Pituitary macroadenoma (HCC)  - REFERRAL TO ENDOCRINOLOGY  Was recently seen by neurosurgery, was not recommended to have resection of pituitary mass. Due to abnormal LH and FSH, I have referred him to endocrinology.     2. Benign paroxysmal positional vertigo, unspecified laterality  - REFERRAL TO PHYSICAL THERAPY  The description of seconds of vertigo, occurring intermittently, with a normal neurologic exam and brain MRI, is consistent with BPPV. I have referred him to PT for treatment of BPPV.     FOLLOW-UP:   Return if symptoms worsen or fail to improve.    Total time spent for the day for this patient unrelated to procedure time is: 34 minutes. I spent 23 minutes in face to face time and I spent 8 minutes pre-charting and 3 minutes in post-visit documentation.      MARYBEL IvyO.  [facility]   Movement Disorders Specialist

## 2021-06-22 NOTE — PATIENT INSTRUCTIONS
I have placed a referral to endocrinology for the pituitary mass.   I have also referred you to PT for vestibular rehab for positional vertigo.     Benign Positional Vertigo  Vertigo is the feeling that you or your surroundings are moving when they are not. Benign positional vertigo is the most common form of vertigo. This is usually a harmless condition (benign). This condition is positional. This means that symptoms are triggered by certain movements and positions.  This condition can be dangerous if it occurs while you are doing something that could cause harm to you or others. This includes activities such as driving or operating machinery.  What are the causes?  In many cases, the cause of this condition is not known. It may be caused by a disturbance in an area of the inner ear that helps your brain to sense movement and balance. This disturbance can be caused by:  · Viral infection (labyrinthitis).  · Head injury.  · Repetitive motion, such as jumping, dancing, or running.  What increases the risk?  You are more likely to develop this condition if:  · You are a woman.  · You are 50 years of age or older.  What are the signs or symptoms?  Symptoms of this condition usually happen when you move your head or your eyes in different directions. Symptoms may start suddenly, and usually last for less than a minute. They include:  · Loss of balance and falling.  · Feeling like you are spinning or moving.  · Feeling like your surroundings are spinning or moving.  · Nausea and vomiting.  · Blurred vision.  · Dizziness.  · Involuntary eye movement (nystagmus).  Symptoms can be mild and cause only minor problems, or they can be severe and interfere with daily life. Episodes of benign positional vertigo may return (recur) over time. Symptoms may improve over time.  How is this diagnosed?  This condition may be diagnosed based on:  · Your medical history.  · Physical exam of the head, neck, and ears.  · Tests, such  as:  ? MRI.  ? CT scan.  ? Eye movement tests. Your health care provider may ask you to change positions quickly while he or she watches you for symptoms of benign positional vertigo, such as nystagmus. Eye movement may be tested with a variety of exams that are designed to evaluate or stimulate vertigo.  ? An electroencephalogram (EEG). This records electrical activity in your brain.  ? Hearing tests.  You may be referred to a health care provider who specializes in ear, nose, and throat (ENT) problems (otolaryngologist) or a provider who specializes in disorders of the nervous system (neurologist).  How is this treated?    This condition may be treated in a session in which your health care provider moves your head in specific positions to adjust your inner ear back to normal. Treatment for this condition may take several sessions. Surgery may be needed in severe cases, but this is rare.   In some cases, benign positional vertigo may resolve on its own in 2-4 weeks.  Follow these instructions at home:  Safety  · Move slowly. Avoid sudden body or head movements or certain positions, as told by your health care provider.  · Avoid driving until your health care provider says it is safe for you to do so.  · Avoid operating heavy machinery until your health care provider says it is safe for you to do so.  · Avoid doing any tasks that would be dangerous to you or others if vertigo occurs.  · If you have trouble walking or keeping your balance, try using a cane for stability. If you feel dizzy or unstable, sit down right away.  · Return to your normal activities as told by your health care provider. Ask your health care provider what activities are safe for you.  General instructions  · Take over-the-counter and prescription medicines only as told by your health care provider.  · Drink enough fluid to keep your urine pale yellow.  · Keep all follow-up visits as told by your health care provider. This is  "important.  Contact a health care provider if:  · You have a fever.  · Your condition gets worse or you develop new symptoms.  · Your family or friends notice any behavioral changes.  · You have nausea or vomiting that gets worse.  · You have numbness or a \"pins and needles\" sensation.  Get help right away if you:  · Have difficulty speaking or moving.  · Are always dizzy.  · Faint.  · Develop severe headaches.  · Have weakness in your legs or arms.  · Have changes in your hearing or vision.  · Develop a stiff neck.  · Develop sensitivity to light.  Summary  · Vertigo is the feeling that you or your surroundings are moving when they are not. Benign positional vertigo is the most common form of vertigo.  · The cause of this condition is not known. It may be caused by a disturbance in an area of the inner ear that helps your brain to sense movement and balance.  · Symptoms include loss of balance and falling, feeling that you or your surroundings are moving, nausea and vomiting, and blurred vision.  · This condition can be diagnosed based on symptoms, physical exam, and other tests, such as MRI, CT scan, eye movement tests, and hearing tests.  · Follow safety instructions as told by your health care provider. You will also be told when to contact your health care provider in case of problems.  This information is not intended to replace advice given to you by your health care provider. Make sure you discuss any questions you have with your health care provider.  Document Released: 09/25/2007 Document Revised: 05/29/2019 Document Reviewed: 05/29/2019  [employer] Patient Education © 2020 [employer]    "

## 2021-09-07 ENCOUNTER — HOSPITAL ENCOUNTER (OUTPATIENT)
Dept: LAB | Facility: MEDICAL CENTER | Age: 71
End: 2021-09-07
Attending: INTERNAL MEDICINE
Payer: MEDICARE

## 2021-09-07 DIAGNOSIS — D69.6 THROMBOCYTOPENIA (HCC): ICD-10-CM

## 2021-09-07 DIAGNOSIS — E80.4 GILBERT SYNDROME: ICD-10-CM

## 2021-09-07 DIAGNOSIS — E78.00 HYPERCHOLESTEREMIA: ICD-10-CM

## 2021-09-07 DIAGNOSIS — I10 ESSENTIAL HYPERTENSION: ICD-10-CM

## 2021-09-07 DIAGNOSIS — R73.01 IFG (IMPAIRED FASTING GLUCOSE): ICD-10-CM

## 2021-09-07 LAB
ALBUMIN SERPL BCP-MCNC: 4.2 G/DL (ref 3.2–4.9)
ALBUMIN/GLOB SERPL: 1.4 G/DL
ALP SERPL-CCNC: 101 U/L (ref 30–99)
ALT SERPL-CCNC: 26 U/L (ref 2–50)
ANION GAP SERPL CALC-SCNC: 9 MMOL/L (ref 7–16)
AST SERPL-CCNC: 26 U/L (ref 12–45)
BASOPHILS # BLD AUTO: 0.3 % (ref 0–1.8)
BASOPHILS # BLD: 0.03 K/UL (ref 0–0.12)
BILIRUB SERPL-MCNC: 1 MG/DL (ref 0.1–1.5)
BUN SERPL-MCNC: 21 MG/DL (ref 8–22)
CALCIUM SERPL-MCNC: 9.6 MG/DL (ref 8.5–10.5)
CHLORIDE SERPL-SCNC: 104 MMOL/L (ref 96–112)
CHOLEST SERPL-MCNC: 200 MG/DL (ref 100–199)
CO2 SERPL-SCNC: 24 MMOL/L (ref 20–33)
CREAT SERPL-MCNC: 1.19 MG/DL (ref 0.5–1.4)
EOSINOPHIL # BLD AUTO: 0.07 K/UL (ref 0–0.51)
EOSINOPHIL NFR BLD: 0.8 % (ref 0–6.9)
ERYTHROCYTE [DISTWIDTH] IN BLOOD BY AUTOMATED COUNT: 46 FL (ref 35.9–50)
EST. AVERAGE GLUCOSE BLD GHB EST-MCNC: 126 MG/DL
FASTING STATUS PATIENT QL REPORTED: NORMAL
GLOBULIN SER CALC-MCNC: 3.1 G/DL (ref 1.9–3.5)
GLUCOSE SERPL-MCNC: 118 MG/DL (ref 65–99)
HBA1C MFR BLD: 6 % (ref 4–5.6)
HCT VFR BLD AUTO: 47.2 % (ref 42–52)
HDLC SERPL-MCNC: 47 MG/DL
HGB BLD-MCNC: 15 G/DL (ref 14–18)
IMM GRANULOCYTES # BLD AUTO: 0.07 K/UL (ref 0–0.11)
IMM GRANULOCYTES NFR BLD AUTO: 0.8 % (ref 0–0.9)
LDLC SERPL CALC-MCNC: 126 MG/DL
LYMPHOCYTES # BLD AUTO: 1.94 K/UL (ref 1–4.8)
LYMPHOCYTES NFR BLD: 22 % (ref 22–41)
MCH RBC QN AUTO: 30.1 PG (ref 27–33)
MCHC RBC AUTO-ENTMCNC: 31.8 G/DL (ref 33.7–35.3)
MCV RBC AUTO: 94.6 FL (ref 81.4–97.8)
MONOCYTES # BLD AUTO: 0.91 K/UL (ref 0–0.85)
MONOCYTES NFR BLD AUTO: 10.3 % (ref 0–13.4)
NEUTROPHILS # BLD AUTO: 5.8 K/UL (ref 1.82–7.42)
NEUTROPHILS NFR BLD: 65.8 % (ref 44–72)
NRBC # BLD AUTO: 0 K/UL
NRBC BLD-RTO: 0 /100 WBC
PLATELET # BLD AUTO: 169 K/UL (ref 164–446)
PMV BLD AUTO: 11 FL (ref 9–12.9)
POTASSIUM SERPL-SCNC: 4.9 MMOL/L (ref 3.6–5.5)
PROT SERPL-MCNC: 7.3 G/DL (ref 6–8.2)
RBC # BLD AUTO: 4.99 M/UL (ref 4.7–6.1)
SODIUM SERPL-SCNC: 137 MMOL/L (ref 135–145)
TRIGL SERPL-MCNC: 136 MG/DL (ref 0–149)
WBC # BLD AUTO: 8.8 K/UL (ref 4.8–10.8)

## 2021-09-07 PROCEDURE — 83036 HEMOGLOBIN GLYCOSYLATED A1C: CPT | Mod: GA

## 2021-09-07 PROCEDURE — 80053 COMPREHEN METABOLIC PANEL: CPT

## 2021-09-07 PROCEDURE — 80061 LIPID PANEL: CPT

## 2021-09-07 PROCEDURE — 85025 COMPLETE CBC W/AUTO DIFF WBC: CPT

## 2021-09-07 PROCEDURE — 36415 COLL VENOUS BLD VENIPUNCTURE: CPT | Mod: GA

## 2021-09-07 SDOH — ECONOMIC STABILITY: HOUSING INSECURITY: IN THE LAST 12 MONTHS, HOW MANY PLACES HAVE YOU LIVED?: 3

## 2021-09-07 SDOH — ECONOMIC STABILITY: INCOME INSECURITY: IN THE LAST 12 MONTHS, WAS THERE A TIME WHEN YOU WERE NOT ABLE TO PAY THE MORTGAGE OR RENT ON TIME?: NO

## 2021-09-07 SDOH — HEALTH STABILITY: PHYSICAL HEALTH: ON AVERAGE, HOW MANY DAYS PER WEEK DO YOU ENGAGE IN MODERATE TO STRENUOUS EXERCISE (LIKE A BRISK WALK)?: 0 DAYS

## 2021-09-07 SDOH — ECONOMIC STABILITY: FOOD INSECURITY: WITHIN THE PAST 12 MONTHS, THE FOOD YOU BOUGHT JUST DIDN'T LAST AND YOU DIDN'T HAVE MONEY TO GET MORE.: NEVER TRUE

## 2021-09-07 SDOH — ECONOMIC STABILITY: FOOD INSECURITY: WITHIN THE PAST 12 MONTHS, YOU WORRIED THAT YOUR FOOD WOULD RUN OUT BEFORE YOU GOT MONEY TO BUY MORE.: NEVER TRUE

## 2021-09-07 SDOH — ECONOMIC STABILITY: INCOME INSECURITY: HOW HARD IS IT FOR YOU TO PAY FOR THE VERY BASICS LIKE FOOD, HOUSING, MEDICAL CARE, AND HEATING?: NOT VERY HARD

## 2021-09-07 SDOH — HEALTH STABILITY: MENTAL HEALTH
STRESS IS WHEN SOMEONE FEELS TENSE, NERVOUS, ANXIOUS, OR CAN'T SLEEP AT NIGHT BECAUSE THEIR MIND IS TROUBLED. HOW STRESSED ARE YOU?: TO SOME EXTENT

## 2021-09-07 SDOH — HEALTH STABILITY: PHYSICAL HEALTH: ON AVERAGE, HOW MANY MINUTES DO YOU ENGAGE IN EXERCISE AT THIS LEVEL?: 0 MIN

## 2021-09-07 ASSESSMENT — LIFESTYLE VARIABLES
HOW MANY STANDARD DRINKS CONTAINING ALCOHOL DO YOU HAVE ON A TYPICAL DAY: 1 OR 2
HOW OFTEN DO YOU HAVE SIX OR MORE DRINKS ON ONE OCCASION: NEVER
HOW OFTEN DO YOU HAVE A DRINK CONTAINING ALCOHOL: MONTHLY OR LESS

## 2021-09-07 ASSESSMENT — SOCIAL DETERMINANTS OF HEALTH (SDOH)
HOW OFTEN DO YOU HAVE SIX OR MORE DRINKS ON ONE OCCASION: NEVER
IN A TYPICAL WEEK, HOW MANY TIMES DO YOU TALK ON THE PHONE WITH FAMILY, FRIENDS, OR NEIGHBORS?: ONCE A WEEK
DO YOU BELONG TO ANY CLUBS OR ORGANIZATIONS SUCH AS CHURCH GROUPS UNIONS, FRATERNAL OR ATHLETIC GROUPS, OR SCHOOL GROUPS?: NO
HOW OFTEN DO YOU HAVE A DRINK CONTAINING ALCOHOL: MONTHLY OR LESS
HOW OFTEN DO YOU GET TOGETHER WITH FRIENDS OR RELATIVES?: ONCE A WEEK
WITHIN THE PAST 12 MONTHS, YOU WORRIED THAT YOUR FOOD WOULD RUN OUT BEFORE YOU GOT THE MONEY TO BUY MORE: NEVER TRUE
HOW OFTEN DO YOU ATTEND CHURCH OR RELIGIOUS SERVICES?: NEVER
HOW MANY DRINKS CONTAINING ALCOHOL DO YOU HAVE ON A TYPICAL DAY WHEN YOU ARE DRINKING: 1 OR 2
DO YOU BELONG TO ANY CLUBS OR ORGANIZATIONS SUCH AS CHURCH GROUPS UNIONS, FRATERNAL OR ATHLETIC GROUPS, OR SCHOOL GROUPS?: NO
HOW HARD IS IT FOR YOU TO PAY FOR THE VERY BASICS LIKE FOOD, HOUSING, MEDICAL CARE, AND HEATING?: NOT VERY HARD
HOW OFTEN DO YOU GET TOGETHER WITH FRIENDS OR RELATIVES?: ONCE A WEEK
IN A TYPICAL WEEK, HOW MANY TIMES DO YOU TALK ON THE PHONE WITH FAMILY, FRIENDS, OR NEIGHBORS?: ONCE A WEEK
HOW OFTEN DO YOU ATTEND CHURCH OR RELIGIOUS SERVICES?: NEVER

## 2021-09-08 ENCOUNTER — OFFICE VISIT (OUTPATIENT)
Dept: MEDICAL GROUP | Age: 71
End: 2021-09-08
Payer: MEDICARE

## 2021-09-08 VITALS
WEIGHT: 252.8 LBS | HEIGHT: 71 IN | SYSTOLIC BLOOD PRESSURE: 134 MMHG | DIASTOLIC BLOOD PRESSURE: 54 MMHG | HEART RATE: 52 BPM | BODY MASS INDEX: 35.39 KG/M2 | TEMPERATURE: 97.8 F

## 2021-09-08 DIAGNOSIS — E80.4 GILBERT SYNDROME: ICD-10-CM

## 2021-09-08 DIAGNOSIS — Z00.00 HEALTH CARE MAINTENANCE: ICD-10-CM

## 2021-09-08 DIAGNOSIS — Z00.00 MEDICARE ANNUAL WELLNESS VISIT, SUBSEQUENT: ICD-10-CM

## 2021-09-08 DIAGNOSIS — Z72.0 TOBACCO USE: ICD-10-CM

## 2021-09-08 DIAGNOSIS — E23.6 ENLARGED PITUITARY GLAND (HCC): ICD-10-CM

## 2021-09-08 DIAGNOSIS — N52.9 ERECTILE DYSFUNCTION, UNSPECIFIED ERECTILE DYSFUNCTION TYPE: ICD-10-CM

## 2021-09-08 DIAGNOSIS — D69.6 THROMBOCYTOPENIA (HCC): ICD-10-CM

## 2021-09-08 DIAGNOSIS — E55.9 HYPOVITAMINOSIS D: ICD-10-CM

## 2021-09-08 DIAGNOSIS — Z12.5 SCREENING FOR MALIGNANT NEOPLASM OF PROSTATE: ICD-10-CM

## 2021-09-08 DIAGNOSIS — E78.00 HYPERCHOLESTEREMIA: ICD-10-CM

## 2021-09-08 DIAGNOSIS — L71.9 ROSACEA: Chronic | ICD-10-CM

## 2021-09-08 DIAGNOSIS — N40.0 BENIGN PROSTATIC HYPERPLASIA, UNSPECIFIED WHETHER LOWER URINARY TRACT SYMPTOMS PRESENT: Chronic | ICD-10-CM

## 2021-09-08 DIAGNOSIS — M79.2 NEUROPATHIC PAIN: Chronic | ICD-10-CM

## 2021-09-08 DIAGNOSIS — R73.01 IFG (IMPAIRED FASTING GLUCOSE): ICD-10-CM

## 2021-09-08 DIAGNOSIS — D35.2 PITUITARY MACROADENOMA (HCC): ICD-10-CM

## 2021-09-08 DIAGNOSIS — E66.9 OBESITY (BMI 35.0-39.9 WITHOUT COMORBIDITY): ICD-10-CM

## 2021-09-08 DIAGNOSIS — I10 ESSENTIAL HYPERTENSION: Chronic | ICD-10-CM

## 2021-09-08 PROBLEM — H81.10 BENIGN PAROXYSMAL POSITIONAL VERTIGO: Status: RESOLVED | Noted: 2021-06-22 | Resolved: 2021-09-08

## 2021-09-08 PROBLEM — M25.561 RIGHT KNEE PAIN: Status: RESOLVED | Noted: 2020-05-28 | Resolved: 2021-09-08

## 2021-09-08 PROBLEM — M17.11 PRIMARY OSTEOARTHRITIS OF RIGHT KNEE: Status: RESOLVED | Noted: 2020-05-28 | Resolved: 2021-09-08

## 2021-09-08 PROBLEM — L43.8 ORAL LICHEN PLANUS: Status: RESOLVED | Noted: 2019-04-30 | Resolved: 2021-09-08

## 2021-09-08 PROCEDURE — G0439 PPPS, SUBSEQ VISIT: HCPCS | Performed by: INTERNAL MEDICINE

## 2021-09-08 PROCEDURE — 99212 OFFICE O/P EST SF 10 MIN: CPT | Mod: 25 | Performed by: INTERNAL MEDICINE

## 2021-09-08 RX ORDER — ERGOCALCIFEROL 1.25 MG/1
50000 CAPSULE ORAL
Qty: 12 CAPSULE | Refills: 1 | Status: SHIPPED | OUTPATIENT
Start: 2021-09-08 | End: 2022-05-15

## 2021-09-08 ASSESSMENT — FIBROSIS 4 INDEX: FIB4 SCORE: 2.11

## 2021-09-08 ASSESSMENT — ENCOUNTER SYMPTOMS: GENERAL WELL-BEING: GOOD

## 2021-09-08 ASSESSMENT — ACTIVITIES OF DAILY LIVING (ADL): BATHING_REQUIRES_ASSISTANCE: 0

## 2021-09-08 ASSESSMENT — PATIENT HEALTH QUESTIONNAIRE - PHQ9: CLINICAL INTERPRETATION OF PHQ2 SCORE: 0

## 2021-09-08 NOTE — LETTER
[facility]  [address] [address]     September 8, 2021    Patient: Laureen Munoz   YOB: 1950   Date of Visit: 9/8/2021           To Whom It May Concern:    Laureen Munoz was seen and treated in our department on 9/8/2021.   Excuse from work today, 9/8/2021    Sincerely,     Madonna Swain M.D.

## 2021-09-08 NOTE — PROGRESS NOTES
Chief Complaint   Patient presents with   • Medicare Annual Wellness   Pituitary adenoma, labs     HPI:  Laureen is a 71 y.o. here for Medicare Annual Wellness Visit    Hypertension  Meds: Metoprolol 25 mg QD, losartan, 100 mg QD; taking as prescribed.   He did not check BP at home.  Denies: - headaches, vision problems, tinnitus.  - chest pain/pressure, palpitations, irregular heart beats, exertional, dyspnea, peripheral edema.  - medication side effect: unusual fatigue, slow heartbeat, foot/leg swelling.  Low salt diet: no  Diet: regular  Exercise: < 4 d/w  BMI: 35  FH of HTN: neg     Hypercholesterolemia, Gilbert sy  The patient had slightly elevated cholesterol/triglycerides.   No meds.  Diet / Exercise/BMI: As above  FH: neg  LFTs were normal, except slightly elevated total bilirubin.     IFG  The patient had elevated FBG and A1c.  No polydipsia, polyphagia, polyuria.  No abdominal pain, weight loss, fatigue.  Diet / Exercise/BMI: As above  FH of DM: neg     Hypovitaminosis D  The patient had low vitamin D level.  Vitamin D supplement: was on high dose.     Thrombocytopenia  The patient had borderline low PLT count, the last was borderline normal.  Denies easy bleeding or bruising.     Enlarged pituitary gland/pituitary microadenoma  It was incidental finding on MRI brain done in the ER on 6/8/2021, due to dizziness.  MRI brain showed [id number] mm pituitary macroadenoma.  He has had recurrent dizziness for the last year where he has to grab hold of objects. He has tunnel vision that resolves in 20 seconds and feels disoriented when this occurs. It is not triggered by standing.    MRI brain, 6/8/2021  1.  There is an approximately 16 x 13 x 12 mm sized hypoenhancing lesion in the right side of the pituitary gland likely representing pituitary macroadenoma. The lesion is abutting the right cavernous sinus without infiltration.  2.  Mild cerebral atrophy.  3.  Minimal chronic microvascular ischemic disease.    He was  evaluated by neurosurgery, I recommended resection due to abnormal LH/FSH.  He was also referred to endocrinology, was unable to schedule appointment.    BPH, ED  Nocturia: 2-3     Denies:   · Frequency  · Urgency  · Weak stream/dribbling  · Bladder fullness / incomplete emptying  · Hesitancy  On finasteride 5 mg daily.  He used Cialis in the past for ED.    Patient Active Problem List    Diagnosis Date Noted   • Benign paroxysmal positional vertigo 06/22/2021   • Pituitary macroadenoma (HCC) 06/09/2021   • Enlarged pituitary gland (HCC) 06/08/2021   • Thrombocytopenia (HCC) 03/09/2021   • Right knee pain 05/28/2020   • Primary osteoarthritis of right knee 05/28/2020   • IFG (impaired fasting glucose) 05/28/2020   • Rosacea 10/30/2019   • Oral lichen planus 04/30/2019   • Gilbert syndrome 04/30/2019   • Erectile dysfunction 10/30/2018   • Health care maintenance 10/30/2018   • Tobacco use 10/30/2018   • Class 2 obesity in adult 07/26/2018   • Hypovitaminosis D 04/11/2014   • Neuropathic pain 07/20/2012   • Essential hypertension 07/20/2012   • Hypercholesteremia 07/20/2012   • Benign prostatic hyperplasia 07/20/2012       Current Outpatient Medications   Medication Sig Dispense Refill   • calcium carbonate (OS-BRANDON 500) 1250 (500 Ca) MG Tab Take 500 mg by mouth every day.     • Multiple Vitamin (MULTI-VITAMINS PO) Take  by mouth.     • vitamin D, Ergocalciferol, (DRISDOL) 1.25 MG (30802 UT) Cap capsule Take 50,000 Units by mouth every 7 days.     • Melatonin 5 MG Chew Tab Chew 15 mg at bedtime as needed (Sleep).     • fluticasone (FLONASE) 50 MCG/ACT nasal spray Administer 1 Spray into affected nostril(S) every morning.     • Fexofenadine HCl (ALLERGY 24-HR PO) Take 1 tablet by mouth every morning.     • losartan (COZAAR) 100 MG Tab Take 1 tablet by mouth every day. 90 tablet 3   • metoprolol SR (TOPROL XL) 25 MG TABLET SR 24 HR TAKE ONE TABLET BY MOUTH ONE TIME DAILY (Patient taking differently: Take 25 mg by mouth  every morning.) 90 tablet 3   • finasteride (PROSCAR) 5 MG Tab Take 1 tablet by mouth every day. 90 tablet 3     No current facility-administered medications for this visit.        Patient is taking medications as noted in medication list.  Current supplements as per medication list.     Allergies: Nkda [no known drug allergy] and Seasonal    Current social contact/activities: sees neighbors     Is patient current with immunizations? No, due for FLU, PNEUMOVAX (PPSV23) and SHINGRIX (Shingles). Patient is interested in receiving NONE today.    He  reports that he has been smoking cigars and cigarettes. He has been smoking about 0.25 packs per day. He has never used smokeless tobacco. He reports current alcohol use. He reports previous drug use. Drugs: Marijuana and Inhaled.  Ready to quit: Not Answered  Counseling given: Not Answered  Comment: cigar, smokes one a day but no cigarettes    DPA/Advanced directive: Patient does not have an Advanced Directive.  A packet and workshop information was given on Advanced Directives.    ROS:    Gait: Uses no assistive device   Ostomy: No   Other tubes: No   Amputations: No   Chronic oxygen use No   Last eye exam 4/2021   Wears hearing aids: No   : Reports urinary leakage during the last 6 months that has not interfered at all with their daily activities or sleep.    Screening:    Depression Screening  Little interest or pleasure in doing things?  0 - not at all  Feeling down, depressed, or hopeless? 0 - not at all  Patient Health Questionnaire Score: 0    Screening for Cognitive Impairment  Three Minute Recall (captain, garden, picture)  2/3 Captain garden picture  B[place]emar clock face with all 12 numbers and set the hands to show 5 past 8.  Yes 5/5  If cognitive concerns identified, deferred for follow up unless specifically addressed in assessment and plan.    Fall Risk Assessment  Has the patient had two or more falls in the last year or any fall with injury in the last year?   No  If fall risk identified, deferred for follow up unless specifically addressed in assessment and plan.    Safety Assessment  Throw rugs on floor.  Yes  Handrails on all stairs.  Yes  Good lighting in all hallways.  Yes  Difficulty hearing.  No  Patient counseled about all safety risks that were identified.    Functional Assessment ADLs  Are there any barriers preventing you from cooking for yourself or meeting nutritional needs?  No.    Are there any barriers preventing you from driving safely or obtaining transportation?  No.    Are there any barriers preventing you from using a telephone or calling for help?  No.    Are there any barriers preventing you from shopping?  No.    Are there any barriers preventing you from taking care of your own finances?  No.    Are there any barriers preventing you from managing your medications?  No.    Are there any barriers preventing you from showering, bathing or dressing yourself?  No.    Are you currently engaging in any exercise or physical activity?  No.     What is your perception of your health?  Good.    Health Maintenance Summary                IMM PNEUMOCOCCAL VACCINE: 65+ Years Overdue 8/14/1956     IMM ZOSTER VACCINES Overdue 11/10/2016      Done 9/15/2016 Imm Admin: Zoster Vaccine Live (ZVL) (Zostavax) - HISTORICAL DATA    IMM INFLUENZA Overdue 9/1/2021      Done 10/1/2020 Imm Admin: Influenza Vac Subunit Quad Inj (Pf)     Patient has more history with this topic...    Annual Wellness Visit Next Due 9/29/2021      Done 9/28/2020      Patient has more history with this topic...    IMM DTaP/Tdap/Td Vaccine Next Due 1/16/2023      Done 1/16/2013 Imm Admin: Tdap Vaccine    COLORECTAL CANCER SCREENING Next Due 5/20/2024         Patient Care Team:  Madonna Swain M.D. as PCP - General (Family Medicine)  Julio Ayon O.D. as Attending Team Physician (Optometry)    Social History     Tobacco Use   • Smoking status: Current Every Day Smoker     Packs/day:  "0.25     Types: Cigars, Cigarettes     Last attempt to quit: 1985     Years since quittin.7   • Smokeless tobacco: Never Used   • Tobacco comment: cigar, smokes one a day but no cigarettes   Vaping Use   • Vaping Use: Never used   Substance Use Topics   • Alcohol use: Yes     Comment: minimal   • Drug use: Not Currently     Types: Marijuana, Inhaled     Comment: rare occasions,occasionally      Family History   Problem Relation Age of Onset   • Heart Disease Mother    • Heart Attack Mother    • Heart Disease Father    • Heart Attack Father    • Cancer Sister         leukemia, breast cancer   • Stroke Sister    • No Known Problems Maternal Grandmother    • No Known Problems Maternal Grandfather    • No Known Problems Paternal Grandmother    • Dementia Paternal Grandfather    • Hypertension Neg Hx    • Hyperlipidemia Neg Hx    • Diabetes Neg Hx      He  has a past medical history of Hypercholesterolemia, Hypertension, and Tinea inguinalis.   Past Surgical History:   Procedure Laterality Date   • OTHER ABDOMINAL SURGERY      gallbladder     Exam:   /54 (BP Location: Left arm, Patient Position: Sitting, BP Cuff Size: Large adult)   Pulse (!) 42   Temp 36.6 °C (97.8 °F) (Temporal)   Ht 1.803 m (5' 11\")   Wt 115 kg (252 lb 12.8 oz)  Body mass index is 35.26 kg/m².  Hearing fair.    Dentition fair  Alert, oriented in no acute distress.  Eye contact is good, speech goal directed, affect calm    Labs  Reviewed and discussed  Lab Results   Component Value Date/Time    CHOLSTRLTOT 200 (H) 2021 09:56 AM     (H) 2021 09:56 AM    HDL 47 2021 09:56 AM    TRIGLYCERIDE 136 2021 09:56 AM       Lab Results   Component Value Date/Time    SODIUM 137 2021 09:56 AM    POTASSIUM 4.9 2021 09:56 AM    CHLORIDE 104 2021 09:56 AM    CO2 24 2021 09:56 AM    GLUCOSE 118 (H) 2021 09:56 AM    BUN 21 2021 09:56 AM    CREATININE 1.19 2021 09:56 AM    CREATININE " 0.92 03/01/2010 04:54 PM    BUNCREATRAT 21 03/01/2010 04:54 PM    GLOMRATE >59 03/01/2010 04:54 PM     Lab Results   Component Value Date/Time    ALKPHOSPHAT 101 (H) 09/07/2021 09:56 AM    ASTSGOT 26 09/07/2021 09:56 AM    ALTSGPT 26 09/07/2021 09:56 AM    TBILIRUBIN 1.0 09/07/2021 09:56 AM      Lab Results   Component Value Date/Time    HBA1C 6.0 (H) 09/07/2021 09:56 AM    HBA1C 5.8 (H) 06/09/2021 03:23 AM    HBA1C 6.2 (H) 03/03/2021 08:52 AM     No results found for: TSH  Lab Results   Component Value Date/Time    FREET4 1.23 06/11/2021 09:26 AM     Lab Results   Component Value Date/Time    WBC 8.8 09/07/2021 09:56 AM    WBC 9.4 03/01/2010 04:54 PM    RBC 4.99 09/07/2021 09:56 AM    RBC 5.39 03/01/2010 04:54 PM    HEMOGLOBIN 15.0 09/07/2021 09:56 AM    HEMATOCRIT 47.2 09/07/2021 09:56 AM    MCV 94.6 09/07/2021 09:56 AM    MCV 89 03/01/2010 04:54 PM    MCH 30.1 09/07/2021 09:56 AM    MCH 29.3 03/01/2010 04:54 PM    MCHC 31.8 (L) 09/07/2021 09:56 AM    MPV 11.0 09/07/2021 09:56 AM    NEUTSPOLYS 65.80 09/07/2021 09:56 AM    LYMPHOCYTES 22.00 09/07/2021 09:56 AM    MONOCYTES 10.30 09/07/2021 09:56 AM    EOSINOPHILS 0.80 09/07/2021 09:56 AM    BASOPHILS 0.30 09/07/2021 09:56 AM    HYPOCHROMIA 1+ 07/15/2013 10:35 AM      Imaging  Reviewed and discussed per HPI    Assessment and Plan    1. Medicare annual wellness visit, subsequent  Reviewed PMH, PSH, FH, SH, ALL, MEDS, HCM/IMM.   - Subsequent Annual Wellness Visit - Includes PPPS ()    2. Health care maintenance  Per HPI  - Subsequent Annual Wellness Visit - Includes PPPS ()    3. Screening for malignant neoplasm of prostate  - Subsequent Annual Wellness Visit - Includes PPPS ()  - PROSTATE SPECIFIC AG SCREENING; Future    4. Essential hypertension  - Controlled, continue with current management.  - Subsequent Annual Wellness Visit - Includes PPPS ()  - Comp Metabolic Panel; Standing    5. Hypercholesteremia  Borderline, discussed treatment  options;  Advised low-calorie diet, daily exercise, weight control, follow-up labs  - Subsequent Annual Wellness Visit - Includes PPPS ()  - Comp Metabolic Panel; Standing  - Lipid Profile; Standing    6. Gilbert syndrome  Reassurance  - Subsequent Annual Wellness Visit - Includes PPPS ()  - Comp Metabolic Panel; Standing    7. IFG (impaired fasting glucose)  - Discussed about risk to develop DM.   - Advised low carb diet, exercise, watch for WT.   - Subsequent Annual Wellness Visit - Includes PPPS ()  - Comp Metabolic Panel; Standing  - HEMOGLOBIN A1C; Standing    8. Hypovitaminosis D  Vitamin D was low normal, continue current supplement  - Subsequent Annual Wellness Visit - Includes PPPS ()  - vitamin D, Ergocalciferol, (DRISDOL) 1.25 MG (68530 UT) Cap capsule; Take 1 Capsule by mouth every 7 days.  Dispense: 12 Capsule; Refill: 1  - VITAMIN D,25 HYDROXY; Standing    9. Thrombocytopenia (HCC)  Borderline, follow-up labs  - Subsequent Annual Wellness Visit - Includes PPPS ()  - CBC WITH DIFFERENTIAL; Standing    10. Enlarged pituitary gland (HCC)  He was unable to establish with endocrinology, given another referral for another office  - Subsequent Annual Wellness Visit - Includes PPPS ()  - REFERRAL TO ENDOCRINOLOGY  11. Pituitary macroadenoma (HCC)  - Subsequent Annual Wellness Visit - Includes PPPS ()  - REFERRAL TO ENDOCRINOLOGY    12. Benign prostatic hyperplasia, unspecified whether lower urinary tract symptoms present  - Controlled, continue with current management.  - Subsequent Annual Wellness Visit - Includes PPPS ()    13. Erectile dysfunction, unspecified erectile dysfunction type  - Controlled, continue with current management.  - Subsequent Annual Wellness Visit - Includes PPPS ()    14. Tobacco use  He has used tobacco not on a daily basis, advised to quit  - Subsequent Annual Wellness Visit - Includes PPPS ()    15. Obesity (BMI 35.0-39.9 without  comorbidity)  Appropriate counseling given  - Subsequent Annual Wellness Visit - Includes PPPS ()    16. Rosacea  Stable, no current treatment  - Subsequent Annual Wellness Visit - Includes PPPS ()    17. Neuropathic pain  He did not respond to gabapentin, follow-up neurology  - Subsequent Annual Wellness Visit - Includes PPPS ()    Services suggested: No services needed at this time  Health Care Screening recommendations as per orders if indicated.  Referrals offered: PT/OT/Nutrition counseling/Behavioral Health/Smoking cessation as per orders if indicated.    Discussion today about general wellness and lifestyle habits:    · Prevent falls and reduce trip hazards; Cautioned about securing or removing rugs.  · Have a working fire alarm and carbon monoxide detector;   · Engage in regular physical activity and social activities     Follow-up: In 6 months and as needed

## 2021-09-13 ENCOUNTER — APPOINTMENT (OUTPATIENT)
Dept: RADIOLOGY | Facility: IMAGING CENTER | Age: 71
End: 2021-09-13
Attending: FAMILY MEDICINE
Payer: MEDICARE

## 2021-09-13 ENCOUNTER — OFFICE VISIT (OUTPATIENT)
Dept: URGENT CARE | Facility: CLINIC | Age: 71
End: 2021-09-13
Payer: MEDICARE

## 2021-09-13 VITALS
HEIGHT: 71 IN | WEIGHT: 250 LBS | OXYGEN SATURATION: 93 % | DIASTOLIC BLOOD PRESSURE: 68 MMHG | TEMPERATURE: 97.6 F | BODY MASS INDEX: 35 KG/M2 | HEART RATE: 61 BPM | SYSTOLIC BLOOD PRESSURE: 132 MMHG | RESPIRATION RATE: 14 BRPM

## 2021-09-13 DIAGNOSIS — S43.402A SPRAIN OF LEFT SHOULDER, UNSPECIFIED SHOULDER SPRAIN TYPE, INITIAL ENCOUNTER: ICD-10-CM

## 2021-09-13 DIAGNOSIS — M62.838 TRAPEZIUS MUSCLE SPASM: ICD-10-CM

## 2021-09-13 PROCEDURE — 73030 X-RAY EXAM OF SHOULDER: CPT | Mod: TC,FY,LT | Performed by: FAMILY MEDICINE

## 2021-09-13 PROCEDURE — 99214 OFFICE O/P EST MOD 30 MIN: CPT | Performed by: FAMILY MEDICINE

## 2021-09-13 RX ORDER — CYCLOBENZAPRINE HCL 10 MG
5 TABLET ORAL NIGHTLY PRN
Qty: 20 TABLET | Refills: 0 | Status: SHIPPED | OUTPATIENT
Start: 2021-09-13 | End: 2021-09-23

## 2021-09-13 RX ORDER — ACETAMINOPHEN 500 MG
500 TABLET ORAL EVERY 6 HOURS PRN
Qty: 30 TABLET | Refills: 0 | Status: SHIPPED
Start: 2021-09-13 | End: 2022-02-08

## 2021-09-13 ASSESSMENT — FIBROSIS 4 INDEX: FIB4 SCORE: 2.11

## 2021-09-13 ASSESSMENT — ENCOUNTER SYMPTOMS
SORE THROAT: 0
SHORTNESS OF BREATH: 0
COUGH: 0
CHILLS: 0
MYALGIAS: 0
VOMITING: 0
FEVER: 0

## 2021-09-13 NOTE — PROGRESS NOTES
Subjective:   Laureen Munoz is a 71 y.o. male who presents for Shoulder Pain (x10 days, left shoulder )        Shoulder Pain  This is a recurrent problem. Episode onset: 10 days. The problem occurs intermittently. The problem has been unchanged. Pertinent negatives include no chills, coughing, fever, myalgias, rash, sore throat or vomiting. Associated symptoms comments: Planes of intermittent posterior left shoulder pain, intermittently worse at night, history of cervical radiculopathy with MRI performed in 2007, denies numbness tingling or weakness in the left upper extremity, primarily complains of left posterior shoulder and trapezius pain, denies functional limitations in range of motion in the left shoulder. The symptoms are aggravated by twisting. He has tried rest for the symptoms. The treatment provided no relief.     PMH:  has a past medical history of Hypercholesterolemia, Hypertension, and Tinea inguinalis.  MEDS:   Current Outpatient Medications:   •  cyclobenzaprine (FLEXERIL) 10 mg Tab, Take 0.5 Tablets by mouth at bedtime as needed for Mild Pain, Moderate Pain or Muscle Spasms for up to 10 days., Disp: 20 Tablet, Rfl: 0  •  acetaminophen (TYLENOL) 500 MG Tab, Take 1 Tablet by mouth every 6 hours as needed for Mild Pain or Moderate Pain., Disp: 30 Tablet, Rfl: 0  •  vitamin D, Ergocalciferol, (DRISDOL) 1.25 MG (17684 UT) Cap capsule, Take 1 Capsule by mouth every 7 days., Disp: 12 Capsule, Rfl: 1  •  calcium carbonate (OS-BRANDON 500) 1250 (500 Ca) MG Tab, Take 500 mg by mouth every day., Disp: , Rfl:   •  Multiple Vitamin (MULTI-VITAMINS PO), Take  by mouth., Disp: , Rfl:   •  Melatonin 5 MG Chew Tab, Chew 15 mg at bedtime as needed (Sleep)., Disp: , Rfl:   •  fluticasone (FLONASE) 50 MCG/ACT nasal spray, Administer 1 Spray into affected nostril(S) every morning., Disp: , Rfl:   •  Fexofenadine HCl (ALLERGY 24-HR PO), Take 1 tablet by mouth every morning., Disp: , Rfl:   •  losartan (COZAAR) 100 MG  "Tab, Take 1 tablet by mouth every day., Disp: 90 tablet, Rfl: 3  •  metoprolol SR (TOPROL XL) 25 MG TABLET SR 24 HR, TAKE ONE TABLET BY MOUTH ONE TIME DAILY (Patient taking differently: Take 25 mg by mouth every morning.), Disp: 90 tablet, Rfl: 3  •  finasteride (PROSCAR) 5 MG Tab, Take 1 tablet by mouth every day., Disp: 90 tablet, Rfl: 3  ALLERGIES:   Allergies   Allergen Reactions   • Nkda [No Known Drug Allergy]    • Seasonal      SURGHX:   Past Surgical History:   Procedure Laterality Date   • OTHER ABDOMINAL SURGERY      gallbladder     SOCHX:  reports that he has been smoking cigars. He has been smoking about 0.25 packs per day. He has never used smokeless tobacco. He reports current alcohol use. He reports previous drug use. Drugs: Marijuana and Inhaled.  FH:   Family History   Problem Relation Age of Onset   • Heart Disease Mother    • Heart Attack Mother    • Heart Disease Father    • Heart Attack Father    • Cancer Sister         leukemia, breast cancer   • Stroke Sister    • No Known Problems Maternal Grandmother    • No Known Problems Maternal Grandfather    • No Known Problems Paternal Grandmother    • Dementia Paternal Grandfather    • Hypertension Neg Hx    • Hyperlipidemia Neg Hx    • Diabetes Neg Hx      Review of Systems   Constitutional: Negative for chills and fever.   HENT: Negative for sore throat.    Respiratory: Negative for cough and shortness of breath.    Gastrointestinal: Negative for vomiting.   Musculoskeletal: Negative for myalgias.   Skin: Negative for rash.        Objective:   /68   Pulse 61   Temp 36.4 °C (97.6 °F) (Temporal)   Resp 14   Ht 1.803 m (5' 11\")   Wt 113 kg (250 lb)   SpO2 93%   BMI 34.87 kg/m²   Physical Exam  Vitals and nursing note reviewed.   Constitutional:       General: He is not in acute distress.     Appearance: He is well-developed.   HENT:      Head: Normocephalic and atraumatic.      Right Ear: External ear normal.      Left Ear: External ear " normal.      Nose: Nose normal.      Mouth/Throat:      Mouth: Mucous membranes are moist.   Eyes:      Conjunctiva/sclera: Conjunctivae normal.   Cardiovascular:      Rate and Rhythm: Normal rate.   Pulmonary:      Effort: Pulmonary effort is normal. No respiratory distress.      Breath sounds: Normal breath sounds.   Abdominal:      General: There is no distension.   Musculoskeletal:         General: Normal range of motion.      Right shoulder: Tenderness present. No bony tenderness. Normal range of motion. Normal strength.        Arms:       Comments: Full range of motion to forward flexion, abduction, internal and external rotation motor strength 5 out of 5 throughout   Skin:     General: Skin is warm and dry.   Neurological:      General: No focal deficit present.      Mental Status: He is alert and oriented to person, place, and time. Mental status is at baseline.      Gait: Gait (gait at baseline) normal.   Psychiatric:         Judgment: Judgment normal.     DX-SHOULDER 2+ LEFT  Order: [id number]  Status:  Final result   Visible to patient:  No (scheduled for 9/14/2021  8:01 AM) Dx:  Sprain of left shoulder, unspecified ...   0 Result Notes  Details    Reading Physician Reading Date Result Priority   Abdullahi Hernandes M.D.  [phone] 9/13/2021 Urgent [facility]   Narrative & Impression     9/13/2021 9:43 AM     HISTORY/REASON FOR EXAM:  Pain/Deformity Following Trauma.        TECHNIQUE/EXAM DESCRIPTION AND NUMBER OF VIEWS:  4 views of the left shoulder shoulder.     COMPARISON: None     FINDINGS:  There is normal bony mineralization of the shoulder. There is no evidence of fracture, dislocation, or osseous lesion. The acromioclavicular joint is intact.     IMPRESSION:     1.  Normal shoulder series.        Exam Ended: 09/13/21  9:56 AM Last Resulted: 09/13/21  9:58 AM           Left shoulder x-ray, mild osteoarthritic changes, no acute fracture, alignment intact      Assessment/Plan:   1. Sprain of left shoulder,  unspecified shoulder sprain type, initial encounter  - DX-SHOULDER 2+ LEFT; Future  - cyclobenzaprine (FLEXERIL) 10 mg Tab; Take 0.5 Tablets by mouth at bedtime as needed for Mild Pain, Moderate Pain or Muscle Spasms for up to 10 days.  Dispense: 20 Tablet; Refill: 0  - acetaminophen (TYLENOL) 500 MG Tab; Take 1 Tablet by mouth every 6 hours as needed for Mild Pain or Moderate Pain.  Dispense: 30 Tablet; Refill: 0    2. Trapezius muscle spasm  - cyclobenzaprine (FLEXERIL) 10 mg Tab; Take 0.5 Tablets by mouth at bedtime as needed for Mild Pain, Moderate Pain or Muscle Spasms for up to 10 days.  Dispense: 20 Tablet; Refill: 0  - acetaminophen (TYLENOL) 500 MG Tab; Take 1 Tablet by mouth every 6 hours as needed for Mild Pain or Moderate Pain.  Dispense: 30 Tablet; Refill: 0        Medical Decision Making/Course:  In the course of preparing for this visit with review of the pertinent past medical history, recent and past clinic visits, current medications, and performing chart, immunization, medical history and medication reconciliation, and in the further course of obtaining the current history pertinent to the clinic visit today, performing an exam and evaluation, ordering and independently evaluating labs, tests, and/or procedures, prescribing any recommended new medications as noted above, providing any pertinent counseling and education and recommending further coordination of care including recommendations for symptomatic and supportive measures, at least 30 minutes of total time were spent during this encounter.      Discussed close monitoring, return precautions, and supportive measures of maintaining adequate fluid hydration and caloric intake, relative rest and symptom management as needed for pain and/or fever.    Differential diagnosis, natural history, supportive care, and indications for immediate follow-up discussed.     Advised the patient to follow-up with the primary care physician for recheck,  reevaluation, and consideration of further management.    Please note that this dictation was created using voice recognition software. I have worked with consultants from the vendor as well as technical experts from [facility] to optimize the interface. I have made every reasonable attempt to correct obvious errors, but I expect that there are errors of grammar and possibly content that I did not discover before finalizing the note.

## 2021-09-13 NOTE — PATIENT INSTRUCTIONS
Muscle Cramps and Spasms  Muscle cramps and spasms are when muscles tighten by themselves. They usually get better within minutes. Muscle cramps are painful. They are usually stronger and last longer than muscle spasms. Muscle spasms may or may not be painful. They can last a few seconds or much longer.  Cramps and spasms can affect any muscle, but they occur most often in the calf muscles of the leg. They are usually not caused by a serious problem. In many cases, the cause is not known. Some common causes include:  · Doing more physical work or exercise than your body is ready for.  · Using the muscles too much (overuse) by repeating certain movements too many times.  · Staying in a certain position for a long time.  · Playing a sport or doing an activity without preparing properly.  · Using bad form or technique while playing a sport or doing an activity.  · Not having enough water in your body (dehydration).  · Injury.  · Side effects of some medicines.  · Low levels of the salts and minerals in your blood (electrolytes), such as low potassium or calcium.  Follow these instructions at home:  Managing pain and stiffness         · Massage, stretch, and relax the muscle. Do this for many minutes at a time.  · If told, put heat on tight or tense muscles as often as told by your doctor. Use the heat source that your doctor recommends, such as a moist heat pack or a heating pad.  ? Place a towel between your skin and the heat source.  ? Leave the heat on for 20-30 minutes.  ? Remove the heat if your skin turns bright red. This is very important if you are not able to feel pain, heat, or cold. You may have a greater risk of getting burned.  · If told, put ice on the affected area. This may help if you are sore or have pain after a cramp or spasm.  ? Put ice in a plastic bag.  ? Place a towel between your skin and the bag.  ? Leave the ice on for 20 minutes, 2-3 times a day.  · Try taking hot showers or baths to help  relax tight muscles.  Eating and drinking  · Drink enough fluid to keep your pee (urine) pale yellow.  · Eat a healthy diet to help ensure that your muscles work well. This should include:  ? Fruits and vegetables.  ? Lean protein.  ? Whole grains.  ? Low-fat or nonfat dairy products.  General instructions  · If you are having cramps often, avoid intense exercise for several days.  · Take over-the-counter and prescription medicines only as told by your doctor.  · Watch for any changes in your symptoms.  · Keep all follow-up visits as told by your doctor. This is important.  Contact a doctor if:  · Your cramps or spasms get worse or happen more often.  · Your cramps or spasms do not get better with time.  Summary  · Muscle cramps and spasms are when muscles tighten by themselves. They usually get better within minutes.  · Cramps and spasms occur most often in the calf muscles of the leg.  · Massage, stretch, and relax the muscle. This may help the cramp or spasm go away.  · Drink enough fluid to keep your pee (urine) pale yellow.  This information is not intended to replace advice given to you by your health care provider. Make sure you discuss any questions you have with your health care provider.  Document Released: 11/30/2009 Document Revised: 05/13/2019 Document Reviewed: 05/13/2019  [employer] Patient Education © 2020 [employer]    Joint Pain  Joint pain (arthralgia) may be caused by many things. Joint pain is likely to go away when you follow instructions from your health care provider for relieving pain at home. However, joint pain can also be caused by conditions that require more treatment. Common causes of joint pain include:  · Bruising in the area of the joint.  · Injury caused by repeating certain movements too many times (overuse injury).  · Age-related joint wear and tear (osteoarthritis).  · Buildup of uric acid crystals in the joint (gout).  · Inflammation of the joint (rheumatic  disease).  · Various other forms of arthritis.  · Infections of the joint (septic arthritis) or of the bone (osteomyelitis).  Your health care provider may recommend that you take pain medicine or wear a supportive device like an elastic bandage, sling, or splint. If your joint pain continues, you may need lab or imaging tests to diagnose the cause of your joint pain.  Follow these instructions at home:  Managing pain, stiffness, and swelling    · If directed, put ice on the painful area. Icing can help to relieve joint pain and swelling.  ? Put ice in a plastic bag.  ? Place a towel between your skin and the bag.  ? Leave the ice on for 20 minutes, 2-3 times a day.  · If directed, apply heat to the painful area as often as told by your health care provider. Heat can reduce the stiffness of your muscles and joints. Use the heat source that your health care provider recommends, such as a moist heat pack or a heating pad.  ? Place a towel between your skin and the heat source.  ? Leave the heat on for 20-30 minutes.  ? Remove the heat if your skin turns bright red. This is especially important if you are unable to feel pain, heat, or cold. You may have a greater risk of getting burned.  · Move your fingers or toes below the painful joint often. You can avoid stiffness and lessen swelling by doing this.  · If possible, raise (elevate) the painful joint above the level of your heart while you are sitting or lying down. To do this, try putting a few pillows under the painful joint.  Activity  · Rest the painful joint for as long as directed. Do not do anything that causes or worsens pain.  · Begin exercising or stretching the affected area, as told by your health care provider. Ask your health care provider what types of exercise are safe for you.  If you have an elastic bandage, sling, or splint:  · Wear the supportive device as told by your health care provider. Remove it only as told by your health care  provider.  · Loosen the device if your fingers or toes below the joint tingle, become numb, or turn cold and blue.  · Keep the device clean.   · Ask your health care provider if you should remove the device before bathing. You may need to cover it with a watertight covering when you take a bath or a shower.  General instructions  · Take over-the-counter and prescription medicines only as told by your health care provider.  · Do not use any products that contain nicotine or tobacco, such as cigarettes and e-cigarettes. If you need help quitting, ask your health care provider.  · Keep all follow-up visits as told by your health care provider. This is important.  Contact a health care provider if:  · You have pain that gets worse and does not get better with medicine.  · Your joint pain does not improve within 3 days.  · You have increased bruising or swelling.  · You have a fever.  · You lose 10 lb (4.5 kg) or more without trying.  Get help right away if:  · You cannot move the joint.  · Your fingers or toes tingle, become numb, or turn cold and blue.  · You have a fever along with a joint that is red, warm, and swollen.  Summary  · Joint pain (arthralgia) may be caused by many things.  · Your health care provider may recommend that you take pain medicine or wear a supportive device like an elastic bandage, sling, or splint.  · If your joint pain continues, you may need tests to diagnose the cause of your joint pain.  · Take over-the-counter and prescription medicines only as told by your health care provider.  This information is not intended to replace advice given to you by your health care provider. Make sure you discuss any questions you have with your health care provider.  Document Released: 12/18/2006 Document Revised: 11/30/2018 Document Reviewed: 10/03/2018  [employer] Patient Education © 2020 [employer]

## 2022-01-21 ENCOUNTER — HOSPITAL ENCOUNTER (OUTPATIENT)
Dept: LAB | Facility: MEDICAL CENTER | Age: 72
End: 2022-01-21
Attending: INTERNAL MEDICINE
Payer: MEDICARE

## 2022-01-21 DIAGNOSIS — I10 ESSENTIAL HYPERTENSION: Chronic | ICD-10-CM

## 2022-01-21 DIAGNOSIS — Z12.5 SCREENING FOR MALIGNANT NEOPLASM OF PROSTATE: ICD-10-CM

## 2022-01-21 DIAGNOSIS — R73.01 IFG (IMPAIRED FASTING GLUCOSE): ICD-10-CM

## 2022-01-21 DIAGNOSIS — E78.00 HYPERCHOLESTEREMIA: ICD-10-CM

## 2022-01-21 DIAGNOSIS — E80.4 GILBERT SYNDROME: ICD-10-CM

## 2022-01-21 DIAGNOSIS — E55.9 HYPOVITAMINOSIS D: ICD-10-CM

## 2022-01-21 DIAGNOSIS — D69.6 THROMBOCYTOPENIA (HCC): ICD-10-CM

## 2022-01-21 LAB
25(OH)D3 SERPL-MCNC: 36 NG/ML (ref 30–100)
ALBUMIN SERPL BCP-MCNC: 4.1 G/DL (ref 3.2–4.9)
ALBUMIN/GLOB SERPL: 1.5 G/DL
ALP SERPL-CCNC: 94 U/L (ref 30–99)
ALT SERPL-CCNC: 30 U/L (ref 2–50)
ANION GAP SERPL CALC-SCNC: 11 MMOL/L (ref 7–16)
AST SERPL-CCNC: 26 U/L (ref 12–45)
BASOPHILS # BLD AUTO: 0.4 % (ref 0–1.8)
BASOPHILS # BLD: 0.04 K/UL (ref 0–0.12)
BILIRUB SERPL-MCNC: 1.1 MG/DL (ref 0.1–1.5)
BUN SERPL-MCNC: 23 MG/DL (ref 8–22)
CALCIUM SERPL-MCNC: 9.2 MG/DL (ref 8.5–10.5)
CHLORIDE SERPL-SCNC: 105 MMOL/L (ref 96–112)
CHOLEST SERPL-MCNC: 197 MG/DL (ref 100–199)
CO2 SERPL-SCNC: 22 MMOL/L (ref 20–33)
CORTIS SERPL-MCNC: 9.7 UG/DL (ref 0–23)
CREAT SERPL-MCNC: 0.82 MG/DL (ref 0.5–1.4)
EOSINOPHIL # BLD AUTO: 0.09 K/UL (ref 0–0.51)
EOSINOPHIL NFR BLD: 1 % (ref 0–6.9)
ERYTHROCYTE [DISTWIDTH] IN BLOOD BY AUTOMATED COUNT: 45.8 FL (ref 35.9–50)
EST. AVERAGE GLUCOSE BLD GHB EST-MCNC: 126 MG/DL
FASTING STATUS PATIENT QL REPORTED: NORMAL
FSH SERPL-ACNC: 30.6 MIU/ML (ref 1.5–12.4)
GLOBULIN SER CALC-MCNC: 2.8 G/DL (ref 1.9–3.5)
GLUCOSE SERPL-MCNC: 102 MG/DL (ref 65–99)
HBA1C MFR BLD: 6 % (ref 4–5.6)
HCT VFR BLD AUTO: 47.1 % (ref 42–52)
HDLC SERPL-MCNC: 51 MG/DL
HGB BLD-MCNC: 15.2 G/DL (ref 14–18)
IMM GRANULOCYTES # BLD AUTO: 0.12 K/UL (ref 0–0.11)
IMM GRANULOCYTES NFR BLD AUTO: 1.3 % (ref 0–0.9)
LDLC SERPL CALC-MCNC: 120 MG/DL
LH SERPL-ACNC: 19.1 IU/L (ref 1.7–8.6)
LYMPHOCYTES # BLD AUTO: 1.43 K/UL (ref 1–4.8)
LYMPHOCYTES NFR BLD: 15.2 % (ref 22–41)
MCH RBC QN AUTO: 29.9 PG (ref 27–33)
MCHC RBC AUTO-ENTMCNC: 32.3 G/DL (ref 33.7–35.3)
MCV RBC AUTO: 92.7 FL (ref 81.4–97.8)
MONOCYTES # BLD AUTO: 0.84 K/UL (ref 0–0.85)
MONOCYTES NFR BLD AUTO: 8.9 % (ref 0–13.4)
NEUTROPHILS # BLD AUTO: 6.9 K/UL (ref 1.82–7.42)
NEUTROPHILS NFR BLD: 73.2 % (ref 44–72)
NRBC # BLD AUTO: 0 K/UL
NRBC BLD-RTO: 0 /100 WBC
PLATELET # BLD AUTO: 163 K/UL (ref 164–446)
PMV BLD AUTO: 11.3 FL (ref 9–12.9)
POTASSIUM SERPL-SCNC: 4.5 MMOL/L (ref 3.6–5.5)
PROLACTIN SERPL-MCNC: 11.4 NG/ML (ref 2.1–17.7)
PROT SERPL-MCNC: 6.9 G/DL (ref 6–8.2)
PSA SERPL-MCNC: 0.3 NG/ML (ref 0–4)
RBC # BLD AUTO: 5.08 M/UL (ref 4.7–6.1)
SODIUM SERPL-SCNC: 138 MMOL/L (ref 135–145)
T3FREE SERPL-MCNC: 3.94 PG/ML (ref 2–4.4)
T4 FREE SERPL-MCNC: 1.21 NG/DL (ref 0.93–1.7)
TESTOST SERPL-MCNC: 394 NG/DL (ref 175–781)
THYROPEROXIDASE AB SERPL-ACNC: 9 IU/ML (ref 0–9)
TRIGL SERPL-MCNC: 129 MG/DL (ref 0–149)
TSH SERPL DL<=0.005 MIU/L-ACNC: 1.71 UIU/ML (ref 0.38–5.33)
WBC # BLD AUTO: 9.4 K/UL (ref 4.8–10.8)

## 2022-01-21 PROCEDURE — 84146 ASSAY OF PROLACTIN: CPT

## 2022-01-21 PROCEDURE — 36415 COLL VENOUS BLD VENIPUNCTURE: CPT

## 2022-01-21 PROCEDURE — 82024 ASSAY OF ACTH: CPT

## 2022-01-21 PROCEDURE — 80053 COMPREHEN METABOLIC PANEL: CPT

## 2022-01-21 PROCEDURE — 83002 ASSAY OF GONADOTROPIN (LH): CPT

## 2022-01-21 PROCEDURE — 83001 ASSAY OF GONADOTROPIN (FSH): CPT

## 2022-01-21 PROCEDURE — 84443 ASSAY THYROID STIM HORMONE: CPT

## 2022-01-21 PROCEDURE — 82533 TOTAL CORTISOL: CPT

## 2022-01-21 PROCEDURE — 84153 ASSAY OF PSA TOTAL: CPT

## 2022-01-21 PROCEDURE — 84270 ASSAY OF SEX HORMONE GLOBUL: CPT

## 2022-01-21 PROCEDURE — 84439 ASSAY OF FREE THYROXINE: CPT

## 2022-01-21 PROCEDURE — 84481 FREE ASSAY (FT-3): CPT

## 2022-01-21 PROCEDURE — 83036 HEMOGLOBIN GLYCOSYLATED A1C: CPT

## 2022-01-21 PROCEDURE — 84403 ASSAY OF TOTAL TESTOSTERONE: CPT

## 2022-01-21 PROCEDURE — 86376 MICROSOMAL ANTIBODY EACH: CPT

## 2022-01-21 PROCEDURE — 80061 LIPID PANEL: CPT

## 2022-01-21 PROCEDURE — 85025 COMPLETE CBC W/AUTO DIFF WBC: CPT

## 2022-01-21 PROCEDURE — 82306 VITAMIN D 25 HYDROXY: CPT

## 2022-01-23 LAB — ACTH PLAS-MCNC: 28.4 PG/ML (ref 7.2–63.3)

## 2022-01-24 LAB — SHBG SERPL-SCNC: 32 NMOL/L (ref 11–80)

## 2022-02-08 ENCOUNTER — OFFICE VISIT (OUTPATIENT)
Dept: MEDICAL GROUP | Age: 72
End: 2022-02-08
Payer: MEDICARE

## 2022-02-08 VITALS
BODY MASS INDEX: 37.04 KG/M2 | TEMPERATURE: 97.5 F | DIASTOLIC BLOOD PRESSURE: 60 MMHG | HEIGHT: 71 IN | OXYGEN SATURATION: 96 % | SYSTOLIC BLOOD PRESSURE: 150 MMHG | HEART RATE: 49 BPM | WEIGHT: 264.6 LBS

## 2022-02-08 DIAGNOSIS — R73.01 IFG (IMPAIRED FASTING GLUCOSE): ICD-10-CM

## 2022-02-08 DIAGNOSIS — I27.20 PULMONARY HYPERTENSION (HCC): ICD-10-CM

## 2022-02-08 DIAGNOSIS — E55.9 HYPOVITAMINOSIS D: ICD-10-CM

## 2022-02-08 DIAGNOSIS — D35.2 PITUITARY MACROADENOMA (HCC): ICD-10-CM

## 2022-02-08 DIAGNOSIS — R60.9 PERIPHERAL EDEMA: ICD-10-CM

## 2022-02-08 DIAGNOSIS — E23.6 ENLARGED PITUITARY GLAND (HCC): ICD-10-CM

## 2022-02-08 DIAGNOSIS — R06.02 SOB (SHORTNESS OF BREATH): ICD-10-CM

## 2022-02-08 DIAGNOSIS — E78.00 HYPERCHOLESTEREMIA: ICD-10-CM

## 2022-02-08 DIAGNOSIS — I10 ESSENTIAL HYPERTENSION: Chronic | ICD-10-CM

## 2022-02-08 DIAGNOSIS — D69.6 THROMBOCYTOPENIA (HCC): ICD-10-CM

## 2022-02-08 PROCEDURE — 99214 OFFICE O/P EST MOD 30 MIN: CPT | Performed by: INTERNAL MEDICINE

## 2022-02-08 RX ORDER — SPIRONOLACTONE 25 MG/1
25 TABLET ORAL DAILY
Qty: 90 TABLET | Refills: 0 | Status: SHIPPED | OUTPATIENT
Start: 2022-02-08 | End: 2022-05-15

## 2022-02-08 ASSESSMENT — FIBROSIS 4 INDEX: FIB4 SCORE: 2.04

## 2022-02-08 ASSESSMENT — ENCOUNTER SYMPTOMS: GENERAL WELL-BEING: GOOD

## 2022-02-08 ASSESSMENT — PATIENT HEALTH QUESTIONNAIRE - PHQ9: CLINICAL INTERPRETATION OF PHQ2 SCORE: 0

## 2022-02-08 ASSESSMENT — ACTIVITIES OF DAILY LIVING (ADL): BATHING_REQUIRES_ASSISTANCE: 0

## 2022-02-08 NOTE — PROGRESS NOTES
CHIEF COMPLAINT  Hypertension, labs    HPI  Laureen Munoz is a 71 y.o. male who presents today for the following     Hypertension, LE swelling, PAH, SOB  Interval course  -Complains of leg swelling in the last approximately 6 months  -Systolic blood pressure has been higher  -Echocardiography from June 2021 showed a right ventricular pressure of 68   -Complains of shortness of breath, but not aware of snoring as he lives alone    Meds: Metoprolol 25 mg QD, losartan, 100 mg QD; taking as prescribed.   He did not check BP at home.  Denies: - headaches, vision problems, tinnitus.  - chest pain/pressure, palpitations, irregular heart beats, exertional, dyspnea, peripheral edema.  - medication side effect: unusual fatigue, slow heartbeat, foot/leg swelling.  Low salt diet: no  Diet: regular  Exercise: < 4 d/w  BMI: 36  FH of HTN: neg    Echocardiography, 6/9/2021  CONCLUSIONS  No prior study is available for comparison.   Normal left ventricular systolic function.  Left ventricular ejection fraction is visually estimated to be 60%.  Diastolic function is difficult to assess with arrhythmia.  Dilated inferior vena cava with inspiratory collapse.  Estimated right ventricular systolic pressure is 68 mmHg.    Hypercholesterolemia  The patient had slightly elevated cholesterol/triglycerides.   No meds.  Diet / Exercise/BMI: As above  FH: neg  LFTs were normal, except slightly elevated total bilirubin.     IFG  The patient had elevated FBG and A1c.  No polydipsia, polyphagia, polyuria.  No abdominal pain, weight loss, fatigue.  Diet / Exercise/BMI: As above  FH of DM: neg     Hypovitaminosis D  The patient had low vitamin D level.  Vitamin D supplement: was on high dose, now OTC supplement.     Thrombocytopenia  The patient had borderline low PLT count, the last was borderline normal.  Denies easy bleeding or bruising.     Enlarged pituitary gland/pituitary microadenoma  Interval course  -Stable, neurology  follow-up    Background  Incidental finding on MRI brain done in the ER on 6/8/2021, due to dizziness.  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  He has had recurrent dizziness for the last year where he has to grab hold of objects. He has tunnel vision that resolves in 20 seconds and feels disoriented when this occurs. It is not triggered by standing.     MRI brain, 6/8/2021  1.  There is an approximately 16 x 13 x 12 mm sized hypoenhancing lesion in the right side of the pituitary gland likely representing pituitary macroadenoma. The lesion is abutting the right cavernous sinus without infiltration.  2.  Mild cerebral atrophy.  3.  Minimal chronic microvascular ischemic disease.     F/u endocrinology    Reviewed PMH, PSH, FH, SH, ALL, HCM/IMM, no changes  Reviewed MEDS, no changes    Patient Active Problem List    Diagnosis Date Noted   • Pituitary macroadenoma (HCC) 06/09/2021   • Enlarged pituitary gland (HCC) 06/08/2021   • Thrombocytopenia (HCC) 03/09/2021   • IFG (impaired fasting glucose) 05/28/2020   • Rosacea 10/30/2019   • Erectile dysfunction 10/30/2018   • Health care maintenance 10/30/2018   • Tobacco use 10/30/2018   • Obesity (BMI 35.0-39.9 without comorbidity) 07/26/2018   • Hypovitaminosis D 04/11/2014   • Neuropathic pain 07/20/2012   • Essential hypertension 07/20/2012   • Hypercholesteremia 07/20/2012   • Benign prostatic hyperplasia 07/20/2012     CURRENT MEDICATIONS  Current Outpatient Medications   Medication Sig Dispense Refill   • vitamin D, Ergocalciferol, (DRISDOL) 1.25 MG (50772 UT) Cap capsule Take 1 Capsule by mouth every 7 days. 12 Capsule 1   • fluticasone (FLONASE) 50 MCG/ACT nasal spray Administer 1 Spray into affected nostril(S) every morning.     • Fexofenadine HCl (ALLERGY 24-HR PO) Take 1 tablet by mouth every morning.     • losartan (COZAAR) 100 MG Tab Take 1 tablet by mouth every day. 90 tablet 3   • metoprolol SR (TOPROL XL) 25 MG TABLET SR 24 HR TAKE ONE TABLET  BY MOUTH ONE TIME DAILY (Patient taking differently: Take 25 mg by mouth every morning.) 90 tablet 3   • finasteride (PROSCAR) 5 MG Tab Take 1 tablet by mouth every day. 90 tablet 3     No current facility-administered medications for this visit.     ALLERGIES  Allergies: Nkda [no known drug allergy] and Seasonal  PAST MEDICAL HISTORY  Past Medical History:   Diagnosis Date   • Hypercholesterolemia    • Hypertension    • Tinea inguinalis      SURGICAL HISTORY  He  has a past surgical history that includes other abdominal surgery.  SOCIAL HISTORY  Social History     Tobacco Use   • Smoking status: Current Every Day Smoker     Packs/day: 0.25     Types: Cigars     Last attempt to quit:      Years since quittin.1   • Smokeless tobacco: Never Used   • Tobacco comment: cigar, smokes one a day but no cigarettes   Vaping Use   • Vaping Use: Never used   Substance Use Topics   • Alcohol use: Yes     Comment: minimal   • Drug use: Not Currently     Types: Marijuana, Inhaled     Comment: rare occasions,occasionally      Social History     Social History Narrative   • Not on file     FAMILY HISTORY  Family History   Problem Relation Age of Onset   • Heart Disease Mother    • Heart Attack Mother    • Heart Disease Father    • Heart Attack Father    • Cancer Sister         leukemia, breast cancer   • Stroke Sister    • No Known Problems Maternal Grandmother    • No Known Problems Maternal Grandfather    • No Known Problems Paternal Grandmother    • Dementia Paternal Grandfather    • Hypertension Neg Hx    • Hyperlipidemia Neg Hx    • Diabetes Neg Hx      Family Status   Relation Name Status   • Mo     • Fa     • Sis     • Sis  Alive   • MGMo     • MGFa     • PGMo     • PGFa     • Neg Hx  (Not Specified)     ROS   Constitutional: Negative for fever, chills, fatigue.  HENT: Negative for congestion, sore throat.  Eyes: Negative for vision problems.   Respiratory:  "Negative for cough.  Cardiovascular: Negative for chest pain, palpitations.   Gastrointestinal: Negative for heartburn, nausea, abdominal pain.   Genitourinary: Negative for dysuria.  Musculoskeletal: Negative for significant myalgia, back and joint pain.   Skin: Negative for rash.   Neuro: Negative for dizziness, weakness.   Endo/Heme/Allergies: Does not bruise/bleed easily.   Psychiatric/Behavioral: Negative for depression.    PHYSICAL EXAM   Blood Pressure 150/60 (BP Location: Right arm, Patient Position: Sitting)   Pulse (Abnormal) 49   Temperature 36.4 °C (97.5 °F)   Height 1.803 m (5' 11\")   Weight 120 kg (264 lb 9.6 oz)   Oxygen Saturation 96%   Body Mass Index 36.90 kg/m²   General:  NAD, well appearing  HEENT:   NC/AT, PERRLA, EOMI.  Cardiovascular: unlabored breathing, no peripheral cyanosis or swelling.  Lungs:   no respiratory distress.  Abdomen: non- distended.  Extremities:  B/L LE swelling.  Skin:  Warm, dry.  No erythema. No rash.   Neurologic: Alert & oriented x 3. CN II-XII grossly intact. No focal deficits.  Psychiatric:  Affect normal, mood normal, judgment normal.    Labs     Labs are reviewed and discussed with a patient  Lab Results   Component Value Date/Time    CHOLSTRLTOT 197 01/21/2022 08:46 AM     (H) 01/21/2022 08:46 AM    HDL 51 01/21/2022 08:46 AM    TRIGLYCERIDE 129 01/21/2022 08:46 AM       Lab Results   Component Value Date/Time    SODIUM 138 01/21/2022 08:46 AM    POTASSIUM 4.5 01/21/2022 08:46 AM    CHLORIDE 105 01/21/2022 08:46 AM    CO2 22 01/21/2022 08:46 AM    GLUCOSE 102 (H) 01/21/2022 08:46 AM    BUN 23 (H) 01/21/2022 08:46 AM    CREATININE 0.82 01/21/2022 08:46 AM    CREATININE 0.92 03/01/2010 04:54 PM    BUNCREATRAT 21 03/01/2010 04:54 PM    GLOMRATE >59 03/01/2010 04:54 PM     Lab Results   Component Value Date/Time    ALKPHOSPHAT 94 01/21/2022 08:46 AM    ASTSGOT 26 01/21/2022 08:46 AM    ALTSGPT 30 01/21/2022 08:46 AM    TBILIRUBIN 1.1 01/21/2022 08:46 AM "      Lab Results   Component Value Date/Time    HBA1C 6.0 (H) 01/21/2022 08:46 AM    HBA1C 6.0 (H) 09/07/2021 09:56 AM    HBA1C 5.8 (H) 06/09/2021 03:23 AM     No results found for: TSH  Lab Results   Component Value Date/Time    FREET4 1.21 01/21/2022 08:53 AM    FREET4 1.23 06/11/2021 09:26 AM     Lab Results   Component Value Date/Time    WBC 9.4 01/21/2022 08:46 AM    WBC 9.4 03/01/2010 04:54 PM    RBC 5.08 01/21/2022 08:46 AM    RBC 5.39 03/01/2010 04:54 PM    HEMOGLOBIN 15.2 01/21/2022 08:46 AM    HEMATOCRIT 47.1 01/21/2022 08:46 AM    MCV 92.7 01/21/2022 08:46 AM    MCV 89 03/01/2010 04:54 PM    MCH 29.9 01/21/2022 08:46 AM    MCH 29.3 03/01/2010 04:54 PM    MCHC 32.3 (L) 01/21/2022 08:46 AM    MPV 11.3 01/21/2022 08:46 AM    NEUTSPOLYS 73.20 (H) 01/21/2022 08:46 AM    LYMPHOCYTES 15.20 (L) 01/21/2022 08:46 AM    MONOCYTES 8.90 01/21/2022 08:46 AM    EOSINOPHILS 1.00 01/21/2022 08:46 AM    BASOPHILS 0.40 01/21/2022 08:46 AM    HYPOCHROMIA 1+ 07/15/2013 10:35 AM      Imaging   Is here today for the ultrasound that day 3 if they paid food by would pay by would be paid more and but you need to say    Assessment and Plan     Laureen Munoz is a 71 y.o. male    1. Essential hypertension  Uncontrolled, add spironolactone  - spironolactone (ALDACTONE) 25 MG Tab; Take 1 Tablet by mouth every day.  Dispense: 90 Tablet; Refill: 0  - Basic Metabolic Panel; Future  2. Peripheral edema  3. Pulmonary hypertension (HCC)  Rule out sleep apnea  - Referral to Pulmonary and Sleep Medicine  4. SOB (shortness of breath)  History of smoking  - DX-CHEST-2 VIEWS; Future    5. Hypercholesteremia  - Controlled, continue with current management.    6. IFG (impaired fasting glucose)  - Controlled, continue with current management.    7. Hypovitaminosis D  - Controlled, continue with current management.    8. Thrombocytopenia (HCC)  Borderline, follow-up labs    9. Enlarged pituitary gland (HCC)  10. Pituitary macroadenoma  ([facility])  Evaluated by neurology and endocrinology    Counseling:   - Smoking:  Nonsmoker    Followup: Return in about 3 months (around 5/8/2022) for LABS, ANNUAL.    All questions are answered.    Please note that this dictation was created using voice recognition software, and that there might be errors of barbara and possibly content.

## 2022-02-10 ENCOUNTER — HOSPITAL ENCOUNTER (OUTPATIENT)
Dept: RADIOLOGY | Facility: MEDICAL CENTER | Age: 72
End: 2022-02-10
Attending: INTERNAL MEDICINE
Payer: MEDICARE

## 2022-02-10 DIAGNOSIS — R06.02 SOB (SHORTNESS OF BREATH): ICD-10-CM

## 2022-02-10 PROCEDURE — 71046 X-RAY EXAM CHEST 2 VIEWS: CPT

## 2022-04-11 ENCOUNTER — OFFICE VISIT (OUTPATIENT)
Dept: SLEEP MEDICINE | Facility: MEDICAL CENTER | Age: 72
End: 2022-04-11
Payer: MEDICARE

## 2022-04-11 VITALS
OXYGEN SATURATION: 93 % | HEIGHT: 71 IN | DIASTOLIC BLOOD PRESSURE: 82 MMHG | HEART RATE: 61 BPM | RESPIRATION RATE: 16 BRPM | BODY MASS INDEX: 37.38 KG/M2 | SYSTOLIC BLOOD PRESSURE: 130 MMHG | WEIGHT: 267 LBS

## 2022-04-11 DIAGNOSIS — G47.30 SLEEP DISORDER BREATHING: Primary | ICD-10-CM

## 2022-04-11 DIAGNOSIS — G47.00 FREQUENT NOCTURNAL AWAKENING: ICD-10-CM

## 2022-04-11 PROCEDURE — 99203 OFFICE O/P NEW LOW 30 MIN: CPT | Performed by: STUDENT IN AN ORGANIZED HEALTH CARE EDUCATION/TRAINING PROGRAM

## 2022-04-11 ASSESSMENT — FIBROSIS 4 INDEX: FIB4 SCORE: 2.04

## 2022-04-11 NOTE — PROGRESS NOTES
[facility] Consult Note     Date: 4/11/2022 / Time: 9:41 AM      Thank you for requesting a sleep medicine consultation on Laureen Munoz at the sleep center. Presents today with the chief complaints of snoring, frequent nocturnal awakenings and unrefreshing sleep.  He is referred by MARLO Michelle [facility] [address] [place] [id number] for evaluation and treatment of sleep disorder breathing.     HISTORY OF PRESENT ILLNESS:     Laureen Munoz is a 71 y.o. male with hypertension, BPH, obesity, and seasonal allergies.  Presents to Sleep Clinic for evaluation of potential sleep apnea.    States he did snore regularly for years.  He currently sleeps alone but was told his snoring improved after rhinoplasty in 2016.    States since the pandemic he has gained significant amount of weight as well as retired due to the pandemic.  Due to no longer working regularly he has not as active.  In addition he had to move in with his ex-wife due to housing issues and states his diet has changed significantly.  Reports eating more desserts which she attributes to his weight gain.    In terms of his sleep it is very interrupted.  States he wakes up at times almost every hour in the night.  He attributes this to having to use the restroom to urinate.  He is taking medication for BPH but states his urination issues significantly impact his continuity of sleep.  He generally has no trouble getting to sleep or getting back to sleep.  He is tired at times during the day and will nap 2-3 times a day off and on generally not longer than 30 minutes.  His energy is down during the day.    As per supplemental questionnaire to be scanned or imported into chart:    Duke Sleepiness Score: 8    Sleep Schedule  Bedtime: Weekday & Weekend 10-11pm  Wake time: Weekday & Weekend 7-8am  Sleep-onset latency: 15 min  Awakenings from sleep: 5-6 times to urinate  Difficulty falling back asleep: yes   Bedroom partner:  "no  Naps: Yes    DAYTIME SYMPTOMS:   Excessive daytime sleepiness: Yes at times   Daytime fatigue: Yes  Difficulty concentrating: No   Memory problems: Yes  Irritability:No   Work/school performance issues: No   Sleepiness with driving: No   Caffeine/stimulant use: Yes 3 small cups a day   Alcohol use:Yes, How Many? Occasionally 2-4 drinks a months      SLEEP RELATED SYMPTOMS  Snoring: Yes in past  Witnessed apnea or gasping/choking: No   Dry mouth or mouth breathing: Yes dry mouth   Sweating: No   Teeth grinding/biting: Yes  Morning headaches: Yes occasionally   Refreshed Upon Awakening: No      SLEEP RELATED BEHAVIORS:  Parasomnias (walking, talking, eating, violence): No   Leg kicking: No   Restless legs - \"urge to move\": No   Nightmares: No  Recurrent: No   Dream enactment: No      NARCOLEPSY:  Cataplexy: No   Sleep paralysis: No   Sleep attacks: No   Hypnagogic/hypnopompic hallucinations: No     MEDICAL HISTORY  Past Medical History:   Diagnosis Date   • Hypercholesterolemia    • Hypertension    • Tinea inguinalis         SURGICAL HISTORY  Past Surgical History:   Procedure Laterality Date   • OTHER ABDOMINAL SURGERY      gallbladder        FAMILY HISTORY  Family History   Problem Relation Age of Onset   • Heart Disease Mother    • Heart Attack Mother    • Heart Disease Father    • Heart Attack Father    • Cancer Sister         leukemia, breast cancer   • Stroke Sister    • No Known Problems Maternal Grandmother    • No Known Problems Maternal Grandfather    • No Known Problems Paternal Grandmother    • Dementia Paternal Grandfather    • Hypertension Neg Hx    • Hyperlipidemia Neg Hx    • Diabetes Neg Hx        SOCIAL HISTORY  Social History     Socioeconomic History   • Marital status:    • Highest education level: Associate degree: academic program   Tobacco Use   • Smoking status: Former Smoker     Packs/day: 0.25     Types: Cigars     Quit date: 1985     Years since quittin.2   • Smokeless " tobacco: Never Used   • Tobacco comment: cigar, smokes one a day but no cigarettes   Vaping Use   • Vaping Use: Never used   Substance and Sexual Activity   • Alcohol use: Yes     Comment: minimal   • Drug use: Not Currently     Types: Marijuana, Inhaled     Comment: rare occasions,occasionally    • Sexual activity: Not Currently     Social Determinants of Health     Financial Resource Strain: Low Risk    • Difficulty of Paying Living Expenses: Not very hard   Food Insecurity: No Food Insecurity   • Worried About Running Out of Food in the Last Year: Never true   • Ran Out of Food in the Last Year: Never true   Transportation Needs: No Transportation Needs   • Lack of Transportation (Medical): No   • Lack of Transportation (Non-Medical): No   Physical Activity: Inactive   • Days of Exercise per Week: 0 days   • Minutes of Exercise per Session: 0 min   Stress: Stress Concern Present   • Feeling of Stress : To some extent   Social Connections: Socially Isolated   • Frequency of Communication with Friends and Family: Once a week   • Frequency of Social Gatherings with Friends and Family: Once a week   • Attends Mandaen Services: Never   • Active Member of Clubs or Organizations: No   • Marital Status:    Housing Stability: High Risk   • Unable to Pay for Housing in the Last Year: No   • Number of Places Lived in the Last Year: 3   • Unstable Housing in the Last Year: No        Occupation: Retired     CURRENT MEDICATIONS  Current Outpatient Medications   Medication Sig Dispense Refill   • spironolactone (ALDACTONE) 25 MG Tab Take 1 Tablet by mouth every day. 90 Tablet 0   • vitamin D, Ergocalciferol, (DRISDOL) 1.25 MG (01648 UT) Cap capsule Take 1 Capsule by mouth every 7 days. 12 Capsule 1   • fluticasone (FLONASE) 50 MCG/ACT nasal spray Administer 1 Spray into affected nostril(S) every morning.     • Fexofenadine HCl (ALLERGY 24-HR PO) Take 1 tablet by mouth every morning.     • losartan (COZAAR) 100 MG Tab  "Take 1 tablet by mouth every day. 90 tablet 3   • metoprolol SR (TOPROL XL) 25 MG TABLET SR 24 HR TAKE ONE TABLET BY MOUTH ONE TIME DAILY (Patient taking differently: Take 25 mg by mouth every morning.) 90 tablet 3   • finasteride (PROSCAR) 5 MG Tab Take 1 tablet by mouth every day. 90 tablet 3     No current facility-administered medications for this visit.       REVIEW OF SYSTEMS  Constitutional: Denies fevers, Denies weight changes  Ears/Nose/Throat/Mouth: Denies nasal congestion or sore throat   Cardiovascular: Denies chest pain  Respiratory: Denies shortness of breath, Denies cough  Gastrointestinal/Hepatic: Denies nausea, vomiting  Sleep: see HPI    Physical Examination:  Vitals/ General Appearance:   Weight/BMI: Body mass index is 37.24 kg/m².  /82 (BP Location: Left arm, Patient Position: Sitting, BP Cuff Size: Adult)   Pulse 61   Resp 16   Ht 1.803 m (5' 11\")   Wt 121 kg (267 lb)   SpO2 93%   Vitals:    04/11/22 0937   BP: 130/82   BP Location: Left arm   Patient Position: Sitting   BP Cuff Size: Adult   Pulse: 61   Resp: 16   SpO2: 93%   Weight: 121 kg (267 lb)   Height: 1.803 m (5' 11\")       Pt. is alert and oriented to time, place and person. Cooperative and in no apparent distress.     Constitutional: Alert, no distress, well-groomed.  Skin: No rashes in visible areas.  Eye: Round. Conjunctiva clear, lids normal. No icterus.   ENT EXAM  Nasal alae/valves collapsible: No   Nasal septum deviation: No   Nasal turbinate hypertrophy: Left: Grade 1   Right: Grade 1  Hard palate narrow: No   Hard palate high: No   Soft palate/uvula (Mallampati score): 3  Tongue Scalloping: No   Retrognathia: No   Micrognathia: No   Cardiovascular:no murmus/gallops/rubs, normal S1 and S2 heart sounds, regular rate and rhythm  Pulmonary:Clear to auscultation, No wheezes, No crackles.  Neurologic:Awake, alert and oriented x 3, Normal age appropriate gait, No involuntary motions.  Extremities: No clubbing, " cyanosis       ASSESSMENT AND PLAN   Laureen Munoz is a 71 y.o. male with hypertension, BPH, obesity, and seasonal allergies.  Presents to Sleep Clinic for evaluation of potential sleep apnea.    1. Laureen Munoz  has symptoms of Obstructive Sleep Apnea (JUAN). Laureen Munoz has symptoms of snoring, dry mouth, morning headaches, unrefreshed upon awakening, nocturia. These  interfere with activities of daily living.     Pt has risk factors for JUAN include obesity, thick neck, and crowded oropharynx.     The pathophysiology of JUAN and the increased risk of cardiovascular morbidity from untreated JUAN is discussed in detail with the patient. He  also has HTN, nocturia which can be worsened by JUAN.      We have discussed diagnostic options including in-laboratory, attended polysomnography and home sleep testing. We have also discussed treatment options including airway pressurization, reconstructive otolaryngologic surgery, dental appliances and weight management.     Subsequently,treatment options will be discussed based on the diagnostic study. Meanwhile, He is urged to avoid supine sleep, weight gain and alcoholic beverages since all of these can worsen JUAN. He is cautioned against drowsy driving. If He feels sleepy while driving, advised must pull over for a break/nap, rather than persist on the road, in the interest of Pt's own safety and that of others on the road.    Plan  -  He  will be scheduled for an overnight HST to assess sleep related breathing disorder.  - Follow up 1-2 weeks after sleep study to discuss results and treatment options moving forward   -Advised to reach out via MyChart or by phone with any questions or concerns.     2.  Regarding treatment of other past medical problems and general health maintenance,  Pt is urged to follow up with PCP.      Please note portions of this record was created using voice recognition software. I have made every reasonable attempt to correct obvious  errors, but I expect that there are errors of grammar and possibly content I did not discover before finalizing the note.

## 2022-04-19 DIAGNOSIS — I10 ESSENTIAL HYPERTENSION: ICD-10-CM

## 2022-04-20 RX ORDER — LOSARTAN POTASSIUM 100 MG/1
100 TABLET ORAL DAILY
Qty: 90 TABLET | Refills: 0 | Status: SHIPPED | OUTPATIENT
Start: 2022-04-20 | End: 2022-07-21

## 2022-04-20 RX ORDER — METOPROLOL SUCCINATE 25 MG/1
TABLET, EXTENDED RELEASE ORAL
Qty: 90 TABLET | Refills: 3 | Status: SHIPPED | OUTPATIENT
Start: 2022-04-20 | End: 2023-04-04 | Stop reason: SDUPTHER

## 2022-05-05 ENCOUNTER — HOSPITAL ENCOUNTER (OUTPATIENT)
Dept: LAB | Facility: MEDICAL CENTER | Age: 72
End: 2022-05-05
Attending: INTERNAL MEDICINE
Payer: MEDICARE

## 2022-05-05 DIAGNOSIS — I10 ESSENTIAL HYPERTENSION: Chronic | ICD-10-CM

## 2022-05-05 LAB
ANION GAP SERPL CALC-SCNC: 14 MMOL/L (ref 7–16)
BUN SERPL-MCNC: 21 MG/DL (ref 8–22)
CALCIUM SERPL-MCNC: 9.5 MG/DL (ref 8.5–10.5)
CHLORIDE SERPL-SCNC: 103 MMOL/L (ref 96–112)
CO2 SERPL-SCNC: 20 MMOL/L (ref 20–33)
CREAT SERPL-MCNC: 0.87 MG/DL (ref 0.5–1.4)
FASTING STATUS PATIENT QL REPORTED: NORMAL
GFR SERPLBLD CREATININE-BSD FMLA CKD-EPI: 92 ML/MIN/1.73 M 2
GLUCOSE SERPL-MCNC: 88 MG/DL (ref 65–99)
POTASSIUM SERPL-SCNC: 4.3 MMOL/L (ref 3.6–5.5)
SODIUM SERPL-SCNC: 137 MMOL/L (ref 135–145)

## 2022-05-05 PROCEDURE — 36415 COLL VENOUS BLD VENIPUNCTURE: CPT

## 2022-05-05 PROCEDURE — 80048 BASIC METABOLIC PNL TOTAL CA: CPT

## 2022-05-09 ENCOUNTER — OFFICE VISIT (OUTPATIENT)
Dept: MEDICAL GROUP | Age: 72
End: 2022-05-09
Payer: MEDICARE

## 2022-05-09 VITALS
OXYGEN SATURATION: 92 % | DIASTOLIC BLOOD PRESSURE: 76 MMHG | BODY MASS INDEX: 37.8 KG/M2 | HEART RATE: 65 BPM | WEIGHT: 270 LBS | HEIGHT: 71 IN | TEMPERATURE: 97.5 F | SYSTOLIC BLOOD PRESSURE: 138 MMHG | RESPIRATION RATE: 18 BRPM

## 2022-05-09 DIAGNOSIS — N40.1 BENIGN PROSTATIC HYPERPLASIA WITH LOWER URINARY TRACT SYMPTOMS, SYMPTOM DETAILS UNSPECIFIED: Primary | ICD-10-CM

## 2022-05-09 DIAGNOSIS — E78.00 HYPERCHOLESTEREMIA: ICD-10-CM

## 2022-05-09 DIAGNOSIS — R73.01 IFG (IMPAIRED FASTING GLUCOSE): ICD-10-CM

## 2022-05-09 DIAGNOSIS — D69.6 THROMBOCYTOPENIA (HCC): ICD-10-CM

## 2022-05-09 DIAGNOSIS — I10 ESSENTIAL HYPERTENSION: Chronic | ICD-10-CM

## 2022-05-09 PROCEDURE — 99214 OFFICE O/P EST MOD 30 MIN: CPT | Performed by: INTERNAL MEDICINE

## 2022-05-09 RX ORDER — GABAPENTIN 300 MG/1
300 CAPSULE ORAL 3 TIMES DAILY
Qty: 90 CAPSULE | Refills: 0 | Status: SHIPPED | OUTPATIENT
Start: 2022-05-09 | End: 2022-06-08

## 2022-05-09 ASSESSMENT — FIBROSIS 4 INDEX: FIB4 SCORE: 2.04

## 2022-05-09 NOTE — PROGRESS NOTES
CHIEF COMPLAINT  HTN, labs    HPI  Laureen Munoz is a 71 y.o. male who presents today for the following     Hypertension, (LE swelling, PAH)  Meds: Metoprolol 25 mg QD, losartan, 100 mg QD, aldactone 25 mg QD; taking as prescribed.   He did not check BP at home.  Denies: - headaches, vision problems, tinnitus.  - chest pain/pressure, palpitations, irregular heart beats, exertional, dyspnea, peripheral edema.  - medication side effect: unusual fatigue, slow heartbeat, foot/leg swelling.  Low salt diet: no  Diet: regular  Exercise: < 4 d/w  BMI: 37  FH of HTN: neg     Echocardiography, 6/9/2021  CONCLUSIONS  No prior study is available for comparison.   Normal left ventricular systolic function.  Left ventricular ejection fraction is visually estimated to be 60%.  Diastolic function is difficult to assess with arrhythmia.  Dilated inferior vena cava with inspiratory collapse.  Estimated right ventricular systolic pressure is 68 mmHg.    Sleep study: pending     Hypercholesterolemia  The patient had slightly elevated cholesterol/triglycerides.   No meds.  Diet / Exercise/BMI: As above  FH: neg  LFTs were normal, except slightly elevated total bilirubin.     IFG  The patient had elevated FBG and A1c.  No polydipsia, polyphagia, polyuria.  No abdominal pain, weight loss, fatigue.  Diet / Exercise/BMI: As above  FH of DM: neg     Thrombocytopenia  The patient had borderline low PLT count, the last was borderline normal.  Denies easy bleeding or bruising.    BPH  On Finasteride, c/o  · Nocturia x 6  · Frequency  · Urgency  · Weak stream/dribbling  · Bladder fullness / incomplete emptying  · Hesitancy    Reviewed PMH, PSH, FH, SH, ALL, HCM/IMM, no changes  Reviewed MEDS, no changes    Patient Active Problem List    Diagnosis Date Noted   • Pituitary macroadenoma (HCC) 06/09/2021   • Enlarged pituitary gland (HCC) 06/08/2021   • Thrombocytopenia (HCC) 03/09/2021   • IFG (impaired fasting glucose) 05/28/2020   • Rosacea  10/30/2019   • Erectile dysfunction 10/30/2018   • Health care maintenance 10/30/2018   • Tobacco use 10/30/2018   • Obesity (BMI 35.0-39.9 without comorbidity) 2018   • Hypovitaminosis D 2014   • Neuropathic pain 2012   • Essential hypertension 2012   • Hypercholesteremia 2012   • Benign prostatic hyperplasia 2012     CURRENT MEDICATIONS  Current Outpatient Medications   Medication Sig Dispense Refill   • metoprolol SR (TOPROL XL) 25 MG TABLET SR 24 HR TAKE ONE TABLET BY MOUTH ONE TIME DAILY 90 Tablet 3   • losartan (COZAAR) 100 MG Tab Take 1 Tablet by mouth every day. 90 Tablet 0   • spironolactone (ALDACTONE) 25 MG Tab Take 1 Tablet by mouth every day. 90 Tablet 0   • vitamin D, Ergocalciferol, (DRISDOL) 1.25 MG (05557 UT) Cap capsule Take 1 Capsule by mouth every 7 days. 12 Capsule 1   • fluticasone (FLONASE) 50 MCG/ACT nasal spray Administer 1 Spray into affected nostril(S) every morning.     • Fexofenadine HCl (ALLERGY 24-HR PO) Take 1 tablet by mouth every morning.     • finasteride (PROSCAR) 5 MG Tab Take 1 tablet by mouth every day. 90 tablet 3     No current facility-administered medications for this visit.     ALLERGIES  Allergies: Nkda [no known drug allergy] and Seasonal  PAST MEDICAL HISTORY  Past Medical History:   Diagnosis Date   • Hypercholesterolemia    • Hypertension    • Tinea inguinalis      SURGICAL HISTORY  He  has a past surgical history that includes other abdominal surgery.  SOCIAL HISTORY  Social History     Tobacco Use   • Smoking status: Former Smoker     Packs/day: 0.25     Types: Cigars     Quit date: 1985     Years since quittin.3   • Smokeless tobacco: Never Used   • Tobacco comment: cigar, smokes one a day but no cigarettes   Vaping Use   • Vaping Use: Never used   Substance Use Topics   • Alcohol use: Yes     Comment: minimal   • Drug use: Not Currently     Types: Marijuana, Inhaled     Comment: rare occasions,occasionally      Social  "History     Social History Narrative   • Not on file     FAMILY HISTORY  Family History   Problem Relation Age of Onset   • Heart Disease Mother    • Heart Attack Mother    • Heart Disease Father    • Heart Attack Father    • Cancer Sister         leukemia, breast cancer   • Stroke Sister    • No Known Problems Maternal Grandmother    • No Known Problems Maternal Grandfather    • No Known Problems Paternal Grandmother    • Dementia Paternal Grandfather    • Hypertension Neg Hx    • Hyperlipidemia Neg Hx    • Diabetes Neg Hx      Family Status   Relation Name Status   • Mo     • Fa     • Sis     • Sis  Alive   • MGMo     • MGFa     • PGMo     • PGFa     • Neg Hx  (Not Specified)       ROS   Constitutional: Negative for fever, chills, fatigue.  HENT: Negative for congestion, sore throat.  Eyes: Negative for vision problems.   Respiratory: Negative for cough, shortness of breath.  Cardiovascular: Negative for chest pain, palpitations.   Gastrointestinal: Negative for heartburn, nausea, abdominal pain.   Genitourinary: Negative for dysuria. And per HPI.  Musculoskeletal: Negative for significant myalgia, back and joint pain.   Skin: Negative for rash.   Neuro: Negative for dizziness, weakness and headaches.   Endo/Heme/Allergies: Does not bruise/bleed easily.   Psychiatric/Behavioral: Negative for depression.    PHYSICAL EXAM   Blood Pressure 138/76   Pulse 65   Temperature 36.4 °C (97.5 °F) (Temporal)   Respiration 18   Height 1.803 m (5' 11\")   Weight 122 kg (270 lb)   Oxygen Saturation 92%   Body Mass Index 37.66 kg/m²   General:  NAD, well appearing  HEENT:   NC/AT, PERRLA, EOMI.  Cardiovascular: unlabored breathing, no peripheral cyanosis or swelling.  Lungs:   no respiratory distress.  Abdomen: non- distended.  Extremities:  No LE swelling.  Skin:  Warm, dry.  No erythema. No rash.   Neurologic: Alert & oriented x 3. CN II-XII grossly intact. No focal " deficits.  Psychiatric:  Affect normal, mood normal, judgment normal.    Labs     Labs are reviewed and discussed with a patient  Lab Results   Component Value Date/Time    CHOLSTRLTOT 197 01/21/2022 08:46 AM     (H) 01/21/2022 08:46 AM    HDL 51 01/21/2022 08:46 AM    TRIGLYCERIDE 129 01/21/2022 08:46 AM       Lab Results   Component Value Date/Time    SODIUM 137 05/05/2022 10:24 AM    POTASSIUM 4.3 05/05/2022 10:24 AM    CHLORIDE 103 05/05/2022 10:24 AM    CO2 20 05/05/2022 10:24 AM    GLUCOSE 88 05/05/2022 10:24 AM    BUN 21 05/05/2022 10:24 AM    CREATININE 0.87 05/05/2022 10:24 AM    CREATININE 0.92 03/01/2010 04:54 PM    BUNCREATRAT 21 03/01/2010 04:54 PM    GLOMRATE >59 03/01/2010 04:54 PM     Lab Results   Component Value Date/Time    ALKPHOSPHAT 94 01/21/2022 08:46 AM    ASTSGOT 26 01/21/2022 08:46 AM    ALTSGPT 30 01/21/2022 08:46 AM    TBILIRUBIN 1.1 01/21/2022 08:46 AM      Lab Results   Component Value Date/Time    HBA1C 6.0 (H) 01/21/2022 08:46 AM    HBA1C 6.0 (H) 09/07/2021 09:56 AM    HBA1C 5.8 (H) 06/09/2021 03:23 AM     No results found for: TSH  Lab Results   Component Value Date/Time    FREET4 1.21 01/21/2022 08:53 AM    FREET4 1.23 06/11/2021 09:26 AM       Lab Results   Component Value Date/Time    WBC 9.4 01/21/2022 08:46 AM    WBC 9.4 03/01/2010 04:54 PM    RBC 5.08 01/21/2022 08:46 AM    RBC 5.39 03/01/2010 04:54 PM    HEMOGLOBIN 15.2 01/21/2022 08:46 AM    HEMATOCRIT 47.1 01/21/2022 08:46 AM    MCV 92.7 01/21/2022 08:46 AM    MCV 89 03/01/2010 04:54 PM    MCH 29.9 01/21/2022 08:46 AM    MCH 29.3 03/01/2010 04:54 PM    MCHC 32.3 (L) 01/21/2022 08:46 AM    MPV 11.3 01/21/2022 08:46 AM    NEUTSPOLYS 73.20 (H) 01/21/2022 08:46 AM    LYMPHOCYTES 15.20 (L) 01/21/2022 08:46 AM    MONOCYTES 8.90 01/21/2022 08:46 AM    EOSINOPHILS 1.00 01/21/2022 08:46 AM    BASOPHILS 0.40 01/21/2022 08:46 AM    HYPOCHROMIA 1+ 07/15/2013 10:35 AM      Imaging     Reviewed and discussed per HPI    Assessment  and Plan     Laureen Munoz is a 71 y.o. male    1. Essential hypertension  - Controlled, continue with current management.    2. Hypercholesteremia  - Controlled/borderline, continue with current management.    3. IFG (impaired fasting glucose)   - Controlled/borderline, continue with current management.    4. Thrombocytopenia (HCC)   -Borderline, stable, continue follow-up CBC    5. Benign prostatic hyperplasia with lower urinary tract symptoms, symptom details unspecified   -Worsening, continue current treatment, follow-up urology  - Referral to Urology    Counseling:   - Smoking:  Nonsmoker    Followup: In 4 months, with labs    All questions are answered.    Please note that this dictation was created using voice recognition software, and that there might be errors of barbara and possibly content.

## 2022-05-14 DIAGNOSIS — E55.9 HYPOVITAMINOSIS D: ICD-10-CM

## 2022-05-14 DIAGNOSIS — I10 ESSENTIAL HYPERTENSION: Chronic | ICD-10-CM

## 2022-05-15 RX ORDER — ERGOCALCIFEROL 1.25 MG/1
CAPSULE ORAL
Qty: 12 CAPSULE | Refills: 0 | Status: SHIPPED | OUTPATIENT
Start: 2022-05-15 | End: 2022-08-08

## 2022-05-15 RX ORDER — SPIRONOLACTONE 25 MG/1
TABLET ORAL
Qty: 90 TABLET | Refills: 0 | Status: SHIPPED | OUTPATIENT
Start: 2022-05-15 | End: 2022-08-17

## 2022-05-26 ENCOUNTER — APPOINTMENT (OUTPATIENT)
Dept: SLEEP MEDICINE | Facility: MEDICAL CENTER | Age: 72
End: 2022-05-26
Attending: STUDENT IN AN ORGANIZED HEALTH CARE EDUCATION/TRAINING PROGRAM
Payer: MEDICARE

## 2022-06-09 ENCOUNTER — TELEMEDICINE (OUTPATIENT)
Dept: MEDICAL GROUP | Age: 72
End: 2022-06-09

## 2022-06-09 ENCOUNTER — HOME STUDY (OUTPATIENT)
Dept: SLEEP MEDICINE | Facility: MEDICAL CENTER | Age: 72
End: 2022-06-09
Payer: MEDICARE

## 2022-06-09 VITALS — TEMPERATURE: 97.3 F | WEIGHT: 267 LBS | BODY MASS INDEX: 37.38 KG/M2 | HEIGHT: 71 IN

## 2022-06-09 DIAGNOSIS — I10 ESSENTIAL HYPERTENSION: Chronic | ICD-10-CM

## 2022-06-09 DIAGNOSIS — R60.9 PERIPHERAL EDEMA: ICD-10-CM

## 2022-06-09 DIAGNOSIS — G47.30 SLEEP APNEA, UNSPECIFIED TYPE: ICD-10-CM

## 2022-06-09 DIAGNOSIS — I87.8 VENOUS STASIS: ICD-10-CM

## 2022-06-09 DIAGNOSIS — I27.21 PAH (PULMONARY ARTERY HYPERTENSION) (HCC): ICD-10-CM

## 2022-06-09 DIAGNOSIS — G47.00 FREQUENT NOCTURNAL AWAKENING: ICD-10-CM

## 2022-06-09 DIAGNOSIS — G47.30 SLEEP DISORDER BREATHING: ICD-10-CM

## 2022-06-09 PROCEDURE — 99214 OFFICE O/P EST MOD 30 MIN: CPT | Mod: 95 | Performed by: INTERNAL MEDICINE

## 2022-06-09 PROCEDURE — G0399 HOME SLEEP TEST/TYPE 3 PORTA: HCPCS | Performed by: STUDENT IN AN ORGANIZED HEALTH CARE EDUCATION/TRAINING PROGRAM

## 2022-06-09 RX ORDER — GABAPENTIN 300 MG/1
300 CAPSULE ORAL 3 TIMES DAILY
Qty: 90 CAPSULE | Refills: 1 | Status: SHIPPED | OUTPATIENT
Start: 2022-06-09 | End: 2022-07-09

## 2022-06-09 RX ORDER — FUROSEMIDE 20 MG/1
20 TABLET ORAL EVERY MORNING
Qty: 90 TABLET | Refills: 0 | Status: SHIPPED
Start: 2022-06-09 | End: 2022-07-25

## 2022-06-09 ASSESSMENT — FIBROSIS 4 INDEX: FIB4 SCORE: 2.04

## 2022-06-09 NOTE — PROGRESS NOTES
Telemedicine Visit: Established Patient     This Remote Face to Face encounter was conducted via Zoom. Given the importance of social distancing and other strategies recommended to reduce the risk of COVID-19 transmission, I am providing medical care to this patient via audio/video visit in place of an in person visit at the request of the patient. Verbal consent to telehealth, risks, benefits, and consequences were discussed. Patient retains the right to withdraw at any time. All existing confidentiality protections apply. The patient has access to all transmitted medical information. No dissemination of any patient images or information to other entities without further written consent.    CHIEF COMPLAINT     Leg swelling    HPI  Laureen Munoz is a 71 y.o. male who presents today for the following:  · Peripheral (LE) edema / Venous stasis   · PAH  · Hypertension  · Sleep apnea    Reviewed PMH, PSH, FH, SH, ALL, HCM/IMM, no changes  Reviewed MEDS, no changes    Patient Active Problem List    Diagnosis Date Noted   • Pituitary macroadenoma (HCC) 06/09/2021   • Enlarged pituitary gland (HCC) 06/08/2021   • Thrombocytopenia (HCC) 03/09/2021   • IFG (impaired fasting glucose) 05/28/2020   • Rosacea 10/30/2019   • Erectile dysfunction 10/30/2018   • Health care maintenance 10/30/2018   • Tobacco use 10/30/2018   • Obesity (BMI 35.0-39.9 without comorbidity) 07/26/2018   • Hypovitaminosis D 04/11/2014   • Neuropathic pain 07/20/2012   • Essential hypertension 07/20/2012   • Hypercholesteremia 07/20/2012   • Benign prostatic hyperplasia 07/20/2012     CURRENT MEDICATIONS  Current Outpatient Medications   Medication Sig Dispense Refill   • furosemide (LASIX) 20 MG Tab Take 1 Tablet by mouth every morning. 90 Tablet 0   • gabapentin (NEURONTIN) 300 MG Cap Take 1 Capsule by mouth 3 times a day for 30 days. 90 Capsule 1   • vitamin D2, Ergocalciferol, (DRISDOL) 1.25 MG (26437 UT) Cap capsule TAKE ONE TABLET BY MOUTH EVERY  7 DAYS 12 Capsule 0   • spironolactone (ALDACTONE) 25 MG Tab TAKE ONE TABLET BY MOUTH ONE TIME DAILY 90 Tablet 0   • CALCIUM PO Take 1,500 mg by mouth.     • Multiple Vitamin (MULTIVITAMIN ADULT PO) Take  by mouth.     • metoprolol SR (TOPROL XL) 25 MG TABLET SR 24 HR TAKE ONE TABLET BY MOUTH ONE TIME DAILY 90 Tablet 3   • losartan (COZAAR) 100 MG Tab Take 1 Tablet by mouth every day. 90 Tablet 0   • fluticasone (FLONASE) 50 MCG/ACT nasal spray Administer 1 Spray into affected nostril(S) every morning.     • Fexofenadine HCl (ALLERGY 24-HR PO) Take 1 tablet by mouth every morning.     • finasteride (PROSCAR) 5 MG Tab Take 1 tablet by mouth every day. 90 tablet 3     No current facility-administered medications for this visit.     ALLERGIES  Allergies: Nkda [no known drug allergy] and Seasonal  PAST MEDICAL HISTORY  Past Medical History:   Diagnosis Date   • Hypercholesterolemia    • Hypertension    • Tinea inguinalis      SURGICAL HISTORY  He  has a past surgical history that includes other abdominal surgery.  SOCIAL HISTORY  Social History     Tobacco Use   • Smoking status: Former Smoker     Packs/day: 0.25     Types: Cigars     Quit date: 1985     Years since quittin.4   • Smokeless tobacco: Never Used   • Tobacco comment: cigar, smokes one a day but no cigarettes   Vaping Use   • Vaping Use: Never used   Substance Use Topics   • Alcohol use: Yes     Comment: minimal   • Drug use: Not Currently     Types: Marijuana, Inhaled     Comment: rare occasions,occasionally      Social History     Social History Narrative   • Not on file     FAMILY HISTORY  Family History   Problem Relation Age of Onset   • Heart Disease Mother    • Heart Attack Mother    • Heart Disease Father    • Heart Attack Father    • Cancer Sister         leukemia, breast cancer   • Stroke Sister    • No Known Problems Maternal Grandmother    • No Known Problems Maternal Grandfather    • No Known Problems Paternal Grandmother    • Dementia  "Paternal Grandfather    • Hypertension Neg Hx    • Hyperlipidemia Neg Hx    • Diabetes Neg Hx      Family Status   Relation Name Status   • Mo     • Fa     • Sis     • Sis  Alive   • MGMo     • MGFa     • PGMo     • PGFa     • Neg Hx  (Not Specified)     ROS   Constitutional: Negative for fever, chills, fatigue.  HENT: Negative for congestion, sore throat.  Eyes: Negative for vision problems.   Respiratory: Negative for cough, shortness of breath.  Cardiovascular: Negative for chest pain, palpitations.   Gastrointestinal: Negative for heartburn, nausea, abdominal pain.   Genitourinary: Negative for dysuria.  Musculoskeletal: Negative for significant myalgia, back and joint pain.   Skin: Negative for rash.   Neuro: Negative for dizziness, weakness and headaches.   Endo/Heme/Allergies: Does not bruise/bleed easily.   Psychiatric/Behavioral: Negative for depression.    Objective   Vitals obtained by patient:  Temperature 36.3 °C (97.3 °F)   Height 1.803 m (5' 11\")   Weight 121 kg (267 lb)  Body mass index is 37.24 kg/m².   Physical Exam:  Constitutional: Alert, no distress, well-groomed.  Skin: No rash in visible areas.  Eye: Round. Conjunctiva clear, lids normal.  ENMT: Lips pink without lesions, good dentition. Phonation normal.  Neck: No visible masses or thyromegaly. Moves freely without pain.  CV: no peripheral cyanosis, tachycardia.  Respiratory: Unlabored respiratory effort, no cough or audible wheezing.  Psych: Alert and oriented x3, normal affect and mood.   LE: B/L venous stasis / edema    Labs     Labs are reviewed and discussed with a patient  Lab Results   Component Value Date/Time    CHOLSTRLTOT 197 2022 08:46 AM     (H) 2022 08:46 AM    HDL 51 2022 08:46 AM    TRIGLYCERIDE 129 2022 08:46 AM       Lab Results   Component Value Date/Time    SODIUM 137 2022 10:24 AM    POTASSIUM 4.3 2022 10:24 AM    CHLORIDE " 103 05/05/2022 10:24 AM    CO2 20 05/05/2022 10:24 AM    GLUCOSE 88 05/05/2022 10:24 AM    BUN 21 05/05/2022 10:24 AM    CREATININE 0.87 05/05/2022 10:24 AM    CREATININE 0.92 03/01/2010 04:54 PM    BUNCREATRAT 21 03/01/2010 04:54 PM    GLOMRATE >59 03/01/2010 04:54 PM     Lab Results   Component Value Date/Time    ALKPHOSPHAT 94 01/21/2022 08:46 AM    ASTSGOT 26 01/21/2022 08:46 AM    ALTSGPT 30 01/21/2022 08:46 AM    TBILIRUBIN 1.1 01/21/2022 08:46 AM      Lab Results   Component Value Date/Time    HBA1C 6.0 (H) 01/21/2022 08:46 AM    HBA1C 6.0 (H) 09/07/2021 09:56 AM    HBA1C 5.8 (H) 06/09/2021 03:23 AM     No results found for: TSH  Lab Results   Component Value Date/Time    FREET4 1.21 01/21/2022 08:53 AM    FREET4 1.23 06/11/2021 09:26 AM       Lab Results   Component Value Date/Time    WBC 9.4 01/21/2022 08:46 AM    WBC 9.4 03/01/2010 04:54 PM    RBC 5.08 01/21/2022 08:46 AM    RBC 5.39 03/01/2010 04:54 PM    HEMOGLOBIN 15.2 01/21/2022 08:46 AM    HEMATOCRIT 47.1 01/21/2022 08:46 AM    MCV 92.7 01/21/2022 08:46 AM    MCV 89 03/01/2010 04:54 PM    MCH 29.9 01/21/2022 08:46 AM    MCH 29.3 03/01/2010 04:54 PM    MCHC 32.3 (L) 01/21/2022 08:46 AM    MPV 11.3 01/21/2022 08:46 AM    NEUTSPOLYS 73.20 (H) 01/21/2022 08:46 AM    LYMPHOCYTES 15.20 (L) 01/21/2022 08:46 AM    MONOCYTES 8.90 01/21/2022 08:46 AM    EOSINOPHILS 1.00 01/21/2022 08:46 AM    BASOPHILS 0.40 01/21/2022 08:46 AM    HYPOCHROMIA 1+ 07/15/2013 10:35 AM      Imaging      Echocardiography 6/9/21    Assessment and Plan     Laureen Munoz is a 71 y.o. male    1. Peripheral edema  70 y/o, M, with JUAN, today on sleep study, hypertension, c/o worsening venous stasis / LE swelling.  Waiting sleep study, advised:  - ACE wraps / com[ression stocking  - leg elevation  - continue aldactone , start lasix and let me know in 3 days about the response    - furosemide (LASIX) 20 MG Tab; Take 1 Tablet by mouth every morning.  Dispense: 90 Tablet; Refill: 0    2.  Venous stasis  As above  - furosemide (LASIX) 20 MG Tab; Take 1 Tablet by mouth every morning.  Dispense: 90 Tablet; Refill: 0    3. PAH (pulmonary artery hypertension) (HCC)  Reviewed and discussed echocardiography    4. Essential hypertension  Controlled, continue with current management (Aldactone, losartan 100 mg daily, spironolactone 25 mg daily)    5. Sleep apnea, unspecified type  Pending sleep study, performing today    Follow-up: As needed

## 2022-06-13 NOTE — PROCEDURES
DIAGNOSTIC HOME SLEEP TEST (HST) REPORT       PATIENT ID:  NAME:  Laureen Munoz  MRN:               [id number]  YOB: 1950  DATE OF STUDY: 6/9/2022      Impression:     This study shows evidence of:     1. Moderate obstructive sleep apnea with  4% Respiratory Event Index (TATA) of 186 per hour. These findings are based on the recording time (flow evaluation time). It is not possible with this device to determine a traditional apnea+hypopnea index (AHI) for total sleep time since EEG channels are not available.     2. Oxygenation O2 Sat. annalise was 73% and mean O2 sat was 84% and baseline O2 at 87 %. O2 sat was below 88% for 5hr 6 min of the flow evaluation time. Oxygen Desaturation (>=4%) Index was elevated at 19.7/hr. AVG HR was 59 BPM.      TECHNICAL DESCRIPTION: RENTISH apnea link air device used was a type-III home study device. Home sleep study recording included: Airflow recording by nasal pressure transducer; Respiratory Effort by 1 RIP Band; Oxygen Saturation and heart rate by finger oximetry. A position sensor and a snore channel was also used.    Scoring Criteria: A modification of the the AASM Manual for the Scoring of Sleep and Associated Events, 2012, was used.   Obstructive apnea was scored by cessation of airflow for at least 10 seconds with continuing respiratory effort.  Central apnea was scored by cessation of airflow for at least 10 seconds with no effort.  Hypopnea was scored by a 30% or more reduction in airflow for at least 10 seconds accompanied by an arterial oxygen desaturation of 3% or more.  (For Medicare patients, hypopneas were scored by a 30% or more reduction in airflow for at least 10 seconds accompanied by an arterial oxygen saturation of 4% or more, as required by their insurance, CMS.        General sleep summary: . Total recording time is 5 hours and 27 minutes and total flow evaluation time is 5 hours and 15 minutes. The patient spent 0 hours and 20 minutes  in the supine position and 4 hours and 37 minutes in the nonsupine position.    Respiratory events:    Apneas: Total 6 (Obstructive apnea index 1.1/hr, Central apnea index 0 /hr, mixed 0 /hour)  Hypopneas: Total 92 with a Hypopnea index of 17.5 /hr    Recommendations:    1. CPAP titration study vs Auto CPAP trial. Due to severity of nocturnal hypoxia recommend undergoing a in lab titration study.   2.   In general patients with sleep apnea are advised to avoid alcohol and sedatives and to not operate a motor vehicle while drowsy. In some cases alternative treatment options may prove effective in resolving sleep apnea in these options include upper airway surgery, the use of a dental orthotic or weight loss and positional therapy. Clinical correlation is required.         Roby De La Cruz MD

## 2022-07-08 DIAGNOSIS — R35.1 BPH ASSOCIATED WITH NOCTURIA: ICD-10-CM

## 2022-07-08 DIAGNOSIS — N40.1 BPH ASSOCIATED WITH NOCTURIA: ICD-10-CM

## 2022-07-08 RX ORDER — FINASTERIDE 5 MG/1
TABLET, FILM COATED ORAL
Qty: 90 TABLET | Refills: 0 | Status: SHIPPED | OUTPATIENT
Start: 2022-07-08 | End: 2023-02-14

## 2022-07-08 NOTE — TELEPHONE ENCOUNTER
Received request via: Pharmacy    Was the patient seen in the last year in this department? Yes 6/9/22    Does the patient have an active prescription (recently filled or refills available) for medication(s) requested? No

## 2022-07-13 ENCOUNTER — OFFICE VISIT (OUTPATIENT)
Dept: SLEEP MEDICINE | Facility: MEDICAL CENTER | Age: 72
End: 2022-07-13
Payer: MEDICARE

## 2022-07-13 VITALS
SYSTOLIC BLOOD PRESSURE: 124 MMHG | BODY MASS INDEX: 37.38 KG/M2 | DIASTOLIC BLOOD PRESSURE: 70 MMHG | HEIGHT: 71 IN | WEIGHT: 267 LBS | OXYGEN SATURATION: 92 % | RESPIRATION RATE: 16 BRPM | HEART RATE: 62 BPM

## 2022-07-13 DIAGNOSIS — G47.33 OSA (OBSTRUCTIVE SLEEP APNEA): ICD-10-CM

## 2022-07-13 DIAGNOSIS — I27.20 PULMONARY HYPERTENSION (HCC): ICD-10-CM

## 2022-07-13 PROCEDURE — 99214 OFFICE O/P EST MOD 30 MIN: CPT | Performed by: NURSE PRACTITIONER

## 2022-07-13 RX ORDER — GABAPENTIN 300 MG/1
CAPSULE ORAL
COMMUNITY
Start: 2022-07-12 | End: 2023-02-14

## 2022-07-13 ASSESSMENT — FIBROSIS 4 INDEX: FIB4 SCORE: 2.04

## 2022-07-13 NOTE — PROGRESS NOTES
"Chief Complaint   Patient presents with   • Apnea     SS results       HPI:  Laureen Munoz is a 71 y.o. year old male here today for follow-up on study results.  Last seen 4/11/2022 by Dr. De La Cruz.  Patient presented as a referral by . Patient had complaints of snoring, frequent nocturnal awakenings, and unrefreshing sleep.     Previous note, \"he states since the pandemic he has gained significant amount of weight as well as retired due to the pandemic.  Due to no longer working regularly he has not as active.  In addition he had to move in with his ex-wife due to housing issues and states his diet has changed significantly.  Reports eating more desserts which she attributes to his weight gain.\"    Patient states bedtime habits include going to sleep around 11 waking up between 3 and 4 currently.  He will of an get up to use the bathroom and go back to sleep around 4 and wake up around 7 AM.  Denies any excessive daytime sleepiness but does have some overall fatigue during the day and can nap, but denies falling asleep unintentionally.  He denies any nocturnal events such as waking up gasping or snoring, but does have occasional dry mouth.  He also complains of morning headaches.    Patient's comorbid conditions include obesity and difficulty controlling hypertension.      Home sleep study (6/9/2022):  Moderate obstructive sleep apnea with  4% Respiratory Event Index (TATA) of 186 per hour. Oxygenation O2 Sat. annalise was 73% and mean O2 sat was 84% and baseline O2 at 87 %. O2 sat was below 88% for 5hr 6 min of the flow evaluation time. Oxygen Desaturation (>=4%) Index was elevated at 19.7/hr. AVG HR was 59 BPM.    Echocardiogram (6/8/2021):  No prior study is available for comparison.   Normal left ventricular systolic function.  Left ventricular ejection fraction is visually estimated to be 60%. Diastolic function is difficult to assess with arrhythmia. Dilated inferior vena cava with inspiratory " "collapse. Estimated right ventricular systolic pressure is 68 mmHg.    ROS: As per HPI and otherwise negative if not stated.    Past Medical History:   Diagnosis Date   • Hypercholesterolemia    • Hypertension    • Tinea inguinalis        Past Surgical History:   Procedure Laterality Date   • OTHER ABDOMINAL SURGERY      gallbladder       Family History   Problem Relation Age of Onset   • Heart Disease Mother    • Heart Attack Mother    • Heart Disease Father    • Heart Attack Father    • Cancer Sister         leukemia, breast cancer   • Stroke Sister    • No Known Problems Maternal Grandmother    • No Known Problems Maternal Grandfather    • No Known Problems Paternal Grandmother    • Dementia Paternal Grandfather    • Hypertension Neg Hx    • Hyperlipidemia Neg Hx    • Diabetes Neg Hx        Allergies as of 07/13/2022 - Reviewed 07/13/2022   Allergen Reaction Noted   • Nkda [no known drug allergy]  11/22/2007   • Seasonal  09/28/2020        Vitals:  /70 (BP Location: Left arm, Patient Position: Sitting, BP Cuff Size: Large adult)   Pulse 62   Resp 16   Ht 1.803 m (5' 11\")   Wt 121 kg (267 lb)   SpO2 92%     Current medications as of today   Current Outpatient Medications   Medication Sig Dispense Refill   • gabapentin (NEURONTIN) 300 MG Cap      • finasteride (PROSCAR) 5 MG Tab TAKE ONE TABLET BY MOUTH ONE TIME DAILY 90 Tablet 0   • furosemide (LASIX) 20 MG Tab Take 1 Tablet by mouth every morning. 90 Tablet 0   • vitamin D2, Ergocalciferol, (DRISDOL) 1.25 MG (59912 UT) Cap capsule TAKE ONE TABLET BY MOUTH EVERY 7 DAYS 12 Capsule 0   • spironolactone (ALDACTONE) 25 MG Tab TAKE ONE TABLET BY MOUTH ONE TIME DAILY 90 Tablet 0   • CALCIUM PO Take 1,500 mg by mouth.     • Multiple Vitamin (MULTIVITAMIN ADULT PO) Take  by mouth.     • metoprolol SR (TOPROL XL) 25 MG TABLET SR 24 HR TAKE ONE TABLET BY MOUTH ONE TIME DAILY 90 Tablet 3   • losartan (COZAAR) 100 MG Tab Take 1 Tablet by mouth every day. 90 Tablet " 0   • fluticasone (FLONASE) 50 MCG/ACT nasal spray Administer 1 Spray into affected nostril(S) every morning.     • Fexofenadine HCl (ALLERGY 24-HR PO) Take 1 tablet by mouth every morning.       No current facility-administered medications for this visit.         Physical Exam:   Gen:           Alert and oriented, No apparent distress. Mood and affect appropriate, normal interaction with examiner.  Eyes:          PERRL, EOM intact, sclere white, conjunctive moist.  Ears:          Not examined.   Hearing:     Grossly intact.  Nose:          Normal, no lesions or deformities.  Dentition:    Good dentition.  Oropharynx:   Tongue normal, posterior pharynx without erythema or exudate.  Neck:        Supple, trachea midline, no masses.  Respiratory Effort: No intercostal retractions or use of accessory muscles.   Lung Auscultation:      Clear to auscultation bilaterally; no rales, rhonchi or wheezing.  CV:            Regular rate and rhythm. No murmurs, rubs or gallops.  Abd:           Not examined.   Lymphadenopathy: Not examined.  Gait and Station: Normal.  Digits and Nails: No clubbing, cyanosis, petechiae, or nodes.   Cranial Nerves: II-XII grossly intact.  Skin:        No rashes, lesions or ulcers noted.               Ext:           No cyanosis or edema.      Assessment:  1. JUAN (obstructive sleep apnea)         Plan:  1.  I reviewed with the patient the pathophysiology of obstructive sleep apnea, as well as potential cardiac and neurologic risks associated with untreated sleep apnea including CAD, HTN, pulmonary arterial hypertension, cardiac arrhythmias, heart attack or stroke.  JUAN patient's have increased risk of motor vehicle accidents, DM type II, chronic kidney disease and nonalcoholic liver disease.  He is cautioned against driving while sleepy for his safety and safety of others on the road. We reviewed treatment modalities for sleep apnea including CPAP/BiPAP therapy, ENT referral, dental appliance.      At  this time, patient would like to wait 6 months and make a decision at that time.  Patient was advised that he does have evidence of pulmonary hypertension on echocardiogram.  It was also advised to do in lab titration due to low oxygen levels during sleep study.  Patient does have history of obesity and poorly controlled hypertension and I did notify him that untreated sleep apnea will add because these problems, as well as prevent optimal treatment or control of these problems.  He verbalized understanding.       Please note that this dictation was created using voice recognition software. I have made every reasonable attempt to correct obvious errors, but it is possible there are errors of grammar and possibly content that I did not discover before finalizing the note.

## 2022-07-13 NOTE — Clinical Note
No, just wanted to update you on our mutual patient.  The patient has evidence of moderate JUAN and nocturnal hypoxia.  There is also evidence of pulmonary hypertension on previous echocardiogram.  I did recommend titration study for this patient, but he would like to wait 6 months to consider treatment.

## 2022-07-21 DIAGNOSIS — I10 ESSENTIAL HYPERTENSION: ICD-10-CM

## 2022-07-21 RX ORDER — LOSARTAN POTASSIUM 100 MG/1
TABLET ORAL
Qty: 90 TABLET | Refills: 1 | Status: SHIPPED | OUTPATIENT
Start: 2022-07-21 | End: 2023-02-14 | Stop reason: SDUPTHER

## 2022-07-25 ENCOUNTER — OFFICE VISIT (OUTPATIENT)
Dept: URGENT CARE | Facility: CLINIC | Age: 72
End: 2022-07-25
Payer: MEDICARE

## 2022-07-25 VITALS
SYSTOLIC BLOOD PRESSURE: 114 MMHG | OXYGEN SATURATION: 91 % | HEIGHT: 71 IN | BODY MASS INDEX: 37.1 KG/M2 | TEMPERATURE: 97.6 F | WEIGHT: 265 LBS | RESPIRATION RATE: 18 BRPM | DIASTOLIC BLOOD PRESSURE: 58 MMHG | HEART RATE: 55 BPM

## 2022-07-25 DIAGNOSIS — L03.031 CELLULITIS OF TOE OF RIGHT FOOT: ICD-10-CM

## 2022-07-25 DIAGNOSIS — B35.1 ONYCHOMYCOSIS: ICD-10-CM

## 2022-07-25 PROCEDURE — 99213 OFFICE O/P EST LOW 20 MIN: CPT | Performed by: FAMILY MEDICINE

## 2022-07-25 RX ORDER — ALFUZOSIN HYDROCHLORIDE 10 MG/1
TABLET, EXTENDED RELEASE ORAL
COMMUNITY
Start: 2022-07-20 | End: 2022-07-25

## 2022-07-25 RX ORDER — CEPHALEXIN 250 MG/1
250 CAPSULE ORAL 4 TIMES DAILY
Qty: 20 CAPSULE | Refills: 0 | Status: SHIPPED | OUTPATIENT
Start: 2022-07-25 | End: 2022-07-30

## 2022-07-25 ASSESSMENT — FIBROSIS 4 INDEX: FIB4 SCORE: 2.04

## 2022-07-25 NOTE — PROGRESS NOTES
"  Subjective:      71 y.o. male presents to urgent care for pain and swelling in his right big toe.  He states it has been there for the last couple of weeks.  Initially he was trimming his toenail when he accidentally cut the toe.  His pain is intermittent, comes when he wears shoes, is described as both dull and achy, currently rated 3/10.  He has been doing Epson salt baths once weekly and using Neosporin.  He states he also has had a fungus growing on that nail for several years.    He denies any other questions or concerns at this time.    Current problem list, medication, and past medical/surgical history were reviewed in Epic.    ROS  See HPI     Objective:      /58   Pulse (!) 55   Temp 36.4 °C (97.6 °F) (Temporal)   Resp 18   Ht 1.803 m (5' 11\")   Wt 120 kg (265 lb)   SpO2 91%   BMI 36.96 kg/m²     Physical Exam  Constitutional:       General: He is not in acute distress.     Appearance: He is not diaphoretic.   Cardiovascular:      Rate and Rhythm: Normal rate and regular rhythm.      Heart sounds: Normal heart sounds.   Pulmonary:      Effort: Pulmonary effort is normal. No respiratory distress.      Breath sounds: Normal breath sounds.   Skin:     Comments: Thickening and yellowing of the right, great toenail.  There is surrounding erythema and edema to the right toe.  No open areas or discharge.  No areas of fluctuance.   Neurological:      Mental Status: He is alert.   Psychiatric:         Mood and Affect: Affect normal.         Judgment: Judgment normal.       Assessment/Plan:     1. Cellulitis of toe of right foot  2. Onychomycosis  Does not appear to be any collection of infection that would benefit from incision and drainage.  She was encouraged to increase foot soaks to once daily.  Prescription for Keflex has been sent.  Referral to podiatry has been sent for further nail care.  - Referral to Podiatry  - cephALEXin (KEFLEX) 250 MG Cap; Take 1 Capsule by mouth 4 times a day for 5 " days.  Dispense: 20 Capsule; Refill: 0      Instructed to return to Urgent Care or nearest Emergency Department if symptoms fail to improve, for any change in condition, further concerns, or new concerning symptoms. Patient states understanding of the plan of care and discharge instructions.    Olivia Holland M.D.

## 2022-08-06 DIAGNOSIS — E55.9 HYPOVITAMINOSIS D: ICD-10-CM

## 2022-08-08 RX ORDER — ERGOCALCIFEROL 1.25 MG/1
CAPSULE ORAL
Qty: 12 CAPSULE | Refills: 0 | Status: SHIPPED | OUTPATIENT
Start: 2022-08-08 | End: 2023-02-14

## 2022-08-16 DIAGNOSIS — I10 ESSENTIAL HYPERTENSION: Chronic | ICD-10-CM

## 2022-08-17 RX ORDER — SPIRONOLACTONE 25 MG/1
TABLET ORAL
Qty: 90 TABLET | Refills: 0 | Status: SHIPPED | OUTPATIENT
Start: 2022-08-17 | End: 2023-02-14

## 2022-08-28 ENCOUNTER — PATIENT MESSAGE (OUTPATIENT)
Dept: MEDICAL GROUP | Age: 72
End: 2022-08-28
Payer: MEDICARE

## 2022-08-28 DIAGNOSIS — M79.671 PAIN IN BOTH FEET: ICD-10-CM

## 2022-08-28 DIAGNOSIS — M79.672 PAIN IN BOTH FEET: ICD-10-CM

## 2022-08-29 NOTE — PATIENT COMMUNICATION
How would the patient prefer to be contacted with a response: MyChart message    2. SPECIFIC Action To Be Taken: Referral pending, please sign.    3. Diagnosis/Clinical Reason for Request: Bilateral foot pain    4. Specialty & Provider Name/Lab/Imaging Location: Podiatry    5. Is appointment scheduled for requested order/referral: no    Patient was informed they will receive a return phone call from the office ONLY if there are any questions before processing their request. Advised to call back if they haven't received a call from the referral department in 5 days.

## 2022-09-23 ENCOUNTER — HOSPITAL ENCOUNTER (OUTPATIENT)
Dept: LAB | Facility: MEDICAL CENTER | Age: 72
End: 2022-09-23
Attending: INTERNAL MEDICINE
Payer: MEDICARE

## 2022-09-23 LAB
CORTIS SERPL-MCNC: 14.3 UG/DL (ref 0–23)
FSH SERPL-ACNC: 30.8 MIU/ML (ref 1.5–12.4)
LH SERPL-ACNC: 16.3 IU/L (ref 1.7–8.6)
PROLACTIN SERPL-MCNC: 10.3 NG/ML (ref 2.1–17.7)
T3FREE SERPL-MCNC: 3.63 PG/ML (ref 2–4.4)
T4 FREE SERPL-MCNC: 1.11 NG/DL (ref 0.93–1.7)
TESTOST SERPL-MCNC: 345 NG/DL (ref 175–781)
TSH SERPL DL<=0.005 MIU/L-ACNC: 1.76 UIU/ML (ref 0.38–5.33)

## 2022-09-23 PROCEDURE — 83001 ASSAY OF GONADOTROPIN (FSH): CPT

## 2022-09-23 PROCEDURE — 84270 ASSAY OF SEX HORMONE GLOBUL: CPT

## 2022-09-23 PROCEDURE — 84481 FREE ASSAY (FT-3): CPT

## 2022-09-23 PROCEDURE — 82533 TOTAL CORTISOL: CPT

## 2022-09-23 PROCEDURE — 84146 ASSAY OF PROLACTIN: CPT

## 2022-09-23 PROCEDURE — 82024 ASSAY OF ACTH: CPT

## 2022-09-23 PROCEDURE — 36415 COLL VENOUS BLD VENIPUNCTURE: CPT

## 2022-09-23 PROCEDURE — 83002 ASSAY OF GONADOTROPIN (LH): CPT

## 2022-09-23 PROCEDURE — 84439 ASSAY OF FREE THYROXINE: CPT

## 2022-09-23 PROCEDURE — 84443 ASSAY THYROID STIM HORMONE: CPT

## 2022-09-23 PROCEDURE — 84403 ASSAY OF TOTAL TESTOSTERONE: CPT

## 2022-09-28 LAB
ACTH PLAS-MCNC: 41 PG/ML (ref 7.2–63.3)
SHBG SERPL-SCNC: 72 NMOL/L (ref 19–76)

## 2022-10-04 ENCOUNTER — HOSPITAL ENCOUNTER (OUTPATIENT)
Dept: LAB | Facility: MEDICAL CENTER | Age: 72
End: 2022-10-04
Attending: NURSE PRACTITIONER
Payer: MEDICARE

## 2022-10-04 LAB
BASOPHILS # BLD AUTO: 0.6 % (ref 0–1.8)
BASOPHILS # BLD: 0.04 K/UL (ref 0–0.12)
CRP SERPL HS-MCNC: 0.81 MG/DL (ref 0–0.75)
EOSINOPHIL # BLD AUTO: 0.08 K/UL (ref 0–0.51)
EOSINOPHIL NFR BLD: 1.2 % (ref 0–6.9)
ERYTHROCYTE [DISTWIDTH] IN BLOOD BY AUTOMATED COUNT: 47.5 FL (ref 35.9–50)
ERYTHROCYTE [SEDIMENTATION RATE] IN BLOOD BY WESTERGREN METHOD: 12 MM/HOUR (ref 0–20)
HCT VFR BLD AUTO: 43.8 % (ref 42–52)
HGB BLD-MCNC: 14.5 G/DL (ref 14–18)
IMM GRANULOCYTES # BLD AUTO: 0.12 K/UL (ref 0–0.11)
IMM GRANULOCYTES NFR BLD AUTO: 1.8 % (ref 0–0.9)
LYMPHOCYTES # BLD AUTO: 1.51 K/UL (ref 1–4.8)
LYMPHOCYTES NFR BLD: 22.3 % (ref 22–41)
MCH RBC QN AUTO: 31.5 PG (ref 27–33)
MCHC RBC AUTO-ENTMCNC: 33.1 G/DL (ref 33.7–35.3)
MCV RBC AUTO: 95.2 FL (ref 81.4–97.8)
MONOCYTES # BLD AUTO: 0.74 K/UL (ref 0–0.85)
MONOCYTES NFR BLD AUTO: 10.9 % (ref 0–13.4)
NEUTROPHILS # BLD AUTO: 4.29 K/UL (ref 1.82–7.42)
NEUTROPHILS NFR BLD: 63.2 % (ref 44–72)
NRBC # BLD AUTO: 0 K/UL
NRBC BLD-RTO: 0 /100 WBC
PLATELET # BLD AUTO: 174 K/UL (ref 164–446)
PMV BLD AUTO: 11.2 FL (ref 9–12.9)
RBC # BLD AUTO: 4.6 M/UL (ref 4.7–6.1)
RHEUMATOID FACT SER IA-ACNC: <10 IU/ML (ref 0–14)
URATE SERPL-MCNC: 7.3 MG/DL (ref 2.5–8.3)
WBC # BLD AUTO: 6.8 K/UL (ref 4.8–10.8)

## 2022-10-04 PROCEDURE — 86038 ANTINUCLEAR ANTIBODIES: CPT

## 2022-10-04 PROCEDURE — 36415 COLL VENOUS BLD VENIPUNCTURE: CPT

## 2022-10-04 PROCEDURE — 85025 COMPLETE CBC W/AUTO DIFF WBC: CPT

## 2022-10-04 PROCEDURE — 86140 C-REACTIVE PROTEIN: CPT

## 2022-10-04 PROCEDURE — 84550 ASSAY OF BLOOD/URIC ACID: CPT

## 2022-10-04 PROCEDURE — 85652 RBC SED RATE AUTOMATED: CPT

## 2022-10-04 PROCEDURE — 86431 RHEUMATOID FACTOR QUANT: CPT

## 2022-10-06 LAB — NUCLEAR IGG SER QL IA: NORMAL

## 2022-10-10 ENCOUNTER — APPOINTMENT (RX ONLY)
Dept: URBAN - METROPOLITAN AREA CLINIC 4 | Facility: CLINIC | Age: 72
Setting detail: DERMATOLOGY
End: 2022-10-10

## 2022-10-10 DIAGNOSIS — L21.8 OTHER SEBORRHEIC DERMATITIS: ICD-10-CM

## 2022-10-10 PROBLEM — L30.9 DERMATITIS, UNSPECIFIED: Status: ACTIVE | Noted: 2022-10-10

## 2022-10-10 PROCEDURE — ? COUNSELING

## 2022-10-10 PROCEDURE — ? PRESCRIPTION

## 2022-10-10 PROCEDURE — 99213 OFFICE O/P EST LOW 20 MIN: CPT

## 2022-10-10 RX ORDER — HYDROCORTISONE 25 MG/G
CREAM TOPICAL QPM
Qty: 30 | Refills: 0 | Status: ERX | COMMUNITY
Start: 2022-10-10

## 2022-10-10 RX ORDER — KETOCONAZOLE 20 MG/ML
SHAMPOO, SUSPENSION TOPICAL TIW
Qty: 120 | Refills: 2 | Status: ERX | COMMUNITY
Start: 2022-10-10

## 2022-10-10 RX ADMIN — HYDROCORTISONE 1: 25 CREAM TOPICAL at 00:00

## 2022-10-10 RX ADMIN — KETOCONAZOLE 1: 20 SHAMPOO, SUSPENSION TOPICAL at 00:00

## 2022-10-10 ASSESSMENT — LOCATION SIMPLE DESCRIPTION DERM
LOCATION SIMPLE: POSTERIOR SCALP
LOCATION SIMPLE: POSTERIOR NECK
LOCATION SIMPLE: RIGHT FOREHEAD
LOCATION SIMPLE: LEFT FOREHEAD

## 2022-10-10 ASSESSMENT — LOCATION DETAILED DESCRIPTION DERM
LOCATION DETAILED: LEFT SUPERIOR FOREHEAD
LOCATION DETAILED: MID POSTERIOR NECK
LOCATION DETAILED: POSTERIOR MID-PARIETAL SCALP
LOCATION DETAILED: RIGHT SUPERIOR FOREHEAD

## 2022-10-10 ASSESSMENT — LOCATION ZONE DERM
LOCATION ZONE: SCALP
LOCATION ZONE: FACE
LOCATION ZONE: NECK

## 2022-10-10 NOTE — PROCEDURE: MIPS QUALITY
Quality 110: Preventive Care And Screening: Influenza Immunization: Influenza Immunization Administered during Influenza season
Detail Level: Detailed
Quality 226: Preventive Care And Screening: Tobacco Use: Screening And Cessation Intervention: Patient screened for tobacco use and is an ex/non-smoker
Quality 111:Pneumonia Vaccination Status For Older Adults: Pneumococcal vaccine (PPSV23) administered on or after patient’s 60th birthday and before the end of the measurement period
Quality 130: Documentation Of Current Medications In The Medical Record: Current Medications Documented
Quality 431: Preventive Care And Screening: Unhealthy Alcohol Use - Screening: Patient not identified as an unhealthy alcohol user when screened for unhealthy alcohol use using a systematic screening method

## 2022-11-07 ENCOUNTER — APPOINTMENT (RX ONLY)
Dept: URBAN - METROPOLITAN AREA CLINIC 4 | Facility: CLINIC | Age: 72
Setting detail: DERMATOLOGY
End: 2022-11-07

## 2022-11-07 DIAGNOSIS — L28.1 PRURIGO NODULARIS: ICD-10-CM

## 2022-11-07 DIAGNOSIS — L21.8 OTHER SEBORRHEIC DERMATITIS: ICD-10-CM

## 2022-11-07 DIAGNOSIS — L57.0 ACTINIC KERATOSIS: ICD-10-CM

## 2022-11-07 PROBLEM — L30.9 DERMATITIS, UNSPECIFIED: Status: ACTIVE | Noted: 2022-11-07

## 2022-11-07 PROCEDURE — 17000 DESTRUCT PREMALG LESION: CPT | Mod: 59

## 2022-11-07 PROCEDURE — ? ADDITIONAL NOTES

## 2022-11-07 PROCEDURE — 17110 DESTRUCTION B9 LES UP TO 14: CPT

## 2022-11-07 PROCEDURE — 99213 OFFICE O/P EST LOW 20 MIN: CPT | Mod: 25

## 2022-11-07 PROCEDURE — ? LIQUID NITROGEN

## 2022-11-07 PROCEDURE — 17003 DESTRUCT PREMALG LES 2-14: CPT | Mod: 59

## 2022-11-07 PROCEDURE — ? COUNSELING

## 2022-11-07 ASSESSMENT — LOCATION DETAILED DESCRIPTION DERM
LOCATION DETAILED: RIGHT MIDDLE DISTAL INTERPHALANGEAL JOINT
LOCATION DETAILED: RIGHT INFERIOR FOREHEAD
LOCATION DETAILED: POSTERIOR MID-PARIETAL SCALP
LOCATION DETAILED: SUPERIOR MID FOREHEAD
LOCATION DETAILED: RIGHT POSTERIOR NECK
LOCATION DETAILED: LEFT ULNAR DORSAL HAND
LOCATION DETAILED: LEFT INFERIOR POSTERIOR NECK
LOCATION DETAILED: LEFT FOREHEAD
LOCATION DETAILED: MID POSTERIOR NECK
LOCATION DETAILED: LEFT DISTAL POSTERIOR UPPER ARM
LOCATION DETAILED: RIGHT PROXIMAL DORSAL INDEX FINGER
LOCATION DETAILED: LEFT DISTAL DORSAL FOREARM
LOCATION DETAILED: RIGHT SUPERIOR FOREHEAD
LOCATION DETAILED: LEFT SUPERIOR FOREHEAD
LOCATION DETAILED: LEFT SUPERIOR LATERAL MALAR CHEEK
LOCATION DETAILED: LEFT DORSAL MIDDLE METACARPOPHALANGEAL JOINT
LOCATION DETAILED: RIGHT MIDDLE PROXIMAL INTERPHALANGEAL JOINT
LOCATION DETAILED: RIGHT ULNAR DORSAL HAND

## 2022-11-07 ASSESSMENT — LOCATION ZONE DERM
LOCATION ZONE: FACE
LOCATION ZONE: NECK
LOCATION ZONE: ARM
LOCATION ZONE: FINGER
LOCATION ZONE: HAND
LOCATION ZONE: SCALP

## 2022-11-07 ASSESSMENT — LOCATION SIMPLE DESCRIPTION DERM
LOCATION SIMPLE: RIGHT HAND
LOCATION SIMPLE: POSTERIOR SCALP
LOCATION SIMPLE: LEFT FOREHEAD
LOCATION SIMPLE: RIGHT INDEX FINGER
LOCATION SIMPLE: SUPERIOR FOREHEAD
LOCATION SIMPLE: RIGHT MIDDLE FINGER
LOCATION SIMPLE: POSTERIOR NECK
LOCATION SIMPLE: LEFT FOREARM
LOCATION SIMPLE: LEFT UPPER ARM
LOCATION SIMPLE: RIGHT FOREHEAD
LOCATION SIMPLE: LEFT HAND
LOCATION SIMPLE: LEFT CHEEK

## 2022-11-07 NOTE — PROCEDURE: LIQUID NITROGEN
Spray Paint Text: The liquid nitrogen was applied to the skin utilizing a spray paint frosting technique.
Render Post-Care Instructions In Note?: no
Medical Necessity Clause: This procedure was medically necessary because the lesions that were treated were:
Show Spray Paint Technique Variable?: Yes
Medical Necessity Information: It is in your best interest to select a reason for this procedure from the list below. All of these items fulfill various CMS LCD requirements except the new and changing color options.
Consent: The patient's consent was obtained including but not limited to risks of crusting, scabbing, blistering, scarring, darker or lighter pigmentary change, recurrence, incomplete removal and infection.
Detail Level: Detailed
Post-Care Instructions: I reviewed with the patient in detail post-care instructions. Patient is to wear sunprotection, and avoid picking at any of the treated lesions. Pt may apply Vaseline to crusted or scabbing areas.
Number Of Freeze-Thaw Cycles: 1 freeze-thaw cycle
Aperture Size (Optional): C
Application Tool (Optional): Cry-AC
Duration Of Freeze Thaw-Cycle (Seconds): 3

## 2022-11-08 ENCOUNTER — PATIENT MESSAGE (OUTPATIENT)
Dept: HEALTH INFORMATION MANAGEMENT | Facility: OTHER | Age: 72
End: 2022-11-08

## 2023-02-14 ENCOUNTER — OFFICE VISIT (OUTPATIENT)
Dept: URGENT CARE | Facility: CLINIC | Age: 73
End: 2023-02-14
Payer: MEDICARE

## 2023-02-14 VITALS
RESPIRATION RATE: 18 BRPM | OXYGEN SATURATION: 91 % | HEART RATE: 60 BPM | SYSTOLIC BLOOD PRESSURE: 152 MMHG | TEMPERATURE: 97.6 F | BODY MASS INDEX: 36.4 KG/M2 | WEIGHT: 260 LBS | DIASTOLIC BLOOD PRESSURE: 84 MMHG | HEIGHT: 71 IN

## 2023-02-14 DIAGNOSIS — Z76.0 ENCOUNTER FOR MEDICATION REFILL: ICD-10-CM

## 2023-02-14 DIAGNOSIS — I10 ESSENTIAL HYPERTENSION: ICD-10-CM

## 2023-02-14 DIAGNOSIS — I10 ELEVATED BLOOD PRESSURE READING IN OFFICE WITH DIAGNOSIS OF HYPERTENSION: ICD-10-CM

## 2023-02-14 PROCEDURE — 99214 OFFICE O/P EST MOD 30 MIN: CPT | Performed by: NURSE PRACTITIONER

## 2023-02-14 RX ORDER — KETOCONAZOLE 20 MG/G
CREAM TOPICAL
COMMUNITY
End: 2023-08-03

## 2023-02-14 RX ORDER — ALFUZOSIN HYDROCHLORIDE 10 MG/1
TABLET, EXTENDED RELEASE ORAL
COMMUNITY
Start: 2023-02-03 | End: 2023-02-14

## 2023-02-14 RX ORDER — LOSARTAN POTASSIUM 100 MG/1
100 TABLET ORAL DAILY
Qty: 90 EACH | Refills: 1 | Status: SHIPPED | OUTPATIENT
Start: 2023-02-14 | End: 2023-02-21 | Stop reason: SDUPTHER

## 2023-02-14 RX ORDER — AMOXICILLIN AND CLAVULANATE POTASSIUM 875; 125 MG/1; MG/1
TABLET, FILM COATED ORAL
COMMUNITY
Start: 2022-12-28 | End: 2023-02-14

## 2023-02-14 RX ORDER — ALFUZOSIN HYDROCHLORIDE 10 MG/1
TABLET, EXTENDED RELEASE ORAL
COMMUNITY

## 2023-02-14 RX ORDER — FUROSEMIDE 20 MG/1
TABLET ORAL
COMMUNITY
End: 2023-02-14

## 2023-02-14 ASSESSMENT — FIBROSIS 4 INDEX: FIB4 SCORE: 1.94

## 2023-02-14 NOTE — PROGRESS NOTES
Patient has consented to treatment and for use of patient information for treatment and billing purposes.    Date: 02/14/23     Arrival Mode: Private Vehicle    Chief Complaint:    Chief Complaint   Patient presents with    Other     Pt states he took his blood pressure last night and 190/95 , states that his blood pressure was lower this morning but still elevated, states he needs a medication refill         History of Present Illness: 72 y.o.  male presents to clinic with an elevated blood pressure last night.  Patient states he has been out of his Cozaar for several weeks.  He is continue taking his metoprolol.  States that his primary care recent when he left the practice.  He has not established with a new PCP.  Denies any headaches vision changes shortness of breath or chest pain.  He denies any lower leg swelling.       ROS:    As stated in HPI     Pertinent Medical History:  Past Medical History:   Diagnosis Date    Hypercholesterolemia     Hypertension     Tinea inguinalis         Pertinent Surgical History:  Past Surgical History:   Procedure Laterality Date    OTHER ABDOMINAL SURGERY      gallbladder        Pertinent Medications:    Current Outpatient Medications on File Prior to Visit   Medication Sig Dispense Refill    alfuzosin (UROXATRAL) 10 MG SR tablet alfuzosin ER 10 mg tablet,extended release 24 hr      ketoconazole (NIZORAL) 2 % Cream ketoconazole 2 % topical cream      CALCIUM PO Take 1,500 mg by mouth.      Multiple Vitamin (MULTIVITAMIN ADULT PO) Take  by mouth.      metoprolol SR (TOPROL XL) 25 MG TABLET SR 24 HR TAKE ONE TABLET BY MOUTH ONE TIME DAILY 90 Tablet 3    fluticasone (FLONASE) 50 MCG/ACT nasal spray Administer 1 Spray into affected nostril(S) every morning.       No current facility-administered medications on file prior to visit.        Allergies:    Nkda [no known drug allergy] and Seasonal     Social History:  Social History     Tobacco Use    Smoking status: Former      Packs/day: 0.25     Types: Cigars, Cigarettes     Quit date: 1985     Years since quittin.1    Smokeless tobacco: Never    Tobacco comments:     cigar, smokes one a day but no cigarettes   Vaping Use    Vaping Use: Never used   Substance Use Topics    Alcohol use: Yes     Comment: minimal    Drug use: Not Currently     Types: Marijuana, Inhaled     Comment: rare occasions,occasionally         No LMP for male patient.           Physical Exam:    Vitals:    23 1045   BP: (!) 152/84   Pulse:    Resp:    Temp:    SpO2:              Physical Exam  Constitutional:       General: He is awake.      Appearance: Normal appearance. He is not ill-appearing, toxic-appearing or diaphoretic.   HENT:      Head: Normocephalic and atraumatic.      Right Ear: Tympanic membrane, ear canal and external ear normal.      Left Ear: Tympanic membrane, ear canal and external ear normal.   Eyes:      General: Lids are normal. Gaze aligned appropriately. No allergic shiner or scleral icterus.     Extraocular Movements: Extraocular movements intact.      Conjunctiva/sclera: Conjunctivae normal.      Pupils: Pupils are equal, round, and reactive to light.   Cardiovascular:      Rate and Rhythm: Normal rate and regular rhythm.      Pulses:           Radial pulses are 2+ on the right side and 2+ on the left side.      Heart sounds: Normal heart sounds.   Pulmonary:      Effort: Pulmonary effort is normal.      Breath sounds: Normal breath sounds and air entry. No decreased breath sounds, wheezing, rhonchi or rales.   Musculoskeletal:      Right lower leg: No edema.      Left lower leg: No edema.   Lymphadenopathy:      Cervical: No cervical adenopathy.   Skin:     General: Skin is warm.      Capillary Refill: Capillary refill takes less than 2 seconds.      Coloration: Skin is not cyanotic or pale.   Neurological:      Mental Status: He is alert and oriented to person, place, and time.      Gait: Gait is intact.   Psychiatric:          Behavior: Behavior normal. Behavior is cooperative.                Diagnostics:    None     Medical Decision making and clinic course :    I personally reviewed prior external notes and test results pertinent to today's visit. Pt is clinically stable at today's acute urgent care visit.  No acute distress noted. Appropriate for outpatient care at this time. Shared decision-making was utilized with patient for treatment plan.  Pleasant nontoxic-appearing 72-year-old male presenting to clinic for medication refill.  We will refill patient's Cozaar as well as refer to establish with a new primary care.  Acute exacerbation of chronic hypertension.      Blood pressure is noted to be elevated upon arrival to clinic. Patient educated on  concerning signs and symptoms of high blood pressure. Patient is educated  to follow up with PCP in the next 30 days to discuss elevated blood pressure.        The patient remained stable during the urgent care visit.    Plan:    Medication discussed included indication for use and the potential  benefits and side effects.       1. Elevated blood pressure reading in office with diagnosis of hypertension    - Referral to establish with [facility] PCP    2. Essential hypertension    - losartan (COZAAR) 100 MG Tab; Take 1 Tablet by mouth every day for 90 days.  Dispense: 90 Each; Refill: 1  - Referral to establish with [facility] PCP    3. Encounter for medication refill        All of the patient's questions were answered to their satisfaction at the time of discharge.    Follow up:    Recommended f/u in  30-60 days with pcp     Patient was encouraged to monitor symptoms closely. Those signs and symptoms which would warrant concern and mandate seeking a higher level of service through the emergency department discussed at length and included in discharge papers.  Patient stated agreement and understanding of this plan of care.    Disposition:  Home in stable condition       Voice Recognition  Disclaimer:  Portions of this document were created using voice recognition software. The software does have a chance of producing errors of grammar and possibly content. I have made every reasonable attempt to correct obvious errors, but there may be errors of grammar and possibly content that I did not discover before finalizing the documentation.    ANISA Wilson.

## 2023-02-21 ENCOUNTER — OFFICE VISIT (OUTPATIENT)
Dept: MEDICAL GROUP | Facility: IMAGING CENTER | Age: 73
End: 2023-02-21
Attending: NURSE PRACTITIONER
Payer: MEDICARE

## 2023-02-21 VITALS
SYSTOLIC BLOOD PRESSURE: 134 MMHG | HEART RATE: 59 BPM | TEMPERATURE: 98.2 F | DIASTOLIC BLOOD PRESSURE: 80 MMHG | RESPIRATION RATE: 16 BRPM | BODY MASS INDEX: 37.14 KG/M2 | HEIGHT: 71 IN | OXYGEN SATURATION: 92 % | WEIGHT: 265.3 LBS

## 2023-02-21 DIAGNOSIS — N40.1 BENIGN PROSTATIC HYPERPLASIA WITH URINARY FREQUENCY: Chronic | ICD-10-CM

## 2023-02-21 DIAGNOSIS — Z23 NEED FOR VACCINATION: ICD-10-CM

## 2023-02-21 DIAGNOSIS — F32.9 REACTIVE DEPRESSION: ICD-10-CM

## 2023-02-21 DIAGNOSIS — R35.0 BENIGN PROSTATIC HYPERPLASIA WITH URINARY FREQUENCY: Chronic | ICD-10-CM

## 2023-02-21 DIAGNOSIS — E23.6 ENLARGED PITUITARY GLAND (HCC): ICD-10-CM

## 2023-02-21 DIAGNOSIS — I10 ESSENTIAL HYPERTENSION: Chronic | ICD-10-CM

## 2023-02-21 DIAGNOSIS — E66.9 OBESITY (BMI 35.0-39.9 WITHOUT COMORBIDITY): ICD-10-CM

## 2023-02-21 DIAGNOSIS — D35.2 PITUITARY MACROADENOMA (HCC): ICD-10-CM

## 2023-02-21 DIAGNOSIS — D69.6 THROMBOCYTOPENIA (HCC): ICD-10-CM

## 2023-02-21 DIAGNOSIS — M79.2 NEUROPATHIC PAIN: Chronic | ICD-10-CM

## 2023-02-21 DIAGNOSIS — Z13.6 SCREENING FOR AAA (ABDOMINAL AORTIC ANEURYSM): ICD-10-CM

## 2023-02-21 DIAGNOSIS — E78.00 HYPERCHOLESTEREMIA: ICD-10-CM

## 2023-02-21 DIAGNOSIS — Z13.31 DEPRESSION SCREENING: ICD-10-CM

## 2023-02-21 DIAGNOSIS — Z76.89 ENCOUNTER TO ESTABLISH CARE WITH NEW DOCTOR: ICD-10-CM

## 2023-02-21 DIAGNOSIS — E55.9 HYPOVITAMINOSIS D: ICD-10-CM

## 2023-02-21 PROBLEM — Z00.00 HEALTH CARE MAINTENANCE: Status: RESOLVED | Noted: 2018-10-30 | Resolved: 2023-02-21

## 2023-02-21 PROCEDURE — 90471 IMMUNIZATION ADMIN: CPT | Performed by: CLINICAL NURSE SPECIALIST

## 2023-02-21 PROCEDURE — 99214 OFFICE O/P EST MOD 30 MIN: CPT | Mod: 25 | Performed by: CLINICAL NURSE SPECIALIST

## 2023-02-21 PROCEDURE — 90715 TDAP VACCINE 7 YRS/> IM: CPT | Performed by: CLINICAL NURSE SPECIALIST

## 2023-02-21 RX ORDER — ROSUVASTATIN CALCIUM 10 MG/1
10 TABLET, COATED ORAL EVERY EVENING
Qty: 30 TABLET | Refills: 11 | Status: SHIPPED | OUTPATIENT
Start: 2023-02-21 | End: 2023-04-04 | Stop reason: SDUPTHER

## 2023-02-21 RX ORDER — KETOCONAZOLE 20 MG/ML
SHAMPOO TOPICAL
COMMUNITY
Start: 2023-02-18 | End: 2023-08-03

## 2023-02-21 RX ORDER — AMLODIPINE BESYLATE 5 MG/1
5 TABLET ORAL DAILY
Qty: 30 TABLET | Refills: 0 | Status: SHIPPED | OUTPATIENT
Start: 2023-02-21 | End: 2023-03-13

## 2023-02-21 RX ORDER — LOSARTAN POTASSIUM 100 MG/1
100 TABLET ORAL DAILY
Qty: 100 EACH | Refills: 1 | Status: SHIPPED
Start: 2023-02-21 | End: 2023-02-21

## 2023-02-21 ASSESSMENT — PATIENT HEALTH QUESTIONNAIRE - PHQ9: CLINICAL INTERPRETATION OF PHQ2 SCORE: 0

## 2023-02-21 ASSESSMENT — PAIN SCALES - GENERAL: PAINLEVEL: 4=SLIGHT-MODERATE PAIN

## 2023-02-21 ASSESSMENT — FIBROSIS 4 INDEX: FIB4 SCORE: 1.94

## 2023-02-21 NOTE — ASSESSMENT & PLAN NOTE
Reports gained weight since pandemic.  He also feels he has been more depressed. He reports was 198lbs a couple of years ago.

## 2023-02-21 NOTE — ASSESSMENT & PLAN NOTE
Seeing Dr. Méndez in neurology and reports had recent visit and cleared for 2 years.  He is also seeing Dr. Castro. Discussing testosterone replacement.

## 2023-02-21 NOTE — PROGRESS NOTES
"Subjective     Laureen Munoz is a 72 y.o. male who presents with Establish Care, Foot Problem (Pain in both feet. ), and Weight Check (Weight issues )            HPI  Obesity (BMI 35.0-39.9 without comorbidity)  Reports gained weight since pandemic.  He also feels he has been more depressed. He reports was 198lbs a couple of years ago.    Essential hypertension  Taking losartan 100mg and ran out but got 90 day supply from .  Taking since at least 2014.  Also taking metoprolol ST 25mg once daily.    Benign prostatic hyperplasia  Recently started alfuzosin 10mg for BPH.  Tolerating well. Having frequent bouts of nocturnal urination with feelings of incomplete emptying but alfuzosin helping.      Enlarged pituitary gland (HCC)  Seeing Dr. Méndez in neurology and reports had recent visit and cleared for 2 years.     Pituitary macroadenoma (HCC)  Seeing Dr. Méndez in neurology and reports had recent visit and cleared for 2 years.  He is also seeing Dr. Castro. Discussing testosterone replacement.     Thrombocytopenia (HCC)  Chronic, intermittent, controlled    Reactive depression  New issue. He feels he is under extreme stress living with his ex-wife and adult son.  He had a leia relationship for years with his ex and now is in a good place.  She is his \"best friend.\"  He has been living with her now for 2 years.  He reports he has never felt depressed before but has been thinking a lot about this recently.  He is interested in therapy.    Neuropathic pain  Was hospitalized and had some swelling in his feet and pain.  He has tried gabapentin and had a shot in his achilles by ortho and this has helped some.     Hypercholesteremia  The 10-year ASCVD risk score (Jaden DK, et al., 2019) is: 23.6% Not taking a statin.      ROS  See HPI    Allergies   Allergen Reactions    Nkda [No Known Drug Allergy]     Seasonal      Current Outpatient Medications on File Prior to Visit   Medication Sig Dispense Refill    ketoconazole " "(NIZORAL) 2 % shampoo       alfuzosin (UROXATRAL) 10 MG SR tablet alfuzosin ER 10 mg tablet,extended release 24 hr      ketoconazole (NIZORAL) 2 % Cream ketoconazole 2 % topical cream      CALCIUM PO Take 1,500 mg by mouth.      Multiple Vitamin (MULTIVITAMIN ADULT PO) Take  by mouth.      metoprolol SR (TOPROL XL) 25 MG TABLET SR 24 HR TAKE ONE TABLET BY MOUTH ONE TIME DAILY 90 Tablet 3    fluticasone (FLONASE) 50 MCG/ACT nasal spray Administer 1 Spray into affected nostril(S) every morning.       No current facility-administered medications on file prior to visit.              Objective     /80 (BP Location: Right arm, Patient Position: Sitting, BP Cuff Size: Large adult)   Pulse (!) 59   Temp 36.8 °C (98.2 °F) (Temporal)   Resp 16   Ht 1.803 m (5' 11\")   Wt 120 kg (265 lb 4.8 oz)   SpO2 92%   BMI 37.00 kg/m²      Physical Exam  Constitutional:       General: He is not in acute distress.     Appearance: He is not ill-appearing, toxic-appearing or diaphoretic.   HENT:      Head: Normocephalic and atraumatic.   Eyes:      Extraocular Movements: Extraocular movements intact.      Pupils: Pupils are equal, round, and reactive to light.   Cardiovascular:      Rate and Rhythm: Normal rate.   Pulmonary:      Effort: Pulmonary effort is normal.   Skin:     General: Skin is warm and dry.   Neurological:      Mental Status: He is alert and oriented to person, place, and time.   Psychiatric:         Mood and Affect: Mood normal.         Behavior: Behavior normal.         Thought Content: Thought content normal.         Judgment: Judgment normal.                           Assessment & Plan      1. Encounter to establish care with new doctor  Laureen is in clinic to establish care today.  See below for issues discussed.    2. Obesity (BMI 35.0-39.9 without comorbidity)  Chronic, uncontrolled, worsening.  He reports that his exercise level has decreased since having neuropathy.  We discussed swimming as an option but " does not require much pedal involvement.  We also discussed increasing healthy dietary choices.  Labs ordered as below.    3. Essential hypertension  Chronic, controlled on losartan but he reports chronic dizziness since taking losartan.  We will change blood pressure medication and reassess in 2 weeks.    STOP losartan 100 mg daily  CONTINUE metoprolol SR 25 mg daily  START amlodipine 5 mg daily    - amLODIPine (NORVASC) 5 MG Tab; Take 1 Tablet by mouth every day.  Dispense: 30 Tablet; Refill: 0    4. Benign prostatic hyperplasia with urinary frequency  Chronic, defer management to urology.    5. Screening for AAA (abdominal aortic aneurysm)    - US-ABDOMINAL AORTA W/O DOPPLER; Future    6. Need for vaccination    - Tdap Vaccine =>8YO IM    7. Enlarged pituitary gland (HCC)  Defer management to neurology and endocrinology    8. Pituitary macroadenoma (HCC)  Defer management to neurology and endocrinology    9. Thrombocytopenia (HCC)  Chronic, recent platelets in healthy range.  Will reassess.    10. Reactive depression  New issue.  He reports high levels of stress in life.  He denies suicidal ideation.  Referral to behavioral health placed.  We will defer medication management at this time.  While he reports depression he answered no to the depression screening questions.    - Referral to Behavioral Health    11. Depression screening  Score 0    12. Neuropathic pain  Chronic, improved with orthopedic intervention.  Defer management to orthopedics at this time.    13. Hypercholesteremia  Chronic, repeat labs.  High ASCVD risk.    Start rosuvastatin 10 mg daily    - Lipid Profile; Future  - rosuvastatin (CRESTOR) 10 MG Tab; Take 1 Tablet by mouth every evening.  Dispense: 30 Tablet; Refill: 11    14. Hypovitaminosis D  Historical data.  Recheck vitamin D levels.  - VITAMIN D,25 HYDROXY (DEFICIENCY); Future    Return if symptoms worsen or fail to improve, for With test results, f/u HTN.    The patient verbalized  agreement and understanding of the current plan. All questions and concerns were addressed at the time of visit.    This note was dictated using Dragon Software.  I have made every reasonable attempt to correct errors, but errors of grammar and content may still exist.

## 2023-02-21 NOTE — ASSESSMENT & PLAN NOTE
"New issue. He feels he is under extreme stress living with his ex-wife and adult son.  He had a leia relationship for years with his ex and now is in a good place.  She is his \"best friend.\"  He has been living with her now for 2 years.  He reports he has never felt depressed before but has been thinking a lot about this recently.  He is interested in therapy.  "

## 2023-02-21 NOTE — ASSESSMENT & PLAN NOTE
Was hospitalized and had some swelling in his feet and pain.  He has tried gabapentin and had a shot in his achilles by ortho and this has helped some.

## 2023-02-21 NOTE — ASSESSMENT & PLAN NOTE
Recently started alfuzosin 10mg for BPH.  Tolerating well. Having frequent bouts of nocturnal urination with feelings of incomplete emptying but alfuzosin helping.

## 2023-02-21 NOTE — ASSESSMENT & PLAN NOTE
Taking losartan 100mg and ran out but got 90 day supply from .  Taking since at least 2014.  Also taking metoprolol ST 25mg once daily.

## 2023-02-23 ENCOUNTER — HOSPITAL ENCOUNTER (OUTPATIENT)
Dept: LAB | Facility: MEDICAL CENTER | Age: 73
End: 2023-02-23
Attending: INTERNAL MEDICINE
Payer: MEDICARE

## 2023-02-23 ENCOUNTER — HOSPITAL ENCOUNTER (OUTPATIENT)
Dept: LAB | Facility: MEDICAL CENTER | Age: 73
End: 2023-02-23
Attending: CLINICAL NURSE SPECIALIST
Payer: MEDICARE

## 2023-02-23 DIAGNOSIS — E55.9 HYPOVITAMINOSIS D: ICD-10-CM

## 2023-02-23 DIAGNOSIS — E78.00 HYPERCHOLESTEREMIA: ICD-10-CM

## 2023-02-23 LAB
25(OH)D3 SERPL-MCNC: 34 NG/ML (ref 30–100)
BASOPHILS # BLD AUTO: 0.6 % (ref 0–1.8)
BASOPHILS # BLD: 0.06 K/UL (ref 0–0.12)
CHOLEST SERPL-MCNC: 185 MG/DL (ref 100–199)
EOSINOPHIL # BLD AUTO: 0.04 K/UL (ref 0–0.51)
EOSINOPHIL NFR BLD: 0.4 % (ref 0–6.9)
ERYTHROCYTE [DISTWIDTH] IN BLOOD BY AUTOMATED COUNT: 45.4 FL (ref 35.9–50)
FASTING STATUS PATIENT QL REPORTED: NORMAL
HCT VFR BLD AUTO: 42.4 % (ref 42–52)
HDLC SERPL-MCNC: 53 MG/DL
HGB BLD-MCNC: 14.1 G/DL (ref 14–18)
IMM GRANULOCYTES # BLD AUTO: 0.09 K/UL (ref 0–0.11)
IMM GRANULOCYTES NFR BLD AUTO: 1 % (ref 0–0.9)
LDLC SERPL CALC-MCNC: 116 MG/DL
LYMPHOCYTES # BLD AUTO: 1.31 K/UL (ref 1–4.8)
LYMPHOCYTES NFR BLD: 14 % (ref 22–41)
MCH RBC QN AUTO: 31 PG (ref 27–33)
MCHC RBC AUTO-ENTMCNC: 33.3 G/DL (ref 33.7–35.3)
MCV RBC AUTO: 93.2 FL (ref 81.4–97.8)
MONOCYTES # BLD AUTO: 0.78 K/UL (ref 0–0.85)
MONOCYTES NFR BLD AUTO: 8.3 % (ref 0–13.4)
NEUTROPHILS # BLD AUTO: 7.09 K/UL (ref 1.82–7.42)
NEUTROPHILS NFR BLD: 75.7 % (ref 44–72)
NRBC # BLD AUTO: 0 K/UL
NRBC BLD-RTO: 0 /100 WBC
PLATELET # BLD AUTO: 135 K/UL (ref 164–446)
PMV BLD AUTO: 11 FL (ref 9–12.9)
RBC # BLD AUTO: 4.55 M/UL (ref 4.7–6.1)
TESTOST SERPL-MCNC: 318 NG/DL (ref 175–781)
TRIGL SERPL-MCNC: 79 MG/DL (ref 0–149)
WBC # BLD AUTO: 9.4 K/UL (ref 4.8–10.8)

## 2023-02-23 PROCEDURE — 80061 LIPID PANEL: CPT

## 2023-02-23 PROCEDURE — 36415 COLL VENOUS BLD VENIPUNCTURE: CPT

## 2023-02-23 PROCEDURE — 85025 COMPLETE CBC W/AUTO DIFF WBC: CPT

## 2023-02-23 PROCEDURE — 84270 ASSAY OF SEX HORMONE GLOBUL: CPT

## 2023-02-23 PROCEDURE — 84403 ASSAY OF TOTAL TESTOSTERONE: CPT

## 2023-02-23 PROCEDURE — 82306 VITAMIN D 25 HYDROXY: CPT

## 2023-02-25 LAB — SHBG SERPL-SCNC: 53 NMOL/L (ref 19–76)

## 2023-03-07 ENCOUNTER — OFFICE VISIT (OUTPATIENT)
Dept: MEDICAL GROUP | Facility: IMAGING CENTER | Age: 73
End: 2023-03-07
Payer: MEDICARE

## 2023-03-07 VITALS
DIASTOLIC BLOOD PRESSURE: 82 MMHG | HEIGHT: 71 IN | SYSTOLIC BLOOD PRESSURE: 124 MMHG | TEMPERATURE: 98 F | BODY MASS INDEX: 37.52 KG/M2 | HEART RATE: 60 BPM | OXYGEN SATURATION: 93 % | WEIGHT: 268 LBS | RESPIRATION RATE: 16 BRPM

## 2023-03-07 DIAGNOSIS — I10 ESSENTIAL HYPERTENSION: Chronic | ICD-10-CM

## 2023-03-07 DIAGNOSIS — E78.00 HYPERCHOLESTEREMIA: ICD-10-CM

## 2023-03-07 DIAGNOSIS — R60.0 LOCALIZED EDEMA: ICD-10-CM

## 2023-03-07 DIAGNOSIS — I27.20 PULMONARY HYPERTENSION (HCC): ICD-10-CM

## 2023-03-07 DIAGNOSIS — M79.2 NEUROPATHIC PAIN: Chronic | ICD-10-CM

## 2023-03-07 DIAGNOSIS — D69.6 LOW PLATELET COUNT (HCC): ICD-10-CM

## 2023-03-07 PROCEDURE — 99214 OFFICE O/P EST MOD 30 MIN: CPT | Performed by: CLINICAL NURSE SPECIALIST

## 2023-03-07 ASSESSMENT — ANXIETY QUESTIONNAIRES
5. BEING SO RESTLESS THAT IT IS HARD TO SIT STILL: NOT AT ALL
2. NOT BEING ABLE TO STOP OR CONTROL WORRYING: NOT AT ALL
4. TROUBLE RELAXING: SEVERAL DAYS
1. FEELING NERVOUS, ANXIOUS, OR ON EDGE: SEVERAL DAYS
3. WORRYING TOO MUCH ABOUT DIFFERENT THINGS: NOT AT ALL
GAD7 TOTAL SCORE: 3
7. FEELING AFRAID AS IF SOMETHING AWFUL MIGHT HAPPEN: NOT AT ALL
6. BECOMING EASILY ANNOYED OR IRRITABLE: SEVERAL DAYS

## 2023-03-07 ASSESSMENT — PATIENT HEALTH QUESTIONNAIRE - PHQ9: CLINICAL INTERPRETATION OF PHQ2 SCORE: 0

## 2023-03-07 ASSESSMENT — PAIN SCALES - GENERAL: PAINLEVEL: NO PAIN

## 2023-03-07 ASSESSMENT — FIBROSIS 4 INDEX: FIB4 SCORE: 2.5

## 2023-03-07 NOTE — PROGRESS NOTES
Subjective     Laureen Munoz is a 72 y.o. male who presents with Follow-Up (On lab results from 02/23/23)            HPI  Essential hypertension  Dizziness resolved with stopping losartan.  Taking amlodipine 5mg daily.     Hypercholesteremia  He started taking rosuvastatin 20mg    Neuropathic pain  Started having shooting pains in feet in middle of night intermittently of 2nd and 3rd toes.  Ordered compression socks 6-8 months ago and could not put on socks because feet too large.  Last night he was able wear the socks and he did not feel any pain.  He took them off in the middle of the night and pain returned immediately.  Numbness and tingling bilaterally.     Localized edema  Edema was more on right and now more on left foot.  Last couple of days left foot has increased in size. Started amlodipine a couple of weeks ago. No trauma.  No tenderness.    Pulmonary hypertension (HCC)  Elevated right ventricular pressure on Echo from 2021.  No shortness of breath.     ROS  See HPI    Allergies   Allergen Reactions    Nkda [No Known Drug Allergy]     Seasonal      Current Outpatient Medications on File Prior to Visit   Medication Sig Dispense Refill    ketoconazole (NIZORAL) 2 % shampoo       rosuvastatin (CRESTOR) 10 MG Tab Take 1 Tablet by mouth every evening. 30 Tablet 11    amLODIPine (NORVASC) 5 MG Tab Take 1 Tablet by mouth every day. 30 Tablet 0    alfuzosin (UROXATRAL) 10 MG SR tablet alfuzosin ER 10 mg tablet,extended release 24 hr      ketoconazole (NIZORAL) 2 % Cream ketoconazole 2 % topical cream      CALCIUM PO Take 1,500 mg by mouth.      Multiple Vitamin (MULTIVITAMIN ADULT PO) Take  by mouth.      metoprolol SR (TOPROL XL) 25 MG TABLET SR 24 HR TAKE ONE TABLET BY MOUTH ONE TIME DAILY 90 Tablet 3    fluticasone (FLONASE) 50 MCG/ACT nasal spray Administer 1 Spray into affected nostril(S) every morning.       No current facility-administered medications on file prior to visit.              2/8/2022      "9:00 AM 2/21/2023    10:40 AM 3/7/2023     9:40 AM   Depression Screen (PHQ-2/PHQ-9)   PHQ-2 Total Score 0 0 0       Interpretation of PHQ-9 Total Score   Score Severity   1-4 No Depression   5-9 Mild Depression   10-14 Moderate Depression   15-19 Moderately Severe Depression   20-27 Severe Depression    MONICA-7 Questionnaire    Feeling nervous, anxious, or on edge: Several days  Not being able to sop or control worrying: Not at all  Worrying too much about different things: Not at all  Trouble relaxing: Several days  Being so restless that it's hard to sit still: Not at all  Becoming easily annoyed or irritable: Several days  Feeling afraid as if something awful might happen: Not at all  Total: 3    Interpretation of MONICA 7 Total Score   Score Severity :  0-4 No Anxiety   5-9 Mild Anxiety  10-14 Moderate Anxiety  15-21 Severe Anxiety        Objective     /82 (BP Location: Right arm, Patient Position: Sitting, BP Cuff Size: Adult)   Pulse 60   Temp 36.7 °C (98 °F) (Temporal)   Resp 16   Ht 1.803 m (5' 11\")   Wt 122 kg (268 lb)   SpO2 93%   BMI 37.38 kg/m²      Physical Exam  Constitutional:       General: He is not in acute distress.     Appearance: He is not ill-appearing, toxic-appearing or diaphoretic.   HENT:      Head: Normocephalic and atraumatic.   Eyes:      Extraocular Movements: Extraocular movements intact.      Pupils: Pupils are equal, round, and reactive to light.   Cardiovascular:      Rate and Rhythm: Normal rate.   Pulmonary:      Effort: Pulmonary effort is normal.   Musculoskeletal:      Right lower leg: Edema (trace at best) present.      Left lower leg: Edema (1+) present.   Skin:     General: Skin is warm and dry.      Coloration: Skin is not pale.      Findings: No bruising.   Neurological:      Mental Status: He is alert and oriented to person, place, and time.   Psychiatric:         Mood and Affect: Mood normal.         Behavior: Behavior normal.         Thought Content: Thought " content normal.         Judgment: Judgment normal.                           Assessment & Plan      1. Essential hypertension  Chronic, controlled. Dizziness resolved with cessation of losartan.     CONTINUE amlodipine 5mg daily    2. Hypercholesteremia  Chronic, unknown control.     CONTINUE rosuvastatin 20mg and recheck labs in future    - Lipid Profile; Future    3. Neuropathic pain  Chronic, improved with use of compression socks. Continue to wear compression socks at night.    4. Localized edema  Chronic, worsening.  Left ankle and foot 1+ edema which he reports is recent.  Left ankle with very mild trace edema.  Some tenderness over medial anterior calf.  Will check us extremity.    - US-EXTREMITY VENOUS LOWER UNILAT LEFT; Future    5. Low platelet count (HCC)  New issue. Recheck in one month.    - PLATELET COUNT; Future    6. Pulmonary hypertension (HCC)  New issue. Reviewed echo completed by previous provider and indicates pulmonary hypertension.  No shortness of breath currently.  Dizziness has resolved.  Will recheck echo as last echo from 2020.    - EC-ECHOCARDIOGRAM COMPLETE W/O CONT; Future     Return in about 4 weeks (around 4/4/2023), or if symptoms worsen or fail to improve, for With test results.

## 2023-03-07 NOTE — ASSESSMENT & PLAN NOTE
Edema was more on right and now more on left foot.  Last couple of days left foot has increased in size. Started amlodipine a couple of weeks ago. No trauma.  No tenderness.

## 2023-03-07 NOTE — ASSESSMENT & PLAN NOTE
Started having shooting pains in feet in middle of night intermittently of 2nd and 3rd toes.  Ordered compression socks 6-8 months ago and could not put on socks because feet too large.  Last night he was able wear the socks and he did not feel any pain.  He took them off in the middle of the night and pain returned immediately.  Numbness and tingling bilaterally.

## 2023-03-08 ENCOUNTER — APPOINTMENT (OUTPATIENT)
Dept: RADIOLOGY | Facility: MEDICAL CENTER | Age: 73
End: 2023-03-08
Attending: CLINICAL NURSE SPECIALIST
Payer: MEDICARE

## 2023-03-09 ENCOUNTER — HOSPITAL ENCOUNTER (OUTPATIENT)
Dept: RADIOLOGY | Facility: MEDICAL CENTER | Age: 73
End: 2023-03-09
Attending: CLINICAL NURSE SPECIALIST
Payer: MEDICARE

## 2023-03-09 DIAGNOSIS — R60.0 LOCALIZED EDEMA: ICD-10-CM

## 2023-03-09 PROCEDURE — 93971 EXTREMITY STUDY: CPT | Mod: LT

## 2023-03-22 ENCOUNTER — APPOINTMENT (OUTPATIENT)
Dept: RADIOLOGY | Facility: MEDICAL CENTER | Age: 73
End: 2023-03-22
Attending: CLINICAL NURSE SPECIALIST
Payer: MEDICARE

## 2023-03-22 DIAGNOSIS — Z13.6 SCREENING FOR AAA (ABDOMINAL AORTIC ANEURYSM): ICD-10-CM

## 2023-03-22 PROCEDURE — 76775 US EXAM ABDO BACK WALL LIM: CPT

## 2023-03-29 ENCOUNTER — TELEPHONE (OUTPATIENT)
Dept: HEALTH INFORMATION MANAGEMENT | Facility: OTHER | Age: 73
End: 2023-03-29

## 2023-03-31 ENCOUNTER — HOSPITAL ENCOUNTER (OUTPATIENT)
Dept: LAB | Facility: MEDICAL CENTER | Age: 73
End: 2023-03-31
Attending: CLINICAL NURSE SPECIALIST
Payer: MEDICARE

## 2023-03-31 DIAGNOSIS — D69.6 LOW PLATELET COUNT (HCC): ICD-10-CM

## 2023-03-31 DIAGNOSIS — E78.00 HYPERCHOLESTEREMIA: ICD-10-CM

## 2023-03-31 LAB
CHOLEST SERPL-MCNC: 107 MG/DL (ref 100–199)
FASTING STATUS PATIENT QL REPORTED: NORMAL
HDLC SERPL-MCNC: 49 MG/DL
LDLC SERPL CALC-MCNC: 43 MG/DL
PLATELET # BLD AUTO: 168 K/UL (ref 164–446)
TRIGL SERPL-MCNC: 77 MG/DL (ref 0–149)

## 2023-03-31 PROCEDURE — 36415 COLL VENOUS BLD VENIPUNCTURE: CPT

## 2023-03-31 PROCEDURE — 80061 LIPID PANEL: CPT

## 2023-03-31 PROCEDURE — 85049 AUTOMATED PLATELET COUNT: CPT

## 2023-04-04 ENCOUNTER — OFFICE VISIT (OUTPATIENT)
Dept: MEDICAL GROUP | Facility: IMAGING CENTER | Age: 73
End: 2023-04-04
Payer: MEDICARE

## 2023-04-04 VITALS
HEIGHT: 71 IN | DIASTOLIC BLOOD PRESSURE: 70 MMHG | OXYGEN SATURATION: 92 % | HEART RATE: 61 BPM | TEMPERATURE: 97.9 F | BODY MASS INDEX: 36.4 KG/M2 | SYSTOLIC BLOOD PRESSURE: 122 MMHG | RESPIRATION RATE: 16 BRPM | WEIGHT: 260 LBS

## 2023-04-04 DIAGNOSIS — N28.1 CYST OF LEFT KIDNEY: ICD-10-CM

## 2023-04-04 DIAGNOSIS — F32.9 REACTIVE DEPRESSION: ICD-10-CM

## 2023-04-04 DIAGNOSIS — M79.2 NEUROPATHIC PAIN: Chronic | ICD-10-CM

## 2023-04-04 DIAGNOSIS — I27.20 PULMONARY HYPERTENSION (HCC): ICD-10-CM

## 2023-04-04 DIAGNOSIS — R60.0 LOCALIZED EDEMA: ICD-10-CM

## 2023-04-04 DIAGNOSIS — E23.6 ENLARGED PITUITARY GLAND (HCC): ICD-10-CM

## 2023-04-04 DIAGNOSIS — I10 ESSENTIAL HYPERTENSION: ICD-10-CM

## 2023-04-04 DIAGNOSIS — H53.9 VISUAL DISTURBANCE: ICD-10-CM

## 2023-04-04 DIAGNOSIS — E78.00 HYPERCHOLESTEREMIA: ICD-10-CM

## 2023-04-04 PROBLEM — Z72.0 TOBACCO USE: Status: RESOLVED | Noted: 2018-10-30 | Resolved: 2023-04-04

## 2023-04-04 PROBLEM — D69.6 THROMBOCYTOPENIA (HCC): Status: RESOLVED | Noted: 2021-03-09 | Resolved: 2023-04-04

## 2023-04-04 PROCEDURE — 99214 OFFICE O/P EST MOD 30 MIN: CPT | Performed by: CLINICAL NURSE SPECIALIST

## 2023-04-04 RX ORDER — METOPROLOL SUCCINATE 25 MG/1
TABLET, EXTENDED RELEASE ORAL
Qty: 100 TABLET | Refills: 0 | Status: SHIPPED | OUTPATIENT
Start: 2023-04-04 | End: 2023-08-15 | Stop reason: SDUPTHER

## 2023-04-04 RX ORDER — NEEDLES, DISPOSABLE 18GX1 1/2"
NEEDLE, DISPOSABLE MISCELLANEOUS
COMMUNITY
Start: 2023-03-28 | End: 2023-04-18

## 2023-04-04 RX ORDER — ROSUVASTATIN CALCIUM 20 MG/1
20 TABLET, COATED ORAL EVERY EVENING
Qty: 100 TABLET | Refills: 0 | Status: SHIPPED | OUTPATIENT
Start: 2023-04-04 | End: 2023-07-20 | Stop reason: SDUPTHER

## 2023-04-04 RX ORDER — SYRINGE WITH NEEDLE, 1 ML 25GX5/8"
SYRINGE, EMPTY DISPOSABLE MISCELLANEOUS
COMMUNITY
Start: 2023-03-28 | End: 2023-04-18

## 2023-04-04 RX ORDER — HYDROCHLOROTHIAZIDE 25 MG/1
25 TABLET ORAL DAILY
Qty: 30 TABLET | Refills: 1 | Status: SHIPPED
Start: 2023-04-04 | End: 2023-05-09

## 2023-04-04 ASSESSMENT — PAIN SCALES - GENERAL: PAINLEVEL: NO PAIN

## 2023-04-04 ASSESSMENT — ANXIETY QUESTIONNAIRES
6. BECOMING EASILY ANNOYED OR IRRITABLE: NOT AT ALL
5. BEING SO RESTLESS THAT IT IS HARD TO SIT STILL: NOT AT ALL
1. FEELING NERVOUS, ANXIOUS, OR ON EDGE: NOT AT ALL
GAD7 TOTAL SCORE: 0
7. FEELING AFRAID AS IF SOMETHING AWFUL MIGHT HAPPEN: NOT AT ALL
4. TROUBLE RELAXING: NOT AT ALL
3. WORRYING TOO MUCH ABOUT DIFFERENT THINGS: NOT AT ALL
2. NOT BEING ABLE TO STOP OR CONTROL WORRYING: NOT AT ALL

## 2023-04-04 ASSESSMENT — FIBROSIS 4 INDEX: FIB4 SCORE: 2.01

## 2023-04-04 ASSESSMENT — PATIENT HEALTH QUESTIONNAIRE - PHQ9: CLINICAL INTERPRETATION OF PHQ2 SCORE: 0

## 2023-04-04 NOTE — PROGRESS NOTES
"Subjective     Laureen Munoz is a 72 y.o. male who presents with Follow-Up and Leg Swelling (Both ankles. Could not get compression socks on )            HPI  Enlarged pituitary gland (HCC)  Last CT 2021. Saw neurology a couple of months ago and got the all clear.  He had an episode of eyes crossing a few weeks ago which resolved after a couple of hours of sleep.    Neuropathic pain  Neuropathy in bilateral feet. Venous u/s shows no DVT.  He has seen an ortho who suggested an epidural.      Localized edema  Swelling increasing in left ankle. Mild shortness of breath going upstairs.    Pulmonary hypertension (HCC)  Stopped losartan and dizziness resolved. Still taking amlodipine 5mg daily. Has not tried HCTZ.  Home /75 approximate average.    Reactive depression  Depression is almost resolved. Still living with ex wife and son.      Cyst of left kidney  Found incidentally on aortic ultrasound, largest area 10cm.  Sees urology in one month.  Has BPH as well and hypertension.  Has had left lower extremity edema.     ROS  See HPI    Allergies   Allergen Reactions    Nkda [No Known Drug Allergy]     Seasonal      Current Outpatient Medications on File Prior to Visit   Medication Sig Dispense Refill    ketoconazole (NIZORAL) 2 % shampoo       alfuzosin (UROXATRAL) 10 MG SR tablet alfuzosin ER 10 mg tablet,extended release 24 hr      ketoconazole (NIZORAL) 2 % Cream ketoconazole 2 % topical cream      CALCIUM PO Take 1,500 mg by mouth.      Multiple Vitamin (MULTIVITAMIN ADULT PO) Take  by mouth.      fluticasone (FLONASE) 50 MCG/ACT nasal spray Administer 1 Spray into affected nostril(S) every morning.      EASY TOUCH HYPODERMIC NEEDLE 18G X 1-1/2\" Misc       B-D 3CC LUER-VALENTIN SYR 25GX5/8\" 25G X 5/8\" 3 ML Misc        No current facility-administered medications on file prior to visit.              Objective     /70 (BP Location: Right arm, Patient Position: Sitting, BP Cuff Size: Large adult)   Pulse 61   " "Temp 36.6 °C (97.9 °F) (Temporal)   Resp 16   Ht 1.803 m (5' 11\")   Wt 118 kg (260 lb)   SpO2 92%   BMI 36.26 kg/m²      Physical Exam  Constitutional:       General: He is not in acute distress.     Appearance: He is not ill-appearing, toxic-appearing or diaphoretic.   HENT:      Head: Normocephalic and atraumatic.   Eyes:      Pupils: Pupils are equal, round, and reactive to light.   Cardiovascular:      Rate and Rhythm: Normal rate.   Pulmonary:      Effort: Pulmonary effort is normal.   Skin:     General: Skin is warm and dry.   Neurological:      Mental Status: He is alert.   Psychiatric:         Mood and Affect: Mood normal.         Behavior: Behavior normal.         Thought Content: Thought content normal.         Judgment: Judgment normal.                           Assessment & Plan        1. Essential hypertension  Chronic, partially controlled. As he reports worsening of BLE edema, will trial switching from amlodipine to HCTZ.    STOP amlodipine 5mg  START HCTZ 25mg daily  CONTINUE metoprolol ST 25mg SR daily    - metoprolol SR (TOPROL XL) 25 MG TABLET SR 24 HR; TAKE ONE TABLET BY MOUTH ONE TIME DAILY  Dispense: 100 Tablet; Refill: 0  - hydroCHLOROthiazide (HYDRODIURIL) 25 MG Tab; Take 1 Tablet by mouth every day.  Dispense: 30 Tablet; Refill: 1    2. Hypercholesteremia  Chronic, controlled.    CONTINUE rosuvastatin 20mg daily    - rosuvastatin (CRESTOR) 20 MG Tab; Take 1 Tablet by mouth every evening.  Dispense: 100 Tablet; Refill: 0    3. Enlarged pituitary gland (HCC)  Defer management overall to neurology.  CT ordered d/t recent eye issue    - CT-HEAD WITH & W/O; Future    4. Neuropathic pain  Chronic, uncontrolled. Will address more at future visit.     5. Localized edema  Chronic, worsening.  Will stop amlodipine as possibly worsening. AAA u/s showed large simple kidney cyst which may be contributing to his edema. He has a urologist and will follow up with this.     6. Pulmonary hypertension " (HCC)  Chronic, unknown control. He addressed some increase in shortness of breath.  ECHO scheduled next month.  Will address more in future visit.    7. Reactive depression  Chronic, improved and nearly resolved.    8. Visual disturbance  Had one episode of being cross-eyed three weeks ago which resolved spontaneously. Concern for TIA.  He also has a know pituitary adenoma last imaged 2 years ago. Will start aspirin and monitor kidney function fairly closely.     START aspirin 81mg    - CT-HEAD WITH & W/O; Future    9. Cyst of left kidney  New issue. Incidental finding on u/s aorta. Follow up with urology.    Return if symptoms worsen or fail to improve, for With test results.

## 2023-04-04 NOTE — ASSESSMENT & PLAN NOTE
Found incidentally on aortic ultrasound, largest area 10cm.  Sees urology in one month.  Has BPH as well and hypertension.  Has had left lower extremity edema.

## 2023-04-04 NOTE — ASSESSMENT & PLAN NOTE
Stopped losartan and dizziness resolved. Still taking amlodipine 5mg daily. Has not tried HCTZ.  Home /75 approximate average.

## 2023-04-04 NOTE — ASSESSMENT & PLAN NOTE
Neuropathy in bilateral feet. Venous u/s shows no DVT.  He has seen an ortho who suggested an epidural.

## 2023-04-05 DIAGNOSIS — R60.0 LOCALIZED EDEMA: ICD-10-CM

## 2023-04-05 DIAGNOSIS — R73.01 IFG (IMPAIRED FASTING GLUCOSE): ICD-10-CM

## 2023-04-05 DIAGNOSIS — E66.9 OBESITY (BMI 35.0-39.9 WITHOUT COMORBIDITY): ICD-10-CM

## 2023-04-06 ENCOUNTER — HOSPITAL ENCOUNTER (OUTPATIENT)
Dept: LAB | Facility: MEDICAL CENTER | Age: 73
End: 2023-04-06
Attending: CLINICAL NURSE SPECIALIST
Payer: MEDICARE

## 2023-04-06 DIAGNOSIS — R60.0 LOCALIZED EDEMA: ICD-10-CM

## 2023-04-06 DIAGNOSIS — R73.01 IFG (IMPAIRED FASTING GLUCOSE): ICD-10-CM

## 2023-04-06 DIAGNOSIS — E66.9 OBESITY (BMI 35.0-39.9 WITHOUT COMORBIDITY): ICD-10-CM

## 2023-04-06 LAB
ALBUMIN SERPL BCP-MCNC: 4.1 G/DL (ref 3.2–4.9)
ALBUMIN/GLOB SERPL: 1.2 G/DL
ALP SERPL-CCNC: 119 U/L (ref 30–99)
ALT SERPL-CCNC: 19 U/L (ref 2–50)
ANION GAP SERPL CALC-SCNC: 11 MMOL/L (ref 7–16)
AST SERPL-CCNC: 23 U/L (ref 12–45)
BILIRUB SERPL-MCNC: 1.1 MG/DL (ref 0.1–1.5)
BUN SERPL-MCNC: 16 MG/DL (ref 8–22)
CALCIUM ALBUM COR SERPL-MCNC: 9.2 MG/DL (ref 8.5–10.5)
CALCIUM SERPL-MCNC: 9.3 MG/DL (ref 8.5–10.5)
CHLORIDE SERPL-SCNC: 102 MMOL/L (ref 96–112)
CO2 SERPL-SCNC: 24 MMOL/L (ref 20–33)
CREAT SERPL-MCNC: 1.03 MG/DL (ref 0.5–1.4)
GFR SERPLBLD CREATININE-BSD FMLA CKD-EPI: 77 ML/MIN/1.73 M 2
GLOBULIN SER CALC-MCNC: 3.4 G/DL (ref 1.9–3.5)
GLUCOSE SERPL-MCNC: 105 MG/DL (ref 65–99)
POTASSIUM SERPL-SCNC: 4.4 MMOL/L (ref 3.6–5.5)
PROT SERPL-MCNC: 7.5 G/DL (ref 6–8.2)
SODIUM SERPL-SCNC: 137 MMOL/L (ref 135–145)

## 2023-04-06 PROCEDURE — 36415 COLL VENOUS BLD VENIPUNCTURE: CPT

## 2023-04-06 PROCEDURE — 80053 COMPREHEN METABOLIC PANEL: CPT

## 2023-04-13 ENCOUNTER — APPOINTMENT (OUTPATIENT)
Dept: RADIOLOGY | Facility: MEDICAL CENTER | Age: 73
End: 2023-04-13
Attending: CLINICAL NURSE SPECIALIST
Payer: MEDICARE

## 2023-04-15 SDOH — HEALTH STABILITY: PHYSICAL HEALTH: ON AVERAGE, HOW MANY MINUTES DO YOU ENGAGE IN EXERCISE AT THIS LEVEL?: 0 MIN

## 2023-04-15 SDOH — ECONOMIC STABILITY: INCOME INSECURITY: IN THE LAST 12 MONTHS, WAS THERE A TIME WHEN YOU WERE NOT ABLE TO PAY THE MORTGAGE OR RENT ON TIME?: NO

## 2023-04-15 SDOH — ECONOMIC STABILITY: INCOME INSECURITY: HOW HARD IS IT FOR YOU TO PAY FOR THE VERY BASICS LIKE FOOD, HOUSING, MEDICAL CARE, AND HEATING?: SOMEWHAT HARD

## 2023-04-15 SDOH — ECONOMIC STABILITY: FOOD INSECURITY: WITHIN THE PAST 12 MONTHS, YOU WORRIED THAT YOUR FOOD WOULD RUN OUT BEFORE YOU GOT MONEY TO BUY MORE.: NEVER TRUE

## 2023-04-15 SDOH — HEALTH STABILITY: PHYSICAL HEALTH: ON AVERAGE, HOW MANY DAYS PER WEEK DO YOU ENGAGE IN MODERATE TO STRENUOUS EXERCISE (LIKE A BRISK WALK)?: 0 DAYS

## 2023-04-15 SDOH — ECONOMIC STABILITY: HOUSING INSECURITY: IN THE LAST 12 MONTHS, HOW MANY PLACES HAVE YOU LIVED?: 1

## 2023-04-15 SDOH — ECONOMIC STABILITY: FOOD INSECURITY: WITHIN THE PAST 12 MONTHS, THE FOOD YOU BOUGHT JUST DIDN'T LAST AND YOU DIDN'T HAVE MONEY TO GET MORE.: NEVER TRUE

## 2023-04-15 ASSESSMENT — LIFESTYLE VARIABLES
HOW MANY STANDARD DRINKS CONTAINING ALCOHOL DO YOU HAVE ON A TYPICAL DAY: 1 OR 2
HOW OFTEN DO YOU HAVE A DRINK CONTAINING ALCOHOL: MONTHLY OR LESS
HOW OFTEN DO YOU HAVE SIX OR MORE DRINKS ON ONE OCCASION: NEVER
SKIP TO QUESTIONS 9-10: 1
AUDIT-C TOTAL SCORE: 1

## 2023-04-15 ASSESSMENT — SOCIAL DETERMINANTS OF HEALTH (SDOH)
HOW OFTEN DO YOU HAVE SIX OR MORE DRINKS ON ONE OCCASION: NEVER
HOW OFTEN DO YOU HAVE A DRINK CONTAINING ALCOHOL: MONTHLY OR LESS
IN A TYPICAL WEEK, HOW MANY TIMES DO YOU TALK ON THE PHONE WITH FAMILY, FRIENDS, OR NEIGHBORS?: MORE THAN THREE TIMES A WEEK
HOW OFTEN DO YOU GET TOGETHER WITH FRIENDS OR RELATIVES?: ONCE A WEEK
HOW OFTEN DO YOU ATTEND CHURCH OR RELIGIOUS SERVICES?: NEVER
HOW OFTEN DO YOU ATTEND CHURCH OR RELIGIOUS SERVICES?: NEVER
WITHIN THE PAST 12 MONTHS, YOU WORRIED THAT YOUR FOOD WOULD RUN OUT BEFORE YOU GOT THE MONEY TO BUY MORE: NEVER TRUE
IN A TYPICAL WEEK, HOW MANY TIMES DO YOU TALK ON THE PHONE WITH FAMILY, FRIENDS, OR NEIGHBORS?: MORE THAN THREE TIMES A WEEK
DO YOU BELONG TO ANY CLUBS OR ORGANIZATIONS SUCH AS CHURCH GROUPS UNIONS, FRATERNAL OR ATHLETIC GROUPS, OR SCHOOL GROUPS?: NO
DO YOU BELONG TO ANY CLUBS OR ORGANIZATIONS SUCH AS CHURCH GROUPS UNIONS, FRATERNAL OR ATHLETIC GROUPS, OR SCHOOL GROUPS?: NO
HOW HARD IS IT FOR YOU TO PAY FOR THE VERY BASICS LIKE FOOD, HOUSING, MEDICAL CARE, AND HEATING?: SOMEWHAT HARD
HOW MANY DRINKS CONTAINING ALCOHOL DO YOU HAVE ON A TYPICAL DAY WHEN YOU ARE DRINKING: 1 OR 2
HOW OFTEN DO YOU GET TOGETHER WITH FRIENDS OR RELATIVES?: ONCE A WEEK
HOW OFTEN DO YOU ATTENT MEETINGS OF THE CLUB OR ORGANIZATION YOU BELONG TO?: NEVER
HOW OFTEN DO YOU ATTENT MEETINGS OF THE CLUB OR ORGANIZATION YOU BELONG TO?: NEVER

## 2023-04-18 ENCOUNTER — OFFICE VISIT (OUTPATIENT)
Dept: MEDICAL GROUP | Facility: IMAGING CENTER | Age: 73
End: 2023-04-18
Payer: MEDICARE

## 2023-04-18 VITALS
HEART RATE: 67 BPM | OXYGEN SATURATION: 94 % | BODY MASS INDEX: 36.03 KG/M2 | DIASTOLIC BLOOD PRESSURE: 68 MMHG | HEIGHT: 71 IN | TEMPERATURE: 98.1 F | WEIGHT: 257.4 LBS | SYSTOLIC BLOOD PRESSURE: 138 MMHG

## 2023-04-18 DIAGNOSIS — R60.0 LOCALIZED EDEMA: ICD-10-CM

## 2023-04-18 DIAGNOSIS — H81.10 BENIGN PAROXYSMAL POSITIONAL VERTIGO, UNSPECIFIED LATERALITY: ICD-10-CM

## 2023-04-18 DIAGNOSIS — Z00.00 ENCOUNTER FOR MEDICARE ANNUAL WELLNESS EXAM: ICD-10-CM

## 2023-04-18 DIAGNOSIS — I10 ESSENTIAL HYPERTENSION: Chronic | ICD-10-CM

## 2023-04-18 DIAGNOSIS — M79.2 NEUROPATHIC PAIN: Chronic | ICD-10-CM

## 2023-04-18 PROBLEM — F32.9 REACTIVE DEPRESSION: Status: RESOLVED | Noted: 2023-02-21 | Resolved: 2023-04-18

## 2023-04-18 PROBLEM — R42 DIZZINESS: Status: ACTIVE | Noted: 2023-04-18

## 2023-04-18 PROCEDURE — G0439 PPPS, SUBSEQ VISIT: HCPCS | Performed by: CLINICAL NURSE SPECIALIST

## 2023-04-18 RX ORDER — TESTOSTERONE CYPIONATE 200 MG/ML
INJECTION, SOLUTION INTRAMUSCULAR
COMMUNITY
Start: 2023-04-14 | End: 2023-05-09

## 2023-04-18 RX ORDER — MECLIZINE HCL 12.5 MG/1
12.5 TABLET ORAL 3 TIMES DAILY PRN
Qty: 30 TABLET | Refills: 0 | Status: SHIPPED | OUTPATIENT
Start: 2023-04-18 | End: 2023-11-06 | Stop reason: SDUPTHER

## 2023-04-18 RX ORDER — ASPIRIN 81 MG/1
81 TABLET, CHEWABLE ORAL DAILY
COMMUNITY
End: 2023-08-03

## 2023-04-18 ASSESSMENT — ENCOUNTER SYMPTOMS: GENERAL WELL-BEING: FAIR

## 2023-04-18 ASSESSMENT — ACTIVITIES OF DAILY LIVING (ADL): BATHING_REQUIRES_ASSISTANCE: 0

## 2023-04-18 ASSESSMENT — FIBROSIS 4 INDEX: FIB4 SCORE: 2.23

## 2023-04-18 ASSESSMENT — PATIENT HEALTH QUESTIONNAIRE - PHQ9: CLINICAL INTERPRETATION OF PHQ2 SCORE: 0

## 2023-04-18 NOTE — ASSESSMENT & PLAN NOTE
Soaks feet and uses tea tree oil foot bath reduces the numbness.  Tingles when moves toes fairly constant.  Has edema which comes and goes.

## 2023-04-18 NOTE — ASSESSMENT & PLAN NOTE
Tolerating HCTZ well. Edema improved and no dizziness until 3 days ago when got dizzy and had some foot swelling.  Has been on metoprolol for 10 years for hypertension.

## 2023-04-18 NOTE — ASSESSMENT & PLAN NOTE
Dizziness last night, better when lays down worse with standing.  Usually lasts 24 hours.  Has to grab the wall occasionally. Mild headaches when this occurs usually left sided.  Resolves spontaneously.  No nausea.

## 2023-04-18 NOTE — PROGRESS NOTES
Chief Complaint   Patient presents with    Annual Wellness Visit       HPI:  Laureen Munoz is a 72 y.o. 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  Usually lasts 24 hours.  Has to grab the wall occasionally. Mild headaches when this occurs usually left sided.  Resolves spontaneously.  No nausea.      Neuropathic pain  Soaks feet and uses tea tree oil foot bath reduces the numbness.  Tingles when moves toes fairly constant.  Has edema which comes and goes.     Essential hypertension   Tolerating HCTZ well. Edema improved and no dizziness until 3 days ago when got dizzy and had some foot swelling.  Has been on metoprolol for 10 years for hypertension.      Localized edema  This has improved with the cessation of amlodipine.       Current Outpatient Medications   Medication Sig Dispense Refill    testosterone cypionate (DEPO-TESTOSTERONE) 200 MG/ML Solution injection       aspirin (ASA) 81 MG Chew Tab chewable tablet Chew 81 mg every day.      meclizine (ANTIVERT) 12.5 MG Tab Take 1 Tablet by mouth 3 times a day as needed for Dizziness. 30 Tablet 0    metoprolol SR (TOPROL XL) 25 MG TABLET SR 24 HR TAKE ONE TABLET BY MOUTH ONE TIME DAILY 100 Tablet 0    rosuvastatin  (CRESTOR) 20 MG Tab Take 1 Tablet by mouth every evening. 100 Tablet 0    hydroCHLOROthiazide (HYDRODIURIL) 25 MG Tab Take 1 Tablet by mouth every day. 30 Tablet 1    ketoconazole (NIZORAL) 2 % shampoo       alfuzosin (UROXATRAL) 10 MG SR tablet alfuzosin ER 10 mg tablet,extended release 24 hr      ketoconazole (NIZORAL) 2 % Cream ketoconazole 2 % topical cream      Multiple Vitamin (MULTIVITAMIN ADULT PO) Take  by mouth.      fluticasone (FLONASE) 50 MCG/ACT nasal spray Administer 1 Spray into affected nostril(S) every morning.       No current facility-administered medications for this visit.          Current supplements as per medication list.     Allergies: Nkda [no known drug allergy] and Seasonal    Current social contact/activities: Not at this time but he lives with ex wife and son     He  reports that he quit smoking about 38 years ago. His smoking use included cigars and cigarettes. He smoked an average of .25 packs per day. He has never used smokeless tobacco. He reports current alcohol use. He reports that he does not currently use drugs after having used the following drugs: Marijuana and Inhaled.  Counseling given: Not Answered  Tobacco comments: cigar, smokes one a day but no cigarettes      ROS:    Gait: Uses no assistive device  Ostomy: No  Other tubes: No  Amputations: No  Chronic oxygen use: No  Last eye exam: 6 months ago  Wears hearing aids: No   : Denies any urinary leakage during the last 6 months    Screening:  UpToDate    Depression Screening  Little interest or pleasure in doing things?  0 - not at all  Feeling down, depressed , or hopeless? 0 - not at all  Patient Health Questionnaire Score: 0     If depressive symptoms identified deferred to follow up visit unless specifically addressed in assessment and plan.    Interpretation of PHQ-9 Total Score   Score Severity   1-4 No Depression   5-9 Mild Depression   10-14 Moderate Depression   15-19 Moderately Severe Depression   20-27 Severe  Depression    Screening for Cognitive Impairment  Three Minute Recall (daughter, heaven, mountain) 2/3    Baldemar clock face with all 12 numbers and set the hands to show 10 past 11.  Yes    Cognitive concerns identified deferred for follow up unless specifically addressed in assessment and plan.    Fall Risk Assessment  Has the patient had two or more falls in the last year or any fall with injury in the last year?  Yes    Safety Assessment  Throw rugs on floor.  Yes  Handrails on all stairs.  Yes  Good lighting in all hallways.  Yes  Difficulty hearing.  No  Patient counseled about all safety risks that were identified.    Functional Assessment ADLs  Are there any barriers preventing you from cooking for yourself or meeting nutritional needs?  No.    Are there any barriers preventing you from driving safely or obtaining transportation?  No.    Are there any barriers preventing you from using a telephone or calling for help?  No.    Are there any barriers preventing you from shopping?  No.    Are there any barriers preventing you from taking care of your own finances?  No.    Are there any barriers preventing you from managing your medications?  No.    Are there any barriers preventing you from showering, bathing or dressing yourself?  No.    Are you currently engaging in any exercise or physical activity?  Yes.  Occasional walking  What is your perception of your health?  Fair    Advance Care Planning  Do you have an Advance Directive, Living Will, Durable Power of , or POLST? Yes          is not on file - instructed patient to bring in a copy to scan into their chart      Health Maintenance Summary            Overdue - IMM ZOSTER VACCINES (2 of 3) Overdue since 11/10/2016      09/15/2016  Imm Admin: Zoster Vaccine Live (ZVL) (Zostavax) - HISTORICAL DATA              Overdue - COVID-19 Vaccine (5 - Booster for Moderna series) Overdue since 9/24/2022 07/30/2022  Imm Admin: MODERNA SARS-COV-2 VACCINE  (12+)    12/06/2021  Imm Admin: MODERNA SARS-COV-2 VACCINE (12+)    04/10/2021  Imm Admin: MODERNA SARS-COV-2 VACCINE (12+)    03/12/2021  Imm Admin: MODERNA SARS-COV-2 VACCINE (12+)              Postponed - IMM PNEUMOCOCCAL VACCINE: 65+ Years (1 - PCV) Postponed until 5/9/2023      No completion history exists for this topic.              Annual Wellness Visit (Every 366 Days) Next due on 4/18/2024 04/18/2023  Visit Dx: Encounter for Medicare annual wellness exam    04/18/2023  Level of Service: ANNUAL WELLNESS VISIT-INCLUDES PPPS SUBSEQUE*    09/08/2021  Subsequent Annual Wellness Visit - Includes PPPS ()    09/08/2021  Visit Dx: Medicare annual wellness visit, subsequent    09/28/2020  Done    Only the first 5 history entries have been loaded, but more history exists.              COLORECTAL CANCER SCREENING (COLONOSCOPY - Every 10 Years) Next due on 5/20/2024 05/20/2014  AMB REFERRAL TO GI FOR COLONOSCOPY              IMM DTaP/Tdap/Td Vaccine (3 - Td or Tdap) Next due on 2/21/2033 02/21/2023  Imm Admin: Tdap Vaccine    01/16/2013  Imm Admin: Tdap Vaccine              HEPATITIS C SCREENING  Completed      05/14/2020  HEP C VIRUS ANTIBODY              IMM INFLUENZA (Series Information) Completed      10/01/2022  Imm Admin: Influenza, Unspecified - HISTORICAL DATA    10/01/2020  Imm Admin: Influenza Vac Subunit Quad Inj (Pf)    10/30/2019  Imm Admin: Influenza Vaccine Adult HD    10/30/2018  Imm Admin: Influenza Vaccine Adult HD    11/08/2014  Imm Admin: INFLUENZA TIV (IM)    Only the first 5 history entries have been loaded, but more history exists.              ABDOMINAL AORTIC ANEURYSM (AAA) SCREEN  Completed      03/22/2023  US-ABDOMINAL AORTA W/O DOPPLER              IMM HEP B VACCINE (Series Information) Aged Out      No completion history exists for this topic.              IMM MENINGOCOCCAL ACWY VACCINE (Series Information) Aged Out      No completion history exists for this topic.       "              Patient Care Team:  NATACHA Haque as PCP - General (Family Medicine)  Julio Ayon O.D. as Attending Team Physician (Optometry)        Social History     Tobacco Use    Smoking status: Former     Packs/day: 0.25     Types: Cigars, Cigarettes     Quit date: 1985     Years since quittin.3    Smokeless tobacco: Never    Tobacco comments:     cigar, smokes one a day but no cigarettes   Vaping Use    Vaping Use: Never used   Substance Use Topics    Alcohol use: Yes     Comment: minimal    Drug use: Not Currently     Types: Marijuana, Inhaled     Comment: rare occasions,occasionally      Family History   Problem Relation Age of Onset    Heart Disease Mother     Heart Attack Mother     Heart Disease Father     Heart Attack Father     Cancer Sister         leukemia, breast cancer    Stroke Sister     No Known Problems Maternal Grandmother     No Known Problems Maternal Grandfather     No Known Problems Paternal Grandmother     Dementia Paternal Grandfather     Hypertension Neg Hx     Hyperlipidemia Neg Hx     Diabetes Neg Hx      He  has a past medical history of Hypercholesterolemia, Hypertension, Reactive depression (2023), Tinea inguinalis, and Tobacco use (10/30/2018).   Past Surgical History:   Procedure Laterality Date    OTHER ABDOMINAL SURGERY      gallbladder       Exam:   /68   Pulse 67   Temp 36.7 °C (98.1 °F) (Temporal)   Ht 1.803 m (5' 11\")   Wt 117 kg (257 lb 6.4 oz)   SpO2 94%  Body mass index is 35.9 kg/m².    Hearing excellent.    Dentition good, two teeth missing  Alert, oriented in no acute distress.  Eye contact is good, speech goal directed, affect calm  Pulses: pedal pulses 2+ bilaterally  Neuro: Tops of feet with minimal sensation, bottoms with pressure only with pressure with nail  Skin: Bilateral toes purplish in color not cool to touch    Assessment and Plan. The following treatment and monitoring plan is recommended:      1. Encounter for " Medicare annual wellness exam  No services needed at this time.      2. Benign paroxysmal positional vertigo, unspecified laterality  New diagnosis.  Positive [place] Halpike, dizziness reproduced but no nystagmus.      TAKE meclizine 12.5mg as needed for exacerbation.     - meclizine (ANTIVERT) 12.5 MG Tab; Take 1 Tablet by mouth 3 times a day as needed for Dizziness.  Dispense: 30 Tablet; Refill: 0    3. Neuropathic pain  Chronic, uncontrolled. Pulses normal in feet but toes somewhat purple in color.  Numbness worse on dorsal aspect for feet bilaterally but present throughout.  Referral to neuropathic clinic placed.     -Referral to neuropathic clinic.    4. Essential hypertension  Chronic, uncontrolled. Bp increased today in clinic.  HCTZ not controlling BP well enough but may be the cause of reduced edema. As he does not have any cardiac rhythm issues known, will drop the metoprolol and resume amlodipine 5mg.  If edema worsens with resumption of amlodipine, stop and let me know.  Continue HCTZ 25mg daily.     - US-RENAL ARTERY DUPLEX COMP; Future    5. Localized edema  Chronic, intermittent with hypertension.  US venous 3/29/23 normal.  Large simple cyst on left kidney per abdominal aortic ultrasound.  Will get renal artery us.    - US-RENAL ARTERY DUPLEX COMP; Future      Services suggested: No services needed at this time  Health Care Screening: Age-appropriate preventive services recommended by USPTF and ACIP covered by Medicare were discussed today. Services ordered if indicated and agreed upon by the patient.  Referrals offered: Community-based lifestyle interventions to reduce health risks and promote self-management and wellness, fall prevention, nutrition, physical activity, tobacco-use cessation, weight loss, and mental health services as per orders if indicated.    Discussion today about general wellness and lifestyle habits:    Prevent falls and reduce trip hazards; Cautioned about securing or removing  rugs.  Have a working fire alarm and carbon monoxide detector;   Engage in regular physical activity and social activities     Follow-up: Return if symptoms worsen or fail to improve.

## 2023-04-30 ENCOUNTER — HOSPITAL ENCOUNTER (OUTPATIENT)
Dept: RADIOLOGY | Facility: MEDICAL CENTER | Age: 73
End: 2023-04-30
Attending: CLINICAL NURSE SPECIALIST
Payer: MEDICARE

## 2023-04-30 DIAGNOSIS — H53.9 VISUAL DISTURBANCE: ICD-10-CM

## 2023-04-30 DIAGNOSIS — E23.6 ENLARGED PITUITARY GLAND (HCC): ICD-10-CM

## 2023-04-30 PROCEDURE — 700117 HCHG RX CONTRAST REV CODE 255: Performed by: CLINICAL NURSE SPECIALIST

## 2023-04-30 PROCEDURE — 70553 MRI BRAIN STEM W/O & W/DYE: CPT

## 2023-04-30 PROCEDURE — A9579 GAD-BASE MR CONTRAST NOS,1ML: HCPCS | Performed by: CLINICAL NURSE SPECIALIST

## 2023-04-30 RX ADMIN — GADOTERIDOL 20 ML: 279.3 INJECTION, SOLUTION INTRAVENOUS at 09:03

## 2023-05-08 ENCOUNTER — APPOINTMENT (RX ONLY)
Dept: URBAN - METROPOLITAN AREA CLINIC 4 | Facility: CLINIC | Age: 73
Setting detail: DERMATOLOGY
End: 2023-05-08

## 2023-05-08 DIAGNOSIS — L82.0 INFLAMED SEBORRHEIC KERATOSIS: ICD-10-CM

## 2023-05-08 DIAGNOSIS — L21.8 OTHER SEBORRHEIC DERMATITIS: ICD-10-CM

## 2023-05-08 PROBLEM — D48.5 NEOPLASM OF UNCERTAIN BEHAVIOR OF SKIN: Status: ACTIVE | Noted: 2023-05-08

## 2023-05-08 PROCEDURE — 99213 OFFICE O/P EST LOW 20 MIN: CPT

## 2023-05-08 PROCEDURE — ? COUNSELING

## 2023-05-08 PROCEDURE — ? DEFER

## 2023-05-08 PROCEDURE — ? ADDITIONAL NOTES

## 2023-05-08 ASSESSMENT — LOCATION SIMPLE DESCRIPTION DERM
LOCATION SIMPLE: POSTERIOR SCALP
LOCATION SIMPLE: LEFT CHEEK
LOCATION SIMPLE: RIGHT FOREHEAD
LOCATION SIMPLE: RIGHT CHEEK
LOCATION SIMPLE: RIGHT LOWER BACK
LOCATION SIMPLE: POSTERIOR NECK
LOCATION SIMPLE: LEFT FOREHEAD

## 2023-05-08 ASSESSMENT — LOCATION DETAILED DESCRIPTION DERM
LOCATION DETAILED: POSTERIOR MID-PARIETAL SCALP
LOCATION DETAILED: RIGHT SUPERIOR LATERAL LOWER BACK
LOCATION DETAILED: MID POSTERIOR NECK
LOCATION DETAILED: RIGHT SUPERIOR FOREHEAD
LOCATION DETAILED: RIGHT CENTRAL MALAR CHEEK
LOCATION DETAILED: LEFT SUPERIOR FOREHEAD
LOCATION DETAILED: LEFT CENTRAL MALAR CHEEK

## 2023-05-08 ASSESSMENT — LOCATION ZONE DERM
LOCATION ZONE: TRUNK
LOCATION ZONE: FACE
LOCATION ZONE: SCALP
LOCATION ZONE: NECK

## 2023-05-08 NOTE — PROCEDURE: ADDITIONAL NOTES
Additional Notes: Advised to use ketoconazole shampoo and hydrocortisone until next visit in one month and is scheduled.
Detail Level: Simple
Render Risk Assessment In Note?: no
Additional Notes: Rechecking at next visit and is scheduled.

## 2023-05-09 ENCOUNTER — OFFICE VISIT (OUTPATIENT)
Dept: MEDICAL GROUP | Facility: IMAGING CENTER | Age: 73
End: 2023-05-09
Payer: MEDICARE

## 2023-05-09 VITALS
WEIGHT: 255.8 LBS | SYSTOLIC BLOOD PRESSURE: 144 MMHG | HEART RATE: 61 BPM | RESPIRATION RATE: 18 BRPM | TEMPERATURE: 98.1 F | DIASTOLIC BLOOD PRESSURE: 84 MMHG | OXYGEN SATURATION: 93 % | HEIGHT: 71 IN | BODY MASS INDEX: 35.81 KG/M2

## 2023-05-09 DIAGNOSIS — I10 ESSENTIAL HYPERTENSION: Chronic | ICD-10-CM

## 2023-05-09 PROCEDURE — 99214 OFFICE O/P EST MOD 30 MIN: CPT | Performed by: CLINICAL NURSE SPECIALIST

## 2023-05-09 ASSESSMENT — PAIN SCALES - GENERAL: PAINLEVEL: NO PAIN

## 2023-05-09 ASSESSMENT — FIBROSIS 4 INDEX: FIB4 SCORE: 2.23

## 2023-05-09 NOTE — PROGRESS NOTES
"Subjective     Laureen Munoz is a 72 y.o. male who presents with Hypertension Follow-up (High BP x2days )            HPI  Essential hypertension  Blood pressure elevated recently.  Over the weekend 160/80 and 158/80.  He has been taking the amlodipine and the HCTZ and stopped metoprolol as opposed to stopping the amlodipine and continuing metoprolol.  Since these changes, BP has been elevated.   No shortness of breath or headaches. Edema of lower extremities has improved.  Neuropathy still present.      ROS  See HPI    Allergies   Allergen Reactions    Nkda [No Known Drug Allergy]     Seasonal      Current Outpatient Medications on File Prior to Visit   Medication Sig Dispense Refill    aspirin (ASA) 81 MG Chew Tab chewable tablet Chew 81 mg every day.      meclizine (ANTIVERT) 12.5 MG Tab Take 1 Tablet by mouth 3 times a day as needed for Dizziness. 30 Tablet 0    rosuvastatin (CRESTOR) 20 MG Tab Take 1 Tablet by mouth every evening. 100 Tablet 0    ketoconazole (NIZORAL) 2 % shampoo       alfuzosin (UROXATRAL) 10 MG SR tablet alfuzosin ER 10 mg tablet,extended release 24 hr      ketoconazole (NIZORAL) 2 % Cream ketoconazole 2 % topical cream      fluticasone (FLONASE) 50 MCG/ACT nasal spray Administer 1 Spray into affected nostril(S) every morning.      testosterone cypionate (DEPO-TESTOSTERONE) 200 MG/ML Solution injection       metoprolol SR (TOPROL XL) 25 MG TABLET SR 24 HR TAKE ONE TABLET BY MOUTH ONE TIME DAILY 100 Tablet 0    Multiple Vitamin (MULTIVITAMIN ADULT PO) Take  by mouth.       No current facility-administered medications on file prior to visit.              Objective     BP (!) 144/84 (BP Location: Left arm, Patient Position: Sitting, BP Cuff Size: Large adult)   Pulse 61   Temp 36.7 °C (98.1 °F) (Temporal)   Resp 18   Ht 1.803 m (5' 11\")   Wt 116 kg (255 lb 12.8 oz)   SpO2 93%   BMI 35.68 kg/m²      Physical Exam  Constitutional:       General: He is not in acute distress.     " Appearance: He is not ill-appearing, toxic-appearing or diaphoretic.   HENT:      Head: Normocephalic and atraumatic.   Eyes:      General: No scleral icterus.     Extraocular Movements: Extraocular movements intact.      Pupils: Pupils are equal, round, and reactive to light.   Cardiovascular:      Rate and Rhythm: Normal rate and regular rhythm.      Heart sounds: Normal heart sounds.   Pulmonary:      Effort: Pulmonary effort is normal.   Skin:     General: Skin is warm and dry.   Neurological:      Mental Status: He is alert and oriented to person, place, and time.   Psychiatric:         Mood and Affect: Mood normal.         Behavior: Behavior normal.         Thought Content: Thought content normal.         Judgment: Judgment normal.                           Assessment & Plan      1. Essential hypertension  Chronic, uncontrolled.  Worsened with addition of HCTZ and stop of metoprolol. This was not the intended plan but is what occurred.  Will revert back to original medication regimen and he will continue to work on healthy lifestyle choices and monitor his BP. He will follow up with neurology re his MRI and his neuropathy.    CONTINUE amlodipine 5mg  CONTINUE metoprolol 25mg  STOP HCTZ        Return if symptoms worsen or fail to improve.

## 2023-05-09 NOTE — ASSESSMENT & PLAN NOTE
Blood pressure elevated recently.  Over the weekend 160/80 and 158/80.  He has been taking the amlodipine and the HCTZ and stopped metoprolol as opposed to stopping the amlodipine and continuing metoprolol.  Since these changes, BP has been elevated.   No shortness of breath or headaches. Edema of lower extremities has improved.  Neuropathy still present.

## 2023-05-10 ENCOUNTER — HOSPITAL ENCOUNTER (OUTPATIENT)
Dept: CARDIOLOGY | Facility: MEDICAL CENTER | Age: 73
End: 2023-05-10
Attending: CLINICAL NURSE SPECIALIST
Payer: MEDICARE

## 2023-05-10 DIAGNOSIS — I27.20 PULMONARY HYPERTENSION (HCC): ICD-10-CM

## 2023-05-10 LAB — LV EJECT FRACT  99904: 60

## 2023-05-10 PROCEDURE — 93306 TTE W/DOPPLER COMPLETE: CPT

## 2023-05-10 PROCEDURE — 93306 TTE W/DOPPLER COMPLETE: CPT | Mod: 26 | Performed by: INTERNAL MEDICINE

## 2023-05-16 ENCOUNTER — HOSPITAL ENCOUNTER (OUTPATIENT)
Dept: RADIOLOGY | Facility: MEDICAL CENTER | Age: 73
End: 2023-05-16
Attending: CLINICAL NURSE SPECIALIST
Payer: MEDICARE

## 2023-05-16 DIAGNOSIS — I70.1 RENAL ARTERY STENOSIS (HCC): ICD-10-CM

## 2023-05-16 DIAGNOSIS — I10 ESSENTIAL HYPERTENSION: ICD-10-CM

## 2023-05-16 DIAGNOSIS — I27.20 PULMONARY HYPERTENSION (HCC): ICD-10-CM

## 2023-05-16 DIAGNOSIS — R60.0 LOCALIZED EDEMA: ICD-10-CM

## 2023-05-16 PROCEDURE — 93975 VASCULAR STUDY: CPT

## 2023-05-16 RX ORDER — LISINOPRIL AND HYDROCHLOROTHIAZIDE 12.5; 1 MG/1; MG/1
1 TABLET ORAL DAILY
Qty: 60 TABLET | Refills: 1 | Status: SHIPPED | OUTPATIENT
Start: 2023-05-16 | End: 2023-07-11

## 2023-05-18 ENCOUNTER — NON-PROVIDER VISIT (OUTPATIENT)
Dept: MEDICAL GROUP | Facility: IMAGING CENTER | Age: 73
End: 2023-05-18
Payer: MEDICARE

## 2023-05-18 VITALS — DIASTOLIC BLOOD PRESSURE: 70 MMHG | SYSTOLIC BLOOD PRESSURE: 124 MMHG

## 2023-05-18 NOTE — PROGRESS NOTES
Laureen Munoz is a 72 y.o. male here for a non-provider visit for BP check          If abnormal, was the Registered Nurse (office provider if RN is unavailable) notified today? Yes    Routed to PCP/Requested Provider? Yes

## 2023-05-25 ENCOUNTER — NON-PROVIDER VISIT (OUTPATIENT)
Dept: MEDICAL GROUP | Facility: IMAGING CENTER | Age: 73
End: 2023-05-25
Payer: MEDICARE

## 2023-05-25 VITALS — DIASTOLIC BLOOD PRESSURE: 60 MMHG | SYSTOLIC BLOOD PRESSURE: 120 MMHG

## 2023-06-05 ENCOUNTER — APPOINTMENT (RX ONLY)
Dept: URBAN - METROPOLITAN AREA CLINIC 4 | Facility: CLINIC | Age: 73
Setting detail: DERMATOLOGY
End: 2023-06-05

## 2023-06-05 DIAGNOSIS — L82.1 OTHER SEBORRHEIC KERATOSIS: ICD-10-CM

## 2023-06-05 DIAGNOSIS — F42.4 EXCORIATION (SKIN-PICKING) DISORDER: ICD-10-CM

## 2023-06-05 DIAGNOSIS — L21.8 OTHER SEBORRHEIC DERMATITIS: ICD-10-CM

## 2023-06-05 PROCEDURE — ? PRESCRIPTION

## 2023-06-05 PROCEDURE — ? COUNSELING

## 2023-06-05 PROCEDURE — ? MEDICATION COUNSELING

## 2023-06-05 PROCEDURE — 99213 OFFICE O/P EST LOW 20 MIN: CPT

## 2023-06-05 PROCEDURE — ? ADDITIONAL NOTES

## 2023-06-05 RX ORDER — KETOCONAZOLE 20 MG/ML
1 SHAMPOO, SUSPENSION TOPICAL TIW
Qty: 120 | Refills: 1 | Status: ERX

## 2023-06-05 RX ORDER — SULFACETAMIDE SODIUM 100 MG/G
1 CREAM TOPICAL QOD
Qty: 60 | Refills: 2 | Status: ERX | COMMUNITY
Start: 2023-06-05

## 2023-06-05 RX ADMIN — SULFACETAMIDE SODIUM 1: 100 CREAM TOPICAL at 00:00

## 2023-06-05 ASSESSMENT — LOCATION DETAILED DESCRIPTION DERM
LOCATION DETAILED: RIGHT CENTRAL MALAR CHEEK
LOCATION DETAILED: LEFT CENTRAL MALAR CHEEK
LOCATION DETAILED: INFERIOR THORACIC SPINE
LOCATION DETAILED: LEFT SUPERIOR FOREHEAD
LOCATION DETAILED: LEFT MEDIAL TRAPEZIAL NECK
LOCATION DETAILED: MID POSTERIOR NECK
LOCATION DETAILED: POSTERIOR MID-PARIETAL SCALP
LOCATION DETAILED: RIGHT SUPERIOR FOREHEAD

## 2023-06-05 ASSESSMENT — LOCATION SIMPLE DESCRIPTION DERM
LOCATION SIMPLE: LEFT FOREHEAD
LOCATION SIMPLE: LEFT CHEEK
LOCATION SIMPLE: UPPER BACK
LOCATION SIMPLE: POSTERIOR SCALP
LOCATION SIMPLE: POSTERIOR NECK
LOCATION SIMPLE: RIGHT CHEEK
LOCATION SIMPLE: RIGHT FOREHEAD

## 2023-06-05 ASSESSMENT — LOCATION ZONE DERM
LOCATION ZONE: FACE
LOCATION ZONE: NECK
LOCATION ZONE: TRUNK
LOCATION ZONE: SCALP

## 2023-06-05 NOTE — PROCEDURE: ADDITIONAL NOTES
Render Risk Assessment In Note?: no
Detail Level: Simple
Additional Notes: Seborrheic dermatitis treatment: \\nShampoo: Mix a little of the ketoconazole with a little of the Head and Shoulders, lather up on the scalp, let sit 3 minutes, then rinse. Repeat this as needed.\\nRash on face: Apply the hydrocortisone cream one day and the sulfacetamide cream the next. Repeat this. Once better, try cutting back on the frequency of the hydrocortisone. If still needing the hydrocortisone every other day in 1 month, contact dermatology.

## 2023-06-05 NOTE — PROCEDURE: MEDICATION COUNSELING
Skyrizi Pregnancy And Lactation Text: The risk during pregnancy and breastfeeding is uncertain with this medication.
Aklief Pregnancy And Lactation Text: It is unknown if this medication is safe to use during pregnancy.  It is unknown if this medication is excreted in breast milk.  Breastfeeding women should use the topical cream on the smallest area of the skin for the shortest time needed while breastfeeding.  Do not apply to nipple and areola.
Solaraze Counseling:  I discussed with the patient the risks of Solaraze including but not limited to erythema, scaling, itching, weeping, crusting, and pain.
Opzelura Pregnancy And Lactation Text: There is insufficient data to evaluate drug-associated risk for major birth defects, miscarriage, or other adverse maternal or fetal outcomes.  There is a pregnancy registry that monitors pregnancy outcomes in pregnant persons exposed to the medication during pregnancy.  It is unknown if this medication is excreted in breast milk.  Do not breastfeed during treatment and for about 4 weeks after the last dose.
Fluconazole Pregnancy And Lactation Text: This medication is Pregnancy Category C and it isn't know if it is safe during pregnancy. It is also excreted in breast milk.
Acitretin Pregnancy And Lactation Text: This medication is Pregnancy Category X and should not be given to women who are pregnant or may become pregnant in the future. This medication is excreted in breast milk.
Quinolones Counseling:  I discussed with the patient the risks of fluoroquinolones including but not limited to GI upset, allergic reaction, drug rash, diarrhea, dizziness, photosensitivity, yeast infections, liver function test abnormalities, tendonitis/tendon rupture.
Cibinqo Counseling: I discussed with the patient the risks of Cibinqo therapy including but not limited to common cold, nausea, headache, cold sores, increased blood CPK levels, dizziness, UTIs, fatigue, acne, and vomitting. Live vaccines should be avoided.  This medication has been linked to serious infections; higher rate of mortality; malignancy and lymphoproliferative disorders; major adverse cardiovascular events; thrombosis; thrombocytopenia and lymphopenia; lipid elevations; and retinal detachment.
Xelvirginiaz Pregnancy And Lactation Text: This medication is Pregnancy Category D and is not considered safe during pregnancy.  The risk during breast feeding is also uncertain.
Cellcept Pregnancy And Lactation Text: This medication is Pregnancy Category D and isn't considered safe during pregnancy. It is unknown if this medication is excreted in breast milk.
5-Fu Counseling: 5-Fluorouracil Counseling:  I discussed with the patient the risks of 5-fluorouracil including but not limited to erythema, scaling, itching, weeping, crusting, and pain.
Finasteride Male Counseling: Finasteride Counseling:  I discussed with the patient the risks of use of finasteride including but not limited to decreased libido, decreased ejaculate volume, gynecomastia, and depression. Women should not handle medication.  All of the patient's questions and concerns were addressed.
Zoryve Pregnancy And Lactation Text: It is unknown if this medication can cause problems during pregnancy and breastfeeding.
Oral Minoxidil Counseling- I discussed with the patient the risks of oral minoxidil including but not limited to shortness of breath, swelling of the feet or ankles, dizziness, lightheadedness, unwanted hair growth and allergic reaction.  The patient verbalized understanding of the proper use and possible adverse effects of oral minoxidil.  All of the patient's questions and concerns were addressed.
Ivermectin Counseling:  Patient instructed to take medication on an empty stomach with a full glass of water.  Patient informed of potential adverse effects including but not limited to nausea, diarrhea, dizziness, itching, and swelling of the extremities or lymph nodes.  The patient verbalized understanding of the proper use and possible adverse effects of ivermectin.  All of the patient's questions and concerns were addressed.
Topical Clindamycin Pregnancy And Lactation Text: This medication is Pregnancy Category B and is considered safe during pregnancy. It is unknown if it is excreted in breast milk.
Imiquimod Counseling:  I discussed with the patient the risks of imiquimod including but not limited to erythema, scaling, itching, weeping, crusting, and pain.  Patient understands that the inflammatory response to imiquimod is variable from person to person and was educated regarded proper titration schedule.  If flu-like symptoms develop, patient knows to discontinue the medication and contact us.
Finasteride Pregnancy And Lactation Text: This medication is absolutely contraindicated during pregnancy. It is unknown if it is excreted in breast milk.
Detail Level: Simple
Topical Ketoconazole Counseling: Patient counseled that this medication may cause skin irritation or allergic reactions.  In the event of skin irritation, the patient was advised to reduce the amount of the drug applied or use it less frequently.   The patient verbalized understanding of the proper use and possible adverse effects of ketoconazole.  All of the patient's questions and concerns were addressed.
Ivermectin Pregnancy And Lactation Text: This medication is Pregnancy Category C and it isn't known if it is safe during pregnancy. It is also excreted in breast milk.
5-Fu Pregnancy And Lactation Text: This medication is Pregnancy Category X and contraindicated in pregnancy and in women who may become pregnant. It is unknown if this medication is excreted in breast milk.
Clindamycin Pregnancy And Lactation Text: This medication can be used in pregnancy if certain situations. Clindamycin is also present in breast milk.
Erivedge Counseling- I discussed with the patient the risks of Erivedge including but not limited to nausea, vomiting, diarrhea, constipation, weight loss, changes in the sense of taste, decreased appetite, muscle spasms, and hair loss.  The patient verbalized understanding of the proper use and possible adverse effects of Erivedge.  All of the patient's questions and concerns were addressed.
Hydroxychloroquine Pregnancy And Lactation Text: This medication has been shown to cause fetal harm but it isn't assigned a Pregnancy Risk Category. There are small amounts excreted in breast milk.
Cimetidine Counseling:  I discussed with the patient the risks of Cimetidine including but not limited to gynecomastia, headache, diarrhea, nausea, drowsiness, arrhythmias, pancreatitis, skin rashes, psychosis, bone marrow suppression and kidney toxicity.
Colchicine Counseling:  Patient counseled regarding adverse effects including but not limited to stomach upset (nausea, vomiting, stomach pain, or diarrhea).  Patient instructed to limit alcohol consumption while taking this medication.  Colchicine may reduce blood counts especially with prolonged use.  The patient understands that monitoring of kidney function and blood counts may be required, especially at baseline. The patient verbalized understanding of the proper use and possible adverse effects of colchicine.  All of the patient's questions and concerns were addressed.
Thalidomide Counseling: I discussed with the patient the risks of thalidomide including but not limited to birth defects, anxiety, weakness, chest pain, dizziness, cough and severe allergy.
Infliximab Counseling:  I discussed with the patient the risks of infliximab including but not limited to myelosuppression, immunosuppression, autoimmune hepatitis, demyelinating diseases, lymphoma, and serious infections.  The patient understands that monitoring is required including a PPD at baseline and must alert us or the primary physician if symptoms of infection or other concerning signs are noted.
Bexarotene Counseling:  I discussed with the patient the risks of bexarotene including but not limited to hair loss, dry lips/skin/eyes, liver abnormalities, hyperlipidemia, pancreatitis, depression/suicidal ideation, photosensitivity, drug rash/allergic reactions, hypothyroidism, anemia, leukopenia, infection, cataracts, and teratogenicity.  Patient understands that they will need regular blood tests to check lipid profile, liver function tests, white blood cell count, thyroid function tests and pregnancy test if applicable.
Acitretin Counseling:  I discussed with the patient the risks of acitretin including but not limited to hair loss, dry lips/skin/eyes, liver damage, hyperlipidemia, depression/suicidal ideation, photosensitivity.  Serious rare side effects can include but are not limited to pancreatitis, pseudotumor cerebri, bony changes, clot formation/stroke/heart attack.  Patient understands that alcohol is contraindicated since it can result in liver toxicity and significantly prolong the elimination of the drug by many years.
Picato Counseling:  I discussed with the patient the risks of Picato including but not limited to erythema, scaling, itching, weeping, crusting, and pain.
Griseofulvin Counseling:  I discussed with the patient the risks of griseofulvin including but not limited to photosensitivity, cytopenia, liver damage, nausea/vomiting and severe allergy.  The patient understands that this medication is best absorbed when taken with a fatty meal (e.g., ice cream or french fries).
Infliximab Pregnancy And Lactation Text: This medication is Pregnancy Category B and is considered safe during pregnancy. It is unknown if this medication is excreted in breast milk.
Doxycycline Counseling:  Patient counseled regarding possible photosensitivity and increased risk for sunburn.  Patient instructed to avoid sunlight, if possible.  When exposed to sunlight, patients should wear protective clothing, sunglasses, and sunscreen.  The patient was instructed to call the office immediately if the following severe adverse effects occur:  hearing changes, easy bruising/bleeding, severe headache, or vision changes.  The patient verbalized understanding of the proper use and possible adverse effects of doxycycline.  All of the patient's questions and concerns were addressed.
Low Dose Naltrexone Counseling- I discussed with the patient the potential risks and side effects of low dose naltrexone including but not limited to: more vivid dreams, headaches, nausea, vomiting, abdominal pain, fatigue, dizziness, and anxiety.
Erivedge Pregnancy And Lactation Text: This medication is Pregnancy Category X and is absolutely contraindicated during pregnancy. It is unknown if it is excreted in breast milk.
Cibinqo Pregnancy And Lactation Text: It is unknown if this medication will adversely affect pregnancy or breast feeding.  You should not take this medication if you are currently pregnant or planning a pregnancy or while breastfeeding.
Cyclophosphamide Counseling:  I discussed with the patient the risks of cyclophosphamide including but not limited to hair loss, hormonal abnormalities, decreased fertility, abdominal pain, diarrhea, nausea and vomiting, bone marrow suppression and infection. The patient understands that monitoring is required while taking this medication.
Solaraze Pregnancy And Lactation Text: This medication is Pregnancy Category B and is considered safe. There is some data to suggest avoiding during the third trimester. It is unknown if this medication is excreted in breast milk.
Azelaic Acid Counseling: Patient counseled that medicine may cause skin irritation and to avoid applying near the eyes.  In the event of skin irritation, the patient was advised to reduce the amount of the drug applied or use it less frequently.   The patient verbalized understanding of the proper use and possible adverse effects of azelaic acid.  All of the patient's questions and concerns were addressed.
Zyclara Counseling:  I discussed with the patient the risks of imiquimod including but not limited to erythema, scaling, itching, weeping, crusting, and pain.  Patient understands that the inflammatory response to imiquimod is variable from person to person and was educated regarded proper titration schedule.  If flu-like symptoms develop, patient knows to discontinue the medication and contact us.
Oral Minoxidil Pregnancy And Lactation Text: This medication should only be used when clearly needed if you are pregnant, attempting to become pregnant or breast feeding.
Stelara Counseling:  I discussed with the patient the risks of ustekinumab including but not limited to immunosuppression, malignancy, posterior leukoencephalopathy syndrome, and serious infections.  The patient understands that monitoring is required including a PPD at baseline and must alert us or the primary physician if symptoms of infection or other concerning signs are noted.
Otezla Counseling: The side effects of Otezla were discussed with the patient, including but not limited to worsening or new depression, weight loss, diarrhea, nausea, upper respiratory tract infection, and headache. Patient instructed to call the office should any adverse effect occur.  The patient verbalized understanding of the proper use and possible adverse effects of Otezla.  All the patient's questions and concerns were addressed.
Drysol Counseling:  I discussed with the patient the risks of drysol/aluminum chloride including but not limited to skin rash, itching, irritation, burning.
Azelaic Acid Pregnancy And Lactation Text: This medication is considered safe during pregnancy and breast feeding.
Soolantra Counseling: I discussed with the patients the risks of topial Soolantra. This is a medicine which decreases the number of mites and inflammation in the skin. You experience burning, stinging, eye irritation or allergic reactions.  Please call our office if you develop any problems from using this medication.
Cyclophosphamide Pregnancy And Lactation Text: This medication is Pregnancy Category D and it isn't considered safe during pregnancy. This medication is excreted in breast milk.
Zyclara Pregnancy And Lactation Text: This medication is Pregnancy Category C. It is unknown if this medication is excreted in breast milk.
Colchicine Pregnancy And Lactation Text: This medication is Pregnancy Category C and isn't considered safe during pregnancy. It is excreted in breast milk.
Cimetidine Pregnancy And Lactation Text: This medication is Pregnancy Category B and is considered safe during pregnancy. It is also excreted in breast milk and breast feeding isn't recommended.
Birth Control Pills Counseling: Birth Control Pill Counseling: I discussed with the patient the potential side effects of OCPs including but not limited to increased risk of stroke, heart attack, thrombophlebitis, deep venous thrombosis, hepatic adenomas, breast changes, GI upset, headaches, and depression.  The patient verbalized understanding of the proper use and possible adverse effects of OCPs. All of the patient's questions and concerns were addressed.
Griseofulvin Pregnancy And Lactation Text: This medication is Pregnancy Category X and is known to cause serious birth defects. It is unknown if this medication is excreted in breast milk but breast feeding should be avoided.
Rituxan Counseling:  I discussed with the patient the risks of Rituxan infusions. Side effects can include infusion reactions, severe drug rashes including mucocutaneous reactions, reactivation of latent hepatitis and other infections and rarely progressive multifocal leukoencephalopathy.  All of the patient's questions and concerns were addressed.
Azithromycin Counseling:  I discussed with the patient the risks of azithromycin including but not limited to GI upset, allergic reaction, drug rash, diarrhea, and yeast infections.
Birth Control Pills Pregnancy And Lactation Text: This medication should be avoided if pregnant and for the first 30 days post-partum.
Dupixent Pregnancy And Lactation Text: This medication likely crosses the placenta but the risk for the fetus is uncertain. This medication is excreted in breast milk.
Klisyri Counseling:  I discussed with the patient the risks of Klisyri including but not limited to erythema, scaling, itching, weeping, crusting, and pain.
Rifampin Counseling: I discussed with the patient the risks of rifampin including but not limited to liver damage, kidney damage, red-orange body fluids, nausea/vomiting and severe allergy.
Olumiant Counseling: I discussed with the patient the risks of Olumiant therapy including but not limited to upper respiratory tract infections, shingles, cold sores, and nausea. Live vaccines should be avoided.  This medication has been linked to serious infections; higher rate of mortality; malignancy and lymphoproliferative disorders; major adverse cardiovascular events; thrombosis; gastrointestinal perforations; neutropenia; lymphopenia; anemia; liver enzyme elevations; and lipid elevations.
Bexarotene Pregnancy And Lactation Text: This medication is Pregnancy Category X and should not be given to women who are pregnant or may become pregnant. This medication should not be used if you are breast feeding.
Wartpeel Counseling:  I discussed with the patient the risks of Wartpeel including but not limited to erythema, scaling, itching, weeping, crusting, and pain.
Libtayo Counseling- I discussed with the patient the risks of Libtayo including but not limited to nausea, vomiting, diarrhea, and bone or muscle pain.  The patient verbalized understanding of the proper use and possible adverse effects of Libtayo.  All of the patient's questions and concerns were addressed.
Adbry Counseling: I discussed with the patient the risks of tralokinumab including but not limited to eye infection and irritation, cold sores, injection site reactions, worsening of asthma, allergic reactions and increased risk of parasitic infection.  Live vaccines should be avoided while taking tralokinumab. The patient understands that monitoring is required and they must alert us or the primary physician if symptoms of infection or other concerning signs are noted.
Low Dose Naltrexone Pregnancy And Lactation Text: Naltrexone is pregnancy category C.  There have been no adequate and well-controlled studies in pregnant women.  It should be used in pregnancy only if the potential benefit justifies the potential risk to the fetus.   Limited data indicates that naltrexone is minimally excreted into breastmilk.
Doxycycline Pregnancy And Lactation Text: This medication is Pregnancy Category D and not consider safe during pregnancy. It is also excreted in breast milk but is considered safe for shorter treatment courses.
Benzoyl Peroxide Counseling: Patient counseled that medicine may cause skin irritation and bleach clothing.  In the event of skin irritation, the patient was advised to reduce the amount of the drug applied or use it less frequently.   The patient verbalized understanding of the proper use and possible adverse effects of benzoyl peroxide.  All of the patient's questions and concerns were addressed.
Soolantra Pregnancy And Lactation Text: This medication is Pregnancy Category C. This medication is considered safe during breast feeding.
Topical Metronidazole Counseling: Metronidazole is a topical antibiotic medication. You may experience burning, stinging, redness, or allergic reactions.  Please call our office if you develop any problems from using this medication.
Isotretinoin Counseling: Patient should get monthly blood tests, not donate blood, not drive at night if vision affected, not share medication, and not undergo elective surgery for 6 months after tx completed. Side effects reviewed, pt to contact office should one occur.
Taltz Counseling: I discussed with the patient the risks of ixekizumab including but not limited to immunosuppression, serious infections, worsening of inflammatory bowel disease and drug reactions.  The patient understands that monitoring is required including a PPD at baseline and must alert us or the primary physician if symptoms of infection or other concerning signs are noted.
Rifampin Pregnancy And Lactation Text: This medication is Pregnancy Category C and it isn't know if it is safe during pregnancy. It is also excreted in breast milk and should not be used if you are breast feeding.
Doxepin Counseling:  Patient advised that the medication is sedating and not to drive a car after taking this medication. Patient informed of potential adverse effects including but not limited to dry mouth, urinary retention, and blurry vision.  The patient verbalized understanding of the proper use and possible adverse effects of doxepin.  All of the patient's questions and concerns were addressed.
Dapsone Counseling: I discussed with the patient the risks of dapsone including but not limited to hemolytic anemia, agranulocytosis, rashes, methemoglobinemia, kidney failure, peripheral neuropathy, headaches, GI upset, and liver toxicity.  Patients who start dapsone require monitoring including baseline LFTs and weekly CBCs for the first month, then every month thereafter.  The patient verbalized understanding of the proper use and possible adverse effects of dapsone.  All of the patient's questions and concerns were addressed.
Tranexamic Acid Counseling:  Patient advised of the small risk of bleeding problems with tranexamic acid. They were also instructed to call if they developed any nausea, vomiting or diarrhea. All of the patient's questions and concerns were addressed.
Cyclosporine Counseling:  I discussed with the patient the risks of cyclosporine including but not limited to hypertension, gingival hyperplasia,myelosuppression, immunosuppression, liver damage, kidney damage, neurotoxicity, lymphoma, and serious infections. The patient understands that monitoring is required including baseline blood pressure, CBC, CMP, lipid panel and uric acid, and then 1-2 times monthly CMP and blood pressure.
Otezla Pregnancy And Lactation Text: This medication is Pregnancy Category C and it isn't known if it is safe during pregnancy. It is unknown if it is excreted in breast milk.
Tranexamic Acid Pregnancy And Lactation Text: It is unknown if this medication is safe during pregnancy or breast feeding.
Klisyri Pregnancy And Lactation Text: It is unknown if this medication can harm a developing fetus or if it is excreted in breast milk.
Spironolactone Counseling: Patient advised regarding risks of diarrhea, abdominal pain, hyperkalemia, birth defects (for female patients), liver toxicity and renal toxicity. The patient may need blood work to monitor liver and kidney function and potassium levels while on therapy. The patient verbalized understanding of the proper use and possible adverse effects of spironolactone.  All of the patient's questions and concerns were addressed.
Enbrel Counseling:  I discussed with the patient the risks of etanercept including but not limited to myelosuppression, immunosuppression, autoimmune hepatitis, demyelinating diseases, lymphoma, and infections.  The patient understands that monitoring is required including a PPD at baseline and must alert us or the primary physician if symptoms of infection or other concerning signs are noted.
Libtayo Pregnancy And Lactation Text: This medication is contraindicated in pregnancy and when breast feeding.
Azithromycin Pregnancy And Lactation Text: This medication is considered safe during pregnancy and is also secreted in breast milk.
Doxepin Pregnancy And Lactation Text: This medication is Pregnancy Category C and it isn't known if it is safe during pregnancy. It is also excreted in breast milk and breast feeding isn't recommended.
Dapsone Pregnancy And Lactation Text: This medication is Pregnancy Category C and is not considered safe during pregnancy or breast feeding.
Erythromycin Counseling:  I discussed with the patient the risks of erythromycin including but not limited to GI upset, allergic reaction, drug rash, diarrhea, increase in liver enzymes, and yeast infections.
Protopic Counseling: Patient may experience a mild burning sensation during topical application. Protopic is not approved in children less than 2 years of age. There have been case reports of hematologic and skin malignancies in patients using topical calcineurin inhibitors although causality is questionable.
Niacinamide Counseling: I recommended taking niacin or niacinamide, also know as vitamin B3, twice daily. Recent evidence suggests that taking vitamin B3 (500 mg twice daily) can reduce the risk of actinic keratoses and non-melanoma skin cancers. Side effects of vitamin B3 include flushing and headache.
Olumiant Pregnancy And Lactation Text: Based on animal studies, Olumiant may cause embryo-fetal harm when administered to pregnant women.  The medication should not be used in pregnancy.  Breastfeeding is not recommended during treatment.
Adbry Pregnancy And Lactation Text: It is unknown if this medication will adversely affect pregnancy or breast feeding.
Rituxan Pregnancy And Lactation Text: This medication is Pregnancy Category C and it isn't know if it is safe during pregnancy. It is unknown if this medication is excreted in breast milk but similar antibodies are known to be excreted.
Itraconazole Counseling:  I discussed with the patient the risks of itraconazole including but not limited to liver damage, nausea/vomiting, neuropathy, and severe allergy.  The patient understands that this medication is best absorbed when taken with acidic beverages such as non-diet cola or ginger ale.  The patient understands that monitoring is required including baseline LFTs and repeat LFTs at intervals.  The patient understands that they are to contact us or the primary physician if concerning signs are noted.
Opioid Counseling: I discussed with the patient the potential side effects of opioids including but not limited to addiction, altered mental status, and depression. I stressed avoiding alcohol, benzodiazepines, muscle relaxants and sleep aids unless specifically okayed by a physician. The patient verbalized understanding of the proper use and possible adverse effects of opioids. All of the patient's questions and concerns were addressed. They were instructed to flush the remaining pills down the toilet if they did not need them for pain.
Niacinamide Pregnancy And Lactation Text: These medications are considered safe during pregnancy.
Topical Retinoid counseling:  Patient advised to apply a pea-sized amount only at bedtime and wait 30 minutes after washing their face before applying.  If too drying, patient may add a non-comedogenic moisturizer. The patient verbalized understanding of the proper use and possible adverse effects of retinoids.  All of the patient's questions and concerns were addressed.
Siliq Counseling:  I discussed with the patient the risks of Siliq including but not limited to new or worsening depression, suicidal thoughts and behavior, immunosuppression, malignancy, posterior leukoencephalopathy syndrome, and serious infections.  The patient understands that monitoring is required including a PPD at baseline and must alert us or the primary physician if symptoms of infection or other concerning signs are noted. There is also a special program designed to monitor depression which is required with Siliq.
Protopic Pregnancy And Lactation Text: This medication is Pregnancy Category C. It is unknown if this medication is excreted in breast milk when applied topically.
Opioid Pregnancy And Lactation Text: These medications can lead to premature delivery and should be avoided during pregnancy. These medications are also present in breast milk in small amounts.
Rinvoq Counseling: I discussed with the patient the risks of Rinvoq therapy including but not limited to upper respiratory tract infections, shingles, cold sores, bronchitis, nausea, cough, fever, acne, and headache. Live vaccines should be avoided.  This medication has been linked to serious infections; higher rate of mortality; malignancy and lymphoproliferative disorders; major adverse cardiovascular events; thrombosis; thrombocytopenia, anemia, and neutropenia; lipid elevations; liver enzyme elevations; and gastrointestinal perforations.
Cyclosporine Pregnancy And Lactation Text: This medication is Pregnancy Category C and it isn't know if it is safe during pregnancy. This medication is excreted in breast milk.
Benzoyl Peroxide Pregnancy And Lactation Text: This medication is Pregnancy Category C. It is unknown if benzoyl peroxide is excreted in breast milk.
Oxybutynin Counseling:  I discussed with the patient the risks of oxybutynin including but not limited to skin rash, drowsiness, dry mouth, difficulty urinating, and blurred vision.
Winlevi Counseling:  I discussed with the patient the risks of topical clascoterone including but not limited to erythema, scaling, itching, and stinging. Patient voiced their understanding.
Elidel Counseling: Patient may experience a mild burning sensation during topical application. Elidel is not approved in children less than 2 years of age. There have been case reports of hematologic and skin malignancies in patients using topical calcineurin inhibitors although causality is questionable.
Isotretinoin Pregnancy And Lactation Text: This medication is Pregnancy Category X and is considered extremely dangerous during pregnancy. It is unknown if it is excreted in breast milk.
Topical Metronidazole Pregnancy And Lactation Text: This medication is Pregnancy Category B and considered safe during pregnancy.  It is also considered safe to use while breastfeeding.
Sarecycline Counseling: Patient advised regarding possible photosensitivity and discoloration of the teeth, skin, lips, tongue and gums.  Patient instructed to avoid sunlight, if possible.  When exposed to sunlight, patients should wear protective clothing, sunglasses, and sunscreen.  The patient was instructed to call the office immediately if the following severe adverse effects occur:  hearing changes, easy bruising/bleeding, severe headache, or vision changes.  The patient verbalized understanding of the proper use and possible adverse effects of sarecycline.  All of the patient's questions and concerns were addressed.
Topical Steroids Counseling: I discussed with the patient that prolonged use of topical steroids can result in the increased appearance of superficial blood vessels (telangiectasias), lightening (hypopigmentation) and thinning of the skin (atrophy).  Patient understands to avoid using high potency steroids in skin folds, the groin or the face.  The patient verbalized understanding of the proper use and possible adverse effects of topical steroids.  All of the patient's questions and concerns were addressed.
Methotrexate Counseling:  Patient counseled regarding adverse effects of methotrexate including but not limited to nausea, vomiting, abnormalities in liver function tests. Patients may develop mouth sores, rash, diarrhea, and abnormalities in blood counts. The patient understands that monitoring is required including LFT's and blood counts.  There is a rare possibility of scarring of the liver and lung problems that can occur when taking methotrexate. Persistent nausea, loss of appetite, pale stools, dark urine, cough, and shortness of breath should be reported immediately. Patient advised to discontinue methotrexate treatment at least three months before attempting to become pregnant.  I discussed the need for folate supplements while taking methotrexate.  These supplements can decrease side effects during methotrexate treatment. The patient verbalized understanding of the proper use and possible adverse effects of methotrexate.  All of the patient's questions and concerns were addressed.
Cimzia Counseling:  I discussed with the patient the risks of Cimzia including but not limited to immunosuppression, allergic reactions and infections.  The patient understands that monitoring is required including a PPD at baseline and must alert us or the primary physician if symptoms of infection or other concerning signs are noted.
Minoxidil Counseling: Minoxidil is a topical medication which can increase blood flow where it is applied. It is uncertain how this medication increases hair growth. Side effects are uncommon and include stinging and allergic reactions.
Odomzo Counseling- I discussed with the patient the risks of Odomzo including but not limited to nausea, vomiting, diarrhea, constipation, weight loss, changes in the sense of taste, decreased appetite, muscle spasms, and hair loss.  The patient verbalized understanding of the proper use and possible adverse effects of Odomzo.  All of the patient's questions and concerns were addressed.
Erythromycin Pregnancy And Lactation Text: This medication is Pregnancy Category B and is considered safe during pregnancy. It is also excreted in breast milk.
Hydroxyzine Counseling: Patient advised that the medication is sedating and not to drive a car after taking this medication.  Patient informed of potential adverse effects including but not limited to dry mouth, urinary retention, and blurry vision.  The patient verbalized understanding of the proper use and possible adverse effects of hydroxyzine.  All of the patient's questions and concerns were addressed.
Gabapentin Counseling: I discussed with the patient the risks of gabapentin including but not limited to dizziness, somnolence, fatigue and ataxia.
Valtrex Counseling: I discussed with the patient the risks of valacyclovir including but not limited to kidney damage, nausea, vomiting and severe allergy.  The patient understands that if the infection seems to be worsening or is not improving, they are to call.
Bactrim Counseling:  I discussed with the patient the risks of sulfa antibiotics including but not limited to GI upset, allergic reaction, drug rash, diarrhea, dizziness, photosensitivity, and yeast infections.  Rarely, more serious reactions can occur including but not limited to aplastic anemia, agranulocytosis, methemoglobinemia, blood dyscrasias, liver or kidney failure, lung infiltrates or desquamative/blistering drug rashes.
Spironolactone Pregnancy And Lactation Text: This medication can cause feminization of the male fetus and should be avoided during pregnancy. The active metabolite is also found in breast milk.
High Dose Vitamin A Counseling: Side effects reviewed, pt to contact office should one occur.
Qbrexza Counseling:  I discussed with the patient the risks of Qbrexza including but not limited to headache, mydriasis, blurred vision, dry eyes, nasal dryness, dry mouth, dry throat, dry skin, urinary hesitation, and constipation.  Local skin reactions including erythema, burning, stinging, and itching can also occur.
Ketoconazole Counseling:   Patient counseled regarding improving absorption with orange juice.  Adverse effects include but are not limited to breast enlargement, headache, diarrhea, nausea, upset stomach, liver function test abnormalities, taste disturbance, and stomach pain.  There is a rare possibility of liver failure that can occur when taking ketoconazole. The patient understands that monitoring of LFTs may be required, especially at baseline. The patient verbalized understanding of the proper use and possible adverse effects of ketoconazole.  All of the patient's questions and concerns were addressed.
Metronidazole Counseling:  I discussed with the patient the risks of metronidazole including but not limited to seizures, nausea/vomiting, a metallic taste in the mouth, nausea/vomiting and severe allergy.
Cimzia Pregnancy And Lactation Text: This medication crosses the placenta but can be considered safe in certain situations. Cimzia may be excreted in breast milk.
Sarecycline Pregnancy And Lactation Text: This medication is Pregnancy Category D and not consider safe during pregnancy. It is also excreted in breast milk.
Rinvoq Pregnancy And Lactation Text: Based on animal studies, Rinvoq may cause embryo-fetal harm when administered to pregnant women.  The medication should not be used in pregnancy.  Breastfeeding is not recommended during treatment and for 6 days after the last dose.
Carac Counseling:  I discussed with the patient the risks of Carac including but not limited to erythema, scaling, itching, weeping, crusting, and pain.
Nsaids Counseling: NSAID Counseling: I discussed with the patient that NSAIDs should be taken with food. Prolonged use of NSAIDs can result in the development of stomach ulcers.  Patient advised to stop taking NSAIDs if abdominal pain occurs.  The patient verbalized understanding of the proper use and possible adverse effects of NSAIDs.  All of the patient's questions and concerns were addressed.
Winlevi Pregnancy And Lactation Text: This medication is considered safe during pregnancy and breastfeeding.
Tremfya Counseling: I discussed with the patient the risks of guselkumab including but not limited to immunosuppression, serious infections, and drug reactions.  The patient understands that monitoring is required including a PPD at baseline and must alert us or the primary physician if symptoms of infection or other concerning signs are noted.
Propranolol Counseling:  I discussed with the patient the risks of propranolol including but not limited to low heart rate, low blood pressure, low blood sugar, restlessness and increased cold sensitivity. They should call the office if they experience any of these side effects.
Eucrisa Counseling: Patient may experience a mild burning sensation during topical application. Eucrisa is not approved in children less than 3 months of age.
Sotyktu Counseling:  I discussed the most common side effects of Sotyktu including: common cold, sore throat, sinus infections, cold sores, canker sores, folliculitis, and acne.  I also discussed more serious side effects of Sotyktu including but not limited to: serious allergic reactions; increased risk for infections such as TB; cancers such as lymphomas; rhabdomyolysis and elevated CPK; and elevated triglycerides and liver enzymes. 
Topical Steroids Applications Pregnancy And Lactation Text: Most topical steroids are considered safe to use during pregnancy and lactation.  Any topical steroid applied to the breast or nipple should be washed off before breastfeeding.
Tazorac Counseling:  Patient advised that medication is irritating and drying.  Patient may need to apply sparingly and wash off after an hour before eventually leaving it on overnight.  The patient verbalized understanding of the proper use and possible adverse effects of tazorac.  All of the patient's questions and concerns were addressed.
Methotrexate Pregnancy And Lactation Text: This medication is Pregnancy Category X and is known to cause fetal harm. This medication is excreted in breast milk.
Arava Counseling:  Patient counseled regarding adverse effects of Arava including but not limited to nausea, vomiting, abnormalities in liver function tests. Patients may develop mouth sores, rash, diarrhea, and abnormalities in blood counts. The patient understands that monitoring is required including LFTs and blood counts.  There is a rare possibility of scarring of the liver and lung problems that can occur when taking methotrexate. Persistent nausea, loss of appetite, pale stools, dark urine, cough, and shortness of breath should be reported immediately. Patient advised to discontinue Arava treatment and consult with a physician prior to attempting conception. The patient will have to undergo a treatment to eliminate Arava from the body prior to conception.
Bactrim Pregnancy And Lactation Text: This medication is Pregnancy Category D and is known to cause fetal risk.  It is also excreted in breast milk.
Hydroxyzine Pregnancy And Lactation Text: This medication is not safe during pregnancy and should not be taken. It is also excreted in breast milk and breast feeding isn't recommended.
Valtrex Pregnancy And Lactation Text: this medication is Pregnancy Category B and is considered safe during pregnancy. This medication is not directly found in breast milk but it's metabolite acyclovir is present.
Minoxidil Pregnancy And Lactation Text: This medication has not been assigned a Pregnancy Risk Category but animal studies failed to show danger with the topical medication. It is unknown if the medication is excreted in breast milk.
Humira Counseling:  I discussed with the patient the risks of adalimumab including but not limited to myelosuppression, immunosuppression, autoimmune hepatitis, demyelinating diseases, lymphoma, and serious infections.  The patient understands that monitoring is required including a PPD at baseline and must alert us or the primary physician if symptoms of infection or other concerning signs are noted.
Glycopyrrolate Counseling:  I discussed with the patient the risks of glycopyrrolate including but not limited to skin rash, drowsiness, dry mouth, difficulty urinating, and blurred vision.
Simponi Counseling:  I discussed with the patient the risks of golimumab including but not limited to myelosuppression, immunosuppression, autoimmune hepatitis, demyelinating diseases, lymphoma, and serious infections.  The patient understands that monitoring is required including a PPD at baseline and must alert us or the primary physician if symptoms of infection or other concerning signs are noted.
Mirvaso Counseling: Mirvaso is a topical medication which can decrease superficial blood flow where applied. Side effects are uncommon and include stinging, redness and allergic reactions.
Cephalexin Counseling: I counseled the patient regarding use of cephalexin as an antibiotic for prophylactic and/or therapeutic purposes. Cephalexin (commonly prescribed under brand name Keflex) is a cephalosporin antibiotic which is active against numerous classes of bacteria, including most skin bacteria. Side effects may include nausea, diarrhea, gastrointestinal upset, rash, hives, yeast infections, and in rare cases, hepatitis, kidney disease, seizures, fever, confusion, neurologic symptoms, and others. Patients with severe allergies to penicillin medications are cautioned that there is about a 10% incidence of cross-reactivity with cephalosporins. When possible, patients with penicillin allergies should use alternatives to cephalosporins for antibiotic therapy.
Use Enhanced Medication Counseling?: No
Azathioprine Counseling:  I discussed with the patient the risks of azathioprine including but not limited to myelosuppression, immunosuppression, hepatotoxicity, lymphoma, and infections.  The patient understands that monitoring is required including baseline LFTs, Creatinine, possible TPMP genotyping and weekly CBCs for the first month and then every 2 weeks thereafter.  The patient verbalized understanding of the proper use and possible adverse effects of azathioprine.  All of the patient's questions and concerns were addressed.
Qbrexza Pregnancy And Lactation Text: There is no available data on Qbrexza use in pregnant women.  There is no available data on Qbrexza use in lactation.
High Dose Vitamin A Pregnancy And Lactation Text: High dose vitamin A therapy is contraindicated during pregnancy and breast feeding.
Nsaids Pregnancy And Lactation Text: These medications are considered safe up to 30 weeks gestation. It is excreted in breast milk.
VTAMA Counseling: I discussed with the patient that VTAMA is not for use in the eyes, mouth or mouth. They should call the office if they develop any signs of allergic reactions to VTAMA. The patient verbalized understanding of the proper use and possible adverse effects of VTAMA.  All of the patient's questions and concerns were addressed.
Tetracycline Counseling: Patient counseled regarding possible photosensitivity and increased risk for sunburn.  Patient instructed to avoid sunlight, if possible.  When exposed to sunlight, patients should wear protective clothing, sunglasses, and sunscreen.  The patient was instructed to call the office immediately if the following severe adverse effects occur:  hearing changes, easy bruising/bleeding, severe headache, or vision changes.  The patient verbalized understanding of the proper use and possible adverse effects of tetracycline.  All of the patient's questions and concerns were addressed. Patient understands to avoid pregnancy while on therapy due to potential birth defects.
Cosentyx Counseling:  I discussed with the patient the risks of Cosentyx including but not limited to worsening of Crohn's disease, immunosuppression, allergic reactions and infections.  The patient understands that monitoring is required including a PPD at baseline and must alert us or the primary physician if symptoms of infection or other concerning signs are noted.
Ketoconazole Pregnancy And Lactation Text: This medication is Pregnancy Category C and it isn't know if it is safe during pregnancy. It is also excreted in breast milk and breast feeding isn't recommended.
Metronidazole Pregnancy And Lactation Text: This medication is Pregnancy Category B and considered safe during pregnancy.  It is also excreted in breast milk.
Topical Sulfur Applications Counseling: Topical Sulfur Counseling: Patient counseled that this medication may cause skin irritation or allergic reactions.  In the event of skin irritation, the patient was advised to reduce the amount of the drug applied or use it less frequently.   The patient verbalized understanding of the proper use and possible adverse effects of topical sulfur application.  All of the patient's questions and concerns were addressed.
Calcipotriene Counseling:  I discussed with the patient the risks of calcipotriene including but not limited to erythema, scaling, itching, and irritation.
Xolair Counseling:  Patient informed of potential adverse effects including but not limited to fever, muscle aches, rash and allergic reactions.  The patient verbalized understanding of the proper use and possible adverse effects of Xolair.  All of the patient's questions and concerns were addressed.
Albendazole Counseling:  I discussed with the patient the risks of albendazole including but not limited to cytopenia, kidney damage, nausea/vomiting and severe allergy.  The patient understands that this medication is being used in an off-label manner.
Prednisone Counseling:  I discussed with the patient the risks of prolonged use of prednisone including but not limited to weight gain, insomnia, osteoporosis, mood changes, diabetes, susceptibility to infection, glaucoma and high blood pressure.  In cases where prednisone use is prolonged, patients should be monitored with blood pressure checks, serum glucose levels and an eye exam.  Additionally, the patient may need to be placed on GI prophylaxis, PCP prophylaxis, and calcium and vitamin D supplementation and/or a bisphosphonate.  The patient verbalized understanding of the proper use and the possible adverse effects of prednisone.  All of the patient's questions and concerns were addressed.
Tazorac Pregnancy And Lactation Text: This medication is not safe during pregnancy. It is unknown if this medication is excreted in breast milk.
Propranolol Pregnancy And Lactation Text: This medication is Pregnancy Category C and it isn't known if it is safe during pregnancy. It is excreted in breast milk.
Dutasteride Male Counseling: Dustasteride Counseling:  I discussed with the patient the risks of use of dutasteride including but not limited to decreased libido, decreased ejaculate volume, and gynecomastia. Women who can become pregnant should not handle medication.  All of the patient's questions and concerns were addressed.
Ilumya Counseling: I discussed with the patient the risks of tildrakizumab including but not limited to immunosuppression, malignancy, posterior leukoencephalopathy syndrome, and serious infections.  The patient understands that monitoring is required including a PPD at baseline and must alert us or the primary physician if symptoms of infection or other concerning signs are noted.
Mirvaso Pregnancy And Lactation Text: This medication has not been assigned a Pregnancy Risk Category. It is unknown if the medication is excreted in breast milk.
Dutasteride Pregnancy And Lactation Text: This medication is absolutely contraindicated in women, especially during pregnancy and breast feeding. Feminization of male fetuses is possible if taking while pregnant.
Hydroquinone Counseling:  Patient advised that medication may result in skin irritation, lightening (hypopigmentation), dryness, and burning.  In the event of skin irritation, the patient was advised to reduce the amount of the drug applied or use it less frequently.  Rarely, spots that are treated with hydroquinone can become darker (pseudoochronosis).  Should this occur, patient instructed to stop medication and call the office. The patient verbalized understanding of the proper use and possible adverse effects of hydroquinone.  All of the patient's questions and concerns were addressed.
Minocycline Counseling: Patient advised regarding possible photosensitivity and discoloration of the teeth, skin, lips, tongue and gums.  Patient instructed to avoid sunlight, if possible.  When exposed to sunlight, patients should wear protective clothing, sunglasses, and sunscreen.  The patient was instructed to call the office immediately if the following severe adverse effects occur:  hearing changes, easy bruising/bleeding, severe headache, or vision changes.  The patient verbalized understanding of the proper use and possible adverse effects of minocycline.  All of the patient's questions and concerns were addressed.
Olanzapine Counseling- I discussed with the patient the common side effects of olanzapine including but are not limited to: lack of energy, dry mouth, increased appetite, sleepiness, tremor, constipation, dizziness, changes in behavior, or restlessness.  Explained that teenagers are more likely to experience headaches, abdominal pain, pain in the arms or legs, tiredness, and sleepiness.  Serious side effects include but are not limited: increased risk of death in elderly patients who are confused, have memory loss, or dementia-related psychosis; hyperglycemia; increased cholesterol and triglycerides; and weight gain.
Sotyktu Pregnancy And Lactation Text: There is insufficient data to evaluate whether or not Sotyktu is safe to use during pregnancy.   It is not known if Sotyktu passes into breast milk and whether or not it is safe to use when breastfeeding.  
Calcipotriene Pregnancy And Lactation Text: The use of this medication during pregnancy or lactation is not recommended as there is insufficient data.
Glycopyrrolate Pregnancy And Lactation Text: This medication is Pregnancy Category B and is considered safe during pregnancy. It is unknown if it is excreted breast milk.
Rhofade Counseling: Rhofade is a topical medication which can decrease superficial blood flow where applied. Side effects are uncommon and include stinging, redness and allergic reactions.
Terbinafine Counseling: Patient counseling regarding adverse effects of terbinafine including but not limited to headache, diarrhea, rash, upset stomach, liver function test abnormalities, itching, taste/smell disturbance, nausea, abdominal pain, and flatulence.  There is a rare possibility of liver failure that can occur when taking terbinafine.  The patient understands that a baseline LFT and kidney function test may be required. The patient verbalized understanding of the proper use and possible adverse effects of terbinafine.  All of the patient's questions and concerns were addressed.
Cephalexin Pregnancy And Lactation Text: This medication is Pregnancy Category B and considered safe during pregnancy.  It is also excreted in breast milk but can be used safely for shorter doses.
Olanzapine Pregnancy And Lactation Text: This medication is pregnancy category C.   There are no adequate and well controlled trials with olanzapine in pregnant females.  Olanzapine should be used during pregnancy only if the potential benefit justifies the potential risk to the fetus.   In a study in lactating healthy women, olanzapine was excreted in breast milk.  It is recommended that women taking olanzapine should not breast feed.
Aklief counseling:  Patient advised to apply a pea-sized amount only at bedtime and wait 30 minutes after washing their face before applying.  If too drying, patient may add a non-comedogenic moisturizer.  The most commonly reported side effects including irritation, redness, scaling, dryness, stinging, burning, itching, and increased risk of sunburn.  The patient verbalized understanding of the proper use and possible adverse effects of retinoids.  All of the patient's questions and concerns were addressed.
Xolair Pregnancy And Lactation Text: This medication is Pregnancy Category B and is considered safe during pregnancy. This medication is excreted in breast milk.
Topical Clindamycin Counseling: Patient counseled that this medication may cause skin irritation or allergic reactions.  In the event of skin irritation, the patient was advised to reduce the amount of the drug applied or use it less frequently.   The patient verbalized understanding of the proper use and possible adverse effects of clindamycin.  All of the patient's questions and concerns were addressed.
Skyrizi Counseling: I discussed with the patient the risks of risankizumab-rzaa including but not limited to immunosuppression, and serious infections.  The patient understands that monitoring is required including a PPD at baseline and must alert us or the primary physician if symptoms of infection or other concerning signs are noted.
Xeljanz Counseling: I discussed with the patient the risks of Xeljanz therapy including increased risk of infection, liver issues, headache, diarrhea, or cold symptoms. Live vaccines should be avoided. They were instructed to call if they have any problems.
Cantharidin Counseling:  I discussed with the patient the risks of Cantharidin including but not limited to pain, redness, burning, itching, and blistering.
Clofazimine Counseling:  I discussed with the patient the risks of clofazimine including but not limited to skin and eye pigmentation, liver damage, nausea/vomiting, gastrointestinal bleeding and allergy.
SSKI Counseling:  I discussed with the patient the risks of SSKI including but not limited to thyroid abnormalities, metallic taste, GI upset, fever, headache, acne, arthralgias, paraesthesias, lymphadenopathy, easy bleeding, arrhythmias, and allergic reaction.
Cellcept Counseling:  I discussed with the patient the risks of mycophenolate mofetil including but not limited to infection/immunosuppression, GI upset, hypokalemia, hypercholesterolemia, bone marrow suppression, lymphoproliferative disorders, malignancy, GI ulceration/bleed/perforation, colitis, interstitial lung disease, kidney failure, progressive multifocal leukoencephalopathy, and birth defects.  The patient understands that monitoring is required including a baseline creatinine and regular CBC testing. In addition, patient must alert us immediately if symptoms of infection or other concerning signs are noted.
Zoryve Counseling:  I discussed with the patient that Zoryve is not for use in the eyes, mouth or vagina. The most commonly reported side effects include diarrhea, headache, insomnia, application site pain, upper respiratory tract infections, and urinary tract infections.  All of the patient's questions and concerns were addressed.
Topical Sulfur Applications Pregnancy And Lactation Text: This medication is considered safe during pregnancy and breast feeding secondary to limited systemic absorption.
Sski Pregnancy And Lactation Text: This medication is Pregnancy Category D and isn't considered safe during pregnancy. It is excreted in breast milk.
Dupixent Counseling: I discussed with the patient the risks of dupilumab including but not limited to eye infection and irritation, cold sores, injection site reactions, worsening of asthma, allergic reactions and increased risk of parasitic infection.  Live vaccines should be avoided while taking dupilumab. Dupilumab will also interact with certain medications such as warfarin and cyclosporine. The patient understands that monitoring is required and they must alert us or the primary physician if symptoms of infection or other concerning signs are noted.
Opzelura Counseling:  I discussed with the patient the risks of Opzelura including but not limited to nasopharngitis, bronchitis, ear infection, eosinophila, hives, diarrhea, folliculitis, tonsillitis, and rhinorrhea.  Taken orally, this medication has been linked to serious infections; higher rate of mortality; malignancy and lymphoproliferative disorders; major adverse cardiovascular events; thrombosis; thrombocytopenia, anemia, and neutropenia; and lipid elevations.
Hydroxychloroquine Counseling:  I discussed with the patient that a baseline ophthalmologic exam is needed at the start of therapy and every year thereafter while on therapy. A CBC may also be warranted for monitoring.  The side effects of this medication were discussed with the patient, including but not limited to agranulocytosis, aplastic anemia, seizures, rashes, retinopathy, and liver toxicity. Patient instructed to call the office should any adverse effect occur.  The patient verbalized understanding of the proper use and possible adverse effects of Plaquenil.  All the patient's questions and concerns were addressed.
Fluconazole Counseling:  Patient counseled regarding adverse effects of fluconazole including but not limited to headache, diarrhea, nausea, upset stomach, liver function test abnormalities, taste disturbance, and stomach pain.  There is a rare possibility of liver failure that can occur when taking fluconazole.  The patient understands that monitoring of LFTs and kidney function test may be required, especially at baseline. The patient verbalized understanding of the proper use and possible adverse effects of fluconazole.  All of the patient's questions and concerns were addressed.
Clindamycin Counseling: I counseled the patient regarding use of clindamycin as an antibiotic for prophylactic and/or therapeutic purposes. Clindamycin is active against numerous classes of bacteria, including skin bacteria. Side effects may include nausea, diarrhea, gastrointestinal upset, rash, hives, yeast infections, and in rare cases, colitis.
Cantharidin Pregnancy And Lactation Text: This medication has not been proven safe during pregnancy. It is unknown if this medication is excreted in breast milk.

## 2023-06-13 ENCOUNTER — OFFICE VISIT (OUTPATIENT)
Dept: MEDICAL GROUP | Facility: IMAGING CENTER | Age: 73
End: 2023-06-13
Payer: MEDICARE

## 2023-06-13 VITALS
SYSTOLIC BLOOD PRESSURE: 100 MMHG | HEIGHT: 71 IN | WEIGHT: 258 LBS | OXYGEN SATURATION: 97 % | BODY MASS INDEX: 36.12 KG/M2 | RESPIRATION RATE: 18 BRPM | HEART RATE: 78 BPM | DIASTOLIC BLOOD PRESSURE: 50 MMHG | TEMPERATURE: 97.3 F

## 2023-06-13 DIAGNOSIS — E78.00 HYPERCHOLESTEREMIA: ICD-10-CM

## 2023-06-13 DIAGNOSIS — R60.0 LOCALIZED EDEMA: ICD-10-CM

## 2023-06-13 DIAGNOSIS — I10 ESSENTIAL HYPERTENSION: Chronic | ICD-10-CM

## 2023-06-13 DIAGNOSIS — R35.0 BENIGN PROSTATIC HYPERPLASIA WITH URINARY FREQUENCY: Chronic | ICD-10-CM

## 2023-06-13 DIAGNOSIS — Z76.89 ENCOUNTER TO ESTABLISH CARE: ICD-10-CM

## 2023-06-13 DIAGNOSIS — E23.6 ENLARGED PITUITARY GLAND (HCC): ICD-10-CM

## 2023-06-13 DIAGNOSIS — N40.1 BENIGN PROSTATIC HYPERPLASIA WITH URINARY FREQUENCY: Chronic | ICD-10-CM

## 2023-06-13 PROCEDURE — 1126F AMNT PAIN NOTED NONE PRSNT: CPT

## 2023-06-13 PROCEDURE — 3074F SYST BP LT 130 MM HG: CPT

## 2023-06-13 PROCEDURE — 99213 OFFICE O/P EST LOW 20 MIN: CPT

## 2023-06-13 PROCEDURE — 3078F DIAST BP <80 MM HG: CPT

## 2023-06-13 RX ORDER — ACETAMINOPHEN 120 MG/1
120 SUPPOSITORY RECTAL EVERY 4 HOURS PRN
COMMUNITY
End: 2023-07-11

## 2023-06-13 ASSESSMENT — PAIN SCALES - GENERAL: PAINLEVEL: NO PAIN

## 2023-06-13 ASSESSMENT — FIBROSIS 4 INDEX: FIB4 SCORE: 2.23

## 2023-06-13 NOTE — PROGRESS NOTES
Chief Complaint   Patient presents with    Establish Care     Prior PCP- Nasir Mark     Seasonal Allergies    Edema     Pt states he has bilateral swelling in his feet     HISTORY OF THE PRESENT ILLNESS: Patient is a 72 y.o. male. This pleasant patient is here today to establish care and to discuss:    To establish care  Previous PCP Nasir Mark    Seasonal Allergies  Ongoing issue.  He is taking OTC Allegra and Nase nasal spray which is helpful. Symptoms include constant runny nose and nasal congestion.    Pituitary microadenoma  Mild cerebral atrophy  Chronic condition.  Incidental finding based on symptoms of dizziness.  MRI revealed pituitary microadenoma and mild cerebral atrophy.  Patient managed by a neurologist Kenrick Skinner. Patient was also referred to endocrinologist for evaluation and management.  Denies neurological deficits.    BPH  Chronic condition. Patient is taking uroxatral. Tolerating medication well without side effects. Managed by urology. Will see urology next week.    Essential hypertension  BLE edema  Chronic condition. Patient is taking prinzide, amlodipine, and metoprolol. Tolerating medication well without side effects.  However, patient reports developing both lower extremity edema which is an established issue for him.  He was treated with spironolactone and Lasix in the past.    HLD  Chronic condition. Patient is taking crestor. Tolerating medication well without side effects.   Patient denies muscle pain, fatigue, lethargy, loss of appetite, abdominal pain, dark-colored urine, or yellowing of skin or eyes.       Allergies: Nkda [no known drug allergy] and Seasonal    Current Outpatient Medications Ordered in Epic   Medication Sig Dispense Refill    acetaminophen (TYLENOL) 120 MG Suppos Insert 120 mg into the rectum every four hours as needed.      lisinopril-hydrochlorothiazide (PRINZIDE) 10-12.5 MG per tablet Take 1 Tablet by mouth every day. If tolerated well, ok to take twice  daily after 2 weeks 60 Tablet 1    aspirin (ASA) 81 MG Chew Tab chewable tablet Chew 81 mg every day.      meclizine (ANTIVERT) 12.5 MG Tab Take 1 Tablet by mouth 3 times a day as needed for Dizziness. 30 Tablet 0    metoprolol SR (TOPROL XL) 25 MG TABLET SR 24 HR TAKE ONE TABLET BY MOUTH ONE TIME DAILY 100 Tablet 0    rosuvastatin (CRESTOR) 20 MG Tab Take 1 Tablet by mouth every evening. 100 Tablet 0    ketoconazole (NIZORAL) 2 % shampoo       alfuzosin (UROXATRAL) 10 MG SR tablet alfuzosin ER 10 mg tablet,extended release 24 hr      ketoconazole (NIZORAL) 2 % Cream ketoconazole 2 % topical cream      fluticasone (FLONASE) 50 MCG/ACT nasal spray Administer 1 Spray into affected nostril(S) every morning.       No current [facility]-ordered facility-administered medications on file.       Past Medical History:   Diagnosis Date    Hypercholesterolemia     Hypertension     Reactive depression 2023    Tinea inguinalis     Tobacco use 10/30/2018       Past Surgical History:   Procedure Laterality Date    OTHER ABDOMINAL SURGERY      gallbladder       Social History     Tobacco Use    Smoking status: Former     Packs/day: 0.25     Types: Cigars, Cigarettes     Quit date: 1985     Years since quittin.4    Smokeless tobacco: Never    Tobacco comments:     cigar, smokes one a day but no cigarettes   Vaping Use    Vaping Use: Never used   Substance Use Topics    Alcohol use: Yes     Comment: minimal    Drug use: Not Currently     Types: Marijuana, Inhaled     Comment: rare occasions,occasionally        Social History     Social History Narrative    Not on file       Family History   Problem Relation Age of Onset    Heart Disease Mother     Heart Attack Mother     Heart Disease Father     Heart Attack Father     Cancer Sister         leukemia, breast cancer    Stroke Sister     No Known Problems Maternal Grandmother     No Known Problems Maternal Grandfather     No Known Problems Paternal Grandmother     Dementia  "Paternal Grandfather     Hypertension Neg Hx     Hyperlipidemia Neg Hx     Diabetes Neg Hx        ROS: per hpi  Gen: no fevers/chills  Pulm: no sob, no cough  CV: no chest pain, no palpitations, + BLE edema  GI: no nausea/vomiting, no diarrhea  Skin: no rash    Exam: /50 (BP Location: Left arm, Patient Position: Sitting, BP Cuff Size: Adult)   Pulse 78   Temp 36.3 °C (97.3 °F) (Temporal)   Resp 18   Ht 1.803 m (5' 11\")   Wt 117 kg (258 lb)   SpO2 97%  Body mass index is 35.98 kg/m².    General: Normal appearing. No distress.  HEENT: Normocephalic. Eyes conjunctiva clear lids without ptosis, pupils equal and reactive to light accommodation, ears normal shape and contour, canals are clear bilaterally, tympanic membranes are benign, nasal mucosa benign, oropharynx is without erythema, edema or exudates.   Neck: Supple without JVD or bruit. Thyroid is not enlarged.  Pulmonary: Clear to ausculation.  Normal effort. No rales, ronchi, or wheezing.  Cardiovascular: Regular rate and rhythm without murmur. Carotid and radial pulses are intact and equal bilaterally.  Abdomen: Soft, nontender, nondistended. Normal bowel sounds. Liver and spleen are not palpable  Neurologic: Grossly nonfocal  Lymph: No cervical, supraclavicular or axillary lymph nodes are palpable  Skin: Warm and dry.  No obvious lesions.  Musculoskeletal: Normal gait. No extremity cyanosis, clubbing. +1 BLE edema.  Psych: Normal mood and affect. Alert and oriented x3. Judgment and insight is normal.    Please note that this dictation was created using voice recognition software. I have made every reasonable attempt to correct obvious errors, but I expect that there are errors of grammar and possibly content that I did not discover before finalizing the note.      Assessment/Plan    72 y.o. male with the following -    1. Encounter to establish care    2. Essential hypertension  Chronic and stable condition on prinzide, amlodipine, and metoprolol.  " Low Blood pressure in office. No s/s of hypotension.  However, patient presents with BLE +1 edema likely due to amlodipine.  Instructed patient to stop amlodipine.  Continue to monitor BP. Follow up in office in 4 weeks for bp check.   Nonpharmacological interventions such as low-salt, cardiac diet discussed.  Educated on stress reduction and physical activity.  Discussed signs and symptoms of major cardiovascular event and need to present to ED.    3. Localized edema  Acute on chronic condition.  Will stop amlodipine.  Discussed supportive measures that include avoidance of prolonged sitting and standing; leg elevation at least at heart level for 30 minutes 3-4 times per day; calf muscle pump exercise; daily walking and simple ankle flexion exercises; use compression stockings.      4. Hypercholesteremia  Chronic and stable condition on rosuvastatin.  Continue current regimen.  Recommendations include lifestyle modification:  -healthy diet that is low in trans and saturated fat and high in fiber and potassium emphasizing on increasing fruits, vegetables, whole grains, poultry, fish and nuts (i.e. DASH diet/Mediterranean)  -reduce salt intake to no more than 2,400 mg/day.   -increase physical activity and start an exercise regimen. Adults should engage in at least 150 minutes/week of moderate-intensity or 75 minutes/week of vigorous-intensity physical activity including resistance exercise which has proven to lower risk of atherosclerotic cardiovascular disease (ASCVD).   -maintain normal BMI and blood pressure control.  -Start omega-3 fatty acid 1 g/day, increase fiber intake, and start probiotic daily supplementation  Discussed signs and symptoms of major cardiovascular event and need to present to ED.    5. Benign prostatic hyperplasia with urinary frequency  Chronic and stable condition on alfuzosin.  Managed by urology.  Recommend to follow-up as scheduled.    6. Enlarged pituitary gland (HCC)  Chronic and  stable condition.  Managed by neurology and endocrinology. VSS, asymptomatic.  Recommend to follow-up as scheduled.  ED symptoms discussed.    Medical Decision Making/Course:  In the course of preparing for this visit with review of the pertinent past medical history, recent and past clinic visits, current medications, and performing chart, immunization, medical history and medication reconciliation, and in the further course of obtaining the current history pertinent to the clinic visit today, performing an exam and evaluation, ordering and independently evaluating labs, tests, and/or procedures, prescribing any recommended new medications as noted above, providing any pertinent counseling and education and recommending further coordination of care. This was discussed with patient in a shared-decision making conversation, and they understand and agreed with plan of care.     Return in about 4 weeks (around 7/11/2023), or if symptoms worsen or fail to improve, for HTN f/u, med check.    Thank you, Edilia LEDEZMA  [facility]    Please note that this dictation was created using voice recognition software. I have made every reasonable attempt to correct obvious errors, but I expect that there are errors of grammar and possibly content that I did not discover before finalizing the note.

## 2023-06-14 RX ORDER — HYDROCORTISONE 25 MG/G
CREAM TOPICAL QPM
Qty: 30 | Refills: 0 | Status: ERX

## 2023-06-19 ENCOUNTER — RX ONLY (OUTPATIENT)
Age: 73
Setting detail: RX ONLY
End: 2023-06-19

## 2023-06-19 RX ORDER — SULFACETAMIDE SODIUM, SULFUR 100; 50 MG/G; MG/G
EMULSION TOPICAL
Qty: 227 | Refills: 2 | Status: ERX | COMMUNITY
Start: 2023-06-19

## 2023-07-11 ENCOUNTER — APPOINTMENT (OUTPATIENT)
Dept: ADMISSIONS | Facility: MEDICAL CENTER | Age: 73
End: 2023-07-11
Attending: UROLOGY
Payer: MEDICARE

## 2023-07-11 ENCOUNTER — OFFICE VISIT (OUTPATIENT)
Dept: MEDICAL GROUP | Facility: IMAGING CENTER | Age: 73
End: 2023-07-11
Payer: MEDICARE

## 2023-07-11 VITALS
HEIGHT: 71 IN | RESPIRATION RATE: 18 BRPM | TEMPERATURE: 97.7 F | DIASTOLIC BLOOD PRESSURE: 70 MMHG | SYSTOLIC BLOOD PRESSURE: 132 MMHG | OXYGEN SATURATION: 93 % | BODY MASS INDEX: 36.34 KG/M2 | HEART RATE: 77 BPM | WEIGHT: 259.6 LBS

## 2023-07-11 DIAGNOSIS — R35.0 BENIGN PROSTATIC HYPERPLASIA WITH URINARY FREQUENCY: Chronic | ICD-10-CM

## 2023-07-11 DIAGNOSIS — E66.9 OBESITY (BMI 30-39.9): ICD-10-CM

## 2023-07-11 DIAGNOSIS — R73.01 ELEVATED FASTING GLUCOSE: ICD-10-CM

## 2023-07-11 DIAGNOSIS — I10 PRIMARY HYPERTENSION: ICD-10-CM

## 2023-07-11 DIAGNOSIS — G60.3 IDIOPATHIC PROGRESSIVE NEUROPATHY: ICD-10-CM

## 2023-07-11 DIAGNOSIS — N40.1 BENIGN PROSTATIC HYPERPLASIA WITH URINARY FREQUENCY: Chronic | ICD-10-CM

## 2023-07-11 PROCEDURE — 1126F AMNT PAIN NOTED NONE PRSNT: CPT

## 2023-07-11 PROCEDURE — 3075F SYST BP GE 130 - 139MM HG: CPT

## 2023-07-11 PROCEDURE — 3078F DIAST BP <80 MM HG: CPT

## 2023-07-11 PROCEDURE — 99214 OFFICE O/P EST MOD 30 MIN: CPT

## 2023-07-11 RX ORDER — LOSARTAN POTASSIUM AND HYDROCHLOROTHIAZIDE 12.5; 5 MG/1; MG/1
1 TABLET ORAL DAILY
Qty: 30 TABLET | Refills: 0 | Status: SHIPPED | OUTPATIENT
Start: 2023-07-11 | End: 2023-08-07

## 2023-07-11 ASSESSMENT — PAIN SCALES - GENERAL: PAINLEVEL: NO PAIN

## 2023-07-11 ASSESSMENT — FIBROSIS 4 INDEX: FIB4 SCORE: 2.23

## 2023-07-11 NOTE — PROGRESS NOTES
Subjective:     CC:   Chief Complaint   Patient presents with    Hypertension Follow-up    Follow-Up     Urology fv prostate surgery scheduled for 08/17     Medication Problem     Pt states one of his bp medication is giving him a cough, worse at night.     Nerve Pain     Pt states he has lost all feeling in his feet and it has gotten progressively worse.        HPI:   Laureen presents today to discuss:    Hypertension  Chronic condition. Patient is taking prinzide and metoprolol. However, patient reports developing nocturnal cough since starting Prinzide in April.      Idiopathic Peripheral neuropathy  Neuropathic pain  Chronic issue that is progressively worsening. He reports numbness on bilateral great toes. He denies pain.  He is doing daily Epsom salt soaks.  He started nervive supplementation 1 month ago. His symptoms are slightly improving starting 2 days ago.   He has seen pain management for this.  He has seen vascular medicine status post JESUS ultrasound with unremarkable findings.  He is established with podiatrist Marcelino whom he will be following up with soon.   He was referred to neurology; however, his appointment is not until November.    BPH  Patient is scheduled for bipolar transurethral resection of prostate on 8/17 with Dr. Mcdowell, [place] [facility].      Past Medical History:   Diagnosis Date    Hypercholesterolemia     Hypertension     Reactive depression 2/21/2023    Tinea inguinalis     Tobacco use 10/30/2018     Family History   Problem Relation Age of Onset    Heart Disease Mother     Heart Attack Mother     Heart Disease Father     Heart Attack Father     Cancer Sister         leukemia, breast cancer    Stroke Sister     No Known Problems Maternal Grandmother     No Known Problems Maternal Grandfather     No Known Problems Paternal Grandmother     Dementia Paternal Grandfather     Hypertension Neg Hx     Hyperlipidemia Neg Hx     Diabetes Neg Hx      Past Surgical  "History:   Procedure Laterality Date    OTHER ABDOMINAL SURGERY      gallbladder     Social History     Tobacco Use    Smoking status: Former     Packs/day: 0.25     Types: Cigars, Cigarettes     Quit date: 1985     Years since quittin.5    Smokeless tobacco: Never    Tobacco comments:     cigar, smokes one a day but no cigarettes   Vaping Use    Vaping Use: Never used   Substance Use Topics    Alcohol use: Yes     Comment: minimal    Drug use: Not Currently     Types: Marijuana, Inhaled     Comment: rare occasions,occasionally      Social History     Social History Narrative    Not on file     Current Outpatient Medications Ordered in Epic   Medication Sig Dispense Refill    losartan-hydrochlorothiazide (HYZAAR) 50-12.5 MG per tablet Take 1 Tablet by mouth every day. 30 Tablet 0    aspirin (ASA) 81 MG Chew Tab chewable tablet Chew 81 mg every day.      meclizine (ANTIVERT) 12.5 MG Tab Take 1 Tablet by mouth 3 times a day as needed for Dizziness. 30 Tablet 0    metoprolol SR (TOPROL XL) 25 MG TABLET SR 24 HR TAKE ONE TABLET BY MOUTH ONE TIME DAILY 100 Tablet 0    rosuvastatin (CRESTOR) 20 MG Tab Take 1 Tablet by mouth every evening. 100 Tablet 0    ketoconazole (NIZORAL) 2 % shampoo       alfuzosin (UROXATRAL) 10 MG SR tablet alfuzosin ER 10 mg tablet,extended release 24 hr      ketoconazole (NIZORAL) 2 % Cream ketoconazole 2 % topical cream      fluticasone (FLONASE) 50 MCG/ACT nasal spray Administer 1 Spray into affected nostril(S) every morning.       No current [facility]-ordered facility-administered medications on file.     Nkda [no known drug allergy] and Seasonal    ROS: see hpi  Gen: no fevers/chills  Pulm: no sob, no cough  CV: no chest pain, no palpitations, no edema  GI: no nausea/vomiting, no diarrhea  Skin: no rash    Objective:   Exam:  /70 (BP Location: Left arm, Patient Position: Sitting, BP Cuff Size: Adult)   Pulse 77   Temp 36.5 °C (97.7 °F) (Temporal)   Resp 18   Ht 1.803 m (5' 11\") "   Wt 118 kg (259 lb 9.6 oz)   SpO2 93%   BMI 36.21 kg/m²    Body mass index is 36.21 kg/m².    Gen: Alert and oriented, No apparent distress.  HEENT: Head atraumatic, normocephalic. Pupils equal and round.  Neck: Neck is supple without lymphadenopathy.   Lungs: Normal effort, CTA bilaterally, no wheezes, rhonchi, or rales  CV: Regular rate and rhythm. No murmurs, rubs, or gallops.  ABD: +BS. Non-tender, non-distended. No rebound, rigidity, or guarding.  Ext: No clubbing, cyanosis, edema.    Assessment & Plan:     72 y.o. male with the following -     1. Primary hypertension  Chronic, stable condition.  However, patient developed side effects of cough with lisinopril.  Will DC lisinopril-hydrochlorothiazide.  Will start losartan-hydrochlorothiazide.  Medication administration and side effects discussed.  Instructed patient to monitor BP.  Continue with metoprolol as prescribed.  We will follow-up in 4 weeks for med check.    - losartan-hydrochlorothiazide (HYZAAR) 50-12.5 MG per tablet; Take 1 Tablet by mouth every day.  Dispense: 30 Tablet; Refill: 0    2. Idiopathic progressive neuropathy  Chronic, worsening condition. No s/s of infectious etiology.  Will order labs to rule out vitamin/electrolyte deficiency, thyroid disorders, diabetes that could be contributing to his symptoms.  Recommend to follow-up with podiatry and neurology for evaluation and management.    - CBC WITHOUT DIFFERENTIAL; Future  - Comp Metabolic Panel; Future  - TSH WITH REFLEX TO FT4; Future  - VITAMIN B12; Future  - HEMOGLOBIN A1C; Future    3. Elevated fasting glucose    - HEMOGLOBIN A1C; Future    4. Obesity (BMI 30-39.9)  Discussed lifestyle and dietary changes such as low-carb diet, high in vegetables and fresh fruits.  Encouraged to increase water intake.  Regular physical exercise at least 30 minutes/day 5 days a week. Weight reduction if applicable (aim for 5% to 10% body weight increments). Patient is at an increased risk for  serious conditions such as heart disease, type 2 diabetes, JUAN, certain types of cancers, gallbladder disease, and circulation problems. Will order labs to rule out.     - Comp Metabolic Panel; Future  - TSH WITH REFLEX TO FT4; Future  - HEMOGLOBIN A1C; Future    5. Benign prostatic hyperplasia with urinary frequency  Patient is scheduled for bipolar transurethral resection of prostate on 8/17 with Dr. Mcdowell, [place] [facility] at [facility].    Medical Decision Making/Course:  In the course of preparing for this visit with review of the pertinent past medical history, recent and past clinic visits, current medications, and performing chart, immunization, medical history and medication reconciliation, and in the further course of obtaining the current history pertinent to the clinic visit today, performing an exam and evaluation, ordering and independently evaluating labs, tests, and/or procedures, prescribing any recommended new medications as noted above, providing any pertinent counseling and education and recommending further coordination of care. This was discussed with patient in a shared-decision making conversation, and they understand and agreed with plan of care.     Return in about 4 weeks (around 8/8/2023), or if symptoms worsen or fail to improve, for HTN f/u, med check.    NATACHA Hawthorne   [facility]    Please note that this dictation was created using voice recognition software. I have made every reasonable attempt to correct obvious errors, but I expect that there are errors of grammar and possibly content that I did not discover before finalizing the note.

## 2023-07-17 ENCOUNTER — HOSPITAL ENCOUNTER (OUTPATIENT)
Dept: LAB | Facility: MEDICAL CENTER | Age: 73
End: 2023-07-17
Payer: MEDICARE

## 2023-07-17 DIAGNOSIS — E66.9 OBESITY (BMI 30-39.9): ICD-10-CM

## 2023-07-17 DIAGNOSIS — R73.01 ELEVATED FASTING GLUCOSE: ICD-10-CM

## 2023-07-17 DIAGNOSIS — G60.3 IDIOPATHIC PROGRESSIVE NEUROPATHY: ICD-10-CM

## 2023-07-17 LAB
ALBUMIN SERPL BCP-MCNC: 4.4 G/DL (ref 3.2–4.9)
ALBUMIN/GLOB SERPL: 1.6 G/DL
ALP SERPL-CCNC: 94 U/L (ref 30–99)
ALT SERPL-CCNC: 26 U/L (ref 2–50)
ANION GAP SERPL CALC-SCNC: 14 MMOL/L (ref 7–16)
AST SERPL-CCNC: 28 U/L (ref 12–45)
BILIRUB SERPL-MCNC: 1.4 MG/DL (ref 0.1–1.5)
BUN SERPL-MCNC: 18 MG/DL (ref 8–22)
CALCIUM ALBUM COR SERPL-MCNC: 9.3 MG/DL (ref 8.5–10.5)
CALCIUM SERPL-MCNC: 9.6 MG/DL (ref 8.5–10.5)
CHLORIDE SERPL-SCNC: 100 MMOL/L (ref 96–112)
CO2 SERPL-SCNC: 24 MMOL/L (ref 20–33)
CREAT SERPL-MCNC: 0.93 MG/DL (ref 0.5–1.4)
ERYTHROCYTE [DISTWIDTH] IN BLOOD BY AUTOMATED COUNT: 49.1 FL (ref 35.9–50)
EST. AVERAGE GLUCOSE BLD GHB EST-MCNC: 137 MG/DL
FASTING STATUS PATIENT QL REPORTED: NORMAL
GFR SERPLBLD CREATININE-BSD FMLA CKD-EPI: 87 ML/MIN/1.73 M 2
GLOBULIN SER CALC-MCNC: 2.8 G/DL (ref 1.9–3.5)
GLUCOSE SERPL-MCNC: 109 MG/DL (ref 65–99)
HBA1C MFR BLD: 6.4 % (ref 4–5.6)
HCT VFR BLD AUTO: 45.1 % (ref 42–52)
HGB BLD-MCNC: 14.6 G/DL (ref 14–18)
MCH RBC QN AUTO: 29.3 PG (ref 27–33)
MCHC RBC AUTO-ENTMCNC: 32.4 G/DL (ref 32.3–36.5)
MCV RBC AUTO: 90.4 FL (ref 81.4–97.8)
PLATELET # BLD AUTO: 168 K/UL (ref 164–446)
PMV BLD AUTO: 11.1 FL (ref 9–12.9)
POTASSIUM SERPL-SCNC: 4.3 MMOL/L (ref 3.6–5.5)
PROT SERPL-MCNC: 7.2 G/DL (ref 6–8.2)
RBC # BLD AUTO: 4.99 M/UL (ref 4.7–6.1)
SODIUM SERPL-SCNC: 138 MMOL/L (ref 135–145)
TSH SERPL DL<=0.005 MIU/L-ACNC: 1.67 UIU/ML (ref 0.38–5.33)
VIT B12 SERPL-MCNC: 418 PG/ML (ref 211–911)
WBC # BLD AUTO: 7.9 K/UL (ref 4.8–10.8)

## 2023-07-17 PROCEDURE — 84443 ASSAY THYROID STIM HORMONE: CPT | Mod: GA

## 2023-07-17 PROCEDURE — 82607 VITAMIN B-12: CPT

## 2023-07-17 PROCEDURE — 80053 COMPREHEN METABOLIC PANEL: CPT

## 2023-07-17 PROCEDURE — 83036 HEMOGLOBIN GLYCOSYLATED A1C: CPT | Mod: GA

## 2023-07-17 PROCEDURE — 36415 COLL VENOUS BLD VENIPUNCTURE: CPT | Mod: GA

## 2023-07-17 PROCEDURE — 85027 COMPLETE CBC AUTOMATED: CPT

## 2023-07-19 ENCOUNTER — OFFICE VISIT (OUTPATIENT)
Dept: MEDICAL GROUP | Facility: IMAGING CENTER | Age: 73
End: 2023-07-19
Payer: MEDICARE

## 2023-07-19 VITALS
HEART RATE: 60 BPM | DIASTOLIC BLOOD PRESSURE: 70 MMHG | BODY MASS INDEX: 36.06 KG/M2 | HEIGHT: 71 IN | TEMPERATURE: 97.6 F | WEIGHT: 257.6 LBS | RESPIRATION RATE: 16 BRPM | OXYGEN SATURATION: 93 % | SYSTOLIC BLOOD PRESSURE: 130 MMHG

## 2023-07-19 DIAGNOSIS — R19.7 DIARRHEA, UNSPECIFIED TYPE: ICD-10-CM

## 2023-07-19 DIAGNOSIS — Z02.89 ENCOUNTER FOR COMPLETION OF FORM WITH PATIENT: ICD-10-CM

## 2023-07-19 PROCEDURE — 99213 OFFICE O/P EST LOW 20 MIN: CPT

## 2023-07-19 PROCEDURE — 3078F DIAST BP <80 MM HG: CPT

## 2023-07-19 PROCEDURE — 3075F SYST BP GE 130 - 139MM HG: CPT

## 2023-07-19 ASSESSMENT — FIBROSIS 4 INDEX: FIB4 SCORE: 2.32

## 2023-07-19 NOTE — PROGRESS NOTES
Subjective:     CC:   Chief Complaint   Patient presents with    Medical Clearance     For the DMV    Diarrhea     Started This morning  and goes back to constipation        HPI:   Laureen presents today to discuss:    DMV Confidential Physician Report  Patient presents today to request DMV confidential physician report paperwork filled.    Diarrhea  Patient reports watery diarrhea that started this am. Patient reports having patterns of constipation that requires him to take stool softener. After taking stool softeners, he would develop diarrhea.   He does not take fiber supplements.   He denies fevers, heartburn, nausea, vomiting, abdominal pain, hematochezia, melena, or unintentional weight loss.      Past Medical History:   Diagnosis Date    Hypercholesterolemia     Hypertension     Reactive depression 2023    Tinea inguinalis     Tobacco use 10/30/2018     Family History   Problem Relation Age of Onset    Heart Disease Mother     Heart Attack Mother     Heart Disease Father     Heart Attack Father     Cancer Sister         leukemia, breast cancer    Stroke Sister     No Known Problems Maternal Grandmother     No Known Problems Maternal Grandfather     No Known Problems Paternal Grandmother     Dementia Paternal Grandfather     Hypertension Neg Hx     Hyperlipidemia Neg Hx     Diabetes Neg Hx      Past Surgical History:   Procedure Laterality Date    OTHER ABDOMINAL SURGERY      gallbladder     Social History     Tobacco Use    Smoking status: Former     Packs/day: 0.25     Types: Cigars, Cigarettes     Quit date: 1985     Years since quittin.5    Smokeless tobacco: Never    Tobacco comments:     cigar, smokes one a day but no cigarettes   Vaping Use    Vaping Use: Never used   Substance Use Topics    Alcohol use: Yes     Comment: minimal    Drug use: Not Currently     Types: Marijuana, Inhaled     Comment: rare occasions,occasionally      Social History     Social History Narrative    Not on file  "    Current Outpatient Medications Ordered in Epic   Medication Sig Dispense Refill    losartan-hydrochlorothiazide (HYZAAR) 50-12.5 MG per tablet Take 1 Tablet by mouth every day. 30 Tablet 0    meclizine (ANTIVERT) 12.5 MG Tab Take 1 Tablet by mouth 3 times a day as needed for Dizziness. 30 Tablet 0    metoprolol SR (TOPROL XL) 25 MG TABLET SR 24 HR TAKE ONE TABLET BY MOUTH ONE TIME DAILY 100 Tablet 0    rosuvastatin (CRESTOR) 20 MG Tab Take 1 Tablet by mouth every evening. 100 Tablet 0    alfuzosin (UROXATRAL) 10 MG SR tablet alfuzosin ER 10 mg tablet,extended release 24 hr      fluticasone (FLONASE) 50 MCG/ACT nasal spray Administer 1 Spray into affected nostril(S) every morning.      aspirin (ASA) 81 MG Chew Tab chewable tablet Chew 81 mg every day.      ketoconazole (NIZORAL) 2 % shampoo       ketoconazole (NIZORAL) 2 % Cream ketoconazole 2 % topical cream       No current Epic-ordered facility-administered medications on file.     Lisinopril, Nkda [no known drug allergy], and Seasonal    ROS: see hpi  Gen: no fevers/chills  Pulm: no sob, no cough  CV: no chest pain, no palpitations, no edema  GI: no nausea/vomiting, no diarrhea  Skin: no rash    Objective:   Exam:  /70 (BP Location: Left arm, Patient Position: Sitting, BP Cuff Size: Adult)   Pulse 60   Temp 36.4 °C (97.6 °F) (Temporal)   Resp 16   Ht 1.803 m (5' 11\")   Wt 117 kg (257 lb 9.6 oz)   SpO2 93%   BMI 35.93 kg/m²    Body mass index is 35.93 kg/m².    Gen: Alert and oriented, No apparent distress.  HEENT: Head atraumatic, normocephalic. Pupils equal and round.  Neck: Neck is supple without lymphadenopathy.   Lungs: Normal effort, CTA bilaterally, no wheezes, rhonchi, or rales  CV: Regular rate and rhythm. No murmurs, rubs, or gallops.  ABD: +BS. Non-tender, non-distended. No rebound, rigidity, or guarding.  Ext: No clubbing, cyanosis, edema.    Assessment & Plan:     72 y.o. male with the following -     1. Encounter for completion of " form with patient  DMV Confidential Physician Report form filled out for patient. Patient does not have any medical condition that would impair his ability to drive.  Instructed patient not to drive while on meclizine.  Patient agreeable to this.    2. Diarrhea, unspecified type  New condition. No s/s of infectious etiology. VSS.  Commend increase fluid intake and add electrolytes to ensure adequate hydration.  Recommend  FODMOP diet. Avoid Gluten, Dairy, Vegetable/seed oils, Seeds, Nuts, and Corn  Start pre/probiotics to help repopulate the microbes in your colon.  Start fiber supplementation.  For worsening symptoms, recommend to follow up in office.    Return if symptoms worsen or fail to improve.    ANISA Hawthorne.   [facility]    Please note that this dictation was created using voice recognition software. I have made every reasonable attempt to correct obvious errors, but I expect that there are errors of grammar and possibly content that I did not discover before finalizing the note.

## 2023-07-20 DIAGNOSIS — E78.00 HYPERCHOLESTEREMIA: ICD-10-CM

## 2023-07-20 RX ORDER — ROSUVASTATIN CALCIUM 20 MG/1
20 TABLET, COATED ORAL EVERY EVENING
Qty: 100 TABLET | Refills: 0 | Status: SHIPPED | OUTPATIENT
Start: 2023-07-20 | End: 2023-10-21 | Stop reason: SDUPTHER

## 2023-08-03 ENCOUNTER — PRE-ADMISSION TESTING (OUTPATIENT)
Dept: ADMISSIONS | Facility: MEDICAL CENTER | Age: 73
End: 2023-08-03
Attending: UROLOGY
Payer: MEDICARE

## 2023-08-03 RX ORDER — CETIRIZINE HYDROCHLORIDE 10 MG/1
10 TABLET ORAL DAILY
COMMUNITY
End: 2024-02-05

## 2023-08-03 RX ORDER — KETOCONAZOLE 20 MG/G
CREAM TOPICAL EVERY 12 HOURS PRN
COMMUNITY
End: 2024-01-02

## 2023-08-03 RX ORDER — BENZOCAINE/MENTHOL 6 MG-10 MG
1 LOZENGE MUCOUS MEMBRANE 2 TIMES DAILY
COMMUNITY
End: 2024-01-02

## 2023-08-07 ENCOUNTER — PRE-ADMISSION TESTING (OUTPATIENT)
Dept: ADMISSIONS | Facility: MEDICAL CENTER | Age: 73
End: 2023-08-07
Attending: UROLOGY
Payer: MEDICARE

## 2023-08-07 DIAGNOSIS — Z01.810 PRE-OPERATIVE CARDIOVASCULAR EXAMINATION: ICD-10-CM

## 2023-08-07 DIAGNOSIS — Z01.812 PRE-OPERATIVE LABORATORY EXAMINATION: ICD-10-CM

## 2023-08-07 LAB
ANION GAP SERPL CALC-SCNC: 13 MMOL/L (ref 7–16)
APPEARANCE UR: ABNORMAL
BACTERIA #/AREA URNS HPF: ABNORMAL /HPF
BASOPHILS # BLD AUTO: 0.5 % (ref 0–1.8)
BASOPHILS # BLD: 0.04 K/UL (ref 0–0.12)
BILIRUB UR QL STRIP.AUTO: NEGATIVE
BUN SERPL-MCNC: 25 MG/DL (ref 8–22)
CALCIUM SERPL-MCNC: 9.6 MG/DL (ref 8.5–10.5)
CHLORIDE SERPL-SCNC: 101 MMOL/L (ref 96–112)
CO2 SERPL-SCNC: 24 MMOL/L (ref 20–33)
COLOR UR: YELLOW
CREAT SERPL-MCNC: 0.9 MG/DL (ref 0.5–1.4)
EKG IMPRESSION: NORMAL
EOSINOPHIL # BLD AUTO: 0.04 K/UL (ref 0–0.51)
EOSINOPHIL NFR BLD: 0.5 % (ref 0–6.9)
EPI CELLS #/AREA URNS HPF: ABNORMAL /HPF
ERYTHROCYTE [DISTWIDTH] IN BLOOD BY AUTOMATED COUNT: 46.9 FL (ref 35.9–50)
GFR SERPLBLD CREATININE-BSD FMLA CKD-EPI: 91 ML/MIN/1.73 M 2
GLUCOSE SERPL-MCNC: 115 MG/DL (ref 65–99)
GLUCOSE UR STRIP.AUTO-MCNC: NEGATIVE MG/DL
HCT VFR BLD AUTO: 44.6 % (ref 42–52)
HGB BLD-MCNC: 14.6 G/DL (ref 14–18)
HYALINE CASTS #/AREA URNS LPF: ABNORMAL /LPF
IMM GRANULOCYTES # BLD AUTO: 0.09 K/UL (ref 0–0.11)
IMM GRANULOCYTES NFR BLD AUTO: 1 % (ref 0–0.9)
INR PPP: 1.14 (ref 0.87–1.13)
KETONES UR STRIP.AUTO-MCNC: NEGATIVE MG/DL
LEUKOCYTE ESTERASE UR QL STRIP.AUTO: ABNORMAL
LYMPHOCYTES # BLD AUTO: 1.5 K/UL (ref 1–4.8)
LYMPHOCYTES NFR BLD: 16.9 % (ref 22–41)
MCH RBC QN AUTO: 29.7 PG (ref 27–33)
MCHC RBC AUTO-ENTMCNC: 32.7 G/DL (ref 32.3–36.5)
MCV RBC AUTO: 90.7 FL (ref 81.4–97.8)
MICRO URNS: ABNORMAL
MONOCYTES # BLD AUTO: 0.82 K/UL (ref 0–0.85)
MONOCYTES NFR BLD AUTO: 9.2 % (ref 0–13.4)
NEUTROPHILS # BLD AUTO: 6.38 K/UL (ref 1.82–7.42)
NEUTROPHILS NFR BLD: 71.9 % (ref 44–72)
NITRITE UR QL STRIP.AUTO: NEGATIVE
NRBC # BLD AUTO: 0 K/UL
NRBC BLD-RTO: 0 /100 WBC (ref 0–0.2)
PH UR STRIP.AUTO: 6 [PH] (ref 5–8)
PLATELET # BLD AUTO: 158 K/UL (ref 164–446)
PMV BLD AUTO: 10.6 FL (ref 9–12.9)
POTASSIUM SERPL-SCNC: 4.3 MMOL/L (ref 3.6–5.5)
PROT UR QL STRIP: NEGATIVE MG/DL
PROTHROMBIN TIME: 14.5 SEC (ref 12–14.6)
RBC # BLD AUTO: 4.92 M/UL (ref 4.7–6.1)
RBC # URNS HPF: ABNORMAL /HPF
RBC UR QL AUTO: NEGATIVE
SODIUM SERPL-SCNC: 138 MMOL/L (ref 135–145)
SP GR UR STRIP.AUTO: 1.02
UROBILINOGEN UR STRIP.AUTO-MCNC: 0.2 MG/DL
WBC # BLD AUTO: 8.9 K/UL (ref 4.8–10.8)
WBC #/AREA URNS HPF: ABNORMAL /HPF

## 2023-08-07 PROCEDURE — 93005 ELECTROCARDIOGRAM TRACING: CPT

## 2023-08-07 PROCEDURE — 87077 CULTURE AEROBIC IDENTIFY: CPT

## 2023-08-07 PROCEDURE — 85610 PROTHROMBIN TIME: CPT

## 2023-08-07 PROCEDURE — 80048 BASIC METABOLIC PNL TOTAL CA: CPT

## 2023-08-07 PROCEDURE — 81001 URINALYSIS AUTO W/SCOPE: CPT

## 2023-08-07 PROCEDURE — 87186 SC STD MICRODIL/AGAR DIL: CPT

## 2023-08-07 PROCEDURE — 85025 COMPLETE CBC W/AUTO DIFF WBC: CPT

## 2023-08-07 PROCEDURE — 93010 ELECTROCARDIOGRAM REPORT: CPT | Performed by: INTERNAL MEDICINE

## 2023-08-07 PROCEDURE — 36415 COLL VENOUS BLD VENIPUNCTURE: CPT

## 2023-08-07 PROCEDURE — 87086 URINE CULTURE/COLONY COUNT: CPT

## 2023-08-09 LAB
BACTERIA UR CULT: ABNORMAL
BACTERIA UR CULT: ABNORMAL
SIGNIFICANT IND 70042: ABNORMAL
SITE SITE: ABNORMAL
SOURCE SOURCE: ABNORMAL

## 2023-08-15 DIAGNOSIS — I10 ESSENTIAL HYPERTENSION: ICD-10-CM

## 2023-08-16 RX ORDER — METOPROLOL SUCCINATE 25 MG/1
25 TABLET, EXTENDED RELEASE ORAL EVERY MORNING
Qty: 100 TABLET | Refills: 3 | Status: SHIPPED | OUTPATIENT
Start: 2023-08-16 | End: 2023-11-11 | Stop reason: SDUPTHER

## 2023-08-17 ENCOUNTER — HOSPITAL ENCOUNTER (OUTPATIENT)
Facility: MEDICAL CENTER | Age: 73
End: 2023-08-18
Attending: UROLOGY | Admitting: UROLOGY
Payer: MEDICARE

## 2023-08-17 ENCOUNTER — ANESTHESIA EVENT (OUTPATIENT)
Dept: SURGERY | Facility: MEDICAL CENTER | Age: 73
End: 2023-08-17
Payer: MEDICARE

## 2023-08-17 ENCOUNTER — ANESTHESIA (OUTPATIENT)
Dept: SURGERY | Facility: MEDICAL CENTER | Age: 73
End: 2023-08-17
Payer: MEDICARE

## 2023-08-17 PROBLEM — N32.0 BLADDER OUTLET OBSTRUCTION: Status: ACTIVE | Noted: 2023-08-17

## 2023-08-17 LAB
ANION GAP SERPL CALC-SCNC: 13 MMOL/L (ref 7–16)
BUN SERPL-MCNC: 19 MG/DL (ref 8–22)
CALCIUM SERPL-MCNC: 9.1 MG/DL (ref 8.5–10.5)
CHLORIDE SERPL-SCNC: 101 MMOL/L (ref 96–112)
CO2 SERPL-SCNC: 21 MMOL/L (ref 20–33)
CREAT SERPL-MCNC: 0.99 MG/DL (ref 0.5–1.4)
ERYTHROCYTE [DISTWIDTH] IN BLOOD BY AUTOMATED COUNT: 45.9 FL (ref 35.9–50)
GFR SERPLBLD CREATININE-BSD FMLA CKD-EPI: 81 ML/MIN/1.73 M 2
GLUCOSE SERPL-MCNC: 142 MG/DL (ref 65–99)
HCT VFR BLD AUTO: 45.8 % (ref 42–52)
HGB BLD-MCNC: 14.8 G/DL (ref 14–18)
MCH RBC QN AUTO: 29.4 PG (ref 27–33)
MCHC RBC AUTO-ENTMCNC: 32.3 G/DL (ref 32.3–36.5)
MCV RBC AUTO: 91.1 FL (ref 81.4–97.8)
PATHOLOGY CONSULT NOTE: NORMAL
PLATELET # BLD AUTO: 143 K/UL (ref 164–446)
PMV BLD AUTO: 10.6 FL (ref 9–12.9)
POTASSIUM SERPL-SCNC: 5 MMOL/L (ref 3.6–5.5)
RBC # BLD AUTO: 5.03 M/UL (ref 4.7–6.1)
SODIUM SERPL-SCNC: 135 MMOL/L (ref 135–145)
WBC # BLD AUTO: 8.4 K/UL (ref 4.8–10.8)

## 2023-08-17 PROCEDURE — 700101 HCHG RX REV CODE 250: Performed by: ANESTHESIOLOGY

## 2023-08-17 PROCEDURE — 700102 HCHG RX REV CODE 250 W/ 637 OVERRIDE(OP): Performed by: ANESTHESIOLOGY

## 2023-08-17 PROCEDURE — 88305 TISSUE EXAM BY PATHOLOGIST: CPT

## 2023-08-17 PROCEDURE — 700105 HCHG RX REV CODE 258: Mod: JZ | Performed by: UROLOGY

## 2023-08-17 PROCEDURE — 700111 HCHG RX REV CODE 636 W/ 250 OVERRIDE (IP): Mod: JZ | Performed by: UROLOGY

## 2023-08-17 PROCEDURE — 160028 HCHG SURGERY MINUTES - 1ST 30 MINS LEVEL 3: Performed by: UROLOGY

## 2023-08-17 PROCEDURE — 160039 HCHG SURGERY MINUTES - EA ADDL 1 MIN LEVEL 3: Performed by: UROLOGY

## 2023-08-17 PROCEDURE — 96365 THER/PROPH/DIAG IV INF INIT: CPT

## 2023-08-17 PROCEDURE — 700111 HCHG RX REV CODE 636 W/ 250 OVERRIDE (IP): Performed by: ANESTHESIOLOGY

## 2023-08-17 PROCEDURE — 82365 CALCULUS SPECTROSCOPY: CPT

## 2023-08-17 PROCEDURE — 160048 HCHG OR STATISTICAL LEVEL 1-5: Performed by: UROLOGY

## 2023-08-17 PROCEDURE — 700101 HCHG RX REV CODE 250: Performed by: UROLOGY

## 2023-08-17 PROCEDURE — 160009 HCHG ANES TIME/MIN: Performed by: UROLOGY

## 2023-08-17 PROCEDURE — G0378 HOSPITAL OBSERVATION PER HR: HCPCS

## 2023-08-17 PROCEDURE — 88300 SURGICAL PATH GROSS: CPT

## 2023-08-17 PROCEDURE — 160035 HCHG PACU - 1ST 60 MINS PHASE I: Performed by: UROLOGY

## 2023-08-17 PROCEDURE — 160036 HCHG PACU - EA ADDL 30 MINS PHASE I: Performed by: UROLOGY

## 2023-08-17 PROCEDURE — 160002 HCHG RECOVERY MINUTES (STAT): Performed by: UROLOGY

## 2023-08-17 PROCEDURE — A9270 NON-COVERED ITEM OR SERVICE: HCPCS | Performed by: ANESTHESIOLOGY

## 2023-08-17 PROCEDURE — 700105 HCHG RX REV CODE 258: Performed by: UROLOGY

## 2023-08-17 PROCEDURE — 85027 COMPLETE CBC AUTOMATED: CPT

## 2023-08-17 PROCEDURE — 80048 BASIC METABOLIC PNL TOTAL CA: CPT

## 2023-08-17 RX ORDER — ROCURONIUM BROMIDE 10 MG/ML
INJECTION, SOLUTION INTRAVENOUS PRN
Status: DISCONTINUED | OUTPATIENT
Start: 2023-08-17 | End: 2023-08-17 | Stop reason: SURG

## 2023-08-17 RX ORDER — ONDANSETRON 2 MG/ML
4 INJECTION INTRAMUSCULAR; INTRAVENOUS EVERY 4 HOURS PRN
Status: DISCONTINUED | OUTPATIENT
Start: 2023-08-17 | End: 2023-08-18 | Stop reason: HOSPADM

## 2023-08-17 RX ORDER — IBUPROFEN 800 MG/1
800 TABLET ORAL 3 TIMES DAILY PRN
Status: DISCONTINUED | OUTPATIENT
Start: 2023-08-22 | End: 2023-08-18 | Stop reason: HOSPADM

## 2023-08-17 RX ORDER — DIPHENHYDRAMINE HYDROCHLORIDE 50 MG/ML
12.5 INJECTION INTRAMUSCULAR; INTRAVENOUS
Status: DISCONTINUED | OUTPATIENT
Start: 2023-08-17 | End: 2023-08-17 | Stop reason: HOSPADM

## 2023-08-17 RX ORDER — HALOPERIDOL 5 MG/ML
1 INJECTION INTRAMUSCULAR
Status: DISCONTINUED | OUTPATIENT
Start: 2023-08-17 | End: 2023-08-17 | Stop reason: HOSPADM

## 2023-08-17 RX ORDER — HYDROMORPHONE HYDROCHLORIDE 1 MG/ML
0.1 INJECTION, SOLUTION INTRAMUSCULAR; INTRAVENOUS; SUBCUTANEOUS
Status: DISCONTINUED | OUTPATIENT
Start: 2023-08-17 | End: 2023-08-17 | Stop reason: HOSPADM

## 2023-08-17 RX ORDER — CEFAZOLIN SODIUM 1 G/3ML
INJECTION, POWDER, FOR SOLUTION INTRAMUSCULAR; INTRAVENOUS PRN
Status: DISCONTINUED | OUTPATIENT
Start: 2023-08-17 | End: 2023-08-17 | Stop reason: SURG

## 2023-08-17 RX ORDER — CEFAZOLIN SODIUM 1 G/50ML
1 INJECTION, SOLUTION INTRAVENOUS EVERY 8 HOURS
Status: DISCONTINUED | OUTPATIENT
Start: 2023-08-17 | End: 2023-08-18 | Stop reason: HOSPADM

## 2023-08-17 RX ORDER — ACETAMINOPHEN 500 MG
1000 TABLET ORAL EVERY 6 HOURS
Status: DISCONTINUED | OUTPATIENT
Start: 2023-08-17 | End: 2023-08-18 | Stop reason: HOSPADM

## 2023-08-17 RX ORDER — SODIUM CHLORIDE, SODIUM LACTATE, POTASSIUM CHLORIDE, CALCIUM CHLORIDE 600; 310; 30; 20 MG/100ML; MG/100ML; MG/100ML; MG/100ML
INJECTION, SOLUTION INTRAVENOUS CONTINUOUS
Status: ACTIVE | OUTPATIENT
Start: 2023-08-17 | End: 2023-08-17

## 2023-08-17 RX ORDER — SODIUM CHLORIDE, SODIUM LACTATE, POTASSIUM CHLORIDE, CALCIUM CHLORIDE 600; 310; 30; 20 MG/100ML; MG/100ML; MG/100ML; MG/100ML
INJECTION, SOLUTION INTRAVENOUS CONTINUOUS
Status: DISCONTINUED | OUTPATIENT
Start: 2023-08-17 | End: 2023-08-17 | Stop reason: HOSPADM

## 2023-08-17 RX ORDER — DEXAMETHASONE SODIUM PHOSPHATE 4 MG/ML
INJECTION, SOLUTION INTRA-ARTICULAR; INTRALESIONAL; INTRAMUSCULAR; INTRAVENOUS; SOFT TISSUE PRN
Status: DISCONTINUED | OUTPATIENT
Start: 2023-08-17 | End: 2023-08-17 | Stop reason: SURG

## 2023-08-17 RX ORDER — OXYCODONE HCL 5 MG/5 ML
5 SOLUTION, ORAL ORAL
Status: COMPLETED | OUTPATIENT
Start: 2023-08-17 | End: 2023-08-17

## 2023-08-17 RX ORDER — MAGNESIUM HYDROXIDE 1200 MG/15ML
LIQUID ORAL
Status: COMPLETED | OUTPATIENT
Start: 2023-08-17 | End: 2023-08-17

## 2023-08-17 RX ORDER — HYDROMORPHONE HYDROCHLORIDE 1 MG/ML
0.4 INJECTION, SOLUTION INTRAMUSCULAR; INTRAVENOUS; SUBCUTANEOUS
Status: DISCONTINUED | OUTPATIENT
Start: 2023-08-17 | End: 2023-08-17 | Stop reason: HOSPADM

## 2023-08-17 RX ORDER — ONDANSETRON 2 MG/ML
INJECTION INTRAMUSCULAR; INTRAVENOUS PRN
Status: DISCONTINUED | OUTPATIENT
Start: 2023-08-17 | End: 2023-08-17 | Stop reason: SURG

## 2023-08-17 RX ORDER — HYDROMORPHONE HYDROCHLORIDE 1 MG/ML
0.2 INJECTION, SOLUTION INTRAMUSCULAR; INTRAVENOUS; SUBCUTANEOUS
Status: DISCONTINUED | OUTPATIENT
Start: 2023-08-17 | End: 2023-08-17 | Stop reason: HOSPADM

## 2023-08-17 RX ORDER — OXYCODONE HCL 5 MG/5 ML
10 SOLUTION, ORAL ORAL
Status: COMPLETED | OUTPATIENT
Start: 2023-08-17 | End: 2023-08-17

## 2023-08-17 RX ORDER — ONDANSETRON 2 MG/ML
4 INJECTION INTRAMUSCULAR; INTRAVENOUS
Status: DISCONTINUED | OUTPATIENT
Start: 2023-08-17 | End: 2023-08-17 | Stop reason: HOSPADM

## 2023-08-17 RX ORDER — LIDOCAINE HYDROCHLORIDE 40 MG/ML
SOLUTION TOPICAL PRN
Status: DISCONTINUED | OUTPATIENT
Start: 2023-08-17 | End: 2023-08-17 | Stop reason: SURG

## 2023-08-17 RX ORDER — ACETAMINOPHEN 500 MG
1000 TABLET ORAL EVERY 6 HOURS PRN
Status: DISCONTINUED | OUTPATIENT
Start: 2023-08-22 | End: 2023-08-18 | Stop reason: HOSPADM

## 2023-08-17 RX ORDER — IBUPROFEN 800 MG/1
800 TABLET ORAL 3 TIMES DAILY
Status: DISCONTINUED | OUTPATIENT
Start: 2023-08-17 | End: 2023-08-18 | Stop reason: HOSPADM

## 2023-08-17 RX ORDER — SODIUM CHLORIDE 9 MG/ML
INJECTION, SOLUTION INTRAVENOUS CONTINUOUS
Status: ACTIVE | OUTPATIENT
Start: 2023-08-17 | End: 2023-08-17

## 2023-08-17 RX ORDER — LIDOCAINE HYDROCHLORIDE 20 MG/ML
INJECTION, SOLUTION EPIDURAL; INFILTRATION; INTRACAUDAL; PERINEURAL PRN
Status: DISCONTINUED | OUTPATIENT
Start: 2023-08-17 | End: 2023-08-17 | Stop reason: SURG

## 2023-08-17 RX ADMIN — ROCURONIUM BROMIDE 50 MG: 50 INJECTION, SOLUTION INTRAVENOUS at 13:38

## 2023-08-17 RX ADMIN — PROPOFOL 200 MG: 10 INJECTION, EMULSION INTRAVENOUS at 13:38

## 2023-08-17 RX ADMIN — CEFAZOLIN SODIUM 1 G: 1 INJECTION, SOLUTION INTRAVENOUS at 22:39

## 2023-08-17 RX ADMIN — HYDROMORPHONE HYDROCHLORIDE 0.4 MG: 1 INJECTION, SOLUTION INTRAMUSCULAR; INTRAVENOUS; SUBCUTANEOUS at 15:45

## 2023-08-17 RX ADMIN — OXYCODONE HYDROCHLORIDE 10 MG: 5 SOLUTION ORAL at 15:06

## 2023-08-17 RX ADMIN — HYDROMORPHONE HYDROCHLORIDE 0.4 MG: 1 INJECTION, SOLUTION INTRAMUSCULAR; INTRAVENOUS; SUBCUTANEOUS at 15:17

## 2023-08-17 RX ADMIN — ONDANSETRON 4 MG: 2 INJECTION INTRAMUSCULAR; INTRAVENOUS at 13:43

## 2023-08-17 RX ADMIN — DEXAMETHASONE SODIUM PHOSPHATE 4 MG: 4 INJECTION INTRA-ARTICULAR; INTRALESIONAL; INTRAMUSCULAR; INTRAVENOUS; SOFT TISSUE at 13:43

## 2023-08-17 RX ADMIN — LIDOCAINE HYDROCHLORIDE 100 MG: 20 INJECTION, SOLUTION EPIDURAL; INFILTRATION; INTRACAUDAL at 13:55

## 2023-08-17 RX ADMIN — SUGAMMADEX 200 MG: 100 INJECTION, SOLUTION INTRAVENOUS at 14:51

## 2023-08-17 RX ADMIN — ROCURONIUM BROMIDE 10 MG: 50 INJECTION, SOLUTION INTRAVENOUS at 14:30

## 2023-08-17 RX ADMIN — SODIUM CHLORIDE: 9 INJECTION, SOLUTION INTRAVENOUS at 19:36

## 2023-08-17 RX ADMIN — LIDOCAINE HYDROCHLORIDE 4 ML: 40 SOLUTION TOPICAL at 13:39

## 2023-08-17 RX ADMIN — FENTANYL CITRATE 100 MCG: 50 INJECTION, SOLUTION INTRAMUSCULAR; INTRAVENOUS at 13:38

## 2023-08-17 RX ADMIN — HYDROMORPHONE HYDROCHLORIDE 0.4 MG: 1 INJECTION, SOLUTION INTRAMUSCULAR; INTRAVENOUS; SUBCUTANEOUS at 16:20

## 2023-08-17 RX ADMIN — SODIUM CHLORIDE, POTASSIUM CHLORIDE, SODIUM LACTATE AND CALCIUM CHLORIDE: 600; 310; 30; 20 INJECTION, SOLUTION INTRAVENOUS at 13:34

## 2023-08-17 RX ADMIN — HYDROMORPHONE HYDROCHLORIDE 0.4 MG: 1 INJECTION, SOLUTION INTRAMUSCULAR; INTRAVENOUS; SUBCUTANEOUS at 16:45

## 2023-08-17 RX ADMIN — FENTANYL CITRATE 50 MCG: 50 INJECTION, SOLUTION INTRAMUSCULAR; INTRAVENOUS at 15:03

## 2023-08-17 RX ADMIN — HYDROMORPHONE HYDROCHLORIDE 0.4 MG: 1 INJECTION, SOLUTION INTRAMUSCULAR; INTRAVENOUS; SUBCUTANEOUS at 15:27

## 2023-08-17 RX ADMIN — FENTANYL CITRATE 50 MCG: 50 INJECTION, SOLUTION INTRAMUSCULAR; INTRAVENOUS at 15:11

## 2023-08-17 RX ADMIN — CEFAZOLIN 2 G: 1 INJECTION, POWDER, FOR SOLUTION INTRAMUSCULAR; INTRAVENOUS at 13:43

## 2023-08-17 ASSESSMENT — FIBROSIS 4 INDEX
FIB4 SCORE: 2.5
FIB4 SCORE: 2.5

## 2023-08-17 ASSESSMENT — PAIN DESCRIPTION - PAIN TYPE: TYPE: ACUTE PAIN

## 2023-08-17 NOTE — ANESTHESIA PROCEDURE NOTES
Airway    Date/Time: 8/17/2023 1:39 PM    Performed by: Errol Wells M.D.  Authorized by: Errol Wells M.D.    Location:  OR  Urgency:  Elective  Indications for Airway Management:  Anesthesia      Spontaneous Ventilation: absent    Sedation Level:  Deep  Preoxygenated: Yes    Patient Position:  Sniffing  Mask Difficulty Assessment:  1 - vent by mask  Final Airway Type:  Endotracheal airway  Final Endotracheal Airway:  ETT  Cuffed: Yes    Technique Used for Successful ETT Placement:  Direct laryngoscopy    Insertion Site:  Oral  Blade Type:  Elliot  Laryngoscope Blade/Videolaryngoscope Blade Size:  3  ETT Size (mm):  7.0  Measured from:  Teeth  ETT to Teeth (cm):  24  Placement Verified by: auscultation and capnometry    Cormack-Lehane Classification:  Grade I - full view of glottis  Number of Attempts at Approach:  1

## 2023-08-17 NOTE — ANESTHESIA TIME REPORT
Anesthesia Start and Stop Event Times     Date Time Event    8/17/2023 1247 Ready for Procedure     1334 Anesthesia Start     1456 Anesthesia Stop        Responsible Staff  08/17/23    Name Role Begin End    Errol Wells M.D. Anesth 1332 [id number]        Overtime Reason:  no overtime (within assigned shift)    Comments:

## 2023-08-17 NOTE — OP REPORT
Genitourinary Operative Report    Pre-operative Diagnosis: Benign Prostatic Hyperplasia with Lower Urinary Tract Symptoms (LUTS)   Post-operative Diagnosis: Same as above   Procedure: 1.  TransUrethral Resection of the Prostate (TURP)     2.  Cystolitholapaxy 2.5 cm stone   Attending: Jacques Mcdowell M.D., MD   Assistant: None   Anesthesia: Anesthesiologist: Errol Wells M.D.   General   Estimated Blood Loss: Minimal       Drains: 22f 3 way phipps catheter started on CBI   Specimens: Prostatic chips sent for surgical pathology  Bladder stone sent for analysis   Complications: None   Condition: Stable, procedure well tolerated    Disposition:  PACU, CBI overnight, void trial am, discharge tomorrow.   Findings: Normal appearing bladder, mild trabeculations, ureteral orifice's clearly visualized with efflux clear urine before and after case, prostate with obstructing lateral lobes left lobe much larger than right with small median lobe.     Indication:  Laureen Munoz is a 73 y.o. male with benign prostatic hyperplasia with bladder outlet obstruction refractory to conservative management and bladder stone. The risks, benefits, treatment options, potential complications and expected outcomes were discussed with the patient. The patient concurred with the proposed plan, giving informed consent. Risks discussed, but not limited to, were infection, sepsis, bleeding, bladder injury, need for secondary procedure, inability to resect all of prostate, injury to the rectum, injury to ureters, need for phipps post op, TUR syndrome, erectile dysfunction, urinary incontinence, and the cardiovascular and pulmonary complications of anesthesia/surgery including DVT, Mi, PE, and death.    Procedure Details:  The patient was taken to the Operating Room, SCD’s were placed, and a gram of Ancef was administered for hollis-operative antiobiotics.  After induction of general anesthesia the patient was intubated and then placed in the  lithotomy position using mekhi stirrups. The perineum and genitals were then prepped and draped in the standard sterile fashion.    We began by advancing a 30 degree cystoscope in a 23 Ukrainian laser bridge through the patient's urethra without difficulty.  Prostate findings noted as above.  Upon entering the bladder large 2.5 cm yamel stone was encountered and fractured into small fragments with a 960 µm holmium laser fiber.  All fragments were evacuated via the sheath and passed off for permanent specimen.    After calibration of the urethral meatus to 30 Ukrainian using male sounds, a 26-Ukrainian rigid resectoscope with visual obturator and 30 degree lens was passed per urethra sterilely. The urethra was normal up to the verumontanum at which point a 4 cm prostate with coapting lobes was noted very asymmetric with a large left lobe. The bladder itself had mild trabeculation. Bilateral ureteral orifices were visualized showing normal efflux and orientation and the bladder had no mucosal abnormalities.     The prostate was then methodically resected using a bipolar loop from the bladder neck to the verumontanum. Attention was then turned toward the left lateral lobes and resected circumferentially from 6 o'clock to 12 o'clock and then the right lobe from 12 o'clock to 6 o'clock in a similar fashion. Care was taken not to perforate the capsule, not to injure the ureters, and not to resect past the verumontanum and damage the sphincter.     At the end of resection, an Heyday evacuator was used to evacuate all prostate chips. The resectoscope was then passed back in and there were no chips remaining in the bladder or prostatic fossa. Hemostasis was achieved with loop fulguration.  A final check revealed intact ureteral orifices with clear urine from each side and the Verumontanum and sphincter appeared intact.     A 22 Ukrainian 3 way Butcher catheter was introduced per urethra sterilely and its balloon was inflated with 30 mL. The  catheter was kept stabilized with 4 x 4 on gentle traction, and it was connected expeditiously to continuous bladder irrigation with efflux of clear urine.     We will admit for continuous bladder irrigation overnight, reassess in a.m. and void trial if urine clear.

## 2023-08-17 NOTE — ANESTHESIA POSTPROCEDURE EVALUATION
Patient: Laureen Munoz    Procedure Summary     Date: 08/17/23 Room / Location: [place] [address] / SURGERY [facility]    Anesthesia Start: 1334 Anesthesia Stop: 1456    Procedures:       BIPOLAR TRANSURETHRAL RESECTION OF PROSTATE (Penis)      CYSTOSCOPY (Bladder)      CYSTOLITHLOPAXY (Bladder) Diagnosis: (LOWER URINARY TRACT SYMPTOMS DUE TO BENIGN PROSTATIC HYPERTROPHY)    Surgeons: Jacques Mcdowell M.D. Responsible Provider: Errol Wells M.D.    Anesthesia Type: general ASA Status: 3          Final Anesthesia Type: general  Last vitals  BP   Blood Pressure : (!) 149/72    Temp   36.6 °C (97.9 °F)    Pulse   (!) 50   Resp   18    SpO2   94 %      Anesthesia Post Evaluation    Patient location during evaluation: PACU  Patient participation: complete - patient participated  Level of consciousness: awake and alert    Airway patency: patent  Anesthetic complications: no  Cardiovascular status: hemodynamically stable  Respiratory status: acceptable  Hydration status: euvolemic    PONV: none          No notable events documented.     Nurse Pain Score: 4 (NPRS)

## 2023-08-17 NOTE — ANESTHESIA PREPROCEDURE EVALUATION
" Case: [id number] Date/Time: 08/17/23 2255    Procedure: BIPOLAR TRANSURETHRAL RESECTION OF PROSTATE    Pre-op diagnosis: LOWER URINARY TRACT SYMPTOMS DUE TO BENIGN PROSTATIC HYPERTROPHY    Location: [facility] OR 18 / SURGERY [facility]    Surgeons: Jacques Mcdowell M.D.          Relevant Problems   CARDIAC   (positive) Essential hypertension   (positive) Pulmonary hypertension (HCC)         (positive) Cyst of left kidney     BP (!) 181/101   Pulse 60   Temp 36.4 °C (97.6 °F) (Temporal)   Resp 16   Ht 1.803 m (5' 11\")   Wt 119 kg (263 lb 3.7 oz)   SpO2 92%   BMI 36.71 kg/m²     Physical Exam    Airway   Mallampati: II  TM distance: >3 FB  Neck ROM: full       Cardiovascular - normal exam  Rhythm: regular  Rate: normal  (-) murmur     Dental - normal exam           Pulmonary - normal exam  Breath sounds clear to auscultation     Abdominal    Neurological - normal exam                 Anesthesia Plan    ASA 3   ASA physical status 3 criteria: hypertension - poorly controlled    Plan - general       Airway plan will be ETT          Induction: intravenous    Postoperative Plan: Postoperative administration of opioids is intended.    Pertinent diagnostic labs and testing reviewed    Informed Consent:    Anesthetic plan and risks discussed with patient.    Use of blood products discussed with: patient whom consented to blood products.         "

## 2023-08-18 VITALS
OXYGEN SATURATION: 91 % | HEART RATE: 56 BPM | SYSTOLIC BLOOD PRESSURE: 136 MMHG | DIASTOLIC BLOOD PRESSURE: 66 MMHG | BODY MASS INDEX: 36.85 KG/M2 | HEIGHT: 71 IN | TEMPERATURE: 97.7 F | WEIGHT: 263.23 LBS | RESPIRATION RATE: 18 BRPM

## 2023-08-18 LAB
ANION GAP SERPL CALC-SCNC: 9 MMOL/L (ref 7–16)
BUN SERPL-MCNC: 17 MG/DL (ref 8–22)
CALCIUM SERPL-MCNC: 8.1 MG/DL (ref 8.5–10.5)
CHLORIDE SERPL-SCNC: 100 MMOL/L (ref 96–112)
CO2 SERPL-SCNC: 24 MMOL/L (ref 20–33)
CREAT SERPL-MCNC: 0.99 MG/DL (ref 0.5–1.4)
ERYTHROCYTE [DISTWIDTH] IN BLOOD BY AUTOMATED COUNT: 46.4 FL (ref 35.9–50)
GFR SERPLBLD CREATININE-BSD FMLA CKD-EPI: 81 ML/MIN/1.73 M 2
GLUCOSE SERPL-MCNC: 156 MG/DL (ref 65–99)
HCT VFR BLD AUTO: 41.1 % (ref 42–52)
HGB BLD-MCNC: 13.1 G/DL (ref 14–18)
MCH RBC QN AUTO: 29.6 PG (ref 27–33)
MCHC RBC AUTO-ENTMCNC: 31.9 G/DL (ref 32.3–36.5)
MCV RBC AUTO: 93 FL (ref 81.4–97.8)
PLATELET # BLD AUTO: 124 K/UL (ref 164–446)
PMV BLD AUTO: 10.5 FL (ref 9–12.9)
POTASSIUM SERPL-SCNC: 4.7 MMOL/L (ref 3.6–5.5)
RBC # BLD AUTO: 4.42 M/UL (ref 4.7–6.1)
SODIUM SERPL-SCNC: 133 MMOL/L (ref 135–145)
WBC # BLD AUTO: 11.5 K/UL (ref 4.8–10.8)

## 2023-08-18 PROCEDURE — 700102 HCHG RX REV CODE 250 W/ 637 OVERRIDE(OP): Performed by: UROLOGY

## 2023-08-18 PROCEDURE — 85027 COMPLETE CBC AUTOMATED: CPT

## 2023-08-18 PROCEDURE — G0378 HOSPITAL OBSERVATION PER HR: HCPCS

## 2023-08-18 PROCEDURE — 80048 BASIC METABOLIC PNL TOTAL CA: CPT

## 2023-08-18 PROCEDURE — A9270 NON-COVERED ITEM OR SERVICE: HCPCS | Performed by: UROLOGY

## 2023-08-18 PROCEDURE — 51798 US URINE CAPACITY MEASURE: CPT

## 2023-08-18 PROCEDURE — 700111 HCHG RX REV CODE 636 W/ 250 OVERRIDE (IP): Mod: JZ | Performed by: UROLOGY

## 2023-08-18 RX ADMIN — IBUPROFEN 800 MG: 800 TABLET, FILM COATED ORAL at 11:32

## 2023-08-18 RX ADMIN — ACETAMINOPHEN 1000 MG: 500 TABLET, FILM COATED ORAL at 11:32

## 2023-08-18 RX ADMIN — CEFAZOLIN SODIUM 1 G: 1 INJECTION, SOLUTION INTRAVENOUS at 05:02

## 2023-08-18 ASSESSMENT — PATIENT HEALTH QUESTIONNAIRE - PHQ9
2. FEELING DOWN, DEPRESSED, IRRITABLE, OR HOPELESS: NOT AT ALL
SUM OF ALL RESPONSES TO PHQ9 QUESTIONS 1 AND 2: 0
1. LITTLE INTEREST OR PLEASURE IN DOING THINGS: NOT AT ALL

## 2023-08-18 ASSESSMENT — LIFESTYLE VARIABLES
TOTAL SCORE: 0
TOTAL SCORE: 0
DOES PATIENT WANT TO STOP DRINKING: NO
ALCOHOL_USE: NO
HAVE YOU EVER FELT YOU SHOULD CUT DOWN ON YOUR DRINKING: NO
AVERAGE NUMBER OF DAYS PER WEEK YOU HAVE A DRINK CONTAINING ALCOHOL: 0
EVER HAD A DRINK FIRST THING IN THE MORNING TO STEADY YOUR NERVES TO GET RID OF A HANGOVER: NO
ON A TYPICAL DAY WHEN YOU DRINK ALCOHOL HOW MANY DRINKS DO YOU HAVE: 2
EVER FELT BAD OR GUILTY ABOUT YOUR DRINKING: NO
HAVE PEOPLE ANNOYED YOU BY CRITICIZING YOUR DRINKING: NO
TOTAL SCORE: 0
HOW MANY TIMES IN THE PAST YEAR HAVE YOU HAD 5 OR MORE DRINKS IN A DAY: 0
CONSUMPTION TOTAL: NEGATIVE

## 2023-08-18 ASSESSMENT — COGNITIVE AND FUNCTIONAL STATUS - GENERAL
SUGGESTED CMS G CODE MODIFIER MOBILITY: CK
TOILETING: A LITTLE
STANDING UP FROM CHAIR USING ARMS: A LITTLE
TURNING FROM BACK TO SIDE WHILE IN FLAT BAD: A LITTLE
MOVING TO AND FROM BED TO CHAIR: A LITTLE
SUGGESTED CMS G CODE MODIFIER DAILY ACTIVITY: CJ
MOBILITY SCORE: 19
CLIMB 3 TO 5 STEPS WITH RAILING: A LITTLE
MOVING FROM LYING ON BACK TO SITTING ON SIDE OF FLAT BED: A LITTLE
HELP NEEDED FOR BATHING: A LITTLE
DAILY ACTIVITIY SCORE: 22

## 2023-08-18 ASSESSMENT — PAIN DESCRIPTION - PAIN TYPE: TYPE: ACUTE PAIN

## 2023-08-18 NOTE — PROGRESS NOTES
Discharge Lounge order placed and patient educated. Care plan and patient education completed. All belongings returned to patient. Patient transported via wheelchair to discharge lounge. Family instructed to pickup patient at [facility].

## 2023-08-18 NOTE — DISCHARGE SUMMARY
Discharge Summary    CHIEF COMPLAINT ON ADMISSION  No chief complaint on file.      Reason for Admission  Benign prostatic hyperplasia with LUTS     Admission Date  8/17/2023    CODE STATUS  Full Code    HPI & HOSPITAL COURSE  This is a 73 y.o. male here with POD1 from TURP with Dr. Mcdowell. Pt is tolerating a normal diet with out n/v. Pain is controlled. He is able to ambulate with out issues. He passed his TOV with 200cc in, 25cc out, then 180cc out after. +flatus. Sodium slightly decreased, pt aware. Other labs are WNL post op.   No notes on file    Therefore, he is discharged in good and stable condition to home with close outpatient follow-up.    The patient recovered much more quickly than anticipated on admission.    Discharge Date  8/18    FOLLOW UP ITEMS POST DISCHARGE  Follow up with Urology     DISCHARGE DIAGNOSES  Principal Problem:    Bladder outlet obstruction (POA: Yes)  Resolved Problems:    * No resolved hospital problems. *      FOLLOW UP  Future Appointments   Date Time Provider Department Center   9/27/2023  9:00 AM NATACHA Hawthorne [facility]     Jacques Mcdowell M.D.  [address]  [facility] [address]  [phone]    Follow up in 4 week(s)        MEDICATIONS ON DISCHARGE     Medication List        CONTINUE taking these medications        Instructions   alfuzosin 10 MG SR tablet  Commonly known as: Uroxatral   at bedtime.     Allergy (Cetirizine) 10 MG Tabs  Generic drug: cetirizine   Take 10 mg by mouth every day.  Dose: 10 mg     fluticasone 50 MCG/ACT nasal spray  Commonly known as: Flonase   Administer 1 Spray into affected nostril(S) every morning.  Dose: 1 Spray     hydrocortisone 1 % Crea   Apply 1 Application topically 2 times a day. 2 weeks on 2 weeks off  Dose: 1 Application     ketoconazole 2 % Crea  Commonly known as: Nizoral   Apply  topically every 12 hours as needed.     losartan-hydrochlorothiazide 50-12.5 MG per tablet  Commonly known as: Hyzaar   TAKE ONE  TABLET BY MOUTH ONE TIME DAILY  Dose: 1 Tablet     meclizine 12.5 MG Tabs  Commonly known as: Antivert   Take 1 Tablet by mouth 3 times a day as needed for Dizziness.  Dose: 12.5 mg     metoprolol SR 25 MG Tb24  Commonly known as: Toprol XL   Take 1 Tablet by mouth every morning. TAKE ONE TABLET BY MOUTH ONE TIME DAILY  Dose: 25 mg     NON SPECIFIED   Antifungal in nail polish form     rosuvastatin 20 MG Tabs  Commonly known as: Crestor   Take 1 Tablet by mouth every evening.  Dose: 20 mg            ASK your doctor about these medications        Instructions   CALCIUM 1200 PO   Take 1,500 mg by mouth.  Dose: 1,500 mg     MULTIVITAMIN ADULT PO   Take  by mouth.              Allergies  Allergies   Allergen Reactions    Seasonal        DIET  Orders Placed This Encounter   Procedures    Diet Order Diet: Regular     Standing Status:   Standing     Number of Occurrences:   1     Order Specific Question:   Diet:     Answer:   Regular [1]       ACTIVITY  No strenuous     CONSULTATIONS  None    PROCEDURES  TURP    LABORATORY  Lab Results   Component Value Date    SODIUM 133 (L) 08/18/2023    POTASSIUM 4.7 08/18/2023    CHLORIDE 100 08/18/2023    CO2 24 08/18/2023    GLUCOSE 156 (H) 08/18/2023    BUN 17 08/18/2023    CREATININE 0.99 08/18/2023    CREATININE 0.92 03/01/2010    GLOMRATE >59 03/01/2010        Lab Results   Component Value Date    WBC 11.5 (H) 08/18/2023    WBC 9.4 03/01/2010    HEMOGLOBIN 13.1 (L) 08/18/2023    HEMATOCRIT 41.1 (L) 08/18/2023    PLATELETCT 124 (L) 08/18/2023        Total time of the discharge process exceeds 25 minutes.

## 2023-08-18 NOTE — PROGRESS NOTES
Assumed care of patient at 1845. Bedside report received. Assessment complete.  AA&Ox4. Denies CP/SOB.  Reporting 0/10 pain. No intervention required at this time.   Educated patient regarding pharmacologic and non pharmacologic modalities for pain management.  Skin per flowsheet.  Tolerating regular diet. Denies N/V at this time.   + void via CBI. - BM. Last BM PTA  Pt ambulates with x1 assist.  All needs met at this time. Call light within reach. Bed alarm on and audible. Pt calls appropriately. Bed low and locked, non skid socks in place. Hourly rounding in place.

## 2023-08-18 NOTE — CARE PLAN
The patient is Watcher - Medium risk of patient condition declining or worsening    Shift Goals  Clinical Goals: CBI I&Os, rest, OOB activity, wean O2  Patient Goals: rest, OOB activity    Progress made toward(s) clinical / shift goals:  CBI I&Os documented per flowsheets. Patient was able to rest intermittently overnight. Able to wean O2 overnight.     Patient is not progressing towards the following goals: Patient declined ambulation at this time. Education provided.

## 2023-08-18 NOTE — PROGRESS NOTES
Admitted to room T 427 via transport in [facility] from PACU at 1830.  Received report from VIOLETA Serna.  Patient reports pain at 0 on a scale of 0-10. Educated patient regarding pharmacologic and non pharmacologic modalities for pain management. Oriented to room call light and smoking policy.  Reviewed plan of care (equipment, incentive spirometer, sequential compression devices, medications, activity, diet, fall precautions, skin care, and pain) with patient and family.     AA&Ox4. Denies CP/SOB.  See 2 RN skin note  Tolerating regular diet. Denies N/V.  + void via 3-way phipps. - BM.   Pt has not ambulated post-op.   SCD's refused; using patient supplied stefanie socks.  Pt on 2L O2.      Plan of care discussed, all questions answered. Educated on the importance of calling before getting OOB and pt verbalizes understanding. Call light is within reach, treaded slipper socks on, bed in lowest/ locked position, hourly rounding in place, all needs met at this time.

## 2023-08-18 NOTE — CARE PLAN
Problem: Knowledge Deficit - Standard  Goal: Patient and family/care givers will demonstrate understanding of plan of care, disease process/condition, diagnostic tests and medications  8/18/2023 1135 by Jeovany Ramos R.N.  Outcome: Met  8/18/2023 1013 by Jeovany Ramos R.EVETTE.  Outcome: Progressing     Problem: Fall Risk  Goal: Patient will remain free from falls  8/18/2023 1135 by Jeovany Ramos R.N.  Outcome: Met  8/18/2023 1013 by Jeovany Ramos R.N.  Outcome: Progressing     Problem: Pain - Standard  Goal: Alleviation of pain or a reduction in pain to the patient’s comfort goal  8/18/2023 1135 by BRYAN Real.N.  Outcome: Met  8/18/2023 1013 by Jeovany Ramos R.N.  Outcome: Progressin

## 2023-08-18 NOTE — CARE PLAN
Problem: Knowledge Deficit - Standard  Goal: Patient and family/care givers will demonstrate understanding of plan of care, disease process/condition, diagnostic tests and medications  Outcome: Progressing     Problem: Fall Risk  Goal: Patient will remain free from falls  Outcome: Progressing     Problem: Pain - Standard  Goal: Alleviation of pain or a reduction in pain to the patient’s comfort goal  Outcome: Progressing   The patient is Stable - Low risk of patient condition declining or worsening    Shift Goals  Clinical Goals: manage CBI, pt education for phipps  Patient Goals: rest, go home    Progress made toward(s) clinical / shift goals:  CBI Dc'd by urology. Patient passed voiding trial. No phipps at discharge.    Patient is not progressing towards the following goals:

## 2023-08-18 NOTE — PROGRESS NOTES
"Bedside report received.  Assessment complete.  A&O x 4. Patient calls appropriately.  Patient ambulates with standby assist. Bed alarm off.   Patient has 0/10 pain. Pain managed with prescribed medications.  Denies N&V. Tolerating regular diet.  Surgical CBI patent.  + void trial, + flatus, last BM PTA.  Patient denies SOB.  SCD's on.  Patient is pleasant and cooperative with the care plan.  Review plan with of care with patient. Call light and personal belongings within reach. Hourly rounding in place. All needs met at this time.   /58   Pulse (!) 51 Comment: Rn notified   Temp 36.5 °C (97.7 °F) (Temporal)   Resp 16   Ht 1.803 m (5' 11\")   Wt 119 kg (263 lb 3.7 oz)   SpO2 90%   BMI 36.71 kg/m²     "

## 2023-08-18 NOTE — OR NURSING
PACU note- Respirations easy. Three way irrigant in place to maintain clear urine. Urine very light pink in color.  Pain meds with good effect.  No nausea.  No problems noted related to OR positioning

## 2023-08-18 NOTE — PROGRESS NOTES
4 Eyes Skin Assessment Completed by Charisse RN and Campos RN.    Head WDL  Ears WDL  Nose WDL  Mouth WDL  Neck WDL  Breast/Chest WDL  Shoulder Blades WDL  Spine WDL  (R) Arm/Elbow/Hand  PIV  (L) Arm/Elbow/Hand WDL  Abdomen WDL  Groin 3-way phipps in place  Scrotum/Coccyx/Buttocks Redness and Blanching  (R) Leg WDL  (L) Leg WDL  (R) Heel/Foot/Toe Edema  (L) Heel/Foot/Toe Edema          Devices In Places Blood Pressure Cuff, Pulse Ox, Phipps, and Nasal Cannula stefanie socks      Interventions In Place NC W/Ear Foams and Pillows    Possible Skin Injury No    Pictures Uploaded Into Epic N/A  Wound Consult Placed N/A  RN Wound Prevention Protocol Ordered No

## 2023-08-21 LAB
APPEARANCE STONE: NORMAL
COMPN STONE: NORMAL
SPECIMEN WT: 1697 MG

## 2023-09-25 ENCOUNTER — APPOINTMENT (RX ONLY)
Dept: URBAN - METROPOLITAN AREA CLINIC 4 | Facility: CLINIC | Age: 73
Setting detail: DERMATOLOGY
End: 2023-09-25

## 2023-09-25 DIAGNOSIS — L30.9 DERMATITIS, UNSPECIFIED: ICD-10-CM

## 2023-09-25 DIAGNOSIS — F42.4 EXCORIATION (SKIN-PICKING) DISORDER: ICD-10-CM

## 2023-09-25 PROCEDURE — 99213 OFFICE O/P EST LOW 20 MIN: CPT

## 2023-09-25 PROCEDURE — ? PRESCRIPTION

## 2023-09-25 PROCEDURE — ? ADDITIONAL NOTES

## 2023-09-25 PROCEDURE — ? COUNSELING

## 2023-09-25 RX ORDER — CLINDAMYCIN PHOSPHATE AND BENZOYL PEROXIDE 1 %-5 %
KIT TOPICAL
Qty: 50 | Refills: 2 | Status: ERX | COMMUNITY
Start: 2023-09-25

## 2023-09-25 RX ORDER — TRIAMCINOLONE ACETONIDE 5 MG/G
CREAM TOPICAL
Qty: 45 | Refills: 0 | Status: ERX | COMMUNITY
Start: 2023-09-25

## 2023-09-25 RX ADMIN — TRIAMCINOLONE ACETONIDE 1: 5 CREAM TOPICAL at 00:00

## 2023-09-25 RX ADMIN — CLINDAMYCIN PHOSPHATE AND BENZOYL PEROXIDE 1: KIT at 00:00

## 2023-09-25 ASSESSMENT — LOCATION ZONE DERM: LOCATION ZONE: NECK

## 2023-09-25 ASSESSMENT — LOCATION SIMPLE DESCRIPTION DERM: LOCATION SIMPLE: POSTERIOR NECK

## 2023-09-25 ASSESSMENT — LOCATION DETAILED DESCRIPTION DERM: LOCATION DETAILED: LEFT MEDIAL TRAPEZIAL NECK

## 2023-09-25 NOTE — PROCEDURE: ADDITIONAL NOTES
Detail Level: Simple
Render Risk Assessment In Note?: no
Additional Notes: Bathing Instructions: Make sure the water is comfortable, not hot. Soap is to be used only in odor bearing areas, groin and underarms, and not to be applied to the rash. After getting out of the shower, pat the skin dry, and apply the moisturizing lotion. Reapply the moisturizing cream before bed.

## 2023-09-27 ENCOUNTER — OFFICE VISIT (OUTPATIENT)
Dept: MEDICAL GROUP | Facility: IMAGING CENTER | Age: 73
End: 2023-09-27
Payer: MEDICARE

## 2023-09-27 VITALS
DIASTOLIC BLOOD PRESSURE: 72 MMHG | WEIGHT: 268.2 LBS | OXYGEN SATURATION: 92 % | SYSTOLIC BLOOD PRESSURE: 128 MMHG | BODY MASS INDEX: 37.55 KG/M2 | HEIGHT: 71 IN | HEART RATE: 57 BPM | RESPIRATION RATE: 16 BRPM | TEMPERATURE: 97.1 F

## 2023-09-27 DIAGNOSIS — G60.9 IDIOPATHIC NEUROPATHY: ICD-10-CM

## 2023-09-27 DIAGNOSIS — E66.9 OBESITY (BMI 35.0-39.9 WITHOUT COMORBIDITY): ICD-10-CM

## 2023-09-27 DIAGNOSIS — R73.03 PREDIABETES: ICD-10-CM

## 2023-09-27 DIAGNOSIS — Z23 NEED FOR VACCINATION: ICD-10-CM

## 2023-09-27 DIAGNOSIS — G47.9 SLEEP DISTURBANCE: ICD-10-CM

## 2023-09-27 PROCEDURE — G0008 ADMIN INFLUENZA VIRUS VAC: HCPCS

## 2023-09-27 PROCEDURE — 3074F SYST BP LT 130 MM HG: CPT

## 2023-09-27 PROCEDURE — 90662 IIV NO PRSV INCREASED AG IM: CPT

## 2023-09-27 PROCEDURE — 3078F DIAST BP <80 MM HG: CPT

## 2023-09-27 PROCEDURE — 99213 OFFICE O/P EST LOW 20 MIN: CPT | Mod: 25

## 2023-09-27 ASSESSMENT — FIBROSIS 4 INDEX: FIB4 SCORE: 3.19

## 2023-09-27 NOTE — PROGRESS NOTES
Subjective:     CC:   Chief Complaint   Patient presents with    Foot Problem     Pt wears compress socks   Pt reported having neuropathy     Other     Sleep issues  since he had prostate problem     Obesity       HPI:   Laureen presents today to discuss:    Idiopathic Peripheral neuropathy  No pain-numbness  Chronic issue that is progressively worsening.  He reports numbness on bilateral great toes. He denies pain.  He is doing daily Epsom salt soaks.  He started nervive supplementation 1 month ago without relief.   He wears compression socks are helpful.  Gabapentin caused adverse side effects.  He has seen pain management and neurology in the past and was treated with injections which provided relief for 6 months. He is scheduled to see neurology in November.  He has seen vascular medicine status post JESUS ultrasound with unremarkable findings.    He is established with podiatrist Marcelino whom he follows up with regularly.    Obesity BMI 30-39.9  Prediabetes  Chronic condition. Patient reports weight gain.  He admits his sedentary lifestyle is a contributing factor. He used to take daily walks; however, he has not been able to do so r/t to his neuropathy.   He admits that he needs to make dietary changes.  He is currently not watching what he eats.    Sleep disturbance  Chronic condition that is worsening. He is having difficulty staying asleep.  He roughly gets 4 hours of sleep at night.    He admits of being diagnosed with JUAN many years ago; however, he did not follow-up for mask fitting.  He did see ENT s/p rhinoplasty in the past which helped with snoring and airway obstruction.   He has taken OTC melatonin without relief.   He also attributes long standing issue urinary frequency r/t to BPH. He had to train himself to void every few hours.   He did have transurethral resection on 8/17.    Past Medical History:   Diagnosis Date    Anesthesia 2017    Arthritis unk    Cancer (HCC) 2020    skin cancer sqammish     Cataract     lens implants    High cholesterol     Hypercholesterolemia     Hypertension     Neuropathy     PONV (postoperative nausea and vomiting)     Rhinoplasti for deviated septon    Reactive depression 2023    Sleep apnea     tested    Stroke (HCC)     stroke or contusion    Tinea inguinalis     Tobacco use 10/30/2018    Urinary bladder disorder 1995    bph     Family History   Problem Relation Age of Onset    Heart Disease Mother          at 91yo/heart failure    Heart Attack Mother     Breast Cancer Mother     Heart Disease Father          at 57yo/massive heart attack    Heart Attack Father     Hypertension Father          coronary thrombosis    Cancer Sister          of leukemia    Breast Cancer Sister     Stroke Sister         living lost eye site left eye    No Known Problems Maternal Grandmother     No Known Problems Maternal Grandfather     No Known Problems Paternal Grandmother     Dementia Paternal Grandfather     Hyperlipidemia Neg Hx     Diabetes Neg Hx      Past Surgical History:   Procedure Laterality Date    VA TRANSURETHRAL ELEC-SURG PROSTATECTOM N/A 2023    Procedure: BIPOLAR TRANSURETHRAL RESECTION OF PROSTATE;  Surgeon: Jacques Mcdowell M.D.;  Location: SURGERY [facility];  Service: Urology    VA CYSTOURETHROSCOPY N/A 2023    Procedure: CYSTOSCOPY;  Surgeon: Jacques Mcdowell M.D.;  Location: SURGERY [facility];  Service: Urology    VA CYSTO/URETERO/PYELOSCOPY W/LITHOTRIPSY N/A 2023    Procedure: CYSTOLITHLOPAXY;  Surgeon: Jacques Mcdowell M.D.;  Location: SURGERY [facility];  Service: Urology    CATARACT PHACO WITH IOL      OTHER      cyst removal back of the neck    OTHER ABDOMINAL SURGERY      gallbladder    RHINOPLASTY       Social History     Tobacco Use    Smoking status: Former     Current packs/day: 0.00     Average packs/day: 1 pack/day for 15.0 years (15.0 ttl pk-yrs)     Types: Cigarettes, Cigars     Start date:  1970     Quit date: 1985     Years since quittin.7    Smokeless tobacco: Never    Tobacco comments:     occasional cigar started in / quit completely    Vaping Use    Vaping Use: Never used   Substance Use Topics    Alcohol use: Yes     Comment: 2 beers / 2 shots in last month    Drug use: Not Currently     Types: Inhaled     Comment: rare occasions,occasionally      Social History     Social History Narrative    Not on file     Current Outpatient Medications Ordered in Epic   Medication Sig Dispense Refill    metoprolol SR (TOPROL XL) 25 MG TABLET SR 24 HR Take 1 Tablet by mouth every morning. TAKE ONE TABLET BY MOUTH ONE TIME DAILY 100 Tablet 3    losartan-hydrochlorothiazide (HYZAAR) 50-12.5 MG per tablet TAKE ONE TABLET BY MOUTH ONE TIME DAILY 90 Tablet 3    cetirizine (ALLERGY, CETIRIZINE,) 10 MG Tab Take 10 mg by mouth every day.      ketoconazole (NIZORAL) 2 % Cream Apply  topically every 12 hours as needed.      rosuvastatin (CRESTOR) 20 MG Tab Take 1 Tablet by mouth every evening. 100 Tablet 0    meclizine (ANTIVERT) 12.5 MG Tab Take 1 Tablet by mouth 3 times a day as needed for Dizziness. 30 Tablet 0    alfuzosin (UROXATRAL) 10 MG SR tablet at bedtime.      fluticasone (FLONASE) 50 MCG/ACT nasal spray Administer 1 Spray into affected nostril(S) every morning.      Multiple Vitamin (MULTIVITAMIN ADULT PO) Take  by mouth.      Calcium Carbonate-Vit D-Min (CALCIUM 1200 PO) Take 1,500 mg by mouth.      hydrocortisone 1 % Cream Apply 1 Application topically 2 times a day. 2 weeks on 2 weeks off      NON SPECIFIED Antifungal in nail polish form       No current [facility]-ordered facility-administered medications on file.     Seasonal    ROS: see hpi  Gen: no fevers/chills  Pulm: no sob, no cough  CV: no chest pain, no palpitations, no edema  GI: no nausea/vomiting, no diarrhea  Skin: no rash    Objective:   Exam:  /72 (BP Location: Left arm, Patient Position: Sitting, BP Cuff Size: Adult)   " Pulse (!) 57   Temp 36.2 °C (97.1 °F) (Temporal)   Resp 16   Ht 1.803 m (5' 11\")   Wt 122 kg (268 lb 3.2 oz)   SpO2 92%   BMI 37.41 kg/m²    Body mass index is 37.41 kg/m².    Gen: Alert and oriented, No apparent distress.  HEENT: Head atraumatic, normocephalic. Pupils equal and round.  Neck: Neck is supple without lymphadenopathy.   Lungs: Normal effort, CTA bilaterally, no wheezes, rhonchi, or rales  CV: Regular rate and rhythm. No murmurs, rubs, or gallops.  ABD: +BS. Non-tender, non-distended. No rebound, rigidity, or guarding.  Ext: No clubbing, cyanosis, edema.    Assessment & Plan:     73 y.o. male with the following -     1. Idiopathic neuropathy  Chronic, uncontrolled. Recommend to continue with conservative therapy.  Follow up with neurology and podiatry as scheduled.   Discussed foot care:  -wear shoes that fit properly, avoid tight fitting shoes, avoid thongs, sandals, open-toed shoes  -never go barefoot  -check feet daily for blisters, cuts, wounds, skin changes/discoloration, and temperature  -use creams to moisturize feet daily  -wash feet daily with mild soap and water, avoid hot water, do not soak feet  -continue with Nervive supplementation     2. Sleep disturbance  Chronic, uncontrolled. Likely JUAN. Will refer to pulmonary for sleep study and CPAP fitting.  Recommend Sleep hygiene:  1) Go to bed and get up at the same time every day  2) No TV, computer, cell phone, or iPad for at least an hour before bed  3) Try to eat a meal at least 2 hours before going to bed.  4) Avoid alcohol within 2 hours of bed.  5) Avoid caffeine after noon.  6) Sleep in a cool room.  7) Try white noise or background noise, such as a fan, air conditioning unit, or white noise machine.  8) Use black-out curtains or blankets over the windows to block light.  9) Bedroom should be used for sleep and sex only - NO TV WATCHING.  10) Consider starting a bedtime ritual like washing your face or using lavender essential " oil to relax. This is a time to pamper yourself before bed.  11) Keep a journal or notebook by your bed to write down nagging thoughts that may keep you awake (do not do this if making a to-do list is going to get you more amped up).    Herbal/over the counter sleep supplements include:  Melatonin, 10 mg 30 minutes before bed  Magnesium 250 mg nightly     - Referral to Pulmonary and Sleep Medicine    3. Obesity (BMI 35.0-39.9 without comorbidity)  4. Prediabetes  Discussed lifestyle and dietary changes such as low-carb diet, high in vegetables and fresh fruits.  Encouraged to increase water intake.  Regular physical exercise at least 30 minutes/day 5 days a week. Weight reduction if applicable (aim for 5% to 10% body weight increments).   Discussed referral to nutritionist, he declined at this time.  Recommend starting Metformin for glucose control and weight loss. He declined. He will work on lifestyle modification for weight loss.     5. Need for vaccination    - Influenza Vaccine, High Dose (65+ Only)    Medical Decision Making/Course:  In the course of preparing for this visit with review of the pertinent past medical history, recent and past clinic visits, current medications, and performing chart, immunization, medical history and medication reconciliation, and in the further course of obtaining the current history pertinent to the clinic visit today, performing an exam and evaluation, ordering and independently evaluating labs, tests, and/or procedures, prescribing any recommended new medications as noted above, providing any pertinent counseling and education and recommending further coordination of care. This was discussed with patient in a shared-decision making conversation, and they understand and agreed with plan of care.     Return in about 3 months (around 12/27/2023), or if symptoms worsen or fail to improve.    ANISA Hawthorne.   [facility]    Please note that this dictation  was created using voice recognition software. I have made every reasonable attempt to correct obvious errors, but I expect that there are errors of grammar and possibly content that I did not discover before finalizing the note.

## 2023-10-06 ENCOUNTER — HOSPITAL ENCOUNTER (OUTPATIENT)
Dept: LAB | Facility: MEDICAL CENTER | Age: 73
End: 2023-10-06
Attending: STUDENT IN AN ORGANIZED HEALTH CARE EDUCATION/TRAINING PROGRAM
Payer: MEDICARE

## 2023-10-06 LAB — PSA SERPL-MCNC: 0.86 NG/ML (ref 0–4)

## 2023-10-06 PROCEDURE — 36415 COLL VENOUS BLD VENIPUNCTURE: CPT | Mod: GA

## 2023-10-06 PROCEDURE — 84153 ASSAY OF PSA TOTAL: CPT | Mod: GA

## 2023-10-21 DIAGNOSIS — E78.00 HYPERCHOLESTEREMIA: ICD-10-CM

## 2023-10-23 ENCOUNTER — OFFICE VISIT (OUTPATIENT)
Dept: SLEEP MEDICINE | Facility: MEDICAL CENTER | Age: 73
End: 2023-10-23
Payer: MEDICARE

## 2023-10-23 VITALS
DIASTOLIC BLOOD PRESSURE: 78 MMHG | OXYGEN SATURATION: 90 % | BODY MASS INDEX: 38.02 KG/M2 | SYSTOLIC BLOOD PRESSURE: 116 MMHG | HEART RATE: 62 BPM | RESPIRATION RATE: 16 BRPM | HEIGHT: 71 IN | WEIGHT: 271.6 LBS

## 2023-10-23 DIAGNOSIS — G47.33 OSA (OBSTRUCTIVE SLEEP APNEA): ICD-10-CM

## 2023-10-23 PROCEDURE — 3078F DIAST BP <80 MM HG: CPT | Performed by: NURSE PRACTITIONER

## 2023-10-23 PROCEDURE — 3074F SYST BP LT 130 MM HG: CPT | Performed by: NURSE PRACTITIONER

## 2023-10-23 PROCEDURE — 99214 OFFICE O/P EST MOD 30 MIN: CPT | Performed by: NURSE PRACTITIONER

## 2023-10-23 PROCEDURE — 99212 OFFICE O/P EST SF 10 MIN: CPT

## 2023-10-23 RX ORDER — AMOXICILLIN AND CLAVULANATE POTASSIUM 500; 125 MG/1; MG/1
TABLET, FILM COATED ORAL
COMMUNITY
Start: 2023-08-09 | End: 2023-12-27

## 2023-10-23 RX ORDER — CLINDAMYCIN AND BENZOYL PEROXIDE 10; 50 MG/G; MG/G
GEL TOPICAL
COMMUNITY
Start: 2023-09-25 | End: 2024-01-23

## 2023-10-23 RX ORDER — TRIAMCINOLONE ACETONIDE 5 MG/G
CREAM TOPICAL
COMMUNITY
Start: 2023-09-25 | End: 2023-12-27

## 2023-10-23 RX ORDER — ROSUVASTATIN CALCIUM 20 MG/1
20 TABLET, COATED ORAL EVERY EVENING
Qty: 100 TABLET | Refills: 0 | Status: SHIPPED | OUTPATIENT
Start: 2023-10-23 | End: 2024-01-20 | Stop reason: SDUPTHER

## 2023-10-23 ASSESSMENT — FIBROSIS 4 INDEX: FIB4 SCORE: 3.19

## 2023-10-23 NOTE — PROGRESS NOTES
"No chief complaint on file.      HPI:  Laureen Munoz is a 73 y.o. year old male here today for follow-up on today.  Last seen 7/13/2022 by me.  Patient had complaints of snoring, frequent nocturnal awakenings, and unrefreshing sleep.      Previous note, \"he states since the pandemic he has gained significant amount of weight as well as retired due to the pandemic.  Due to no longer working regularly he has not as active.  In addition he had to move in with his ex-wife due to housing issues and states his diet has changed significantly.  Reports eating more desserts which she attributes to his weight gain.\"     Patient states bedtime habits include going to sleep around 11 waking up between 3 and 4 currently.  He will of an get up to use the bathroom and go back to sleep around 4 and wake up around 7 AM.  Denies any excessive daytime sleepiness but does have some overall fatigue during the day and can nap, but denies falling asleep unintentionally.  He denies any nocturnal events such as waking up gasping or snoring, but does have occasional dry mouth.  He also complains of morning headaches.     Patient's comorbid conditions include obesity and difficulty controlling hypertension.        Home sleep study (6/9/2022):  Moderate obstructive sleep apnea with  4% Respiratory Event Index (TATA) of 186 per hour. Oxygenation O2 Sat. annalise was 73% and mean O2 sat was 84% and baseline O2 at 87 %. O2 sat was below 88% for 5hr 6 min of the flow evaluation time. Oxygen Desaturation (>=4%) Index was elevated at 19.7/hr. AVG HR was 59 BPM..     Reviewed testing and diagnosis at last visit, but patient wanted to think over his options.  Fortunately, his sleep study is greater than 1 year old and will be need to be repeated.      ROS: As per HPI and otherwise negative if not stated.    Past Medical History:   Diagnosis Date    Anesthesia 2017    Arthritis unk    Cancer (HCC) 2020    skin cancer sqammish    Cataract 2022    lens " implants    High cholesterol     Hypercholesterolemia     Hypertension     Neuropathy     PONV (postoperative nausea and vomiting)     Rhinoplasti for deviated septon    Reactive depression 2023    Sleep apnea     tested    Stroke (HCC)     stroke or contusion    Tinea inguinalis     Tobacco use 10/30/2018    Urinary bladder disorder     bph       Past Surgical History:   Procedure Laterality Date    VT TRANSURETHRAL ELEC-SURG PROSTATECTOM N/A 2023    Procedure: BIPOLAR TRANSURETHRAL RESECTION OF PROSTATE;  Surgeon: Jacques Mcdowell M.D.;  Location: SURGERY [facility];  Service: Urology    VT CYSTOURETHROSCOPY N/A 2023    Procedure: CYSTOSCOPY;  Surgeon: Jacques Mcdowell M.D.;  Location: SURGERY [facility];  Service: Urology    VT CYSTO/URETERO/PYELOSCOPY W/LITHOTRIPSY N/A 2023    Procedure: CYSTOLITHLOPAXY;  Surgeon: Jacques Mcdowell M.D.;  Location: SURGERY [facility];  Service: Urology    CATARACT PHACO WITH IOL      1992    cyst removal back of the neck    OTHER ABDOMINAL SURGERY      gallbladder    RHINOPLASTY         Family History   Problem Relation Age of Onset    Heart Disease Mother          at 89yo/heart failure    Heart Attack Mother     Breast Cancer Mother     Heart Disease Father          at 57yo/massive heart attack    Heart Attack Father     Hypertension Father          coronary thrombosis    Cancer Sister          of leukemia    Breast Cancer Sister     Stroke Sister         living lost eye site left eye    No Known Problems Maternal Grandmother     No Known Problems Maternal Grandfather     No Known Problems Paternal Grandmother     Dementia Paternal Grandfather     Hyperlipidemia Neg Hx     Diabetes Neg Hx        Allergies as of 10/23/2023 - Reviewed 2023   Allergen Reaction Noted    Gabapentin Unspecified 2023    Seasonal  2020        Vitals:  There were no vitals taken for this visit.    Current  medications as of today   Current Outpatient Medications   Medication Sig Dispense Refill    rosuvastatin (CRESTOR) 20 MG Tab Take 1 Tablet by mouth every evening. 100 Tablet 0    metoprolol SR (TOPROL XL) 25 MG TABLET SR 24 HR Take 1 Tablet by mouth every morning. TAKE ONE TABLET BY MOUTH ONE TIME DAILY 100 Tablet 3    losartan-hydrochlorothiazide (HYZAAR) 50-12.5 MG per tablet TAKE ONE TABLET BY MOUTH ONE TIME DAILY 90 Tablet 3    cetirizine (ALLERGY, CETIRIZINE,) 10 MG Tab Take 10 mg by mouth every day.      Multiple Vitamin (MULTIVITAMIN ADULT PO) Take  by mouth.      Calcium Carbonate-Vit D-Min (CALCIUM 1200 PO) Take 1,500 mg by mouth.      hydrocortisone 1 % Cream Apply 1 Application topically 2 times a day. 2 weeks on 2 weeks off      ketoconazole (NIZORAL) 2 % Cream Apply  topically every 12 hours as needed.      NON SPECIFIED Antifungal in nail polish form      meclizine (ANTIVERT) 12.5 MG Tab Take 1 Tablet by mouth 3 times a day as needed for Dizziness. 30 Tablet 0    alfuzosin (UROXATRAL) 10 MG SR tablet at bedtime.      fluticasone (FLONASE) 50 MCG/ACT nasal spray Administer 1 Spray into affected nostril(S) every morning.       No current facility-administered medications for this visit.         Physical Exam:   Gen:           Alert and oriented, No apparent distress. Mood and affect appropriate, normal interaction with examiner.  Eyes:          PERRL, EOM intact, sclere white, conjunctive moist.  Ears:          Not examined.   Hearing:     Grossly intact.  Nose:          Normal, no lesions or deformities.  Dentition:    Good dentition.  Oropharynx:   Tongue normal, posterior pharynx without erythema or exudate.  Neck:        Supple, trachea midline, no masses.  Respiratory Effort: No intercostal retractions or use of accessory muscles.   Lung Auscultation:      Clear to auscultation bilaterally; no rales, rhonchi or wheezing.  CV:            Regular rate and rhythm. No murmurs, rubs or gallops.  Abd:            Not examined.   Lymphadenopathy: Not examined.  Gait and Station: Normal.  Digits and Nails: No clubbing, cyanosis, petechiae, or nodes.   Cranial Nerves: II-XII grossly intact.  Skin:        No rashes, lesions or ulcers noted.               Ext:           No cyanosis or edema.      Assessment:  1. JUAN (obstructive sleep apnea)            Plan:   I reviewed with the patient the pathophysiology of obstructive sleep apnea, as well as potential cardiac and neurologic risks associated with untreated sleep apnea including CAD, HTN, pulmonary arterial hypertension, cardiac arrhythmias, heart attack or stroke.  JUAN patient's have increased risk of motor vehicle accidents, DM type II, chronic kidney disease and nonalcoholic liver disease.  He is cautioned against driving while sleepy for his safety and safety of others on the road. We reviewed treatment modalities for sleep apnea including CPAP/BiPAP therapy, ENT referral, dental appliance.      Patient previously had evidence of moderate JUAN with significant nocturnal hypoxia.  Testing is greater than 1-year-old.  Will repeat in lab sleep study.  Patient advised to follow-up within 30 days of test.    Please note that this dictation was created using voice recognition software. I have made every reasonable attempt to correct obvious errors, but it is possible there are errors of grammar and possibly content that I did not discover before finalizing the note.

## 2023-11-06 DIAGNOSIS — H81.10 BENIGN PAROXYSMAL POSITIONAL VERTIGO, UNSPECIFIED LATERALITY: ICD-10-CM

## 2023-11-06 RX ORDER — MECLIZINE HCL 12.5 MG/1
12.5 TABLET ORAL 3 TIMES DAILY PRN
Qty: 30 TABLET | Refills: 0 | Status: SHIPPED | OUTPATIENT
Start: 2023-11-06 | End: 2024-02-01 | Stop reason: SDUPTHER

## 2023-11-11 DIAGNOSIS — I10 ESSENTIAL HYPERTENSION: ICD-10-CM

## 2023-11-13 RX ORDER — METOPROLOL SUCCINATE 25 MG/1
25 TABLET, EXTENDED RELEASE ORAL EVERY MORNING
Qty: 100 TABLET | Refills: 3 | Status: SHIPPED | OUTPATIENT
Start: 2023-11-13

## 2023-12-27 ENCOUNTER — OFFICE VISIT (OUTPATIENT)
Dept: MEDICAL GROUP | Facility: IMAGING CENTER | Age: 73
End: 2023-12-27
Payer: MEDICARE

## 2023-12-27 VITALS
WEIGHT: 275.4 LBS | BODY MASS INDEX: 38.56 KG/M2 | OXYGEN SATURATION: 92 % | TEMPERATURE: 97.5 F | HEART RATE: 52 BPM | HEIGHT: 71 IN | DIASTOLIC BLOOD PRESSURE: 70 MMHG | RESPIRATION RATE: 14 BRPM | SYSTOLIC BLOOD PRESSURE: 122 MMHG

## 2023-12-27 DIAGNOSIS — E29.1 HYPOGONADISM IN MALE: ICD-10-CM

## 2023-12-27 DIAGNOSIS — D35.2 PITUITARY MICROADENOMA (HCC): ICD-10-CM

## 2023-12-27 DIAGNOSIS — R73.03 PREDIABETES: ICD-10-CM

## 2023-12-27 DIAGNOSIS — R20.2 PARESTHESIA OF BOTH FEET: ICD-10-CM

## 2023-12-27 DIAGNOSIS — G60.9 IDIOPATHIC NEUROPATHY: ICD-10-CM

## 2023-12-27 DIAGNOSIS — R44.9 SENSORY DEFICIT, BILATERAL: ICD-10-CM

## 2023-12-27 LAB
HBA1C MFR BLD: 6.1 % (ref ?–5.8)
POCT INT CON NEG: NEGATIVE
POCT INT CON POS: POSITIVE

## 2023-12-27 PROCEDURE — 99213 OFFICE O/P EST LOW 20 MIN: CPT

## 2023-12-27 PROCEDURE — 3078F DIAST BP <80 MM HG: CPT

## 2023-12-27 PROCEDURE — 3074F SYST BP LT 130 MM HG: CPT

## 2023-12-27 PROCEDURE — 83036 HEMOGLOBIN GLYCOSYLATED A1C: CPT

## 2023-12-27 ASSESSMENT — FIBROSIS 4 INDEX: FIB4 SCORE: 3.19

## 2023-12-27 NOTE — PROGRESS NOTES
Subjective:     CC:   Chief Complaint   Patient presents with    Follow-Up     Sleep 2 to 3 hours pt report getting better  Weight   Pt report getting  sleep study tomorrow     Foot Problem     Neuropathy        HPI:   Laureen presents today to discuss:    Pituitary microadenoma  Mild cerebral atrophy  Chronic condition. MRI revealed pituitary microadenoma and mild cerebral atrophy.  Patient was seen by neurologist Kenrick Skinner in 2021. He admits that he has not followed up since.   Patient was seen by Dr. Castro, endocrinologist a few years ago for evaluation and management. However, he has not followed up in a few years.  He is requesting referrals to re-establish and follow up.  Denies neurological deficits.     Idiopathic Peripheral neuropathy  No pain-numbness  Chronic issue that is progressively worsening.  He reports numbness on bilateral great toes. He is doing daily Epsom salt soaks.  He started nervive supplementation 1 month ago without relief.   He wears compression socks which is helpful.  Gabapentin caused adverse side effects.  He was seeing pain management and podiatry in the past and was treated with injections which provided temporary relief.   He has seen vascular medicine status post JESUS ultrasound with unremarkable findings.      JUAN  Established condition.  Patient is scheduled for sleep study tomorrow.    Prediabetes  Chronic and ongoing condition.  Patient admits that he has been unsuccessful with weight loss. He is making attempts; however, the holiday season made it difficult to eat healthy.   Patient denies polyuria, polyphagia, unexplained weight loss, fatigue, vision changes, frequent UTI.          Past Medical History:   Diagnosis Date    Anesthesia 2017    Arthritis unk    Cancer (HCC) 2020    skin cancer sqammish    Cataract 2022    lens implants    High cholesterol 1998    Hypercholesterolemia     Hypertension     Neuropathy     PONV (postoperative nausea and vomiting) 2017     Rhinoplasti for deviated septon    Reactive depression 2023    Sleep apnea 2019    tested    Stroke (HCC)     stroke or contusion    Tinea inguinalis     Tobacco use 10/30/2018    Urinary bladder disorder 1995    bph     Family History   Problem Relation Age of Onset    Heart Disease Mother          at 89yo/heart failure    Heart Attack Mother     Breast Cancer Mother     Heart Disease Father          at 59yo/massive heart attack    Heart Attack Father     Hypertension Father          coronary thrombosis    Cancer Sister          of leukemia    Breast Cancer Sister     Stroke Sister         living lost eye site left eye    No Known Problems Maternal Grandmother     No Known Problems Maternal Grandfather     No Known Problems Paternal Grandmother     Dementia Paternal Grandfather     Hyperlipidemia Neg Hx     Diabetes Neg Hx      Past Surgical History:   Procedure Laterality Date    CT TRANSURETHRAL ELEC-SURG PROSTATECTOM N/A 2023    Procedure: BIPOLAR TRANSURETHRAL RESECTION OF PROSTATE;  Surgeon: Jacques Mcdowell M.D.;  Location: SURGERY [facility];  Service: Urology    CT CYSTOURETHROSCOPY N/A 2023    Procedure: CYSTOSCOPY;  Surgeon: Jacques Mcdowell M.D.;  Location: SURGERY [facility];  Service: Urology    CT CYSTO/URETERO/PYELOSCOPY W/LITHOTRIPSY N/A 2023    Procedure: CYSTOLITHLOPAXY;  Surgeon: Jacques Mcdowell M.D.;  Location: SURGERY [facility];  Service: Urology    CATARACT PHACO WITH IOL      1992    cyst removal back of the neck    OTHER ABDOMINAL SURGERY      gallbladder    RHINOPLASTY       Social History     Tobacco Use    Smoking status: Former     Current packs/day: 0.00     Average packs/day: 1 pack/day for 15.0 years (15.0 ttl pk-yrs)     Types: Cigarettes, Cigars     Start date: 1970     Quit date: 1985     Years since quittin.0    Smokeless tobacco: Never    Tobacco comments:     occasional cigar started in / quit completely  2021   Vaping Use    Vaping Use: Never used   Substance Use Topics    Alcohol use: Yes     Comment: 2 beers / 2 shots in last month    Drug use: Not Currently     Types: Inhaled     Comment: rare occasions,occasionally      Social History     Social History Narrative    Not on file     Current Outpatient Medications Ordered in Epic   Medication Sig Dispense Refill    metoprolol SR (TOPROL XL) 25 MG TABLET SR 24 HR Take 1 Tablet by mouth every morning. TAKE ONE TABLET BY MOUTH ONE TIME DAILY 100 Tablet 3    meclizine (ANTIVERT) 12.5 MG Tab Take 1 Tablet by mouth 3 times a day as needed for Dizziness. 30 Tablet 0    rosuvastatin (CRESTOR) 20 MG Tab Take 1 Tablet by mouth every evening. 100 Tablet 0    losartan-hydrochlorothiazide (HYZAAR) 50-12.5 MG per tablet TAKE ONE TABLET BY MOUTH ONE TIME DAILY 90 Tablet 3    Multiple Vitamin (MULTIVITAMIN ADULT PO) Take  by mouth.      Calcium Carbonate-Vit D-Min (CALCIUM 1200 PO) Take 1,500 mg by mouth.      alfuzosin (UROXATRAL) 10 MG SR tablet at bedtime.      fluticasone (FLONASE) 50 MCG/ACT nasal spray Administer 1 Spray into affected nostril(S) every morning.      clindamycin-benzoyl peroxide (BENZACLIN) gel  (Patient not taking: Reported on 12/27/2023)      cetirizine (ALLERGY, CETIRIZINE,) 10 MG Tab Take 10 mg by mouth every day. (Patient not taking: Reported on 10/23/2023)      hydrocortisone 1 % Cream Apply 1 Application topically 2 times a day. 2 weeks on 2 weeks off      ketoconazole (NIZORAL) 2 % Cream Apply  topically every 12 hours as needed.       No current Epic-ordered facility-administered medications on file.     Gabapentin and Seasonal    ROS: see hpi  Gen: no fevers/chills  Pulm: no sob, no cough  CV: no chest pain, no palpitations, no edema  GI: no nausea/vomiting, no diarrhea  Skin: no rash    Objective:   Exam:  /70 (BP Location: Left arm, Patient Position: Sitting, BP Cuff Size: Adult)   Pulse (!) 52   Temp 36.4 °C (97.5 °F) (Temporal)   Resp  "14   Ht 1.803 m (5' 11\")   Wt 125 kg (275 lb 6.4 oz)   SpO2 92%   BMI 38.41 kg/m²    Body mass index is 38.41 kg/m².    Gen: Alert and oriented, No apparent distress.  HEENT: Head atraumatic, normocephalic. Pupils equal and round.  Neck: Neck is supple without lymphadenopathy.   Lungs: Normal effort, CTA bilaterally, no wheezes, rhonchi, or rales  CV: Regular rate and rhythm. No murmurs, rubs, or gallops.  ABD: +BS. Non-tender, non-distended. No rebound, rigidity, or guarding.  Ext: No clubbing, cyanosis, edema.    Assessment & Plan:     73 y.o. male with the following -     1. Pituitary microadenoma (HCC)  Chronic and ongoing condition.  Will refer to neurology and endocrinology for evaluation and management.    - Referral to Neurology  - Referral to Endocrinology    2. Sensory deficit, bilateral  3. Paresthesia of both feet  4. Idiopathic neuropathy  Chronic, uncontrolled condition.  Etiology unknown.  Patient has failed treatment with pain management and podiatry.  Will refer to neurology.    - Referral to Neurology    5. Hypogonadism in male  Chronic and uncontrolled condition.  Will refer to endocrinology for evaluation and management.    - Referral to Endocrinology    6. Prediabetes  Chronic and ongoing condition.  A1c 6.1 which is slightly improved from previous 6.4. Patient counseled on lifestyle modification that include healthy diet, increase physical activity, and maintain normal BMI to help reduce her risk of progression to diabetes. Discussed diabetes sequala both in the microvascular and macrovascular level with patient.   Will discuss starting metformin in future.     - POCT Hemoglobin A1C    Medical Decision Making/Course:  In the course of preparing for this visit with review of the pertinent past medical history, recent and past clinic visits, current medications, and performing chart, immunization, medical history and medication reconciliation, and in the further course of obtaining the " current history pertinent to the clinic visit today, performing an exam and evaluation, ordering and independently evaluating labs, tests, and/or procedures, prescribing any recommended new medications as noted above, providing any pertinent counseling and education and recommending further coordination of care. This was discussed with patient in a shared-decision making conversation, and they understand and agreed with plan of care.     Return in about 3 months (around 3/27/2024), or if symptoms worsen or fail to improve.    ANISA Hawthorne.   [facility]    Please note that this dictation was created using voice recognition software. I have made every reasonable attempt to correct obvious errors, but I expect that there are errors of grammar and possibly content that I did not discover before finalizing the note.

## 2023-12-28 ENCOUNTER — SLEEP STUDY (OUTPATIENT)
Dept: SLEEP MEDICINE | Facility: MEDICAL CENTER | Age: 73
End: 2023-12-28
Attending: NURSE PRACTITIONER
Payer: MEDICARE

## 2023-12-28 DIAGNOSIS — G47.33 OSA (OBSTRUCTIVE SLEEP APNEA): ICD-10-CM

## 2023-12-28 PROCEDURE — 95810 POLYSOM 6/> YRS 4/> PARAM: CPT | Performed by: STUDENT IN AN ORGANIZED HEALTH CARE EDUCATION/TRAINING PROGRAM

## 2023-12-29 NOTE — PROCEDURES
Patient: HONG PALACIO  ID: [id number] Date: 12/28/2023 Exam No.:   MONTAGE: Standard  STUDY TYPE: Diagnostic  RECORDING TECHNIQUE:   After the scalp was prepared, gold plated electrodes were applied to the scalp according to the International 10-20 System. EEG (electroencephalogram) was continuously monitored from the O1-M2, O2-M1, C3-M2, C4-M1, F3-M2, and F4-M1. EOGs (electrooculograms) were monitored by electrodes placed at the left and right outer canthi. Chin EMG (electromyogram) was monitored by electrodes placed on the mentalis and sub-mentalis muscles. Nasal and oral airflow were monitored using a triple port thermocouple as well as oronasal pressure transducer. Respiratory effort was measured by inductive plethysmography technology employing abdominal and thoracic belts. Blood oxygen saturation and pulse were monitored by pulse oximetry. Heart rhythm was monitored by surface electrocardiogram. Leg EMG was studied using surface electrodes placed on left and right anterior tibialis. A microphone was used to monitor tracheal sounds and snoring. Body position was monitored and documented by technician observation.   SCORING CRITERIA:   A modification of the AASM manual for scoring of sleep and associated events was used. Obstructive apneas were scored by cessation of airflow for at least 10 seconds with continuing respiratory effort. Central apneas were scored by cessation of airflow for at least 10 seconds with no respiratory effort. Hypopneas were scored by a 30% or more reduction in airflow for at least 10 seconds accompanied by arterial oxygen desaturation of 3% or an arousal. For CMS (Medicare) patients, per AASM rule 1B, hypopneas are scored by 30% with mild reduction in airflow for at least 10 seconds accompanied by arterial saturation decreased at 4%.  Study start time was 10:23:40 PM. Diagnostic recording time was 7h 33.5m with a total sleep time of 2h 50.5m resulting in a sleep efficiency of 37.60%%.  Sleep latency from the start of the study was 10 minutes and the latency from sleep to REM was 00 minutes. In total,60 arousals were scored for an arousal index of 21.1.  Respiratory:  There were a total of 0 apneas consisting of 0 obstructive apneas, 0 mixed apneas, and 0 central apneas. A total of 24 hypopneas were scored. The apnea index was 0.00 per hour and the hypopnea index was 8.45 per hour resulting in an overall 4% AHI of 8.45. AHI during REM was 0.0 and AHI while supine was 0.00.  Oximetry:  There was a mean oxygen saturation of 89.0%. The minimum oxygen saturation in NREM was 83.0 % and in REM was --%. The patient spent 73.8 minutes of TST with SaO2 <88%.  Cardiac:  The highest heart rate seen while awake was 90 BPM while the highest heart rate during sleep was 71 BPM with an average sleeping heart rate of 57 BPM.  Limb Movements:  There were a total of 12 PLMs during sleep which resulted in a PLMS index of 4.2. Of these, 18 were associated with arousals which resulted in a PLMS arousal index of 6.3.    Impression:  1.  Sleep was interrupted during night of study with a total sleep time of 2 hours 51 minutes, sleep efficiency 37.6%  2.  Patient may criteria for mild obstructive sleep apnea with an overall 4% AHI 8.5 events an hour  3.  Nocturnal hypoxia was seen with an average oxygen saturation of 89%, time at or below 88% saturation 74 minutes  4.  Due to interrupted sleep, no REM sleep was seen during night of study and patient did not meet criteria for split-night protocol    Recommendations:  I recommend the patient should consider return for a CPAP/BiPAP titration due to the severity of sleep-related hypoxia.   Patient may be a candidate for empiric home auto CPAP therapy.    In some cases alternative treatment options may be proven effective in resolving sleep apnea. These options include upper airway surgery, the use of a dental orthotic, weight loss, or positional therapy. Clinical correlation  is required. In general patients with sleep apnea are advised to avoid alcohol, sedatives and not to operate a motor vehicle while drowsy.  Untreated sleep apnea increases the risk for cardiovascular and neurovascular disease.

## 2024-01-02 ENCOUNTER — OFFICE VISIT (OUTPATIENT)
Dept: NEUROLOGY | Facility: MEDICAL CENTER | Age: 74
End: 2024-01-02
Attending: PSYCHIATRY & NEUROLOGY
Payer: MEDICARE

## 2024-01-02 VITALS
HEIGHT: 71 IN | BODY MASS INDEX: 38.52 KG/M2 | SYSTOLIC BLOOD PRESSURE: 142 MMHG | DIASTOLIC BLOOD PRESSURE: 66 MMHG | HEART RATE: 54 BPM | WEIGHT: 275.13 LBS | TEMPERATURE: 97.2 F | OXYGEN SATURATION: 92 %

## 2024-01-02 DIAGNOSIS — G60.9 IDIOPATHIC NEUROPATHY: ICD-10-CM

## 2024-01-02 PROCEDURE — 99214 OFFICE O/P EST MOD 30 MIN: CPT | Performed by: PSYCHIATRY & NEUROLOGY

## 2024-01-02 PROCEDURE — 99212 OFFICE O/P EST SF 10 MIN: CPT | Performed by: PSYCHIATRY & NEUROLOGY

## 2024-01-02 PROCEDURE — 3077F SYST BP >= 140 MM HG: CPT | Performed by: PSYCHIATRY & NEUROLOGY

## 2024-01-02 PROCEDURE — 3078F DIAST BP <80 MM HG: CPT | Performed by: PSYCHIATRY & NEUROLOGY

## 2024-01-02 ASSESSMENT — FIBROSIS 4 INDEX: FIB4 SCORE: 3.19

## 2024-01-02 ASSESSMENT — PATIENT HEALTH QUESTIONNAIRE - PHQ9: CLINICAL INTERPRETATION OF PHQ2 SCORE: 0

## 2024-01-02 NOTE — PROGRESS NOTES
"    Chief Complaint   Patient presents with    Follow-Up     Annual        History of present illness:  Laureen Munoz 73 y.o. male with HTN presented to the ED after being sent by his PCP for forgetting passwords, disorientation and recurrent dizziness and tunnel vision.   He was admitted and MRI brain showed [id number] mm pituitary macroadenoma.  He has had recurrent dizziness for the last year where he has to grab hold of objects. He has tunnel vision that resolves in 20 seconds and feels disoriented when this occurs. It is not triggered by standing. He feels like the room does a \"quick flip\". He does not fall from this. This occurs up to 3-4 times per week.   There is no hearing loss.    1/2/23: He is to follow-up regarding peripheral neuropathy.      Past medical history:   Past Medical History:   Diagnosis Date    Anesthesia 2017    Arthritis unk    Cancer (formerly [facility]) 2020    skin cancer sqammish    Cataract 2022    lens implants    High cholesterol 1998    Hypercholesterolemia     Hypertension     Neuropathy     PONV (postoperative nausea and vomiting) 2017    Rhinoplasti for deviated septon    Reactive depression 02/21/2023    Sleep apnea 2019    tested    Stroke (formerly [facility]) 2023    stroke or contusion    Tinea inguinalis     Tobacco use 10/30/2018    Urinary bladder disorder 1995    bph       Past surgical history:   Past Surgical History:   Procedure Laterality Date    GA TRANSURETHRAL ELEC-SURG PROSTATECTOM N/A 8/17/2023    Procedure: BIPOLAR TRANSURETHRAL RESECTION OF PROSTATE;  Surgeon: Jacques Mcdowell M.D.;  Location: SURGERY [facility];  Service: Urology    GA CYSTOURETHROSCOPY N/A 8/17/2023    Procedure: CYSTOSCOPY;  Surgeon: Jacques Mcdowell M.D.;  Location: SURGERY [facility];  Service: Urology    GA CYSTO/URETERO/PYELOSCOPY W/LITHOTRIPSY N/A 8/17/2023    Procedure: CYSTOLITHLOPAXY;  Surgeon: Jacques Mcdowell M.D.;  Location: SURGERY [facility];  Service: Urology    CATARACT PHACO WITH IOL      OTHER "      cyst removal back of the neck    OTHER ABDOMINAL SURGERY      gallbladder    RHINOPLASTY         Family history:   Family History   Problem Relation Age of Onset    Heart Disease Mother          at 89yo/heart failure    Heart Attack Mother     Breast Cancer Mother     Heart Disease Father          at 57yo/massive heart attack    Heart Attack Father     Hypertension Father          coronary thrombosis    Cancer Sister          of leukemia    Breast Cancer Sister     Stroke Sister         living lost eye site left eye    No Known Problems Maternal Grandmother     No Known Problems Maternal Grandfather     No Known Problems Paternal Grandmother     Dementia Paternal Grandfather     Hyperlipidemia Neg Hx     Diabetes Neg Hx        Social history:   Social History     Socioeconomic History    Marital status:      Spouse name: Not on file    Number of children: Not on file    Years of education: Not on file    Highest education level: Associate degree: academic program   Occupational History    Not on file   Tobacco Use    Smoking status: Former     Current packs/day: 0.00     Average packs/day: 1 pack/day for 15.0 years (15.0 ttl pk-yrs)     Types: Cigarettes, Cigars     Start date: 1970     Quit date: 1985     Years since quittin.0    Smokeless tobacco: Never    Tobacco comments:     occasional cigar started in / quit completely    Vaping Use    Vaping Use: Never used   Substance and Sexual Activity    Alcohol use: Yes     Comment: 2 beers / 2 shots in last month    Drug use: Not Currently     Types: Inhaled, Marijuana, Oral     Comment: rare occasions,occasionally     Sexual activity: Not Currently   Other Topics Concern    Not on file   Social History Narrative    Not on file     Social Determinants of Health     Financial Resource Strain: Medium Risk (4/15/2023)    Overall Financial Resource Strain (CARDIA)     Difficulty of Paying Living Expenses: Somewhat hard    Food Insecurity: No Food Insecurity (4/15/2023)    Hunger Vital Sign     Worried About Running Out of Food in the Last Year: Never true     Ran Out of Food in the Last Year: Never true   Transportation Needs: No Transportation Needs (4/15/2023)    PRAPARE - Transportation     Lack of Transportation (Medical): No     Lack of Transportation (Non-Medical): No   Physical Activity: Inactive (4/15/2023)    Exercise Vital Sign     Days of Exercise per Week: 0 days     Minutes of Exercise per Session: 0 min   Stress: No Stress Concern Present (4/15/2023)    [employer] [place] [employer] - Occupational Stress Questionnaire     Feeling of Stress : Only a little   Social Connections: Socially Isolated (4/15/2023)    Social Connection and Isolation Panel [NHANES]     Frequency of Communication with Friends and Family: More than three times a week     Frequency of Social Gatherings with Friends and Family: Once a week     Attends [employer] Services: Never     Active Member of Clubs or Organizations: No     Attends Club or Organization Meetings: Never     Marital Status:    Intimate Partner Violence: Not on file   Housing Stability: Low Risk  (4/15/2023)    Housing Stability Vital Sign     Unable to Pay for Housing in the Last Year: No     Number of Places Lived in the Last Year: 1     Unstable Housing in the Last Year: No       Current medications:   Current Outpatient Medications   Medication    metoprolol SR (TOPROL XL) 25 MG TABLET SR 24 HR    meclizine (ANTIVERT) 12.5 MG Tab    rosuvastatin (CRESTOR) 20 MG Tab    losartan-hydrochlorothiazide (HYZAAR) 50-12.5 MG per tablet    Multiple Vitamin (MULTIVITAMIN ADULT PO)    Calcium Carbonate-Vit D-Min (CALCIUM 1200 PO)    alfuzosin (UROXATRAL) 10 MG SR tablet    fluticasone (FLONASE) 50 MCG/ACT nasal spray    clindamycin-benzoyl peroxide (BENZACLIN) gel    cetirizine (ALLERGY, CETIRIZINE,) 10 MG Tab    hydrocortisone 1 % Cream    ketoconazole (NIZORAL) 2 %  "Cream     No current facility-administered medications for this visit.       Medication Allergy:  Allergies   Allergen Reactions    Gabapentin Unspecified     Dizziness, confusion, falling    Seasonal        Physical examination:   Vitals:    24 1114   BP: (!) 142/66   BP Location: Left arm   Patient Position: Sitting   BP Cuff Size: Large adult   Pulse: (!) 54   Temp: 36.2 °C (97.2 °F)   TempSrc: Temporal   SpO2: 92%   Weight: 125 kg (275 lb 2.2 oz)   Height: 1.803 m (5' 10.98\")     Neurological Exam    Sensory  Decreased sensation of the bilateral lower extremities distal to the ankles .    Gait  Casual gait: Wide stance. Normal stride length. Normal right arm swing. Normal left arm swing.  Pitting edema 2+ of the lower extremities up to the shins    Labs:  I reviewed the following labs personally:  FSH: 29.5  LH: 16.7    Lab Results   Component Value Date/Time    HBA1C 6.1 (A) 2023 09:44 AM     Imagin/8/21 MRI BRAIN PITUITARY:  I reviewed the images personally and agree with the following read:      IMPRESSION:     1.  There is an approximately 16 x 13 x 12 mm sized hypoenhancing lesion in the right side of the pituitary gland likely representing pituitary macroadenoma. The lesion is abutting the right cavernous sinus without infiltration.  2.  Mild cerebral atrophy.  3.  Minimal chronic microvascular ischemic disease.    ASSESSMENT AND PLAN:  Problem List Items Addressed This Visit       Idiopathic neuropathy    Relevant Orders    Referral to Physical Therapy       1. Idiopathic neuropathy  - Referral to Physical Therapy    73-year-old male with lower extremity pitting edema along with symptoms of peripheral neuropathy with numbness of the feet.  He does not have diabetes mellitus or drink alcohol.  I have counseled him on the diagnosis of small fiber peripheral neuropathy, which is idiopathic.  Did not tolerate gabapentin well therefore there will be no treatment of the neuropathic pain.  I " did refer him to physical therapy for balance training exercises.    FOLLOW-UP:   Return if symptoms worsen or fail to improve.    Total time spent for the day for this patient unrelated to procedure time is: 20 minutes. I spent 14 minutes in face to face time and I spent 3 minutes pre-charting and 3 minutes in post-visit documentation.      Dr. Delon Skinner D.O.  [facility]

## 2024-01-20 DIAGNOSIS — E78.00 HYPERCHOLESTEREMIA: ICD-10-CM

## 2024-01-22 NOTE — TELEPHONE ENCOUNTER
Received request via: Pharmacy    Was the patient seen in the last year in this department? Yes    Does the patient have an active prescription (recently filled or refills available) for medication(s) requested? No    [facility] Name: [facility] in [place]     Does the patient have FDC Plus and need 100 day supply (blood pressure, diabetes and cholesterol meds only)? Patient does not have SCP

## 2024-01-23 ENCOUNTER — OFFICE VISIT (OUTPATIENT)
Dept: SLEEP MEDICINE | Facility: MEDICAL CENTER | Age: 74
End: 2024-01-23
Attending: NURSE PRACTITIONER
Payer: MEDICARE

## 2024-01-23 VITALS
WEIGHT: 270 LBS | HEIGHT: 71 IN | SYSTOLIC BLOOD PRESSURE: 110 MMHG | HEART RATE: 93 BPM | DIASTOLIC BLOOD PRESSURE: 70 MMHG | OXYGEN SATURATION: 93 % | BODY MASS INDEX: 37.8 KG/M2 | RESPIRATION RATE: 16 BRPM

## 2024-01-23 DIAGNOSIS — I27.20 PULMONARY HYPERTENSION (HCC): ICD-10-CM

## 2024-01-23 DIAGNOSIS — E66.01 MORBID OBESITY WITH BMI OF 50.0-59.9, ADULT (HCC): ICD-10-CM

## 2024-01-23 DIAGNOSIS — G47.33 OSA (OBSTRUCTIVE SLEEP APNEA): ICD-10-CM

## 2024-01-23 PROCEDURE — 99213 OFFICE O/P EST LOW 20 MIN: CPT | Performed by: INTERNAL MEDICINE

## 2024-01-23 PROCEDURE — 3078F DIAST BP <80 MM HG: CPT | Performed by: INTERNAL MEDICINE

## 2024-01-23 PROCEDURE — 99212 OFFICE O/P EST SF 10 MIN: CPT | Performed by: NURSE PRACTITIONER

## 2024-01-23 PROCEDURE — 3074F SYST BP LT 130 MM HG: CPT | Performed by: INTERNAL MEDICINE

## 2024-01-23 RX ORDER — ROSUVASTATIN CALCIUM 20 MG/1
20 TABLET, COATED ORAL EVERY EVENING
Qty: 100 TABLET | Refills: 0 | Status: SHIPPED | OUTPATIENT
Start: 2024-01-23

## 2024-01-23 ASSESSMENT — ENCOUNTER SYMPTOMS
EYES NEGATIVE: 1
CARDIOVASCULAR NEGATIVE: 1
PSYCHIATRIC NEGATIVE: 1
NEUROLOGICAL NEGATIVE: 1
MUSCULOSKELETAL NEGATIVE: 1
RESPIRATORY NEGATIVE: 1
GASTROINTESTINAL NEGATIVE: 1

## 2024-01-23 ASSESSMENT — FIBROSIS 4 INDEX: FIB4 SCORE: 3.19

## 2024-01-23 NOTE — ASSESSMENT & PLAN NOTE
Moderate sleep apnea TATA 18.6/hr O2 annalise 73%   Average O2 was 84%  Repeat sleep study 12/20/2023 sleep efficiency was very poor and so limited data however on the data we do have AHI 8.5/h with 74 minutes less than or equal to 88%     _x___ Discussed the pathophysiology of sleep disordered breathing including risks for hypertension, heart attack, and stroke   Also discussed treatment options including CPAP, an oral appliance, and surgical intervention.   __x___ Discussed weight control program with goal of achieving and maintaining ideal body weight.   __x___ Polysomnography was discussed and ordered titration study  __x_       follow up post titration study  __x___Discussed weight control programs with goal of achieving and maintaining ideal body weight   __x___Discussed the diagnosis , management and pathophysiology JUAN   _x___Discussed the risks associated with driving and operating equipment while drowsy. The patient verbalized an understanding of this and agreed to take appropriate measures to eliminate these risks.   __x___Discussed the cardiovascular and neuropsychiatric risks of untreated JUAN; including but not limited to: HTN, DM, MI, ASCVD, CVA, CHF, traffic accidents   __x___ Discussed positive airway pressure (PAP) as the preferred therapy for severe JUAN;   _____ Discussed risks of anesthesia/analgesia associated with JUAN and the need to use PAP in the pre, hollis, and post -operative periods.

## 2024-01-23 NOTE — ASSESSMENT & PLAN NOTE
Dr. Reyez has put him on a mediterranean diet and has lost 7 pounds  Working on getting his sleep study

## 2024-01-23 NOTE — PROGRESS NOTES
Pulmonary Clinic follow up    Date of Service: 1/23/2024    Reason for follow up:  Follow-Up (LAST SEEN 10/23/223 MYAH SHRESTHA/AMITA RESULTS)      Problem List Items Addressed This Visit       Pulmonary hypertension (HCC)     RVSP 35 mmhg  Likely due to nocturnal hypoxemia         Morbid obesity with BMI of 50.0-59.9, adult (HCC)     Dr. Reyez has put him on a mediterranean diet and has lost 7 pounds  Working on getting his sleep study         JUAN (obstructive sleep apnea)     Moderate sleep apnea TATA 18.6/hr O2 annalise 73%   Average O2 was 84%  Repeat sleep study 12/20/2023 sleep efficiency was very poor and so limited data however on the data we do have AHI 8.5/h with 74 minutes less than or equal to 88%     _x___ Discussed the pathophysiology of sleep disordered breathing including risks for hypertension, heart attack, and stroke   Also discussed treatment options including CPAP, an oral appliance, and surgical intervention.   __x___ Discussed weight control program with goal of achieving and maintaining ideal body weight.   __x___ Polysomnography was discussed and ordered titration study  __x_       follow up post titration study  __x___Discussed weight control programs with goal of achieving and maintaining ideal body weight   __x___Discussed the diagnosis , management and pathophysiology JUAN   _x___Discussed the risks associated with driving and operating equipment while drowsy. The patient verbalized an understanding of this and agreed to take appropriate measures to eliminate these risks.   __x___Discussed the cardiovascular and neuropsychiatric risks of untreated JUAN; including but not limited to: HTN, DM, MI, ASCVD, CVA, CHF, traffic accidents   __x___ Discussed positive airway pressure (PAP) as the preferred therapy for severe JUAN;   _____ Discussed risks of anesthesia/analgesia associated with JUAN and the need to use PAP in the pre, hollis, and post -operative periods.          Relevant Orders    Polysomnography  "Titration         History of Present Illness: Laureen Munoz is a 73 y.o. male with a past medical history of [id number] mm pituitary macroadenoma being followed by neurosx, idiopathic neuropathy and HTN  12/28/2023 sleep study done but efficiency on 37.6% And with that AHI 8.5/hr with time less than or equal to 88% of 74min. Recommendations  is to return to CPAP/BIPAP totration    Re: sleep his main complaints are snoring, frequent nocturnal awakenings, and unrefreshing sleep   He has had a home study in 6/9/2022  \"Moderate obstructive sleep apnea with  4% Respiratory Event Index (TATA) of 18.6 per hour. Oxygenation O2 Sat. annalise was 73% and mean O2 sat was 84% and baseline O2 at 87 %. O2 sat was below 88% for 5hr 6 min of the flow evaluation time. Oxygen Desaturation (>=4%) Index was elevated at 19.7/hr. AVG HR was 59 BPM. \"     S/p TURP in Aug 2022  S/p Rhinoplasy 2016    RVSP 35 mmhg 5/10/23    Review of Systems   Constitutional:  Positive for malaise/fatigue.   HENT: Negative.     Eyes: Negative.    Respiratory: Negative.     Cardiovascular: Negative.    Gastrointestinal: Negative.    Genitourinary: Negative.    Musculoskeletal: Negative.    Skin: Negative.    Neurological: Negative.    Endo/Heme/Allergies: Negative.    Psychiatric/Behavioral: Negative.         Current Outpatient Medications on File Prior to Visit   Medication Sig Dispense Refill    rosuvastatin (CRESTOR) 20 MG Tab Take 1 Tablet by mouth every evening. 100 Tablet 0    metoprolol SR (TOPROL XL) 25 MG TABLET SR 24 HR Take 1 Tablet by mouth every morning. TAKE ONE TABLET BY MOUTH ONE TIME DAILY 100 Tablet 3    meclizine (ANTIVERT) 12.5 MG Tab Take 1 Tablet by mouth 3 times a day as needed for Dizziness. 30 Tablet 0    losartan-hydrochlorothiazide (HYZAAR) 50-12.5 MG per tablet TAKE ONE TABLET BY MOUTH ONE TIME DAILY 90 Tablet 3    alfuzosin (UROXATRAL) 10 MG SR tablet at bedtime.      fluticasone (FLONASE) 50 MCG/ACT nasal spray Administer 1 Spray " into affected nostril(S) every morning.      cetirizine (ALLERGY, CETIRIZINE,) 10 MG Tab Take 10 mg by mouth every day. (Patient not taking: Reported on 10/23/2023)       No current facility-administered medications on file prior to visit.       Social History     Tobacco Use    Smoking status: Former     Current packs/day: 0.00     Average packs/day: 1 pack/day for 15.0 years (15.0 ttl pk-yrs)     Types: Cigarettes, Cigars     Start date: 1970     Quit date: 1985     Years since quittin.0    Smokeless tobacco: Never    Tobacco comments:     occasional cigar started in / quit completely    Vaping Use    Vaping Use: Never used   Substance Use Topics    Alcohol use: Yes     Comment: 2 beers / 2 shots in last month    Drug use: Not Currently     Types: Inhaled, Marijuana, Oral     Comment: rare occasions,occasionally         Past Medical History:   Diagnosis Date    Anesthesia     Arthritis unk    Cancer (HCC)     skin cancer sqammish    Cataract     lens implants    High cholesterol     Hypercholesterolemia     Hypertension     Neuropathy     PONV (postoperative nausea and vomiting) 2017    Rhinoplasti for deviated septon    Reactive depression 2023    Sleep apnea 2019    tested    Stroke ([facility])     stroke or contusion    Tinea inguinalis     Tobacco use 10/30/2018    Urinary bladder disorder 1995    bph       Past Surgical History:   Procedure Laterality Date    LA TRANSURETHRAL ELEC-SURG PROSTATECTOM N/A 2023    Procedure: BIPOLAR TRANSURETHRAL RESECTION OF PROSTATE;  Surgeon: Jacques Mcdowell M.D.;  Location: SURGERY [facility];  Service: Urology    LA CYSTOURETHROSCOPY N/A 2023    Procedure: CYSTOSCOPY;  Surgeon: Jacques Mcdowell M.D.;  Location: SURGERY [facility];  Service: Urology    LA CYSTO/URETERO/PYELOSCOPY W/LITHOTRIPSY N/A 2023    Procedure: CYSTOLITHLOPAXY;  Surgeon: Jacques Mcdowell M.D.;  Location: SURGERY [facility];  Service:  "Urology    CATARACT PHACO WITH IOL      OTHER      cyst removal back of the neck    OTHER ABDOMINAL SURGERY      gallbladder    RHINOPLASTY         Allergies: Gabapentin and Seasonal    Family History   Problem Relation Age of Onset    Heart Disease Mother          at 89yo/heart failure    Heart Attack Mother     Breast Cancer Mother     Heart Disease Father          at 57yo/massive heart attack    Heart Attack Father     Hypertension Father          coronary thrombosis    Cancer Sister          of leukemia    Breast Cancer Sister     Stroke Sister         living lost eye site left eye    No Known Problems Maternal Grandmother     No Known Problems Maternal Grandfather     No Known Problems Paternal Grandmother     Dementia Paternal Grandfather     Hyperlipidemia Neg Hx     Diabetes Neg Hx        Vitals:    24 0934   Height: 1.803 m (5' 11\")   Weight: 122 kg (270 lb)   Weight % change since last entry.: 0 %   BP: 110/70   Pulse: 93   BMI (Calculated): 37.66   Resp: 16       Physical Examination  Physical Exam  Constitutional:       Appearance: He is obese.   HENT:      Head: Normocephalic and atraumatic.   Musculoskeletal:      Cervical back: Normal range of motion.   Skin:     General: Skin is warm and dry.   Neurological:      General: No focal deficit present.      Mental Status: He is alert and oriented to person, place, and time.   Psychiatric:         Mood and Affect: Mood normal.         Behavior: Behavior normal.         Thought Content: Thought content normal.         Judgment: Judgment normal.                 Laura Og M.D., MD M[facility]  [facility]  "

## 2024-02-01 ENCOUNTER — NON-PROVIDER VISIT (OUTPATIENT)
Dept: MEDICAL GROUP | Facility: IMAGING CENTER | Age: 74
End: 2024-02-01
Payer: MEDICARE

## 2024-02-01 VITALS — SYSTOLIC BLOOD PRESSURE: 130 MMHG | DIASTOLIC BLOOD PRESSURE: 66 MMHG

## 2024-02-01 DIAGNOSIS — H81.10 BENIGN PAROXYSMAL POSITIONAL VERTIGO, UNSPECIFIED LATERALITY: ICD-10-CM

## 2024-02-01 DIAGNOSIS — I10 PRIMARY HYPERTENSION: ICD-10-CM

## 2024-02-01 NOTE — PROGRESS NOTES
Laureen Munoz is a 73 y.o. male here for a non-provider visit for b/p check         If abnormal, was the Registered Nurse (office provider if RN is unavailable) notified today? No    Routed to PCP/Requested Provider? Yes

## 2024-02-02 ENCOUNTER — PHYSICAL THERAPY (OUTPATIENT)
Dept: PHYSICAL THERAPY | Facility: MEDICAL CENTER | Age: 74
End: 2024-02-02
Attending: PSYCHIATRY & NEUROLOGY
Payer: MEDICARE

## 2024-02-02 DIAGNOSIS — R26.89 LOSS OF BALANCE: ICD-10-CM

## 2024-02-02 PROCEDURE — 97163 PT EVAL HIGH COMPLEX 45 MIN: CPT

## 2024-02-02 RX ORDER — LOSARTAN POTASSIUM AND HYDROCHLOROTHIAZIDE 12.5; 5 MG/1; MG/1
1 TABLET ORAL DAILY
Qty: 90 TABLET | Refills: 3 | Status: SHIPPED | OUTPATIENT
Start: 2024-02-02

## 2024-02-02 RX ORDER — MECLIZINE HCL 12.5 MG/1
12.5 TABLET ORAL 3 TIMES DAILY PRN
Qty: 30 TABLET | Refills: 0 | Status: SHIPPED | OUTPATIENT
Start: 2024-02-02

## 2024-02-02 ASSESSMENT — ENCOUNTER SYMPTOMS
PAIN SCALE AT LOWEST: 0
PAIN SCALE: 0
PAIN SCALE AT HIGHEST: 0

## 2024-02-02 NOTE — TELEPHONE ENCOUNTER
Received request via: Patient    Was the patient seen in the last year in this department? Yes    Does the patient have an active prescription (recently filled or refills available) for medication(s) requested? No    [facility]     Does the patient have assisted Plus and need 100 day supply (blood pressure, diabetes and cholesterol meds only)? Patient does not have SCP

## 2024-02-02 NOTE — OP THERAPY EVALUATION
Outpatient Physical Therapy  INITIAL EVALUATION    [facility] Outpatient Physical Therapy  [address]  [place] [address]  Phone:  [phone]  Fax:  [phone]    Date of Evaluation: 2024    Patient: Laureen Munoz  YOB: 1950  MRN: [id number]     Referring Provider: ANCA Wilburn  [address]  [place] [address]   Referring Diagnosis Idiopathic neuropathy [G60.9]     Time Calculation  Start time: 1545  Stop time: 1636 Time Calculation (min): 51 minutes         Chief Complaint: Loss Of Balance (/)    Visit Diagnoses     ICD-10-CM   1. Loss of balance  R26.89       Date of onset of impairment: 2022    Subjective:   History of Present Illness:     Mechanism of injury:  Pt reports he has had a history of neuropathy, stroke and reports he met with Dr. Skinner. Pt reports 5 years ago he was reporting dizziness which he was sent to ER where MRI's and CT's were performed. Dr. Méndez and Dr. Skinner took over his case. Pt reports he got better and was not seen again for a couple of years He reports lately his neuropathy has been worsening. Pt reports he was placed on meclozine which resolved a lot of vision and balance issues. Pt reports he is highly cautious when ambulating of hanging onto railings. Pt reports numbness at the balls of feet. Pt reports he has been increasingly sedentary over the last several years since the pandemic.    Pain:     Current pain ratin    At best pain ratin    At worst pain ratin  Social Support:     Lives in:  Multiple-level home    Lives with:  Significant other and adult children  Hand dominance:  Right  Patient Goals:     Patient goals for therapy:  Improved balance and return to sport/leisure activities      Past Medical History:   Diagnosis Date    Anesthesia 2017    Arthritis unk    Cancer (HCC) 2020    skin cancer sqammish    Cataract     lens implants    High cholesterol 1998     Hypercholesterolemia     Hypertension     Neuropathy     PONV (postoperative nausea and vomiting)     Rhinoplasti for deviated septon    Reactive depression 2023    Sleep apnea     tested    Stroke (HCC)     stroke or contusion    Tinea inguinalis     Tobacco use 10/30/2018    Urinary bladder disorder 1995    bph     Past Surgical History:   Procedure Laterality Date    IA TRANSURETHRAL ELEC-SURG PROSTATECTOM N/A 2023    Procedure: BIPOLAR TRANSURETHRAL RESECTION OF PROSTATE;  Surgeon: Jacques Mcdowell M.D.;  Location: SURGERY [facility];  Service: Urology    IA CYSTOURETHROSCOPY N/A 2023    Procedure: CYSTOSCOPY;  Surgeon: Jacques Mcdowell M.D.;  Location: SURGERY [facility];  Service: Urology    IA CYSTO/URETERO/PYELOSCOPY W/LITHOTRIPSY N/A 2023    Procedure: CYSTOLITHLOPAXY;  Surgeon: Jacques Mcdowell M.D.;  Location: SURGERY [facility];  Service: Urology    CATARACT PHACO WITH IOL      OTHER      cyst removal back of the neck    OTHER ABDOMINAL SURGERY      gallbladder    RHINOPLASTY       Social History     Tobacco Use    Smoking status: Former     Current packs/day: 0.00     Average packs/day: 1 pack/day for 15.0 years (15.0 ttl pk-yrs)     Types: Cigarettes, Cigars     Start date: 1970     Quit date: 1985     Years since quittin.1    Smokeless tobacco: Never    Tobacco comments:     occasional cigar started in / quit completely    Substance Use Topics    Alcohol use: Yes     Comment: 2 beers / 2 shots in last month     Family and Occupational History     Socioeconomic History    Marital status:      Spouse name: Not on file    Number of children: Not on file    Years of education: Not on file    Highest education level: Associate degree: academic program   Occupational History    Not on file       Objective   Ambulation   Weight-Bearing Status   Weight-Bearing Status (Left): full weight bearing   Weight-Bearing Status (Right): full  weight-bearing      Comments   Shuffling gait    TU.5 seconds  LUNA balance: 46/56  30 seconds sit to stand: 10        Therapeutic Exercises (CPT 66430):       Therapeutic Exercise Summary: Pt was educated today regarding current fall risk, expectations for rehab potential and appropriate exercise program for home. HEP was given to patient in print out form and instructions were communicated to pt.         Time-based treatments/modalities:           Assessment, Response and Plan:   Impairments: abnormal gait and activity intolerance    Assessment details:  Pt is a 74 y/o M presenting to PT with signs and symptoms consistent with decreased gait speed, stride length, and balance deficits secondary to neuropathy. Pt has deficits of loss of balance, poor endurance and gait deviations. Given pt's age, overall health, current condition and adherence to PT recommendations, anticipated duration of episode is 8 weeks.      Prognosis: fair    Goals:   Short Term Goals:   1. Pt will achieve 12 or greater on 30 seconds sit to stand test  2. Pt will achieve 10 seconds or greater on tandem stance B/L    Short term goal time span:  2-4 weeks      Long Term Goals:    1. Pt will achieve TUG within age appropriate norms  2. Pt will be able to ambulate 1 mile or greater without risk of falls and with appropriate tolerance  Long term goal time span:  6-8 weeks    Plan:   Therapy options:  Physical therapy treatment to continue  Planned therapy interventions:  Neuromuscular Re-education (CPT 34208), Therapeutic Exercise (CPT 68144) and Therapeutic Activities (CPT 46072)  Frequency:  2x week  Duration in weeks:  8  Discussed with:  Patient      Functional Assessment Used  PT Functional Assessment Tool Used: ABC scale  PT Functional Assessment Score: 63.13%     Referring provider co-signature:  I have reviewed this plan of care and my co-signature certifies the need for services.    Certification Period: 2024 to   04/02/24    Physician Signature: ________________________________ Date: ______________

## 2024-02-05 ENCOUNTER — OFFICE VISIT (OUTPATIENT)
Dept: ENDOCRINOLOGY | Facility: MEDICAL CENTER | Age: 74
End: 2024-02-05
Attending: INTERNAL MEDICINE
Payer: MEDICARE

## 2024-02-05 VITALS
SYSTOLIC BLOOD PRESSURE: 140 MMHG | DIASTOLIC BLOOD PRESSURE: 68 MMHG | HEIGHT: 71 IN | BODY MASS INDEX: 38.5 KG/M2 | OXYGEN SATURATION: 90 % | HEART RATE: 67 BPM | WEIGHT: 275 LBS

## 2024-02-05 DIAGNOSIS — D35.2 PITUITARY ADENOMA (HCC): ICD-10-CM

## 2024-02-05 DIAGNOSIS — E55.9 HYPOVITAMINOSIS D: ICD-10-CM

## 2024-02-05 DIAGNOSIS — R73.03 PREDIABETES: ICD-10-CM

## 2024-02-05 DIAGNOSIS — E66.9 OBESITY (BMI 35.0-39.9 WITHOUT COMORBIDITY): ICD-10-CM

## 2024-02-05 PROCEDURE — 1170F FXNL STATUS ASSESSED: CPT | Performed by: INTERNAL MEDICINE

## 2024-02-05 PROCEDURE — 3077F SYST BP >= 140 MM HG: CPT | Performed by: INTERNAL MEDICINE

## 2024-02-05 PROCEDURE — 3078F DIAST BP <80 MM HG: CPT | Performed by: INTERNAL MEDICINE

## 2024-02-05 PROCEDURE — 99204 OFFICE O/P NEW MOD 45 MIN: CPT | Performed by: INTERNAL MEDICINE

## 2024-02-05 RX ORDER — METFORMIN HYDROCHLORIDE 500 MG/1
1000 TABLET, EXTENDED RELEASE ORAL DAILY
Qty: 180 TABLET | Refills: 1 | Status: SHIPPED | OUTPATIENT
Start: 2024-02-05

## 2024-02-05 RX ORDER — CICLOPIROX 80 MG/ML
SOLUTION TOPICAL
COMMUNITY
Start: 2024-01-22

## 2024-02-05 RX ORDER — DEXAMETHASONE 1 MG
1 TABLET ORAL
Qty: 2 TABLET | Refills: 0 | Status: SHIPPED
Start: 2024-02-05 | End: 2024-03-28

## 2024-02-05 ASSESSMENT — FIBROSIS 4 INDEX: FIB4 SCORE: 3.19

## 2024-02-05 NOTE — PROGRESS NOTES
Chief Complaint: Consult requested by NATACHA Hawthorne for evaluation of Hypothyroidism    HPI:     Laureen Munoz is a 73 y.o. male with pituitary macroadenoma that was initially diagnosed in 2021 he was seen by Dr. Méndez (neurosurgeon)  and [facility] initially and then was referred by Dr. Vila to Dr. Castro.  His initial MRI showed an 11 x 13 mm pituitary microadenoma with slight deviation of the pituitary stalk to the left side and with no evidence of optic chiasm impingement.  He has been seen by ophthalmology regularly annually with visual field exams with normal findings and his last MRI was in April 2023 showing a stable pituitary microadenoma.  His initial hormonal evaluation done in 2021 showed no evidence of hormone hypersecretion except there was no evaluation done for Cushing's disease.  His baseline prolactin was normal 10 IGF-I was normal at 102, TSH was 1.17 Free T4 was 1.11 and cortisol was 10.    He has a history of benign prostatic hypertrophy status post surgery by Dr. Mcdowell.  He also has obesity with a BMI greater than 40 at baseline he weighs 275 pounds.  He also has prediabetes with baseline A1c of 6% in 2020.  He is currently not on medications for this is hide his A1c was 6.4% in 2023.      At this time he feels that he is doing well.  He denies headaches, denies double vision and diplopia.  He does have blurry vision when he reads.  He denies changes in his hand or foot size.  He denies excessive palmar sweating.  He denies unexplained weight loss, tremors, palpitations and insomnia.  He denies fractures.  He denies symptoms of hypogonadism such as hot flashes and decreased libido.  He also denies depression and anxiety.        Patient's medications, allergies, and social histories were reviewed and updated as appropriate.      ROS:     CONS:     No fever, no chills, no weight loss, no fatigue   EYES:      No diplopia, no blurry vision, no redness of eyes, no swelling of  eyelids   ENT:    No hearing loss, No ear pain, No sore throat, no dysphagia, no neck swelling   CV:     No chest pain, no palpitations, no claudication, no orthopnea, no PND   PULM:    No SOB, no cough, no hemoptysis, no wheezing    GI:   No nausea, no vomiting, no diarrhea, no constipation, no bloody stools   :  Passing urine well, no dysuria, no hematuria   ENDO:   No polyuria, no polydipsia, no heat intolerance, no cold intolerance   NEURO: No headaches, no dizziness, no convulsions, no tremors   MUSC:  No joint swellings, no arthralgias, no myalgias, no weakness   SKIN:   No rash, no ulcers, no dry skin   PSYCH:   No depression, no anxiety, no difficulty sleeping       Past Medical History:  Patient Active Problem List    Diagnosis Date Noted    Morbid obesity with BMI of 50.0-59.9, adult ([facility]) 01/23/2024    JUAN (obstructive sleep apnea) 01/23/2024    Sensory deficit, bilateral 12/27/2023    Paresthesia of both feet 12/27/2023    Prediabetes 09/27/2023    Bladder outlet obstruction 08/17/2023    BPPV (benign paroxysmal positional vertigo) 04/18/2023    Cyst of left kidney 04/04/2023    Localized edema 03/07/2023    Pulmonary hypertension ([facility]) 03/07/2023    Pituitary adenoma ([facility]) 06/09/2021    Enlarged pituitary gland ([facility]) 06/08/2021    IFG (impaired fasting glucose) 05/28/2020    Rosacea 10/30/2019    Erectile dysfunction 10/30/2018    Obesity (BMI 35.0-39.9 without comorbidity) 07/26/2018    Hypovitaminosis D 04/11/2014    Idiopathic neuropathy 07/20/2012    Essential hypertension 07/20/2012    Hypercholesteremia 07/20/2012    Benign prostatic hyperplasia 07/20/2012       Past Surgical History:  Past Surgical History:   Procedure Laterality Date    MN TRANSURETHRAL ELEC-SURG PROSTATECTOM N/A 8/17/2023    Procedure: BIPOLAR TRANSURETHRAL RESECTION OF PROSTATE;  Surgeon: Jacques Mcdowell M.D.;  Location: SURGERY [facility];  Service: Urology    MN CYSTOURETHROSCOPY N/A 8/17/2023    Procedure: CYSTOSCOPY;   Surgeon: Jacques Mcdowell M.D.;  Location: SURGERY [facility];  Service: Urology    VA CYSTO/URETERO/PYELOSCOPY W/LITHOTRIPSY N/A 8/17/2023    Procedure: CYSTOLITHLOPAXY;  Surgeon: Jacques Mcdowell M.D.;  Location: SURGERY [facility];  Service: Urology    CATARACT PHACO WITH IOL      OTHER  1992    cyst removal back of the neck    OTHER ABDOMINAL SURGERY      gallbladder    RHINOPLASTY          Allergies:  Gabapentin     Current Medications:    Current Outpatient Medications:     ciclopirox (PENLAC) 8 % solution, , Disp: , Rfl:     metFORMIN ER (GLUCOPHAGE XR) 500 MG TABLET SR 24 HR, Take 2 Tablets by mouth every day., Disp: 180 Tablet, Rfl: 1    dexamethasone (DECADRON) 1 MG Tab, Take 1 Tablet by mouth one time as needed (TAKE AT MIDNIGHT FOR TEST) for up to 1 dose., Disp: 2 Tablet, Rfl: 0    losartan-hydrochlorothiazide (HYZAAR) 50-12.5 MG per tablet, Take 1 Tablet by mouth every day., Disp: 90 Tablet, Rfl: 3    meclizine (ANTIVERT) 12.5 MG Tab, Take 1 Tablet by mouth 3 times a day as needed for Dizziness., Disp: 30 Tablet, Rfl: 0    rosuvastatin (CRESTOR) 20 MG Tab, Take 1 Tablet by mouth every evening., Disp: 100 Tablet, Rfl: 0    metoprolol SR (TOPROL XL) 25 MG TABLET SR 24 HR, Take 1 Tablet by mouth every morning. TAKE ONE TABLET BY MOUTH ONE TIME DAILY, Disp: 100 Tablet, Rfl: 3    alfuzosin (UROXATRAL) 10 MG SR tablet, at bedtime., Disp: , Rfl:     fluticasone (FLONASE) 50 MCG/ACT nasal spray, Administer 1 Spray into affected nostril(S) every morning., Disp: , Rfl:     Social History:  Social History     Socioeconomic History    Marital status:      Spouse name: Not on file    Number of children: Not on file    Years of education: Not on file    Highest education level: Associate degree: academic program   Occupational History    Not on file   Tobacco Use    Smoking status: Former     Current packs/day: 0.00     Average packs/day: 1 pack/day for 15.0 years (15.0 ttl pk-yrs)     Types:  Cigarettes, Cigars     Start date: 1970     Quit date: 1985     Years since quittin.1    Smokeless tobacco: Never    Tobacco comments:     occasional cigar started in / quit completely    Vaping Use    Vaping Use: Never used   Substance and Sexual Activity    Alcohol use: Yes     Comment: 2 beers / 2 shots in last month    Drug use: Not Currently     Types: Inhaled, Marijuana, Oral     Comment: rare occasions,occasionally     Sexual activity: Not Currently   Other Topics Concern    Not on file   Social History Narrative    Not on file     Social Determinants of Health     Financial Resource Strain: Medium Risk (4/15/2023)    Overall Financial Resource Strain (CARDIA)     Difficulty of Paying Living Expenses: Somewhat hard   Food Insecurity: No Food Insecurity (4/15/2023)    Hunger Vital Sign     Worried About Running Out of Food in the Last Year: Never true     Ran Out of Food in the Last Year: Never true   Transportation Needs: No Transportation Needs (4/15/2023)    PRAPARE - Transportation     Lack of Transportation (Medical): No     Lack of Transportation (Non-Medical): No   Physical Activity: Inactive (4/15/2023)    Exercise Vital Sign     Days of Exercise per Week: 0 days     Minutes of Exercise per Session: 0 min   Stress: No Stress Concern Present (4/15/2023)    [employer] [place] [employer] - Occupational Stress Questionnaire     Feeling of Stress : Only a little   Social Connections: Socially Isolated (4/15/2023)    Social Connection and Isolation Panel [NHANES]     Frequency of Communication with Friends and Family: More than three times a week     Frequency of Social Gatherings with Friends and Family: Once a week     Attends Yarsani Services: Never     Active Member of Clubs or Organizations: No     Attends Club or Organization Meetings: Never     Marital Status:    Intimate Partner Violence: Not on file   Housing Stability: Low Risk  (4/15/2023)    Housing  "Stability Vital Sign     Unable to Pay for Housing in the Last Year: No     Number of Places Lived in the Last Year: 1     Unstable Housing in the Last Year: No        Family History:   Family History   Problem Relation Age of Onset    Heart Disease Mother          at 89yo/heart failure    Heart Attack Mother     Breast Cancer Mother     Heart Disease Father          at 59yo/massive heart attack    Heart Attack Father     Hypertension Father          coronary thrombosis    Cancer Sister          of leukemia    Breast Cancer Sister     Stroke Sister         living lost eye site left eye    No Known Problems Maternal Grandmother     No Known Problems Maternal Grandfather     No Known Problems Paternal Grandmother     Dementia Paternal Grandfather     Hyperlipidemia Neg Hx     Diabetes Neg Hx          PHYSICAL EXAM:   Vital signs: BP (!) 140/68 (BP Location: Left arm, Patient Position: Sitting, BP Cuff Size: Adult)   Pulse 67   Ht 1.803 m (5' 11\")   Wt 125 kg (275 lb)   SpO2 90%   BMI 38.35 kg/m²   GENERAL: Well-developed, well-nourished  in no apparent distress.   EYE: No ocular and eyelid asymmetry, Anicteric sclerae,  PERRL  HENT: Hearing grossly intact, Normocephalic, atraumatic. Pink, moist mucous membranes, No exudate  NECK: Supple. Trachea midline. thyroid is normal in size without nodules or tenderness  CARDIOVASCULAR: Regular rate and rhythm. No murmurs, rubs, or gallops.   LUNGS: Clear to auscultation bilaterally   ABDOMEN: Soft, nontender with positive bowel sounds.   EXTREMITIES: No clubbing, cyanosis, or edema.   NEUROLOGICAL: Cranial nerves II-XII are grossly intact   Symmetric reflexes at the patella no proximal muscle weakness  LYMPH: No cervical, supraclavicular,  adenopathy palpated.   SKIN: No rashes, lesions. Turgor is normal.    Labs:  Lab Results   Component Value Date/Time    WBC 11.5 (H) 2023 12:44 AM    WBC 9.4 2010 04:54 PM    RBC 4.42 (L) 2023 12:44 AM " "   RBC 5.39 2010 04:54 PM    HEMOGLOBIN 13.1 (L) 2023 12:44 AM    MCV 93.0 2023 12:44 AM    MCV 89 2010 04:54 PM    MCH 29.6 2023 12:44 AM    MCH 29.3 2010 04:54 PM    MCHC 31.9 (L) 2023 12:44 AM    RDW 46.4 2023 12:44 AM    RDW 14.0 2010 04:54 PM    MPV 10.5 2023 12:44 AM       Lab Results   Component Value Date/Time    SODIUM 133 (L) 2023 12:44 AM    POTASSIUM 4.7 2023 12:44 AM    CHLORIDE 100 2023 12:44 AM    CO2 24 2023 12:44 AM    ANION 9.0 2023 12:44 AM    GLUCOSE 156 (H) 2023 12:44 AM    BUN 17 2023 12:44 AM    CREATININE 0.99 2023 12:44 AM    CREATININE 0.92 2010 04:54 PM    CALCIUM 8.1 (L) 2023 12:44 AM    ASTSGOT 28 2023 08:22 AM    ALTSGPT 26 2023 08:22 AM    TBILIRUBIN 1.4 2023 08:22 AM    ALBUMIN 4.4 2023 08:22 AM    TOTPROTEIN 7.2 2023 08:22 AM    GLOBULIN 2.8 2023 08:22 AM    AGRATIO 1.6 2023 08:22 AM       Lab Results   Component Value Date/Time    CHOLSTRLTOT 107 2023 1220    TRIGLYCERIDE 77 2023 1220    HDL 49 2023 1220    LDL 43 2023 1220    CHOLHDLRAT 4.7 2010 1654       Lab Results   Component Value Date/Time    TSHULTRASEN 1.670 2023 0822     Lab Results   Component Value Date/Time    FREET4 1.11 2022 0900     Lab Results   Component Value Date/Time    FREET3 3.63 2022 0900     No results found for: \"THYSTIMIG\"    Lab Results   Component Value Date/Time    MICROSOMALA 9.0 2022 0853         Imagin2023 8:59 AM     HISTORY/REASON FOR EXAM:  Probable pituitary macroadenoma follow-up.        TECHNIQUE/EXAM DESCRIPTION:  MRI of the brain without and with contrast, pituitary protocol.     T1 3D TFE sagittal, T2 Drive Turbo spin-echo axial, and 3D FLAIR coronal images were obtained of the whole brain. Thin-section images of the sella were obtained including: T2 MARILYN coronal, T1 or " T1-weighted 3D MARILYN precontrast coronal, T1 or T1-weighted 3D   MARILYN postcontrast coronal, T1 or T1-weighted 3DTSE precontrast sagittal and T1 or T1-weighted 3D MARILYN postcontrast sagittal images. Optional T1-weighted dynamic contrast-infused coronal MARILYN images may also be obtained.     The study was performed on a [employer] Signa 1.5 Alethea MRI scanner.     20 mL ProHance contrast was administered intravenously.     COMPARISON:  6/8/2021     FINDINGS:  The thin-section images of the sella redemonstrate a hypoenhancing 11 x 13 mm pituitary mass in the right side of the gland. There is unchanged deviation of the pituitary stalk leftward. There is no evidence of invasion of the adjacent right cavernous   sinus. The cavernous sinuses show normal enhancement and configuration. Vascular flow voids in the juxtasellar carotid arteries are unremarkable.     The images of the whole brain show the calvaria to be unremarkable. There are no extraaxial fluid collections. There is a pattern of mild cerebral atrophy manifest as prominence of sulcal markings over the convexities and vertex along with mild   ventriculomegaly. There is a pattern of mild supratentorial white matter disease with scattered foci of bright T2 and FLAIR signal in the subcortical and deep white matter of both hemispheres consistent with small vessel ischemic change versus   demyelination or gliosis. There are chronic bilateral cerebellar lacunar infarcts. There is a new small focus of encephalomalacia in the left parietal lobe with focal linear gradient echo signal hypointensity which may be related to hemosiderin   deposition from interval hemorrhagic infarct or cortical contusion. There are no mass effects or shift of midline structures. There are no hemorrhagic lesions.     The brainstem and posterior fossa structures are unremarkable.     Vascular flow voids in the vertebrobasilar and carotid arteries, [place], and dural venous sinuses are intact.      The paranasal sinuses and mastoids in the field of view are unremarkable. The patient is status post cataract repair.     IMPRESSION:     1.  Stable hypoenhancing 11 x 13 mm pituitary mass most likely representing a pituitary macroadenoma.  2.  New small focus of encephalomalacia in the left parietal lobe with focal linear gradient echo signal hypointensity which may be related considering deposition from interval chronic hemorrhagic infarct or cortical contusion.  3.  Chronic bilateral cerebellar lacunar infarcts.  4.  Mild diffuse cerebral substance loss.  5.  Mild microangiopathic ischemic change versus demyelination or gliosis.    ASSESSMENT/PLAN:     1. Pituitary adenoma (HCC)  Stable.  He appears to have no clinical symptoms of hormonal hypersecretion and no symptoms of hormone deficiency from his pituitary adenoma.    I reviewed the MRI images with him and his pituitary adenoma is far from the optic chiasm.  At this time I recommend completing his comprehensive hormone evaluation to make sure that he does not have hypercortisolism and schedule him for a low-dose overnight dexamethasone suppression test to screen for hypercortisolism and Cushing's disease and I will update him  I am also checking his IGF-I and prolactin and TSH and free T4 levels  He is not interested in hypogonadism treatment at this time and to be honest his testosterone levels were not severely low to begin with.  Will check his testosterone levels next time because I am afraid the dexamethasone might affect the validity of the testosterone levels  I will see him again in 6 months      2. Prediabetes  Unstable he has progressive prediabetes he also has obesity and multiple risk factors for type 2 diabetes I recommend he start metformin for diabetes prevention also recommend that he exercise and watch his carb intake closely.  I reviewed the side effects of metformin and proper administration.    3. Hypovitaminosis D  Stable we will check  calcium vitamin D with upcoming labs    4. Obesity (BMI 35.0-39.9 without comorbidity)  Stable reviewed importance of diet and exercise and lifestyle changes for diabetes prevention      Return in about 6 months (around 8/5/2024).       This patient during there office visit was started on new medication.  Side effects of new medications were discussed with the patient today in the office. The patient was supplied paperwork on this new medication.    Thank you kindly for allowing me to participate in the thyroid care plan for this patient.    Delvis Ordaz MD, FACE, [facility]  02/05/24    CC:   NATACHA Hawthorne

## 2024-02-06 ENCOUNTER — APPOINTMENT (OUTPATIENT)
Dept: MEDICAL GROUP | Facility: IMAGING CENTER | Age: 74
End: 2024-02-06
Payer: MEDICARE

## 2024-02-08 ENCOUNTER — HOSPITAL ENCOUNTER (OUTPATIENT)
Dept: LAB | Facility: MEDICAL CENTER | Age: 74
End: 2024-02-08
Attending: INTERNAL MEDICINE
Payer: MEDICARE

## 2024-02-08 DIAGNOSIS — R73.03 PREDIABETES: ICD-10-CM

## 2024-02-08 DIAGNOSIS — D35.2 PITUITARY ADENOMA (HCC): ICD-10-CM

## 2024-02-08 DIAGNOSIS — E55.9 HYPOVITAMINOSIS D: ICD-10-CM

## 2024-02-08 DIAGNOSIS — E66.9 OBESITY (BMI 35.0-39.9 WITHOUT COMORBIDITY): ICD-10-CM

## 2024-02-08 LAB
25(OH)D3 SERPL-MCNC: 33 NG/ML (ref 30–100)
ALBUMIN SERPL BCP-MCNC: 4.3 G/DL (ref 3.2–4.9)
ALBUMIN/GLOB SERPL: 1.4 G/DL
ALP SERPL-CCNC: 111 U/L (ref 30–99)
ALT SERPL-CCNC: 27 U/L (ref 2–50)
ANION GAP SERPL CALC-SCNC: 12 MMOL/L (ref 7–16)
AST SERPL-CCNC: 26 U/L (ref 12–45)
BILIRUB SERPL-MCNC: 1.3 MG/DL (ref 0.1–1.5)
BUN SERPL-MCNC: 20 MG/DL (ref 8–22)
CALCIUM ALBUM COR SERPL-MCNC: 9.3 MG/DL (ref 8.5–10.5)
CALCIUM SERPL-MCNC: 9.5 MG/DL (ref 8.4–10.2)
CHLORIDE SERPL-SCNC: 102 MMOL/L (ref 96–112)
CO2 SERPL-SCNC: 23 MMOL/L (ref 20–33)
CORTIS SERPL-MCNC: 0.9 UG/DL (ref 0–23)
CREAT SERPL-MCNC: 1.06 MG/DL (ref 0.5–1.4)
FASTING STATUS PATIENT QL REPORTED: NORMAL
GFR SERPLBLD CREATININE-BSD FMLA CKD-EPI: 74 ML/MIN/1.73 M 2
GLOBULIN SER CALC-MCNC: 3.1 G/DL (ref 1.9–3.5)
GLUCOSE SERPL-MCNC: 139 MG/DL (ref 65–99)
POTASSIUM SERPL-SCNC: 4.2 MMOL/L (ref 3.6–5.5)
PROLACTIN SERPL-MCNC: 12.7 NG/ML (ref 2.1–17.7)
PROT SERPL-MCNC: 7.4 G/DL (ref 6–8.2)
SODIUM SERPL-SCNC: 137 MMOL/L (ref 135–145)
T4 FREE SERPL-MCNC: 1.19 NG/DL (ref 0.93–1.7)
TSH SERPL DL<=0.005 MIU/L-ACNC: 0.99 UIU/ML (ref 0.38–5.33)

## 2024-02-08 PROCEDURE — 84305 ASSAY OF SOMATOMEDIN: CPT

## 2024-02-08 PROCEDURE — 84443 ASSAY THYROID STIM HORMONE: CPT

## 2024-02-08 PROCEDURE — 84146 ASSAY OF PROLACTIN: CPT

## 2024-02-08 PROCEDURE — 82533 TOTAL CORTISOL: CPT

## 2024-02-08 PROCEDURE — 36415 COLL VENOUS BLD VENIPUNCTURE: CPT

## 2024-02-08 PROCEDURE — 80299 QUANTITATIVE ASSAY DRUG: CPT

## 2024-02-08 PROCEDURE — 82306 VITAMIN D 25 HYDROXY: CPT

## 2024-02-08 PROCEDURE — 80053 COMPREHEN METABOLIC PANEL: CPT

## 2024-02-08 PROCEDURE — 84439 ASSAY OF FREE THYROXINE: CPT

## 2024-02-09 ENCOUNTER — PHYSICAL THERAPY (OUTPATIENT)
Dept: PHYSICAL THERAPY | Facility: MEDICAL CENTER | Age: 74
End: 2024-02-09
Attending: PSYCHIATRY & NEUROLOGY
Payer: MEDICARE

## 2024-02-09 DIAGNOSIS — R26.89 LOSS OF BALANCE: ICD-10-CM

## 2024-02-09 LAB
IGF-I SERPL-MCNC: 112 NG/ML (ref 25–242)
IGF-I Z-SCORE SERPL: 0.2

## 2024-02-09 PROCEDURE — 97110 THERAPEUTIC EXERCISES: CPT

## 2024-02-09 NOTE — OP THERAPY DAILY TREATMENT
"  Outpatient Physical Therapy  DAILY TREATMENT     [facility] Outpatient Physical Therapy  [address]  [place] [address]  Phone:  [phone]  Fax:  [phone]    Date: 02/09/2024    Patient: Laureen Munoz  YOB: 1950  MRN: [id number]     Time Calculation    Start time: 1540  Stop time: 1620 Time Calculation (min): 40 minutes         Chief Complaint: Loss Of Balance    Visit #: 2    SUBJECTIVE:  Pt reports he was recently prescribed metformin, as a result he has side affects which include fatigue and diarrhea.             Therapeutic Exercises (CPT 22969):     1. Romberg, 3x15\" EC    2. Mini squat, 2x12, HEP    3. Heel raise, 2x12, HEP    4. Lateral stepping, 3x4 laps, HEP    5. Tandem stance (EO), 4x15\" holds, HEP      Therapeutic Exercise Summary: Pt was educated today regarding expectations for current POC and exercise program at home.       Time-based treatments/modalities:    Physical Therapy Timed Treatment Charges  Therapeutic exercise minutes (CPT 37559): 40 minutes      Pain rating (1-10) before treatment:  0    ASSESSMENT:   Response to treatment: Pt had good tolerance for today's PT session. Pt will continue to benefit from skilled PT to address aforementioned deficits.      PLAN/RECOMMENDATIONS:   Plan for treatment: therapy treatment to continue next visit.  Planned interventions for next visit: continue with current treatment, neuromuscular re-education (CPT 48219), therapeutic activities (CPT 81385), and therapeutic exercise (CPT 61271).         "

## 2024-02-13 ENCOUNTER — SLEEP STUDY (OUTPATIENT)
Dept: SLEEP MEDICINE | Facility: MEDICAL CENTER | Age: 74
End: 2024-02-13
Attending: INTERNAL MEDICINE
Payer: MEDICARE

## 2024-02-13 DIAGNOSIS — G47.33 OSA (OBSTRUCTIVE SLEEP APNEA): ICD-10-CM

## 2024-02-13 LAB — TEST NAME 95000: NORMAL

## 2024-02-13 PROCEDURE — 95811 POLYSOM 6/>YRS CPAP 4/> PARM: CPT | Performed by: STUDENT IN AN ORGANIZED HEALTH CARE EDUCATION/TRAINING PROGRAM

## 2024-02-16 ENCOUNTER — APPOINTMENT (OUTPATIENT)
Dept: PHYSICAL THERAPY | Facility: MEDICAL CENTER | Age: 74
End: 2024-02-16
Attending: PSYCHIATRY & NEUROLOGY
Payer: MEDICARE

## 2024-02-16 NOTE — PROCEDURES
Patient: HONG PALACIO  ID: [id number] Date: 2/13/2024   MONTAGE: Standard  STUDY TYPE: Treatment  RECORDING TECHNIQUE:   After the scalp was prepared, gold plated electrodes were applied to the scalp according to the International 10-20 System. EEG (electroencephalogram) was continuously monitored from the O1-M2, O2-M1, C3-M2, C4-M1, F3-M2, and F4-M1. EOGs (electrooculograms) were monitored by electrodes placed at the left and right outer canthi. Chin EMG (electromyogram) was monitored by electrodes placed on the mentalis and sub-mentalis muscles. Nasal and oral airflow were monitored using a triple port thermocouple as well as oronasal pressure transducer. Respiratory effort was measured by inductive plethysmography technology employing abdominal and thoracic belts. Blood oxygen saturation and pulse were monitored by pulse oximetry. Heart rhythm was monitored by surface electrocardiogram. Leg EMG was studied using surface electrodes placed on left and right anterior tibialis. A microphone was used to monitor tracheal sounds and snoring. Body position was monitored and documented by technician observation.   SCORING CRITERIA:   A modification of the AASM manual for scoring of sleep and associated events was used. Obstructive apneas were scored by cessation of airflow for at least 10 seconds with continuing respiratory effort. Central apneas were scored by cessation of airflow for at least 10 seconds with no respiratory effort. Hypopneas were scored by a 30% or more reduction in airflow for at least 10 seconds accompanied by arterial oxygen desaturation of 3% or an arousal. For CMS (Medicare) patients, per AASM rule 1B, hypopneas are scored by 30% with mild reduction in airflow for at least 10 seconds accompanied by arterial saturation decreased at 4%.  Study start time was 09:58:05 PM. Diagnostic recording time was 7h 19.0m with a total sleep time of 4h 13.0m resulting in a sleep efficiency of 57.63%%. Sleep latency  from the start of the study was 26 minutes and the latency from sleep to REM was 68 minutes. In total,184 arousals were scored for an arousal index of 43.6.  Respiratory:  There were a total of 130 apneas consisting of 2 obstructive apneas, 0 mixed apneas, and 128 central apneas. A total of 70 hypopneas were scored. The apnea index was 30.83 per hour and the hypopnea index was 16.60 per hour resulting in an overall AHI of 47.43. AHI during REM was 10.0 and AHI while supine was 95.29.  Central apneas accounted for 64% of respiratory events  Oximetry:  There was a mean oxygen saturation of 89.0%. The minimum oxygen saturation in NREM was 80.0 % and in REM was 85.0%. The patient spent 149.8 minutes of TST with SaO2 <88%.  Cardiac:  The highest heart rate seen while awake was 95 BPM while the highest heart rate during sleep was 94 BPM with an average sleeping heart rate of 58 BPM.  Limb Movements:  There were a total of 0 PLMs during sleep which resulted in a PLMS index of 0.0. Of these, 28 were associated with arousals which resulted in a PLMS arousal index of 6.6.  Titration:   CPAP was tried from 6 to 12. BiPAP was tried from 15/11 to 19/14.  This was a fully attended sleep study. This test was technically adequate. This patient was titrated on CPAP starting at 6 cm of water pressure. Patient was titrated up to BiPAP 19/14 cm of water pressure.       Impression:  1.  Patient used CPAP and BiPAP during night of study  2.  Did have some improvement with obstructive sleep apnea at lower PAP pressures, however as PAP pressures increased central apneas became more apparent  3.  Sleep was fragmented with a decrease sleep efficiency of 57.6%  4.  Increased central apneas accounting for 64% of respiratory events, meeting criteria for complex sleep apnea  5.  No supine REM sleep was seen during night of study  6.  Significant sleep fragmentation second portion of night where majority of central apneas  occurred.    Recommendations:  I recommend the patient should consider return for a ASV titration due to the severity of complex sleep apnea and sleep-related hypoxia.     In some cases alternative treatment options may be proven effective in resolving sleep apnea. These options include upper airway surgery, the use of a dental orthotic, weight loss, or positional therapy. Clinical correlation is required. In general patients with sleep apnea are advised to avoid alcohol, sedatives and not to operate a motor vehicle while drowsy.  Untreated sleep apnea increases the risk for cardiovascular and neurovascular disease.

## 2024-02-19 ENCOUNTER — PHYSICAL THERAPY (OUTPATIENT)
Dept: PHYSICAL THERAPY | Facility: MEDICAL CENTER | Age: 74
End: 2024-02-19
Attending: PSYCHIATRY & NEUROLOGY
Payer: MEDICARE

## 2024-02-19 DIAGNOSIS — R26.89 LOSS OF BALANCE: ICD-10-CM

## 2024-02-19 PROCEDURE — 97112 NEUROMUSCULAR REEDUCATION: CPT

## 2024-02-19 PROCEDURE — 97530 THERAPEUTIC ACTIVITIES: CPT

## 2024-02-19 NOTE — OP THERAPY DAILY TREATMENT
"  Outpatient Physical Therapy  DAILY TREATMENT     [facility]  [address]  [place] [address]  Phone:  [phone]  Fax:  [phone]    Date: 02/19/2024    Patient: Laureen Munoz  YOB: 1950  MRN: [id number]     Time Calculation    Start time: 1545  Stop time: 1625 Time Calculation (min): 40 minutes         Chief Complaint: Loss Of Balance    Visit #: 3    SUBJECTIVE:  Pt reports minimal change with balance since onset of PT. Pt reports he has had episodes of dizziness, describing some times have sensations of room spinning and light headedness. Pt reports lately his BP readings have been in the 90's/40's.             Therapeutic Exercises (CPT 40820):     1. Romberg, 3x15\" EC, HEP    2. Mini squat, 2x12, NT    3. Heel raise, 2x12, HEP    4. Lateral stepping, 3x4 laps, HEP    5. Tandem stance (EO), 4x15\" holds, HEP      Therapeutic Exercise Summary: Pt was educated today regarding findings of lexy-hallpike, education regarding current blood pressure readings, expectations for pt to reach out to PCP should dizziness symptoms and low blood pressure continue.     Therapeutic Treatments and Modalities:     1. Therapeutic Activities (CPT 13468), Lexy-Hallpike, education, BP reading.    Time-based treatments/modalities:    Physical Therapy Timed Treatment Charges  Neuromusc re-ed, balance, coor, post minutes (CPT 87929): 15 minutes  Therapeutic activity minutes (CPT 03738): 30 minutes      ASSESSMENT:   Response to treatment: Pt had moderate tolerance for today's PT session. Pt's blood pressure was taken at 126/74 and pt denied any dizziness. Pt was thoroughly educated regarding negative findings of B/L lexy-hallpike testing as well as expectations for continued HEP program. Pt will continue to benefit from skilled PT to address aforementioned deficits.     PLAN/RECOMMENDATIONS:   Plan for treatment: therapy treatment to continue next " visit.  Planned interventions for next visit: continue with current treatment, neuromuscular re-education (CPT 08643), therapeutic activities (CPT 12917), and therapeutic exercise (CPT 60467).

## 2024-02-23 ENCOUNTER — APPOINTMENT (OUTPATIENT)
Dept: PHYSICAL THERAPY | Facility: MEDICAL CENTER | Age: 74
End: 2024-02-23
Attending: PSYCHIATRY & NEUROLOGY
Payer: MEDICARE

## 2024-03-01 ENCOUNTER — APPOINTMENT (OUTPATIENT)
Dept: PHYSICAL THERAPY | Facility: MEDICAL CENTER | Age: 74
End: 2024-03-01
Attending: PSYCHIATRY & NEUROLOGY
Payer: MEDICARE

## 2024-03-01 ENCOUNTER — APPOINTMENT (OUTPATIENT)
Dept: SLEEP MEDICINE | Facility: MEDICAL CENTER | Age: 74
End: 2024-03-01
Payer: MEDICARE

## 2024-03-04 ENCOUNTER — APPOINTMENT (RX ONLY)
Dept: URBAN - METROPOLITAN AREA CLINIC 4 | Facility: CLINIC | Age: 74
Setting detail: DERMATOLOGY
End: 2024-03-04

## 2024-03-04 DIAGNOSIS — L28.1 PRURIGO NODULARIS: ICD-10-CM

## 2024-03-04 DIAGNOSIS — L21.8 OTHER SEBORRHEIC DERMATITIS: ICD-10-CM

## 2024-03-04 DIAGNOSIS — L57.0 ACTINIC KERATOSIS: ICD-10-CM

## 2024-03-04 PROCEDURE — 17000 DESTRUCT PREMALG LESION: CPT

## 2024-03-04 PROCEDURE — ? LIQUID NITROGEN

## 2024-03-04 PROCEDURE — ? PRESCRIPTION

## 2024-03-04 PROCEDURE — 99214 OFFICE O/P EST MOD 30 MIN: CPT | Mod: 25

## 2024-03-04 PROCEDURE — ? COUNSELING

## 2024-03-04 RX ORDER — KETOCONAZOLE 20 MG/ML
1 SHAMPOO, SUSPENSION TOPICAL TIW
Qty: 120 | Refills: 1 | Status: ERX

## 2024-03-04 RX ORDER — FLUOROURACIL 2 G/40G
1 CREAM TOPICAL BID
Qty: 40 | Refills: 1 | Status: ERX | COMMUNITY
Start: 2024-03-04

## 2024-03-04 RX ORDER — TRIAMCINOLONE ACETONIDE 1 MG/G
CREAM TOPICAL BID
Qty: 454 | Refills: 0 | Status: ERX | COMMUNITY
Start: 2024-03-04

## 2024-03-04 RX ADMIN — TRIAMCINOLONE ACETONIDE: 1 CREAM TOPICAL at 00:00

## 2024-03-04 RX ADMIN — FLUOROURACIL 1: 2 CREAM TOPICAL at 00:00

## 2024-03-04 ASSESSMENT — LOCATION SIMPLE DESCRIPTION DERM
LOCATION SIMPLE: POSTERIOR SCALP
LOCATION SIMPLE: RIGHT FOREARM
LOCATION SIMPLE: POSTERIOR NECK
LOCATION SIMPLE: LEFT FOREARM
LOCATION SIMPLE: TRAPEZIAL NECK
LOCATION SIMPLE: RIGHT ZYGOMA
LOCATION SIMPLE: RIGHT FOREHEAD
LOCATION SIMPLE: RIGHT CHEEK
LOCATION SIMPLE: SUPERIOR FOREHEAD
LOCATION SIMPLE: LEFT FOREHEAD
LOCATION SIMPLE: LEFT CHEEK

## 2024-03-04 ASSESSMENT — LOCATION DETAILED DESCRIPTION DERM
LOCATION DETAILED: LEFT SUPERIOR LATERAL NECK
LOCATION DETAILED: RIGHT CENTRAL MALAR CHEEK
LOCATION DETAILED: RIGHT CENTRAL ZYGOMA
LOCATION DETAILED: LEFT SUPERIOR FOREHEAD
LOCATION DETAILED: RIGHT SUPERIOR MEDIAL MALAR CHEEK
LOCATION DETAILED: POSTERIOR MID-PARIETAL SCALP
LOCATION DETAILED: MID POSTERIOR NECK
LOCATION DETAILED: SUPERIOR MID FOREHEAD
LOCATION DETAILED: MID TRAPEZIAL NECK
LOCATION DETAILED: RIGHT PROXIMAL DORSAL FOREARM
LOCATION DETAILED: LEFT CENTRAL MALAR CHEEK
LOCATION DETAILED: RIGHT SUPERIOR FOREHEAD
LOCATION DETAILED: LEFT PROXIMAL DORSAL FOREARM

## 2024-03-04 ASSESSMENT — LOCATION ZONE DERM
LOCATION ZONE: FACE
LOCATION ZONE: NECK
LOCATION ZONE: SCALP
LOCATION ZONE: ARM

## 2024-03-04 NOTE — PROCEDURE: LIQUID NITROGEN
Detail Level: Detailed
Consent: The patient's consent was obtained including but not limited to risks of crusting, scabbing, blistering, scarring, darker or lighter pigmentary change, recurrence, incomplete removal and infection.
Show Aperture Variable?: Yes
Post-Care Instructions: I reviewed with the patient in detail post-care instructions. Patient is to wear sunprotection, and avoid picking at any of the treated lesions. Pt may apply Vaseline to crusted or scabbing areas.
Duration Of Freeze Thaw-Cycle (Seconds): 3
Aperture Size (Optional): C
Render Note In Bullet Format When Appropriate: No
Application Tool (Optional): Cry-AC
Number Of Freeze-Thaw Cycles: 1 freeze-thaw cycle

## 2024-03-05 ENCOUNTER — APPOINTMENT (OUTPATIENT)
Dept: RADIOLOGY | Facility: MEDICAL CENTER | Age: 74
End: 2024-03-05
Attending: INTERNAL MEDICINE
Payer: MEDICARE

## 2024-03-05 DIAGNOSIS — D35.2 PITUITARY ADENOMA (HCC): ICD-10-CM

## 2024-03-05 PROCEDURE — A9579 GAD-BASE MR CONTRAST NOS,1ML: HCPCS | Performed by: INTERNAL MEDICINE

## 2024-03-05 PROCEDURE — 700117 HCHG RX CONTRAST REV CODE 255: Performed by: INTERNAL MEDICINE

## 2024-03-05 PROCEDURE — 70553 MRI BRAIN STEM W/O & W/DYE: CPT

## 2024-03-05 RX ADMIN — GADOTERIDOL 20 ML: 279.3 INJECTION, SOLUTION INTRAVENOUS at 14:24

## 2024-03-06 ENCOUNTER — OFFICE VISIT (OUTPATIENT)
Dept: SLEEP MEDICINE | Facility: MEDICAL CENTER | Age: 74
End: 2024-03-06
Payer: MEDICARE

## 2024-03-06 VITALS
SYSTOLIC BLOOD PRESSURE: 130 MMHG | WEIGHT: 269 LBS | HEIGHT: 71 IN | RESPIRATION RATE: 18 BRPM | BODY MASS INDEX: 37.66 KG/M2 | DIASTOLIC BLOOD PRESSURE: 72 MMHG | OXYGEN SATURATION: 92 % | HEART RATE: 62 BPM

## 2024-03-06 DIAGNOSIS — G47.31 COMPLEX SLEEP APNEA SYNDROME: ICD-10-CM

## 2024-03-06 PROBLEM — G47.39 COMPLEX SLEEP APNEA SYNDROME: Status: ACTIVE | Noted: 2024-01-23

## 2024-03-06 PROCEDURE — 3075F SYST BP GE 130 - 139MM HG: CPT

## 2024-03-06 PROCEDURE — 99212 OFFICE O/P EST SF 10 MIN: CPT

## 2024-03-06 PROCEDURE — 99213 OFFICE O/P EST LOW 20 MIN: CPT

## 2024-03-06 PROCEDURE — 3078F DIAST BP <80 MM HG: CPT

## 2024-03-06 PROCEDURE — 1170F FXNL STATUS ASSESSED: CPT

## 2024-03-06 ASSESSMENT — FIBROSIS 4 INDEX: FIB4 SCORE: 2.91

## 2024-03-06 NOTE — ASSESSMENT & PLAN NOTE
Sleep Apnea:    The pathophysiology of sleep anea and the increased risk of cardiovascular morbidity from untreated sleep apnea is discussed in detail with the patient.  Urged to avoid supine sleep, weight gain and alcoholic beverages since all of these can worsen sleep apnea. Cautioned against drowsy driving. If feeling sleepy while driving, pull over for a break/nap, rather than persist on the road, in the interest of own safety and that of others on the road.    Titration study was reviewed and discussed with the patient.  We spent a significant time defining sleep apnea, risk of untreated sleep apnea, treatment options, and process of undergoing a ASV titration.    - Order placed for ASV titration  - Recommended the patient change out supplies as recommended for best mask fit and usage of the machine  - Advised patient to reach out via [employer] if any questions or concerns should arise.     The risks of untreated sleep apnea were discussed with the patient at length. Patients with sleep apnea are at increased risk of cardiovascular disease including coronary artery disease, systemic arterial hypertension, pulmonary arterial hypertension, cardiac arrythmias, and stroke. The patient was advised to avoid driving a motor vehicle when drowsy.    Positive airway pressure will favorably impact many of the adverse conditions and effects provoked by sleep apnea.

## 2024-03-15 ENCOUNTER — APPOINTMENT (OUTPATIENT)
Dept: PHYSICAL THERAPY | Facility: MEDICAL CENTER | Age: 74
End: 2024-03-15
Attending: PSYCHIATRY & NEUROLOGY
Payer: MEDICARE

## 2024-03-22 ENCOUNTER — APPOINTMENT (OUTPATIENT)
Dept: PHYSICAL THERAPY | Facility: MEDICAL CENTER | Age: 74
End: 2024-03-22
Attending: PSYCHIATRY & NEUROLOGY
Payer: MEDICARE

## 2024-03-25 ENCOUNTER — SLEEP STUDY (OUTPATIENT)
Dept: SLEEP MEDICINE | Facility: MEDICAL CENTER | Age: 74
End: 2024-03-25
Payer: MEDICARE

## 2024-03-25 DIAGNOSIS — G47.31 COMPLEX SLEEP APNEA SYNDROME: ICD-10-CM

## 2024-03-25 PROCEDURE — 95811 POLYSOM 6/>YRS CPAP 4/> PARM: CPT | Performed by: PREVENTIVE MEDICINE

## 2024-03-26 NOTE — PROCEDURES
Patient: HONG PALACIO  ID: [id number] Date: 3/25/2024      MONTAGE: Standard     STUDY TYPE: Treatment     RECORDING TECHNIQUE:     After the scalp was prepared, gold plated electrodes were applied to the scalp according to the International 10-20 System. EEG (electroencephalogram) was continuously monitored from the O1-M2, O2-M1, C3-M2, C4-M1, F3-M2, and F4-M1. EOGs (electrooculograms) were monitored by electrodes placed at the left and right outer canthi.  Chin EMG (electromyogram) was monitored by electrodes placed on the mentalis and sub-mentalis muscles.  Nasal and oral airflow were monitored using a triple port thermocouple as well as oronasal pressure transducer.  Respiratory effort was measured by inductive plethysmography technology employing abdominal and thoracic belts.  Blood oxygen saturation and pulse were monitored by pulse oximetry.  Heart rhythm was monitored by surface electrocardiogram.  Leg EMG was studied using surface electrodes placed on left and right anterior tibialis.  A microphone was used to monitor tracheal sounds and snoring.  Body position was monitored and documented by technician observation.      SCORING CRITERIA:      A modification of the AASM manual for scoring of sleep and associated events was used. Obstructive apneas were scored by cessation of airflow for at least 10 seconds with continuing respiratory effort. Central apneas were scored by cessation of airflow for at least 10 seconds with no respiratory effort.  Hypopneas were scored by a 30% or more reduction in airflow for at least 10 seconds accompanied by arterial oxygen desaturation of 3% or an arousal. For CMS (Medicare) patients, per AASM rule 1B, hypopneas are scored by 30% with mild reduction in airflow for at least 10 seconds accompanied by arterial saturation decreased at 4%.  Study start time was 10:11:23 PM.  Diagnostic recording time was 7h 55.0m with a total sleep time of 3h 39.0m resulting in a sleep efficiency  of 46.11%%.  Sleep latency from the start of the study was 18 minutes and the latency from sleep to REM was 76 minutes. In total,40  arousals were scored for an arousal index of 11.0.    Respiratory:  There were a total of 0 apneas consisting of 0 obstructive apneas, 0 mixed apneas, and 0 central apneas.  A total of 32 hypopneas were scored. The apnea index was 0.00 per hour and the hypopnea index was 8.77 per hour resulting in an overall AHI of 8.77. AHI during REM was 2.9 and AHI while supine was 0.00.    Oximetry:  There was a mean oxygen saturation of 89.0%.  The minimum oxygen saturation in NREM was 82.0 % and in REM was 80.0%. The patient spent 131.7 minutes of TST with SaO2 <88%.    Cardiac:  The highest heart rate seen while awake was 96 BPM while the highest heart rate during sleep was 86 BPM with an average sleeping heart rate of 58 BPM.    Limb Movements:  There were a total of 0 PLMs during sleep which resulted in a PLMS index of 0.0.  Of these, 1 were associated with arousals which resulted in a PLMS arousal index of 0.3.    Titration:  ASV was tried from EPAP:5, PS: 3/15 to EPAP:9, PS: 3/15  This was a fully attended sleep study. This test was technically adequate. This patient was titrated on CPAP starting at *** cm of water pressure. Patient was titrated up to *** cm of water pressure. Patient did best at *** cm of water pressure. Patient spent *** minutes at that pressure and the AHI was *** which is considered *** obstructive sleep apnea.    Assessment:   ***Obstructive Sleep Apnea Hypopnea - AHI*** ***Nocturnal desaturation - annalise saturation ***% - saturations <88% below for *** minutes of TST.    Recommendation:

## 2024-03-28 ENCOUNTER — OFFICE VISIT (OUTPATIENT)
Dept: MEDICAL GROUP | Facility: IMAGING CENTER | Age: 74
End: 2024-03-28
Payer: MEDICARE

## 2024-03-28 VITALS
DIASTOLIC BLOOD PRESSURE: 62 MMHG | RESPIRATION RATE: 16 BRPM | WEIGHT: 270.4 LBS | BODY MASS INDEX: 37.85 KG/M2 | SYSTOLIC BLOOD PRESSURE: 122 MMHG | HEIGHT: 71 IN | OXYGEN SATURATION: 93 % | TEMPERATURE: 97.8 F | HEART RATE: 61 BPM

## 2024-03-28 DIAGNOSIS — D35.2 PITUITARY ADENOMA (HCC): ICD-10-CM

## 2024-03-28 DIAGNOSIS — R73.03 PREDIABETES: ICD-10-CM

## 2024-03-28 DIAGNOSIS — I10 ESSENTIAL HYPERTENSION: Chronic | ICD-10-CM

## 2024-03-28 DIAGNOSIS — I70.1 ATHEROSCLEROSIS OF RENAL ARTERY (HCC): ICD-10-CM

## 2024-03-28 DIAGNOSIS — D69.6 THROMBOCYTOPENIA (HCC): ICD-10-CM

## 2024-03-28 DIAGNOSIS — Z12.11 ENCOUNTER FOR SCREENING COLONOSCOPY: ICD-10-CM

## 2024-03-28 DIAGNOSIS — Z00.00 ENCOUNTER FOR MEDICARE ANNUAL WELLNESS EXAM: ICD-10-CM

## 2024-03-28 RX ORDER — TRIAMCINOLONE ACETONIDE 1 MG/G
CREAM TOPICAL 2 TIMES DAILY
COMMUNITY

## 2024-03-28 RX ORDER — CETIRIZINE HYDROCHLORIDE 10 MG/1
10 TABLET ORAL DAILY
COMMUNITY

## 2024-03-28 RX ORDER — FLUOROURACIL 5 MG/G
0.5 CREAM TOPICAL DAILY
COMMUNITY

## 2024-03-28 ASSESSMENT — PAIN SCALES - GENERAL: PAINLEVEL: NO PAIN

## 2024-03-28 ASSESSMENT — ENCOUNTER SYMPTOMS: GENERAL WELL-BEING: FAIR

## 2024-03-28 ASSESSMENT — FIBROSIS 4 INDEX: FIB4 SCORE: 2.91

## 2024-03-28 ASSESSMENT — PATIENT HEALTH QUESTIONNAIRE - PHQ9: CLINICAL INTERPRETATION OF PHQ2 SCORE: 0

## 2024-03-28 ASSESSMENT — ACTIVITIES OF DAILY LIVING (ADL): BATHING_REQUIRES_ASSISTANCE: 0

## 2024-03-28 NOTE — PROGRESS NOTES
Chief Complaint   Patient presents with    Annual Exam     Physical     Referral Needed     GI colonoscopy          HPI:  Laureen Munoz is a 73 y.o. here for Medicare Annual Wellness Visit     Patient Active Problem List    Diagnosis Date Noted    Morbid obesity with BMI of 50.0-59.9, adult (HCC) 01/23/2024    Complex sleep apnea syndrome 01/23/2024    Sensory deficit, bilateral 12/27/2023    Paresthesia of both feet 12/27/2023    Prediabetes 09/27/2023    Bladder outlet obstruction 08/17/2023    BPPV (benign paroxysmal positional vertigo) 04/18/2023    Cyst of left kidney 04/04/2023    Localized edema 03/07/2023    Pulmonary hypertension (HCC) 03/07/2023    Pituitary adenoma ([facility]) 06/09/2021    Enlarged pituitary gland ([facility]) 06/08/2021    IFG (impaired fasting glucose) 05/28/2020    Rosacea 10/30/2019    Erectile dysfunction 10/30/2018    Obesity (BMI 35.0-39.9 without comorbidity) 07/26/2018    Hypovitaminosis D 04/11/2014    Idiopathic neuropathy 07/20/2012    Essential hypertension 07/20/2012    Hypercholesteremia 07/20/2012    Benign prostatic hyperplasia 07/20/2012       Current Outpatient Medications   Medication Sig Dispense Refill    fluoruracil (CARAC) 0.5 % cream Apply 0.5 % topically every day.      NON SPECIFIED Nervive dietary supp      cetirizine (ZYRTEC) 10 MG Tab Take 10 mg by mouth every day.      triamcinolone acetonide (KENALOG) 0.1 % Cream Apply  topically 2 times a day.      NON SPECIFIED OTC cerave moisturizing cream      Melatonin 1 MG Cap Take 5 mg by mouth every evening.      metFORMIN ER (GLUCOPHAGE XR) 500 MG TABLET SR 24 HR Take 2 Tablets by mouth every day. 180 Tablet 1    losartan-hydrochlorothiazide (HYZAAR) 50-12.5 MG per tablet Take 1 Tablet by mouth every day. 90 Tablet 3    meclizine (ANTIVERT) 12.5 MG Tab Take 1 Tablet by mouth 3 times a day as needed for Dizziness. 30 Tablet 0    rosuvastatin (CRESTOR) 20 MG Tab Take 1 Tablet by mouth every evening. 100 Tablet 0    metoprolol  SR (TOPROL XL) 25 MG TABLET SR 24 HR Take 1 Tablet by mouth every morning. TAKE ONE TABLET BY MOUTH ONE TIME DAILY 100 Tablet 3    fluticasone (FLONASE) 50 MCG/ACT nasal spray Administer 1 Spray into affected nostril(S) every morning.      ciclopirox (PENLAC) 8 % solution       dexamethasone (DECADRON) 1 MG Tab Take 1 Tablet by mouth one time as needed (TAKE AT MIDNIGHT FOR TEST) for up to 1 dose. 2 Tablet 0    alfuzosin (UROXATRAL) 10 MG SR tablet at bedtime.       No current facility-administered medications for this visit.            Current supplements as per medication list.       Allergies: Gabapentin    Current social contact/activities: ***     He  reports that he quit smoking about 39 years ago. His smoking use included cigarettes and cigars. He started smoking about 54 years ago. He has a 15 pack-year smoking history. He has never used smokeless tobacco. He reports current alcohol use of about 1.2 oz of alcohol per week. He reports that he does not currently use drugs after having used the following drugs: Marijuana and Inhaled.  Counseling given: Not Answered  Tobacco comments: occasional cigar started in 1996/ quit completely 2021      DPA/Advanced Directive:  {[place] ADVANCED DIRECTIVE:20335}    ROS:    Gait: Uses {DEVICE:54703}  Ostomy: {AWV YES / NO:21192}  Other tubes: {AWV YES / NO:21192}  Amputations: {AWV YES / NO:21192}  Chronic oxygen use: {AWV YES / NO:21192}  Last eye exam: ***  Wears hearing aids: {AWV YES / NO:21192}   : {Urinary leakage?:20840}    Screening:  ***  Depression Screening  Little interest or pleasure in doing things?     Feeling down, depressed , or hopeless?    Trouble falling or staying asleep, or sleeping too much?     Feeling tired or having little energy?     Poor appetite or overeating?     Feeling bad about yourself - or that you are a failure or have let yourself or your family down?    Trouble concentrating on things, such as reading the newspaper or watching television?     Moving or speaking so slowly that other people could have noticed.  Or the opposite - being so fidgety or restless that you have been moving around a lot more than usual?     Thoughts that you would be better off dead, or of hurting yourself?     Patient Health Questionnaire Score:      If depressive symptoms identified deferred to follow up visit unless specifically addressed in assessment and plan.    Interpretation of PHQ-9 Total Score   Score Severity   1-4 No Depression   5-9 Mild Depression   10-14 Moderate Depression   15-19 Moderately Severe Depression   20-27 Severe Depression    Screening for Cognitive Impairment  Do you or any of your friends or family members have any concern about your memory?    Three Minute Recall (Leader, Season, Table)  /3    Baldemar clock face with all 12 numbers and set the hands to show 10 minutes after 11.       Cognitive concerns identified deferred for follow up unless specifically addressed in assessment and plan.    Fall Risk Assessment  Has the patient had two or more falls in the last year or any fall with injury in the last year?       Safety Assessment  Do you always wear your seatbelt?     Any changes to home needed to function safely?    Difficulty hearing.     Patient counseled about all safety risks that were identified.    Functional Assessment ADLs  Are there any barriers preventing you from cooking for yourself or meeting nutritional needs?   .    Are there any barriers preventing you from driving safely or obtaining transportation?   .    Are there any barriers preventing you from using a telephone or calling for help?       Are there any barriers preventing you from shopping?   .    Are there any barriers preventing you from taking care of your own finances?       Are there any barriers preventing you from managing your medications?       Are there any barriers preventing you from showering, bathing or dressing yourself?      Are there any barriers preventing  you from doing housework or laundry?      Are there any barriers preventing you from using the toilet?     Are you currently engaging in any exercise or physical activity?   .      Self-Assessment of Health  What is your perception of your health?      Do you sleep more than six hours a night?      In the past 7 days, how much did pain keep you from doing your normal work?      Do you spend quality time with family or friends (virtually or in person)?      Do you usually eat a heart healthy diet that constists of a variety of fruits, vegetables, whole grains and fiber?      Do you eat foods high in fat and/or Fast Food more than three times per week?      How concerned are you that your medical conditions are not being well managed?      Are you worried that in the next 2 months, you may not have stable housing that you own, rent, or stay in as part of a household?          Advance Care Planning  Do you have an Advance Directive, Living Will, Durable Power of , or POLST?                   Health Maintenance Summary            Overdue - Pneumococcal Vaccine: 65+ Years (1 of 1 - PCV) Never done      No completion history exists for this topic.              Overdue - Zoster (Shingles) Vaccines (2 of 3) Overdue since 11/10/2016      09/15/2016  Imm Admin: Zoster Vaccine Live (ZVL) (Zostavax) - HISTORICAL DATA              Annual Wellness Visit (Yearly) Next due on 4/18/2024 04/18/2023  Visit Dx: Encounter for Medicare annual wellness exam    04/18/2023  Level of Service: [place] ANNUAL WELLNESS VISIT-INCLUDES PPPS SUBSEQUE*    09/08/2021  Subsequent Annual Wellness Visit - Includes PPPS ()    09/08/2021  Visit Dx: Medicare annual wellness visit, subsequent    09/28/2020  Done    Only the first 5 history entries have been loaded, but more history exists.              Colorectal Cancer Screening (Colonoscopy - Every 10 Years) Order placed this encounter      05/20/2014  AMB REFERRAL TO GI FOR COLONOSCOPY               IMM DTaP/Tdap/Td Vaccine (3 - Td or Tdap) Next due on 2/21/2033 02/21/2023  Imm Admin: Tdap Vaccine    01/16/2013  Imm Admin: Tdap Vaccine              Hepatitis C Screening  Tentatively Complete      05/14/2020  Hepatitis C Antibody component of HEP C VIRUS ANTIBODY              Abdominal Aortic Aneurysm (AAA) Screening  Tentatively Complete      03/22/2023  US-ABDOMINAL AORTA W/O DOPPLER              Influenza Vaccine (Series Information) Completed      09/27/2023  Imm Admin: Influenza Vaccine Adult HD    10/01/2022  Imm Admin: Influenza, Unspecified - HISTORICAL DATA    10/01/2020  Imm Admin: Influenza Vac Subunit Quad Inj (Pf)    10/30/2019  Imm Admin: Influenza Vaccine Adult HD    10/30/2018  Imm Admin: Influenza Vaccine Adult HD    Only the first 5 history entries have been loaded, but more history exists.              COVID-19 Vaccine (Series Information) Completed      10/30/2023  Imm Admin: Covid-19 Mrna (Spikevax) Moderna 12+ Years    07/30/2022  Imm Admin: MODERNA SARS-COV-2 VACCINE (12+)    12/06/2021  Imm Admin: MODERNA SARS-COV-2 VACCINE (12+)    04/10/2021  Imm Admin: MODERNA SARS-COV-2 VACCINE (12+)    03/12/2021  Imm Admin: MODERNA SARS-COV-2 VACCINE (12+)    Only the first 5 history entries have been loaded, but more history exists.              Hepatitis A Vaccine (Hep A) (Series Information) Aged Out      No completion history exists for this topic.              Hepatitis B Vaccine (Hep B) (Series Information) Aged Out      No completion history exists for this topic.              HPV Vaccines (Series Information) Aged Out      No completion history exists for this topic.              Polio Vaccine (Inactivated Polio) (Series Information) Aged Out      No completion history exists for this topic.              Meningococcal Immunization (Series Information) Aged Out      No completion history exists for this topic.                    Patient Care Team:  NATACHA Hawthorne  as PCP - General (Nurse Practitioner Family)  Julio Ayon O.D. as Attending Team Physician (Optometry)      Social History     Tobacco Use    Smoking status: Former     Current packs/day: 0.00     Average packs/day: 1 pack/day for 15.0 years (15.0 ttl pk-yrs)     Types: Cigarettes, Cigars     Start date: 1970     Quit date: 1985     Years since quittin.2    Smokeless tobacco: Never    Tobacco comments:     occasional cigar started in / quit completely    Vaping Use    Vaping Use: Never used   Substance Use Topics    Alcohol use: Yes     Alcohol/week: 1.2 oz     Types: 1 Cans of beer, 1 Shots of liquor per week     Comment: 2 beers / 2 shots in last month    Drug use: Not Currently     Types: Marijuana, Inhaled     Comment: none for 30 years/ tried pot [id number] didn't care to much for     Family History   Problem Relation Age of Onset    Heart Disease Mother          at 91yo/heart failure    Heart Attack Mother     Breast Cancer Mother     Heart Disease Father          at 57yo/massive heart attack    Heart Attack Father     Hypertension Father          coronary thrombosis    Cancer Sister          of leukemia    Breast Cancer Sister     Stroke Sister         living lost eye site left eye    No Known Problems Maternal Grandmother     No Known Problems Maternal Grandfather     No Known Problems Paternal Grandmother     Dementia Paternal Grandfather     Hyperlipidemia Neg Hx     Diabetes Neg Hx      He  has a past medical history of Allergy, Anesthesia (), Arthritis (), Cancer ([facility]) (), Cataract (), High cholesterol (), Hypercholesterolemia, Hypertension, Migraine, Neuropathy, PONV (postoperative nausea and vomiting) (), Reactive depression (2023), Sleep apnea (), Stroke ([facility]) (), Tinea inguinalis, Tobacco use (10/30/2018), and Urinary bladder disorder ().    He has no past medical history of Acute nasopharyngitis, Anginal syndrome  "(HCC), Arrhythmia, Asthma, Blood clotting disorder (HCC), Bowel habit changes, Breath shortness, Bronchitis, Carcinoma in situ of respiratory system, Congestive heart failure (HCC), Continuous ambulatory peritoneal dialysis status (HCC), Coughing blood, Dental disorder, Diabetes (HCC), Dialysis patient (HCC), Disorder of thyroid, Emphysema of lung (HCC), Glaucoma, Gynecological disorder, Heart burn, Heart murmur, Heart valve disease, Hemorrhagic disorder (HCC), Hepatitis A, Hepatitis B, Hepatitis C, Hiatus hernia syndrome, Indigestion, Jaundice, Myocardial infarct (HCC), Pacemaker, Pain, Pneumonia, Pregnant, Renal disorder, Rheumatic fever, Seizure (HCC), Snoring, Tuberculosis, or Urinary incontinence.   Past Surgical History:   Procedure Laterality Date    WI TRANSURETHRAL ELEC-SURG PROSTATECTOM N/A 08/17/2023    Procedure: BIPOLAR TRANSURETHRAL RESECTION OF PROSTATE;  Surgeon: Jacques Mcdowell M.D.;  Location: [facility];  Service: Urology    WI CYSTOURETHROSCOPY N/A 08/17/2023    Procedure: CYSTOSCOPY;  Surgeon: Jacques Mcdowell M.D.;  Location: [facility];  Service: Urology    WI CYSTO/URETERO/PYELOSCOPY W/LITHOTRIPSY N/A 08/17/2023    Procedure: CYSTOLITHLOPAXY;  Surgeon: Jacques Mcdowell M.D.;  Location: [facility];  Service: Urology    CATARACT PHACO WITH IOL      EYE SURGERY      OTHER  1992    cyst removal back of the neck    OTHER ABDOMINAL SURGERY      gallbladder    PROSTATECTOMY, RADICAL RETRO      RHINOPLASTY         Exam:   BP (!) 166/78 (BP Location: Left arm, Patient Position: Sitting, BP Cuff Size: Adult)   Pulse 61   Temp 36.6 °C (97.8 °F) (Temporal)   Resp 16   Ht 1.803 m (5' 11\")   Wt 123 kg (270 lb 6.4 oz)   SpO2 93%  Body mass index is 37.71 kg/m².    Hearing {GOOD/FAIR/POOR/EXCELLENT:54298}.    Dentition {DENTITION:76853}  Alert, oriented in no acute distress.  Eye contact is good, speech goal directed, affect calm    Assessment and Plan. The following " treatment and monitoring plan is recommended:  ***  1. Encounter for screening colonoscopy              Services suggested: { AWV COORDINATION OF SERVICES:20405}  Health Care Screening: Age-appropriate preventive services recommended by USPTF and ACIP covered by Medicare were discussed today. Services ordered if indicated and agreed upon by the patient.  Referrals offered: Community-based lifestyle interventions to reduce health risks and promote self-management and wellness, fall prevention, nutrition, physical activity, tobacco-use cessation, weight loss, and mental health services as per orders if indicated.    Discussion today about general wellness and lifestyle habits:    Prevent falls and reduce trip hazards; Cautioned about securing or removing rugs.  Have a working fire alarm and carbon monoxide detector;   Engage in regular physical activity and social activities     Follow-up: No follow-ups on file.

## 2024-03-28 NOTE — PROGRESS NOTES
Chief Complaint   Patient presents with    Annual Exam     Physical     Referral Needed     GI colonoscopy        HPI:  Laureen Munoz is a 73 y.o. here for Medicare Annual Wellness Visit     Essential hypertension  Chronic and stable condition on metoprolol 25 mg daily and Hyzaar 1 tab daily.  However, patient reports developing cough after starting Hyzaar.  He stopped taking hyzaar 2 weeks ago. His home BP readings have been in 115-120s/60-70s.  Patient denies chest pain, chest pressure,  palpitations, syncope, orthopnea, dyspnea or exertion, or leg swelling.    Pituitary microadenoma (HCC)   Chronic and stable condition.  Managed by Dr. Rodriguez, endocrinology.  Recommend to follow-up as scheduled.    Patient Active Problem List    Diagnosis Date Noted    Atherosclerosis of renal artery (HCC) 03/28/2024    Thrombocytopenia, unspecified (HCC) 03/28/2024    Morbid obesity with BMI of 50.0-59.9, adult (HCC) 01/23/2024    Complex sleep apnea syndrome 01/23/2024    Sensory deficit, bilateral 12/27/2023    Paresthesia of both feet 12/27/2023    Prediabetes 09/27/2023    Bladder outlet obstruction 08/17/2023    BPPV (benign paroxysmal positional vertigo) 04/18/2023    Cyst of left kidney 04/04/2023    Localized edema 03/07/2023    Pulmonary hypertension (HCC) 03/07/2023    Pituitary adenoma (HCC) 06/09/2021    Enlarged pituitary gland (HCC) 06/08/2021    IFG (impaired fasting glucose) 05/28/2020    Rosacea 10/30/2019    Erectile dysfunction 10/30/2018    Obesity (BMI 35.0-39.9 without comorbidity) 07/26/2018    Hypovitaminosis D 04/11/2014    Idiopathic neuropathy 07/20/2012    Essential hypertension 07/20/2012    Hypercholesteremia 07/20/2012    Benign prostatic hyperplasia 07/20/2012       Current Outpatient Medications   Medication Sig Dispense Refill    fluoruracil (CARAC) 0.5 % cream Apply 0.5 % topically every day.      NON SPECIFIED Nervive dietary supp      cetirizine (ZYRTEC) 10 MG Tab Take 10 mg by mouth  every day.      triamcinolone acetonide (KENALOG) 0.1 % Cream Apply  topically 2 times a day.      NON SPECIFIED OTC cerave moisturizing cream      Melatonin 1 MG Cap Take 5 mg by mouth every evening.      metFORMIN ER (GLUCOPHAGE XR) 500 MG TABLET SR 24 HR Take 2 Tablets by mouth every day. 180 Tablet 1    losartan-hydrochlorothiazide (HYZAAR) 50-12.5 MG per tablet Take 1 Tablet by mouth every day. 90 Tablet 3    meclizine (ANTIVERT) 12.5 MG Tab Take 1 Tablet by mouth 3 times a day as needed for Dizziness. 30 Tablet 0    rosuvastatin (CRESTOR) 20 MG Tab Take 1 Tablet by mouth every evening. 100 Tablet 0    metoprolol SR (TOPROL XL) 25 MG TABLET SR 24 HR Take 1 Tablet by mouth every morning. TAKE ONE TABLET BY MOUTH ONE TIME DAILY 100 Tablet 3    fluticasone (FLONASE) 50 MCG/ACT nasal spray Administer 1 Spray into affected nostril(S) every morning.      ciclopirox (PENLAC) 8 % solution       alfuzosin (UROXATRAL) 10 MG SR tablet at bedtime.       No current facility-administered medications for this visit.          Current supplements as per medication list.     Allergies: Gabapentin    Current social contact/activities: Friends and family      He  reports that he quit smoking about 39 years ago. His smoking use included cigarettes and cigars. He started smoking about 54 years ago. He has a 15 pack-year smoking history. He has never used smokeless tobacco. He reports current alcohol use of about 1.2 oz of alcohol per week. He reports that he does not currently use drugs after having used the following drugs: Marijuana and Inhaled.  Counseling given: Not Answered  Tobacco comments: occasional cigar started in 1996/ quit completely 2021      ROS:    Gait: Uses no assistive device  Ostomy: No  Other tubes: No  Amputations: No  Chronic oxygen use: No  Last eye exam: 6 months ago   Wears hearing aids: No   : Reports urinary leakage during the last 6 months that has not interfered at all with their daily activities or  sleep.      Depression Screening  Little interest or pleasure in doing things?  0 - not at all  Feeling down, depressed , or hopeless? 0 - not at all  Patient Health Questionnaire Score: 0     If depressive symptoms identified deferred to follow up visit unless specifically addressed in assessment and plan.    Interpretation of PHQ-9 Total Score   Score Severity   1-4 No Depression   5-9 Mild Depression   10-14 Moderate Depression   15-19 Moderately Severe Depression   20-27 Severe Depression    Screening for Cognitive Impairment  Do you or any of your friends or family members have any concern about your memory? Yes  Three Minute Recall (Leader, Season, Table) 2/3    Baldemar clock face with all 12 numbers and set the hands to show 10 minutes after 11.  Yes 5  Cognitive concerns identified deferred for follow up unless specifically addressed in assessment and plan.    Fall Risk Assessment  Has the patient had two or more falls in the last year or any fall with injury in the last year?  No    Safety Assessment  Do you always wear your seatbelt?  Yes  Any changes to home needed to function safely? No  Difficulty hearing.  No  Patient counseled about all safety risks that were identified.    Functional Assessment ADLs  Are there any barriers preventing you from cooking for yourself or meeting nutritional needs?  No.    Are there any barriers preventing you from driving safely or obtaining transportation?  No.    Are there any barriers preventing you from using a telephone or calling for help?  No    Are there any barriers preventing you from shopping?  No.    Are there any barriers preventing you from taking care of your own finances?  No    Are there any barriers preventing you from managing your medications?  No    Are there any barriers preventing you from showering, bathing or dressing yourself? No    Are there any barriers preventing you from doing housework or laundry? No  Are there any barriers preventing you from  using the toilet?No  Are you currently engaging in any exercise or physical activity?  No.      Self-Assessment of Health  What is your perception of your health? Fair  Do you sleep more than six hours a night? Yes  In the past 7 days, how much did pain keep you from doing your normal work? Some  Do you spend quality time with family or friends (virtually or in person)? Yes  Do you usually eat a heart healthy diet that constists of a variety of fruits, vegetables, whole grains and fiber? Yes  Do you eat foods high in fat and/or Fast Food more than three times per week? No    Advance Care Planning  Do you have an Advance Directive, Living Will, Durable Power of , or POLST? No                 Health Maintenance Summary            Overdue - Pneumococcal Vaccine: 65+ Years (1 of 1 - PCV) Never done      No completion history exists for this topic.              Overdue - Zoster (Shingles) Vaccines (2 of 3) Overdue since 11/10/2016      09/15/2016  Imm Admin: Zoster Vaccine Live (ZVL) (Zostavax) - HISTORICAL DATA              Ordered - Colorectal Cancer Screening (Colonoscopy - Every 10 Years) Ordered on 3/28/2024      05/20/2014  AMB REFERRAL TO GI FOR COLONOSCOPY              Annual Wellness Visit (Yearly) Next due on 3/28/2025      03/28/2024  Visit Dx: Encounter for Medicare annual wellness exam    04/18/2023  Visit Dx: Encounter for Medicare annual wellness exam    04/18/2023  Level of Service: [place] ANNUAL WELLNESS VISIT-INCLUDES PPPS SUBSEQUE*    09/08/2021  Subsequent Annual Wellness Visit - Includes PPPS ()    09/08/2021  Visit Dx: Medicare annual wellness visit, subsequent    Only the first 5 history entries have been loaded, but more history exists.              IMM DTaP/Tdap/Td Vaccine (3 - Td or Tdap) Next due on 2/21/2033 02/21/2023  Imm Admin: Tdap Vaccine    01/16/2013  Imm Admin: Tdap Vaccine              Hepatitis C Screening  Tentatively Complete      05/14/2020  Hepatitis C Antibody  component of HEP C VIRUS ANTIBODY              Abdominal Aortic Aneurysm (AAA) Screening  Tentatively Complete      03/22/2023  US-ABDOMINAL AORTA W/O DOPPLER              Influenza Vaccine (Series Information) Completed      09/27/2023  Imm Admin: Influenza Vaccine Adult HD    10/01/2022  Imm Admin: Influenza, Unspecified - HISTORICAL DATA    10/01/2020  Imm Admin: Influenza Vac Subunit Quad Inj (Pf)    10/30/2019  Imm Admin: Influenza Vaccine Adult HD    10/30/2018  Imm Admin: Influenza Vaccine Adult HD    Only the first 5 history entries have been loaded, but more history exists.              COVID-19 Vaccine (Series Information) Completed      10/30/2023  Imm Admin: Covid-19 Mrna (Spikevax) Moderna 12+ Years    07/30/2022  Imm Admin: MODERNA SARS-COV-2 VACCINE (12+)    12/06/2021  Imm Admin: MODERNA SARS-COV-2 VACCINE (12+)    04/10/2021  Imm Admin: MODERNA SARS-COV-2 VACCINE (12+)    03/12/2021  Imm Admin: MODERNA SARS-COV-2 VACCINE (12+)    Only the first 5 history entries have been loaded, but more history exists.              Hepatitis A Vaccine (Hep A) (Series Information) Aged Out      No completion history exists for this topic.              Hepatitis B Vaccine (Hep B) (Series Information) Aged Out      No completion history exists for this topic.              HPV Vaccines (Series Information) Aged Out      No completion history exists for this topic.              Polio Vaccine (Inactivated Polio) (Series Information) Aged Out      No completion history exists for this topic.              Meningococcal Immunization (Series Information) Aged Out      No completion history exists for this topic.                    Patient Care Team:  NATACHA Hawthorne as PCP - General (Nurse Practitioner Family)  Julio Ayon O.D. as Attending Team Physician (Optometry)        Social History     Tobacco Use    Smoking status: Former     Current packs/day: 0.00     Average packs/day: 1 pack/day for 15.0 years  (15.0 ttl pk-yrs)     Types: Cigarettes, Cigars     Start date: 1970     Quit date: 1985     Years since quittin.2    Smokeless tobacco: Never    Tobacco comments:     occasional cigar started in / quit completely    Vaping Use    Vaping Use: Never used   Substance Use Topics    Alcohol use: Yes     Alcohol/week: 1.2 oz     Types: 1 Cans of beer, 1 Shots of liquor per week     Comment: 2 beers / 2 shots in last month    Drug use: Not Currently     Types: Marijuana, Inhaled     Comment: none for 30 years/ tried pot [id number] didn't care to much for     Family History   Problem Relation Age of Onset    Heart Disease Mother          at 91yo/heart failure    Heart Attack Mother     Breast Cancer Mother     Heart Disease Father          at 57yo/massive heart attack    Heart Attack Father     Hypertension Father          coronary thrombosis    Cancer Sister          of leukemia    Breast Cancer Sister     Stroke Sister         living lost eye site left eye    No Known Problems Maternal Grandmother     No Known Problems Maternal Grandfather     No Known Problems Paternal Grandmother     Dementia Paternal Grandfather     Hyperlipidemia Neg Hx     Diabetes Neg Hx      He  has a past medical history of Allergy, Anesthesia (), Arthritis (unk), Cancer ([facility]) (), Cataract (), High cholesterol (), Hypercholesterolemia, Hypertension, Migraine, Neuropathy, PONV (postoperative nausea and vomiting) (), Reactive depression (2023), Sleep apnea (), Stroke ([facility]) (), Tinea inguinalis, Tobacco use (10/30/2018), and Urinary bladder disorder ().    He has no past medical history of Acute nasopharyngitis, Anginal syndrome ([facility]), Arrhythmia, Asthma, Blood clotting disorder ([facility]), Bowel habit changes, Breath shortness, Bronchitis, Carcinoma in situ of respiratory system, Congestive heart failure ([facility]), Continuous ambulatory peritoneal dialysis status ([facility]), Coughing blood,  "Dental disorder, Diabetes (HCC), Dialysis patient (HCC), Disorder of thyroid, Emphysema of lung (HCC), Glaucoma, Gynecological disorder, Heart burn, Heart murmur, Heart valve disease, Hemorrhagic disorder (HCC), Hepatitis A, Hepatitis B, Hepatitis C, Hiatus hernia syndrome, Indigestion, Jaundice, Myocardial infarct (HCC), Pacemaker, Pain, Pneumonia, Pregnant, Renal disorder, Rheumatic fever, Seizure (HCC), Snoring, Tuberculosis, or Urinary incontinence.   Past Surgical History:   Procedure Laterality Date    DC TRANSURETHRAL ELEC-SURG PROSTATECTOM N/A 08/17/2023    Procedure: BIPOLAR TRANSURETHRAL RESECTION OF PROSTATE;  Surgeon: Jacques Mcdowell M.D.;  Location: SURGERY [facility];  Service: Urology    DC CYSTOURETHROSCOPY N/A 08/17/2023    Procedure: CYSTOSCOPY;  Surgeon: Jacques Mcdowell M.D.;  Location: SURGERY [facility];  Service: Urology    DC CYSTO/URETERO/PYELOSCOPY W/LITHOTRIPSY N/A 08/17/2023    Procedure: CYSTOLITHLOPAXY;  Surgeon: Jacques Mcdowell M.D.;  Location: SURGERY [facility];  Service: Urology    CATARACT PHACO WITH IOL      EYE SURGERY      OTHER  1992    cyst removal back of the neck    OTHER ABDOMINAL SURGERY      gallbladder    PROSTATECTOMY, RADICAL RETRO      RHINOPLASTY         Exam:   /62 (BP Location: Left arm, Patient Position: Sitting, BP Cuff Size: Adult)   Pulse 61   Temp 36.6 °C (97.8 °F) (Temporal)   Resp 16   Ht 1.803 m (5' 11\")   Wt 123 kg (270 lb 6.4 oz)   SpO2 93%  Body mass index is 37.71 kg/m².    Hearing good.    Dentition good  Alert, oriented in no acute distress.  Eye contact is good, speech goal directed, affect calm    Assessment and Plan. The following treatment and monitoring plan is recommended:      1. Encounter for Medicare annual wellness exam  PMH/PSH/FH/Social history reviewed. Medication reconciled. Vaccinations discussed. Previous records and labs reviewed. Discussed age appropriate anticipatory guidance.  Pneumonia vaccine n/a in office " today.    2. Essential hypertension  Chronic and stable condition on metoprolol 25 mg daily.  Blood pressure well controlled.    Patient stopped Hyzaar due to side effects of cough.  BP remains at goal.  Instructed patient to monitor BP.  If BP remains high >140/80, Will start amlodipine for BP control.  Nonpharmacological interventions such as low-salt, cardiac diet discussed.  Educated on stress reduction and physical activity.  Discussed signs and symptoms of major cardiovascular event and need to present to ED.    3. Atherosclerosis of renal artery (HCC)  Established, stable condition.  GFR WNL.  Will monitor BP and renal function.     - Lipid Profile; Future    4. Thrombocytopenia (HCC)  Chronic, stable condition.  Etiology unknown. Pt remains asymptomatic.  Will repeat labs to monitor.      - CBC WITH DIFFERENTIAL; Future    5. Prediabetes  Chronic, stable condition on metformin 1000 mg daily.  Patient also seeing endocrinologist.  Recommend to continue current regimen.  Will repeat labs to evaluate.    - Lipid Profile; Future  - HEMOGLOBIN A1C; Future    6. Pituitary adenoma (HCC)  Chronic and stable condition.  Recommend to follow-up with endocrinology as scheduled.    7. Encounter for screening colonoscopy    - Referral to GI for Colonoscopy    HCC Gap Form    Diagnosis to address: I70.1 - Atherosclerosis of renal artery (HCC)  Assessment and plan: Chronic, stable, as based on today's assessment and impact on other conditions evaluated today. Continue with current treatment plan:  Follow-up with specialist as directed, but at least annually.  Diagnosis: D69.6 - Thrombocytopenia, unspecified (HCC)  Assessment and plan: Chronic, stable. Continue with current defined treatment plan: . Follow-up at least annually.  Last edited 03/28/24 12:09 PDT by ANISA Hawthorne.         Services suggested: No services needed at this time  Health Care Screening: Age-appropriate preventive services recommended by USPTF  and ACIP covered by Medicare were discussed today. Services ordered if indicated and agreed upon by the patient.  Referrals offered: Community-based lifestyle interventions to reduce health risks and promote self-management and wellness, fall prevention, nutrition, physical activity, tobacco-use cessation, weight loss, and mental health services as per orders if indicated.    Discussion today about general wellness and lifestyle habits:    Prevent falls and reduce trip hazards; Cautioned about securing or removing rugs.  Have a working fire alarm and carbon monoxide detector;   Engage in regular physical activity and social activities     Follow-up: Return in about 3 months (around 6/28/2024), or if symptoms worsen or fail to improve.    Thank you, Edilia LEDEZMA  [facility]

## 2024-03-29 ENCOUNTER — APPOINTMENT (OUTPATIENT)
Dept: PHYSICAL THERAPY | Facility: MEDICAL CENTER | Age: 74
End: 2024-03-29
Attending: PSYCHIATRY & NEUROLOGY
Payer: MEDICARE

## 2024-04-02 ENCOUNTER — HOSPITAL ENCOUNTER (OUTPATIENT)
Dept: LAB | Facility: MEDICAL CENTER | Age: 74
End: 2024-04-02
Payer: MEDICARE

## 2024-04-02 DIAGNOSIS — D69.6 THROMBOCYTOPENIA (HCC): ICD-10-CM

## 2024-04-02 DIAGNOSIS — R73.03 PREDIABETES: ICD-10-CM

## 2024-04-02 DIAGNOSIS — I70.1 ATHEROSCLEROSIS OF RENAL ARTERY (HCC): ICD-10-CM

## 2024-04-02 LAB
BASOPHILS # BLD AUTO: 0.5 % (ref 0–1.8)
BASOPHILS # BLD: 0.04 K/UL (ref 0–0.12)
CHOLEST SERPL-MCNC: 124 MG/DL (ref 100–199)
EOSINOPHIL # BLD AUTO: 0.12 K/UL (ref 0–0.51)
EOSINOPHIL NFR BLD: 1.4 % (ref 0–6.9)
ERYTHROCYTE [DISTWIDTH] IN BLOOD BY AUTOMATED COUNT: 44.6 FL (ref 35.9–50)
EST. AVERAGE GLUCOSE BLD GHB EST-MCNC: 134 MG/DL
FASTING STATUS PATIENT QL REPORTED: NORMAL
HBA1C MFR BLD: 6.3 % (ref 4–5.6)
HCT VFR BLD AUTO: 45.8 % (ref 42–52)
HDLC SERPL-MCNC: 46 MG/DL
HGB BLD-MCNC: 14.9 G/DL (ref 14–18)
IMM GRANULOCYTES # BLD AUTO: 0.12 K/UL (ref 0–0.11)
IMM GRANULOCYTES NFR BLD AUTO: 1.4 % (ref 0–0.9)
LDLC SERPL CALC-MCNC: 54 MG/DL
LYMPHOCYTES # BLD AUTO: 1.57 K/UL (ref 1–4.8)
LYMPHOCYTES NFR BLD: 18.8 % (ref 22–41)
MCH RBC QN AUTO: 30 PG (ref 27–33)
MCHC RBC AUTO-ENTMCNC: 32.5 G/DL (ref 32.3–36.5)
MCV RBC AUTO: 92.2 FL (ref 81.4–97.8)
MONOCYTES # BLD AUTO: 0.9 K/UL (ref 0–0.85)
MONOCYTES NFR BLD AUTO: 10.8 % (ref 0–13.4)
NEUTROPHILS # BLD AUTO: 5.59 K/UL (ref 1.82–7.42)
NEUTROPHILS NFR BLD: 67.1 % (ref 44–72)
NRBC # BLD AUTO: 0 K/UL
NRBC BLD-RTO: 0 /100 WBC (ref 0–0.2)
PLATELET # BLD AUTO: 158 K/UL (ref 164–446)
PMV BLD AUTO: 11.9 FL (ref 9–12.9)
RBC # BLD AUTO: 4.97 M/UL (ref 4.7–6.1)
TRIGL SERPL-MCNC: 121 MG/DL (ref 0–149)
WBC # BLD AUTO: 8.3 K/UL (ref 4.8–10.8)

## 2024-04-02 PROCEDURE — 80061 LIPID PANEL: CPT

## 2024-04-02 PROCEDURE — 83036 HEMOGLOBIN GLYCOSYLATED A1C: CPT | Mod: GA

## 2024-04-02 PROCEDURE — 85025 COMPLETE CBC W/AUTO DIFF WBC: CPT

## 2024-04-02 PROCEDURE — 36415 COLL VENOUS BLD VENIPUNCTURE: CPT | Mod: GA

## 2024-04-04 ENCOUNTER — APPOINTMENT (RX ONLY)
Dept: URBAN - METROPOLITAN AREA CLINIC 4 | Facility: CLINIC | Age: 74
Setting detail: DERMATOLOGY
End: 2024-04-04

## 2024-04-04 DIAGNOSIS — L28.1 PRURIGO NODULARIS: ICD-10-CM

## 2024-04-04 PROCEDURE — ? LIQUID NITROGEN

## 2024-04-04 PROCEDURE — 17110 DESTRUCTION B9 LES UP TO 14: CPT

## 2024-04-04 PROCEDURE — ? COUNSELING

## 2024-04-04 ASSESSMENT — LOCATION DETAILED DESCRIPTION DERM
LOCATION DETAILED: LEFT PROXIMAL DORSAL FOREARM
LOCATION DETAILED: MID TRAPEZIAL NECK
LOCATION DETAILED: RIGHT RADIAL DORSAL HAND
LOCATION DETAILED: RIGHT PROXIMAL DORSAL FOREARM
LOCATION DETAILED: RIGHT LATERAL DORSAL WRIST
LOCATION DETAILED: LEFT DISTAL DORSAL FOREARM
LOCATION DETAILED: LEFT DISTAL RADIAL DORSAL FOREARM
LOCATION DETAILED: LEFT SUPERIOR LATERAL NECK

## 2024-04-04 ASSESSMENT — LOCATION ZONE DERM
LOCATION ZONE: ARM
LOCATION ZONE: NECK
LOCATION ZONE: HAND

## 2024-04-04 ASSESSMENT — LOCATION SIMPLE DESCRIPTION DERM
LOCATION SIMPLE: TRAPEZIAL NECK
LOCATION SIMPLE: RIGHT FOREARM
LOCATION SIMPLE: LEFT FOREARM
LOCATION SIMPLE: POSTERIOR NECK
LOCATION SIMPLE: RIGHT HAND
LOCATION SIMPLE: RIGHT WRIST

## 2024-04-05 ENCOUNTER — APPOINTMENT (OUTPATIENT)
Dept: PHYSICAL THERAPY | Facility: MEDICAL CENTER | Age: 74
End: 2024-04-05
Attending: PSYCHIATRY & NEUROLOGY
Payer: MEDICARE

## 2024-04-12 ENCOUNTER — APPOINTMENT (OUTPATIENT)
Dept: PHYSICAL THERAPY | Facility: MEDICAL CENTER | Age: 74
End: 2024-04-12
Attending: PSYCHIATRY & NEUROLOGY
Payer: MEDICARE

## 2024-04-12 NOTE — PROGRESS NOTES
[facility] Follow-up Visit    Date of Visit: 4/18/2024     CC:   Follow-up for JUAN management      HPI:  Laureen Munoz is a very pleasant 73 y.o. year old male former smoker (15 pack-years, quit in 1985), with a PMHx of JUAN, pulmonary hypertension, elevated BMI, [id number] mm pituitary macroadenoma being followed by neurosx, idiopathic neuropathy, HTN who presented to the [facility] for a regular follow up. Last seen in the office on 3/6/2024 with myself.     Patient presents for titration results.  Previous CPAP/BiPAP titration study showed complex sleep apnea.  It was requested that the patient return for an ASV titration study.  Patient states that he has daytime drowsiness, drowsiness while driving, issues on sleep, and a rare dry mouth.  He denies any significant morning headaches, snoring, gasping and apneas, palpitations, aerophagia, sleep, or skin irritation.  Patient typically goes to bed at 1 AM and wakes up between 4-5 AM.  He will have 2-3 awakenings at night will not have any issues when back to sleep.  He will nap once a day for about an hour.      Sleep History:  PSG 12/28/2023-      PSG titration 1/23/2024-        PSG titration ASV 3/25/2024-      ECHO 5/10/2023-  Compared to the prior study on 06/09/2021, there has been a decrease in   PAP although study is technically difficult with off axis views.    Clinical correlation recommended.  The left ventricular ejection fraction is visually estimated to be 55-  60%.  No significant valvular abnormalities.   Estimated right ventricular systolic pressure is 35 mmHg.      Patient Active Problem List    Diagnosis Date Noted    Complex sleep apnea syndrome 01/23/2024    Right bundle branch block (RBBB) on electrocardiogram (ECG) 04/15/2024    Atherosclerosis of renal artery (HCC) 03/28/2024    Thrombocytopenia, unspecified (HCC) 03/28/2024    Morbid obesity with BMI of 50.0-59.9, adult (HCC) 01/23/2024    Sensory deficit, bilateral 12/27/2023     Paresthesia of both feet 12/27/2023    Prediabetes 09/27/2023    Bladder outlet obstruction 08/17/2023    BPPV (benign paroxysmal positional vertigo) 04/18/2023    Cyst of left kidney 04/04/2023    Localized edema 03/07/2023    Pulmonary hypertension (HCC) 03/07/2023    Pituitary adenoma (HCC) 06/09/2021    Enlarged pituitary gland (HCC) 06/08/2021    IFG (impaired fasting glucose) 05/28/2020    Rosacea 10/30/2019    Erectile dysfunction 10/30/2018    Obesity (BMI 35.0-39.9 without comorbidity) 07/26/2018    Hypovitaminosis D 04/11/2014    Idiopathic neuropathy 07/20/2012    Essential hypertension 07/20/2012    Hypercholesteremia 07/20/2012    Benign prostatic hyperplasia 07/20/2012     Past Medical History:   Diagnosis Date    Allergy     Anesthesia 2017    Arthritis unk    Cancer ([facility]) 2020    skin cancer sqammish    Cataract 2022    lens implants    High cholesterol 1998    Hypercholesterolemia     Hypertension     Migraine     Neuropathy     PONV (postoperative nausea and vomiting) 2017    Rhinoplasti for deviated septon    Reactive depression 02/21/2023    Sleep apnea 2019    tested    Stroke ([facility]) 2023    stroke or contusion    Tinea inguinalis     Tobacco use 10/30/2018    Urinary bladder disorder 1995    bph      Past Surgical History:   Procedure Laterality Date    AR TRANSURETHRAL ELEC-SURG PROSTATECTOM N/A 08/17/2023    Procedure: BIPOLAR TRANSURETHRAL RESECTION OF PROSTATE;  Surgeon: Jacques Mcdowell M.D.;  Location: SURGERY [facility];  Service: Urology    AR CYSTOURETHROSCOPY N/A 08/17/2023    Procedure: CYSTOSCOPY;  Surgeon: Jacques Mcdowell M.D.;  Location: SURGERY [facility];  Service: Urology    AR CYSTO/URETERO/PYELOSCOPY W/LITHOTRIPSY N/A 08/17/2023    Procedure: CYSTOLITHLOPAXY;  Surgeon: Jacques Mcdowell M.D.;  Location: SURGERY [facility];  Service: Urology    CATARACT PHACO WITH IOL      EYE SURGERY      OTHER  1992    cyst removal back of the neck    OTHER ABDOMINAL SURGERY       gallbladder    PROSTATECTOMY, RADICAL RETRO      RHINOPLASTY       Family History   Problem Relation Age of Onset    Heart Disease Mother          at 89yo/heart failure    Heart Attack Mother     Breast Cancer Mother     Heart Disease Father          at 57yo/massive heart attack    Heart Attack Father     Hypertension Father          coronary thrombosis    Cancer Sister          of leukemia    Breast Cancer Sister     Stroke Sister         living lost eye site left eye    No Known Problems Maternal Grandmother     No Known Problems Maternal Grandfather     No Known Problems Paternal Grandmother     Dementia Paternal Grandfather     Hyperlipidemia Neg Hx     Diabetes Neg Hx      Social History     Socioeconomic History    Marital status:      Spouse name: Not on file    Number of children: Not on file    Years of education: Not on file    Highest education level: Associate degree: academic program   Occupational History    Not on file   Tobacco Use    Smoking status: Former     Current packs/day: 0.00     Average packs/day: 1 pack/day for 15.0 years (15.0 ttl pk-yrs)     Types: Cigarettes, Cigars     Start date: 1970     Quit date: 1985     Years since quittin.3    Smokeless tobacco: Never    Tobacco comments:     occasional cigar started in / quit completely    Vaping Use    Vaping Use: Never used   Substance and Sexual Activity    Alcohol use: Yes     Alcohol/week: 1.2 oz     Types: 1 Cans of beer, 1 Shots of liquor per week     Comment: 2 beers / 2 shots in last month    Drug use: Not Currently     Types: Marijuana, Inhaled     Comment: none for 30 years/ tried pot [id number] didn't care to much for    Sexual activity: Not Currently     Partners: Female     Comment: not sexually active last 3 years, when active male condom   Other Topics Concern    Not on file   Social History Narrative    Not on file     Social Determinants of Health     Financial Resource Strain: Medium  Risk (4/15/2023)    Overall Financial Resource Strain (CARDIA)     Difficulty of Paying Living Expenses: Somewhat hard   Food Insecurity: No Food Insecurity (4/15/2023)    Hunger Vital Sign     Worried About Running Out of Food in the Last Year: Never true     Ran Out of Food in the Last Year: Never true   Transportation Needs: No Transportation Needs (4/15/2023)    PRAPARE - Transportation     Lack of Transportation (Medical): No     Lack of Transportation (Non-Medical): No   Physical Activity: Inactive (4/15/2023)    Exercise Vital Sign     Days of Exercise per Week: 0 days     Minutes of Exercise per Session: 0 min   Stress: No Stress Concern Present (4/15/2023)    [employer] [place] [employer] - Occupational Stress Questionnaire     Feeling of Stress : Only a little   Social Connections: Socially Isolated (4/15/2023)    Social Connection and Isolation Panel [NHANES]     Frequency of Communication with Friends and Family: More than three times a week     Frequency of Social Gatherings with Friends and Family: Once a week     Attends [employer] Services: Never     Active Member of Clubs or Organizations: No     Attends Club or Organization Meetings: Never     Marital Status:    Intimate Partner Violence: Not on file   Housing Stability: Low Risk  (4/15/2023)    Housing Stability Vital Sign     Unable to Pay for Housing in the Last Year: No     Number of Places Lived in the Last Year: 1     Unstable Housing in the Last Year: No     Current Outpatient Medications   Medication Sig Dispense Refill    cetirizine (ZYRTEC) 10 MG Tab Take 10 mg by mouth every day.      Melatonin 1 MG Cap Take 5 mg by mouth every evening.      metFORMIN ER (GLUCOPHAGE XR) 500 MG TABLET SR 24 HR Take 2 Tablets by mouth every day. 180 Tablet 1    meclizine (ANTIVERT) 12.5 MG Tab Take 1 Tablet by mouth 3 times a day as needed for Dizziness. 30 Tablet 0    rosuvastatin (CRESTOR) 20 MG Tab Take 1 Tablet by mouth every evening.  "100 Tablet 0    metoprolol SR (TOPROL XL) 25 MG TABLET SR 24 HR Take 1 Tablet by mouth every morning. TAKE ONE TABLET BY MOUTH ONE TIME DAILY 100 Tablet 3    fluticasone (FLONASE) 50 MCG/ACT nasal spray Administer 1 Spray into affected nostril(S) every morning.      fluoruracil (CARAC) 0.5 % cream Apply 0.5 % topically every day.      triamcinolone acetonide (KENALOG) 0.1 % Cream Apply  topically 2 times a day.      ciclopirox (PENLAC) 8 % solution       losartan-hydrochlorothiazide (HYZAAR) 50-12.5 MG per tablet Take 1 Tablet by mouth every day. (Patient taking differently: Take 1 Tablet by mouth every day. X 4  a week because of the side effects   Dry cough , runny nose, headaches in the morning) 90 Tablet 3    alfuzosin (UROXATRAL) 10 MG SR tablet at bedtime.       No current facility-administered medications for this visit.      ALLERGIES: Gabapentin    ROS:  Constitutional: Denies fever, chills, sweats,  weight loss, fatigue  Cardiovascular: Denies chest pain, tightness, palpitations, swelling in legs/feet  Respiratory: Denies shortness of breath, cough, sputum, wheezing, painful breathing   Sleep: per HPI  Gastrointestinal: Denies  difficulty swallowing, nausea, abdominal pain, diarrhea, constipation, heartburn.  Musculoskeletal: Denies painful joints, sore muscles,       PHYSICAL EXAM:  /74 (BP Location: Right arm, Patient Position: Sitting, BP Cuff Size: Adult)   Pulse 62   Ht 1.803 m (5' 11\")   Wt 120 kg (265 lb)   SpO2 91%   BMI 36.96 kg/m²   Appearance: Well-nourished, well-developed, no acute distress  Eyes:  No scleral icterus , EOMI  ENMT: No redness of the oropharynx  Lung auscultation:  No wheezes rhonchi rubs or rales  Cardiac: No murmurs, rubs, or gallops; regular rhythm, normal rate; no edema  Musculoskeletal:  Grossly normal; gait and station normal; digits and nails normal  Skin:  No rashes, petechiae, cyanosis  Neurologic: without focal signs; oriented to person, time, place, and " purpose; judgement intact  Psychiatric:  No depression, anxiety, agitation  Mallampati score: Class III    Assessment and Plan:    The medical record was reviewed.    Diagnostic and titration nocturnal polysomnogram's, home sleep apnea tests, continuous nocturnal oximetry results, multiple sleep latency tests, and compliance reports reviewed.    Problem List Items Addressed This Visit          Pulmonary/Sleep Medicine Problems    Complex sleep apnea syndrome     Sleep Apnea:    The pathophysiology of sleep anea and the increased risk of cardiovascular morbidity from untreated sleep apnea is discussed in detail with the patient.  Urged to avoid supine sleep, weight gain and alcoholic beverages since all of these can worsen sleep apnea. Cautioned against drowsy driving. If feeling sleepy while driving, pull over for a break/nap, rather than persist on the road, in the interest of own safety and that of others on the road.    ASV titration was reviewed and discussed with the patient.  We spent significant time today defining sleep apnea, risk of untreated sleep apnea, different treatment options, process of obtaining an ASV machine, and follow-up parameters.    - Order placed for ASV (EPAP 6, PS 3/15) w/ 2LPM  - OPO once compliant  - Compliance was reinforced  - Recommended the patient against the use of Ozone , such as SoClean  - Clean supplies a couple times a week with dish soap and water and air dry  - Recommended the patient change out supplies as recommended for best mask fit and usage of the machine  - Advised patient to reach out via MyChart if any questions or concerns should arise.   - Equipment replacement schedule:  Mask cushion every month  Nasal pillows 2 times per month  Mask every 6 months  Head gear every 6 months  Tubing every 3 months  Ultra-fine filters 2 times per month  Foam filter every 6 months  Humidifier chamber every 6 months  Chin strap every 6 months    Has been advised to start ASV  therapy, clean equipment frequently, and get new mask and supplies as allowed by insurance and DME. Recommend an earlier appointment, if significant treatment barriers develop.    The risks of untreated sleep apnea were discussed with the patient at length. Patients with sleep apnea are at increased risk of cardiovascular disease including coronary artery disease, systemic arterial hypertension, pulmonary arterial hypertension, cardiac arrythmias, and stroke. The patient was advised to avoid driving a motor vehicle when drowsy.    Positive airway pressure will favorably impact many of the adverse conditions and effects provoked by sleep apnea.           Relevant Orders    DME ASV    DME O2 New Set Up     Have advised the patient to follow up with the appropriate healthcare practitioners for all other medical problems and issues.    Return in about 3 months (around 7/18/2024), or if symptoms worsen or fail to improve, for 1st compliance- need 60 day compliance, with Antonietta.    Please note portions of this record was created using voice recognition software. I have made every reasonable attempt to correct obvious errors, but I expect that there are errors of grammar and possibly content I did not discover before finalizing the note.    Time spent in record review prior to patient arrival, reviewing results, and in face-to-face encounter totaled 20 min.  __________  BRISA Brown  Pulmonary & Sleep Medicine  [facility]

## 2024-04-15 ENCOUNTER — OFFICE VISIT (OUTPATIENT)
Dept: MEDICAL GROUP | Facility: IMAGING CENTER | Age: 74
End: 2024-04-15
Payer: MEDICARE

## 2024-04-15 VITALS
WEIGHT: 265 LBS | HEART RATE: 70 BPM | BODY MASS INDEX: 37.1 KG/M2 | SYSTOLIC BLOOD PRESSURE: 128 MMHG | TEMPERATURE: 97 F | HEIGHT: 71 IN | DIASTOLIC BLOOD PRESSURE: 60 MMHG | OXYGEN SATURATION: 93 % | RESPIRATION RATE: 14 BRPM

## 2024-04-15 DIAGNOSIS — E78.00 HYPERCHOLESTEREMIA: ICD-10-CM

## 2024-04-15 DIAGNOSIS — R09.89 PULSE IRREGULARITY: ICD-10-CM

## 2024-04-15 DIAGNOSIS — D69.6 THROMBOCYTOPENIA, UNSPECIFIED (HCC): ICD-10-CM

## 2024-04-15 DIAGNOSIS — I10 ESSENTIAL HYPERTENSION: Chronic | ICD-10-CM

## 2024-04-15 DIAGNOSIS — I45.10 RIGHT BUNDLE BRANCH BLOCK (RBBB) ON ELECTROCARDIOGRAM (ECG): ICD-10-CM

## 2024-04-15 DIAGNOSIS — R73.03 PREDIABETES: ICD-10-CM

## 2024-04-15 PROCEDURE — 3074F SYST BP LT 130 MM HG: CPT

## 2024-04-15 PROCEDURE — 93000 ELECTROCARDIOGRAM COMPLETE: CPT

## 2024-04-15 PROCEDURE — 3078F DIAST BP <80 MM HG: CPT

## 2024-04-15 PROCEDURE — 99214 OFFICE O/P EST MOD 30 MIN: CPT

## 2024-04-15 PROCEDURE — 1170F FXNL STATUS ASSESSED: CPT

## 2024-04-15 ASSESSMENT — FIBROSIS 4 INDEX: FIB4 SCORE: 2.28

## 2024-04-15 NOTE — PROGRESS NOTES
Subjective:     CC:   Chief Complaint   Patient presents with    Follow-Up     Lab result 4/2 for A1C    Other     Consult on the sleep study with Eda 3/26 result in the media       HPI:   Laureen presents today to discuss:    Thrombocytopenia (HCC)  Chronic, stable condition.  Etiology unknown. Pt remains asymptomatic.      Latest Reference Range & Units 08/17/23 18:43 08/18/23 00:44 04/02/24 08:35   Platelet Count 164 - 446 K/uL 143 (L) 124 (L) 158 (L)   (L): Data is abnormally low    Prediabetes  Chronic and ongoing condition.  Patient on metformin 1000 mg daily.   Diet: he has been working on dietary changes, increased fiber intake, watching his sugar intake  Exercise: admits he is currently sedentary due to peripheral neuropathy. However, he has been taking Nervive supplements which has been helping.   He is motivated in going on walks daily especially with the weather improving.   A1c increased to  6.3 from 6.    Obesity BMI 35.0-39.9  He is motivated in weight loss.   He has made dietary changes and lost 5 lbs in one month.  He will start going on daily walks.    Hypercholesterolemia  Chronic and stable condition on rosuvastatin 20 mg daily.  Patient denies muscle pain, fatigue, lethargy, loss of appetite, abdominal pain, dark-colored urine, or yellowing of skin or eyes.     Latest Reference Range & Units 04/02/24 08:35   Cholesterol,Tot 100 - 199 mg/dL 124   Triglycerides 0 - 149 mg/dL 121   HDL >=40 mg/dL 46   LDL <100 mg/dL 54     Hypertension  Chronic condition. Pt on Metoprolol 25 mg dialy. He is only taking hyzaar 4 times a week due to side effects of cough. His cough has improved since decreasing days on medication. His home BP are at goal.   Patient denies blurred blurred, severe headaches, chest pain, chest pressure, dizziness, palpitations, syncope, orthopnea, dyspnea or exertion, or leg swelling.    JUAN  Pt completed sleep study. He is scheduled to see pulmonary and sleep medicine on 4/18  regarding results.     Pulse irregularity  Patient noted to have irregular pulse upon assessment.   Patient denies blurred blurred, severe headaches, chest pain, chest pressure, dizziness, palpitations, syncope, orthopnea, dyspnea or exertion, or leg swelling.      EKG Interpretation  Interpreted by Edilia LEDEZMA  Rhythm: sinus bradycardia  Rate: 50-60  Axis: normal  Ectopy: premature atrial contraction  Conduction: right bundle branch block (incomplete), prolonged CO interval  ST Segments: no acute change  T Waves: no acute change  Q Waves: none    Clinical Impression: right bundle branch block, sinus bradycardia, and 1st degree AV block     Past Medical History:   Diagnosis Date    Allergy     Anesthesia 2017    Arthritis unk    Cancer ([facility])     skin cancer sqammish    Cataract     lens implants    High cholesterol 1998    Hypercholesterolemia     Hypertension     Migraine     Neuropathy     PONV (postoperative nausea and vomiting) 2017    Rhinoplasti for deviated septon    Reactive depression 2023    Sleep apnea     tested    Stroke ([facility])     stroke or contusion    Tinea inguinalis     Tobacco use 10/30/2018    Urinary bladder disorder 1995    bph     Family History   Problem Relation Age of Onset    Heart Disease Mother          at 91yo/heart failure    Heart Attack Mother     Breast Cancer Mother     Heart Disease Father          at 57yo/massive heart attack    Heart Attack Father     Hypertension Father          coronary thrombosis    Cancer Sister          of leukemia    Breast Cancer Sister     Stroke Sister         living lost eye site left eye    No Known Problems Maternal Grandmother     No Known Problems Maternal Grandfather     No Known Problems Paternal Grandmother     Dementia Paternal Grandfather     Hyperlipidemia Neg Hx     Diabetes Neg Hx      Past Surgical History:   Procedure Laterality Date    [place] TRANSURETHRAL ELEC-SURG PROSTATECTOM N/A 2023     Procedure: BIPOLAR TRANSURETHRAL RESECTION OF PROSTATE;  Surgeon: Jacques Mcdowell M.D.;  Location: SURGERY [facility];  Service: Urology    [place] CYSTOURETHROSCOPY N/A 2023    Procedure: CYSTOSCOPY;  Surgeon: Jacques Mcdowell M.D.;  Location: SURGERY [facility];  Service: Urology    [place] CYSTO/URETERO/PYELOSCOPY W/LITHOTRIPSY N/A 2023    Procedure: CYSTOLITHLOPAXY;  Surgeon: Jacques Mcdowell M.D.;  Location: SURGERY [facility];  Service: Urology    CATARACT PHACO WITH IOL      EYE SURGERY      OTHER      cyst removal back of the neck    OTHER ABDOMINAL SURGERY      gallbladder    PROSTATECTOMY, RADICAL RETRO      RHINOPLASTY       Social History     Tobacco Use    Smoking status: Former     Current packs/day: 0.00     Average packs/day: 1 pack/day for 15.0 years (15.0 ttl pk-yrs)     Types: Cigarettes, Cigars     Start date: 1970     Quit date: 1985     Years since quittin.3    Smokeless tobacco: Never    Tobacco comments:     occasional cigar started in / quit completely    Vaping Use    Vaping Use: Never used   Substance Use Topics    Alcohol use: Yes     Alcohol/week: 1.2 oz     Types: 1 Cans of beer, 1 Shots of liquor per week     Comment: 2 beers / 2 shots in last month    Drug use: Not Currently     Types: Marijuana, Inhaled     Comment: none for 30 years/ tried pot [id number] didn't care to much for     Social History     Social History Narrative    Not on file     Current Outpatient Medications Ordered in Epic   Medication Sig Dispense Refill    cetirizine (ZYRTEC) 10 MG Tab Take 10 mg by mouth every day.      metFORMIN ER (GLUCOPHAGE XR) 500 MG TABLET SR 24 HR Take 2 Tablets by mouth every day. 180 Tablet 1    losartan-hydrochlorothiazide (HYZAAR) 50-12.5 MG per tablet Take 1 Tablet by mouth every day. (Patient taking differently: Take 1 Tablet by mouth every day. X 4  a week because of the side effects   Dry cough , runny nose, headaches in the morning) 90 Tablet 3     "meclizine (ANTIVERT) 12.5 MG Tab Take 1 Tablet by mouth 3 times a day as needed for Dizziness. 30 Tablet 0    rosuvastatin (CRESTOR) 20 MG Tab Take 1 Tablet by mouth every evening. 100 Tablet 0    metoprolol SR (TOPROL XL) 25 MG TABLET SR 24 HR Take 1 Tablet by mouth every morning. TAKE ONE TABLET BY MOUTH ONE TIME DAILY 100 Tablet 3    fluticasone (FLONASE) 50 MCG/ACT nasal spray Administer 1 Spray into affected nostril(S) every morning.      fluoruracil (CARAC) 0.5 % cream Apply 0.5 % topically every day.      triamcinolone acetonide (KENALOG) 0.1 % Cream Apply  topically 2 times a day.      Melatonin 1 MG Cap Take 5 mg by mouth every evening.      ciclopirox (PENLAC) 8 % solution       alfuzosin (UROXATRAL) 10 MG SR tablet at bedtime.       No current Epic-ordered facility-administered medications on file.     Gabapentin and Hyzaar [losartan potassium-hctz]    ROS: see hpi  Gen: no fevers/chills  Pulm: no sob, no cough  CV: no chest pain, no palpitations, no edema  GI: no nausea/vomiting, no diarrhea  Skin: no rash    Objective:   Exam:  /60 (BP Location: Left arm, Patient Position: Sitting, BP Cuff Size: Adult)   Pulse 70   Temp 36.1 °C (97 °F) (Temporal)   Resp 14   Ht 1.803 m (5' 11\")   Wt 120 kg (265 lb)   SpO2 93%   BMI 36.96 kg/m²    Body mass index is 36.96 kg/m².    Gen: Alert and oriented, No apparent distress.  HEENT: Head atraumatic, normocephalic. Pupils equal and round.  Neck: Neck is supple without lymphadenopathy.   Lungs: Normal effort, CTA bilaterally, no wheezes, rhonchi, or rales  CV: Regular rate and rhythm. No murmurs, rubs, or gallops.  ABD: +BS. Non-tender, non-distended. No rebound, rigidity, or guarding.  Ext: No clubbing, cyanosis, edema.    Assessment & Plan:     73 y.o. male with the following -     1. Pulse irregularity  2. Right bundle branch block (RBBB) on electrocardiogram (ECG)  New finding. VSS. Asymptomatic. EKG revealed right bundle branch block, sinus " bradycardia, and 1st degree AV block.  Pt on metoprolol and hyzaar for BP control.   Will defer to cardiology for evaluation and management.     - EKG - Clinic Performed  - REFERRAL TO CARDIOLOGY    3. Prediabetes  Chronic condition.  A1c higher at 6.3 compared to previous A1c of 6.1. He is on Metformin 1000 mg daily.  Patient counseled on lifestyle modification that include healthy diet, increase physical activity, and maintain normal BMI to help reduce her risk of progression to diabetes. Discussed diabetes sequala both in the microvascular and macrovascular level with patient.   Discussed increasing metformin dose, patient declined.  He will start physical exercise to help with glucose control and continue with dietary changes.  Will re- check HbA1c level in the 3 month-follow up.    4.  Thrombocytopenia, unspecified (HCC)  Chronic, stable condition.  Etiology unknown.  Asymptomatic.  Will continue to monitor.    5. Hypercholesterolemia  Chronic, stable condition on rosuvastatin 20 mg daily.  LDL at goal.  Recommend to continue current regimen.    6.  Essential hypertension  Chronic and stable condition on metoprolol 25 mg daily and Hyzaar 1 tab 4 times a week.  Blood pressure well controlled.   Nonpharmacological interventions such as low-salt, cardiac diet discussed.  Educated on stress reduction and physical activity.  Discussed signs and symptoms of major cardiovascular event and need to present to ED.    Medical Decision Making/Course:  In the course of preparing for this visit with review of the pertinent past medical history, recent and past clinic visits, current medications, and performing chart, immunization, medical history and medication reconciliation, and in the further course of obtaining the current history pertinent to the clinic visit today, performing an exam and evaluation, ordering and independently evaluating labs, tests, and/or procedures, prescribing any recommended new medications as  noted above, providing any pertinent counseling and education and recommending further coordination of care. This was discussed with patient in a shared-decision making conversation, and they understand and agreed with plan of care.     Return in about 3 months (around 7/15/2024), or if symptoms worsen or fail to improve.    ANISA Hawthorne.   [facility]    Please note that this dictation was created using voice recognition software. I have made every reasonable attempt to correct obvious errors, but I expect that there are errors of grammar and possibly content that I did not discover before finalizing the note.

## 2024-04-18 ENCOUNTER — OFFICE VISIT (OUTPATIENT)
Dept: SLEEP MEDICINE | Facility: MEDICAL CENTER | Age: 74
End: 2024-04-18
Payer: MEDICARE

## 2024-04-18 VITALS
OXYGEN SATURATION: 91 % | HEART RATE: 62 BPM | HEIGHT: 71 IN | DIASTOLIC BLOOD PRESSURE: 74 MMHG | BODY MASS INDEX: 37.1 KG/M2 | SYSTOLIC BLOOD PRESSURE: 122 MMHG | WEIGHT: 265 LBS

## 2024-04-18 DIAGNOSIS — G47.31 COMPLEX SLEEP APNEA SYNDROME: ICD-10-CM

## 2024-04-18 PROCEDURE — 99212 OFFICE O/P EST SF 10 MIN: CPT

## 2024-04-18 PROCEDURE — 3078F DIAST BP <80 MM HG: CPT

## 2024-04-18 PROCEDURE — 3074F SYST BP LT 130 MM HG: CPT

## 2024-04-18 PROCEDURE — 1170F FXNL STATUS ASSESSED: CPT

## 2024-04-18 PROCEDURE — 99213 OFFICE O/P EST LOW 20 MIN: CPT

## 2024-04-18 ASSESSMENT — FIBROSIS 4 INDEX: FIB4 SCORE: 2.28

## 2024-04-18 NOTE — ASSESSMENT & PLAN NOTE
Sleep Apnea:    The pathophysiology of sleep anea and the increased risk of cardiovascular morbidity from untreated sleep apnea is discussed in detail with the patient.  Urged to avoid supine sleep, weight gain and alcoholic beverages since all of these can worsen sleep apnea. Cautioned against drowsy driving. If feeling sleepy while driving, pull over for a break/nap, rather than persist on the road, in the interest of own safety and that of others on the road.    ASV titration was reviewed and discussed with the patient.  We spent significant time today defining sleep apnea, risk of untreated sleep apnea, different treatment options, process of obtaining an ASV machine, and follow-up parameters.    - Order placed for ASV (EPAP 6, PS 3/15) w/ 2LPM  - OPO once compliant  - Compliance was reinforced  - Recommended the patient against the use of Ozone , such as SoClean  - Clean supplies a couple times a week with dish soap and water and air dry  - Recommended the patient change out supplies as recommended for best mask fit and usage of the machine  - Advised patient to reach out via [employer] if any questions or concerns should arise.   - Equipment replacement schedule:  Mask cushion every month  Nasal pillows 2 times per month  Mask every 6 months  Head gear every 6 months  Tubing every 3 months  Ultra-fine filters 2 times per month  Foam filter every 6 months  Humidifier chamber every 6 months  Chin strap every 6 months    Has been advised to start ASV therapy, clean equipment frequently, and get new mask and supplies as allowed by insurance and DME. Recommend an earlier appointment, if significant treatment barriers develop.    The risks of untreated sleep apnea were discussed with the patient at length. Patients with sleep apnea are at increased risk of cardiovascular disease including coronary artery disease, systemic arterial hypertension, pulmonary arterial hypertension, cardiac arrythmias, and stroke.  The patient was advised to avoid driving a motor vehicle when drowsy.    Positive airway pressure will favorably impact many of the adverse conditions and effects provoked by sleep apnea.

## 2024-04-18 NOTE — PATIENT INSTRUCTIONS
Please make sure that you are seen within 90 days of receiving your machine, with at least 60 days of use.  Please call scheduling at [phone] if your appointment needs to be moved to meet these parameters.     To meet compliance requirements for insurance please ensure that you use the machine at least 6/7 days a week for at least 4 or more hours a night.    I advise patients to research different mask options before picking up the new machine.  You should also ask what the [employer] company's return policy is for the mask because if you do not like the mask and don't return it within that time frame, you will have to wait 6 months for insurance to cover another mask.     Please bring your entire machine and chip to your first appointment, regardless if the machine is set up for wireless access.    Please do not return the machine without discussing any issues with a sleep provider, as you would be forfeiting therapy and would have to restart the testing process over.

## 2024-05-28 ENCOUNTER — OFFICE VISIT (OUTPATIENT)
Dept: MEDICAL GROUP | Facility: IMAGING CENTER | Age: 74
End: 2024-05-28
Payer: MEDICARE

## 2024-05-28 ENCOUNTER — HOSPITAL ENCOUNTER (OUTPATIENT)
Dept: LAB | Facility: MEDICAL CENTER | Age: 74
End: 2024-05-28
Payer: MEDICARE

## 2024-05-28 VITALS
WEIGHT: 267 LBS | OXYGEN SATURATION: 92 % | DIASTOLIC BLOOD PRESSURE: 70 MMHG | RESPIRATION RATE: 14 BRPM | HEIGHT: 71 IN | HEART RATE: 58 BPM | BODY MASS INDEX: 37.38 KG/M2 | SYSTOLIC BLOOD PRESSURE: 142 MMHG | TEMPERATURE: 96.9 F

## 2024-05-28 DIAGNOSIS — R60.9 EDEMA, UNSPECIFIED TYPE: ICD-10-CM

## 2024-05-28 DIAGNOSIS — I10 ESSENTIAL HYPERTENSION: ICD-10-CM

## 2024-05-28 DIAGNOSIS — R00.1 BRADYCARDIA: ICD-10-CM

## 2024-05-28 DIAGNOSIS — R06.09 DOE (DYSPNEA ON EXERTION): ICD-10-CM

## 2024-05-28 LAB — NT-PROBNP SERPL IA-MCNC: 314 PG/ML (ref 0–125)

## 2024-05-28 PROCEDURE — 99214 OFFICE O/P EST MOD 30 MIN: CPT

## 2024-05-28 PROCEDURE — 3077F SYST BP >= 140 MM HG: CPT

## 2024-05-28 PROCEDURE — 1170F FXNL STATUS ASSESSED: CPT

## 2024-05-28 PROCEDURE — 3078F DIAST BP <80 MM HG: CPT

## 2024-05-28 RX ORDER — METOPROLOL SUCCINATE 25 MG/1
12.5 TABLET, EXTENDED RELEASE ORAL EVERY MORNING
Qty: 30 TABLET | Refills: 3 | Status: SHIPPED | OUTPATIENT
Start: 2024-05-28

## 2024-05-28 RX ORDER — HYDROCHLOROTHIAZIDE 25 MG/1
25 TABLET ORAL DAILY
Qty: 30 TABLET | Refills: 1 | Status: SHIPPED | OUTPATIENT
Start: 2024-05-28

## 2024-05-28 RX ORDER — METOPROLOL SUCCINATE 25 MG/1
12.5 TABLET, EXTENDED RELEASE ORAL EVERY MORNING
Qty: 30 TABLET | Refills: 3 | Status: SHIPPED
Start: 2024-05-28 | End: 2024-05-28

## 2024-05-28 RX ORDER — OLMESARTAN MEDOXOMIL 20 MG/1
20 TABLET ORAL DAILY
Qty: 30 TABLET | Refills: 1 | Status: SHIPPED | OUTPATIENT
Start: 2024-05-28

## 2024-05-28 ASSESSMENT — FIBROSIS 4 INDEX: FIB4 SCORE: 2.28

## 2024-05-28 NOTE — PROGRESS NOTES
Subjective:     CC:   Chief Complaint   Patient presents with    Other     Pt report he got cancelled for the coloscopy   For high blood pressure  Pt stop taking losartan, like consult on the BP medication  Metoprolol- pt report not working   Yesterday  171/95          HPI:   Laureen presents today to discuss:    Hypertension  Chronic condition. Pt on Metoprolol 25 mg daily. He stopped taking hyzaar one month ago due cough, runny, and headaches. Lisinopril caused similar symptoms. Amlodipine worsened his leg edema.   His BP has been at goal. BP prior to colonoscopy was 123/67.  However, on the day of colonoscopy, his BP was too low and procedure was cancelled.   His BP continue to rise. BP yesterday was 171/95.  His leg edema is worsening.   He admits MARTINEZ.   He is scheduled to establish with cardiology on  for bradycardia and RBBB.  Echo 2023 showed EF of 55-60%    Patient denies blurred blurred, severe headaches, chest pain, chest pressure, dizziness, palpitations, syncope, orthopnea.      Past Medical History:   Diagnosis Date    Allergy     Anesthesia 2017    Arthritis unk    Cancer ([facility])     skin cancer sqammish    Cataract     lens implants    High cholesterol 1998    Hypercholesterolemia     Hypertension     Migraine     Neuropathy     PONV (postoperative nausea and vomiting) 2017    Rhinoplasti for deviated septon    Reactive depression 2023    Sleep apnea 2019    tested    Stroke ([facility])     stroke or contusion    Tinea inguinalis     Tobacco use 10/30/2018    Urinary bladder disorder 1995    bph     Family History   Problem Relation Age of Onset    Heart Disease Mother          at 89yo/heart failure    Heart Attack Mother     Breast Cancer Mother     Heart Disease Father          at 59yo/massive heart attack    Heart Attack Father     Hypertension Father          coronary thrombosis    Cancer Sister          of leukemia    Breast Cancer Sister     Stroke Sister          living lost eye site left eye    No Known Problems Maternal Grandmother     No Known Problems Maternal Grandfather     No Known Problems Paternal Grandmother     Dementia Paternal Grandfather     Hyperlipidemia Neg Hx     Diabetes Neg Hx      Past Surgical History:   Procedure Laterality Date    FL TRANSURETHRAL ELEC-SURG PROSTATECTOM N/A 2023    Procedure: BIPOLAR TRANSURETHRAL RESECTION OF PROSTATE;  Surgeon: Jacques Mcdowell M.D.;  Location: SURGERY [facility];  Service: Urology    FL CYSTOURETHROSCOPY N/A 2023    Procedure: CYSTOSCOPY;  Surgeon: Jacques Mcdowell M.D.;  Location: SURGERY [facility];  Service: Urology    FL CYSTO/URETERO/PYELOSCOPY W/LITHOTRIPSY N/A 2023    Procedure: CYSTOLITHLOPAXY;  Surgeon: Jacques Mcdowell M.D.;  Location: SURGERY [facility];  Service: Urology    CATARACT PHACO WITH IOL      EYE SURGERY      OTHER      cyst removal back of the neck    OTHER ABDOMINAL SURGERY      gallbladder    PROSTATECTOMY, RADICAL RETRO      RHINOPLASTY       Social History     Tobacco Use    Smoking status: Former     Current packs/day: 0.00     Average packs/day: 1 pack/day for 15.0 years (15.0 ttl pk-yrs)     Types: Cigarettes, Cigars     Start date: 1970     Quit date: 1985     Years since quittin.4    Smokeless tobacco: Never    Tobacco comments:     occasional cigar started in / quit completely    Vaping Use    Vaping status: Never Used   Substance Use Topics    Alcohol use: Yes     Alcohol/week: 1.2 oz     Types: 1 Cans of beer, 1 Shots of liquor per week     Comment: 2 beers / 2 shots in last month    Drug use: Not Currently     Types: Marijuana, Inhaled     Comment: none for 30 years/ tried pot [id number] didn't care to much for     Social History     Social History Narrative    Not on file     Current Outpatient Medications Ordered in Epic   Medication Sig Dispense Refill    hydroCHLOROthiazide 25 MG Tab Take 1 Tablet by mouth every day. 30 Tablet 1  "   olmesartan (BENICAR) 20 MG Tab Take 1 Tablet by mouth every day. 30 Tablet 1    metoprolol SR (TOPROL XL) 25 MG TABLET SR 24 HR Take 0.5 Tablets by mouth every morning. Take half tablet by mouth once a day 30 Tablet 3    cetirizine (ZYRTEC) 10 MG Tab Take 10 mg by mouth every day.      triamcinolone acetonide (KENALOG) 0.1 % Cream Apply  topically 2 times a day.      Melatonin 1 MG Cap Take 5 mg by mouth every evening.      metFORMIN ER (GLUCOPHAGE XR) 500 MG TABLET SR 24 HR Take 2 Tablets by mouth every day. 180 Tablet 1    meclizine (ANTIVERT) 12.5 MG Tab Take 1 Tablet by mouth 3 times a day as needed for Dizziness. 30 Tablet 0    fluticasone (FLONASE) 50 MCG/ACT nasal spray Administer 1 Spray into affected nostril(S) every morning.      fluoruracil (CARAC) 0.5 % cream Apply 0.5 % topically every day.      ciclopirox (PENLAC) 8 % solution       rosuvastatin (CRESTOR) 20 MG Tab Take 1 Tablet by mouth every evening. 100 Tablet 0    alfuzosin (UROXATRAL) 10 MG SR tablet at bedtime.       No current Epic-ordered facility-administered medications on file.     Gabapentin and Hyzaar [losartan potassium-hctz]    ROS: see hpi  Gen: no fevers/chills  Pulm: no sob, no cough  CV: no chest pain, no palpitations, yes edema  GI: no nausea/vomiting, no diarrhea  Skin: no rash    Objective:   Exam:  BP (!) 142/70 (BP Location: Left arm, Patient Position: Sitting, BP Cuff Size: Adult long)   Pulse (!) 58   Temp 36.1 °C (96.9 °F) (Temporal)   Resp 14   Ht 1.803 m (5' 11\")   Wt 121 kg (267 lb)   SpO2 92%   BMI 37.24 kg/m²    Body mass index is 37.24 kg/m².    Gen: Alert and oriented, No apparent distress.  HEENT: Head atraumatic, normocephalic. Pupils equal and round.  Neck: Neck is supple without lymphadenopathy.   Lungs: Normal effort, CTA bilaterally, no wheezes, rhonchi, or rales  CV: Regular rate and rhythm. No murmurs, rubs, or gallops.  ABD: +BS. Non-tender, non-distended. No rebound, rigidity, or guarding.  Ext: No " clubbing, cyanosis, BLE 2+ edema.    Assessment & Plan:     73 y.o. male with the following -     1. Essential hypertension  Chronic, uncontrolled on metoprolol 25 mg daily. Pt developed side effects from losartan, lisinopril, and amlodipine.   He is bradycardic. Will decrease metoprolol dose to 12.5 mg daily.  Start chlorothiazide 25 mg daily.  Will trial patient on olmesartan 20 mg daily.   Med administration and side effects discussed.  Monitor BP and keep log.  BP goal >100/50 <140/90, if BP falls outside parameters, notify me.   If symptoms of hypotension occurs, recommend to push fluids and call 911.  Recommend to f/u with cardiology.    - hydroCHLOROthiazide 25 MG Tab; Take 1 Tablet by mouth every day.  Dispense: 30 Tablet; Refill: 1  - olmesartan (BENICAR) 20 MG Tab; Take 1 Tablet by mouth every day.  Dispense: 30 Tablet; Refill: 1  - metoprolol SR (TOPROL XL) 25 MG TABLET SR 24 HR; Take 0.5 Tablets by mouth every morning. Take half tablet by mouth once a day  Dispense: 30 Tablet; Refill: 3    2. Bradycardia  Established, suspect medication induced. Will decrease metoprolol to 12.5 mg daily.     - EC-ECHOCARDIOGRAM COMPLETE W/O CONT; Future    3. Edema, unspecified type  4. MARTINEZ (dyspnea on exertion)  Will start HCTZ 25 mg daily. Will order labs and imaging to r/o CHF  Follow-up with cardiology as scheduled.  ED symptoms discussed.    - proBrain Natriuretic Peptide, NT; Future  - EC-ECHOCARDIOGRAM COMPLETE W/O CONT; Future    Medical Decision Making/Course:  In the course of preparing for this visit with review of the pertinent past medical history, recent and past clinic visits, current medications, and performing chart, immunization, medical history and medication reconciliation, and in the further course of obtaining the current history pertinent to the clinic visit today, performing an exam and evaluation, ordering and independently evaluating labs, tests, and/or procedures, prescribing any recommended  new medications as noted above, providing any pertinent counseling and education and recommending further coordination of care. This was discussed with patient in a shared-decision making conversation, and they understand and agreed with plan of care.     Return if symptoms worsen or fail to improve.    ANISA Hawthorne.   [facility]    Please note that this dictation was created using voice recognition software. I have made every reasonable attempt to correct obvious errors, but I expect that there are errors of grammar and possibly content that I did not discover before finalizing the note.

## 2024-06-06 NOTE — TELEPHONE ENCOUNTER
"ESTABLISHED PATIENT PRE-VISIT PLANNING   Monday, 06/07/2021@3:12PM:    Patient was NOT contacted to complete PVP.     Note: Patient will not be contacted if there is no indication to call.     1.  Reviewed notes from the last few office visits within the medical group: Yes    2.  If any orders were placed at last visit or intended to be done for this visit (i.e. 6 mos follow-up), do we have Results/Consult Notes?         •  Labs - Labs ordered, but not to be completed until Per Dr. Vila's Office Visit note on 03/09/2021:\"Return in about 6 months (around 9/9/2021) for ANNUAL, HEALTH .\".  Note: If patient appointment is for lab review and patient did not complete labs, check with provider if OK to reschedule patient until labs completed.       •  Imaging - Imaging was not ordered at last office visit.       •  Referrals - No referrals were ordered at last office visit.    3. Is this appointment scheduled as a Hospital Follow-Up? No    4.  Immunizations were updated in Epic using Reconcile Outside Information activity? Yes    5.  Patient is due for the following Health Maintenance Topics:   Health Maintenance Due   Topic Date Due   • IMM PNEUMOCOCCAL VACCINE: 65+ Years (1 of 2 - PPSV23) Never done   • IMM ZOSTER VACCINES (2 of 3) 11/10/2016       6.  AHA (Pulse8) form printed for Provider? N/A    " ----- Message from Lashonda Sharpe sent at 6/6/2024 11:08 AM CDT -----  Type:  Patient Returning Call    Who Called:[place]/ [facility]   Would the patient rather a call back or a response via [facility]? Call back   Best Call Back Number:[phone]   Additional Information: calling to confirm if pt is no longer taking amiodarone (PACERONE) 200 MG Tab.. it is on her medication list and pt states she is no longer taking it

## 2024-06-07 ENCOUNTER — HOSPITAL ENCOUNTER (OUTPATIENT)
Dept: CARDIOLOGY | Facility: MEDICAL CENTER | Age: 74
End: 2024-06-07
Payer: MEDICARE

## 2024-06-07 DIAGNOSIS — R60.9 EDEMA, UNSPECIFIED TYPE: ICD-10-CM

## 2024-06-07 DIAGNOSIS — R06.09 DOE (DYSPNEA ON EXERTION): ICD-10-CM

## 2024-06-07 DIAGNOSIS — R00.1 BRADYCARDIA: ICD-10-CM

## 2024-06-07 LAB
LV EJECT FRACT  99904: 60
LV EJECT FRACT MOD 2C 99903: 65
LV EJECT FRACT MOD 4C 99902: 61.65
LV EJECT FRACT MOD BP 99901: 64.16

## 2024-06-07 PROCEDURE — 93306 TTE W/DOPPLER COMPLETE: CPT | Mod: 26 | Performed by: INTERNAL MEDICINE

## 2024-06-07 PROCEDURE — 93306 TTE W/DOPPLER COMPLETE: CPT

## 2024-06-10 ENCOUNTER — TELEPHONE (OUTPATIENT)
Dept: MEDICAL GROUP | Facility: IMAGING CENTER | Age: 74
End: 2024-06-10
Payer: MEDICARE

## 2024-06-11 ENCOUNTER — TELEPHONE (OUTPATIENT)
Dept: CARDIOLOGY | Facility: MEDICAL CENTER | Age: 74
End: 2024-06-11
Payer: MEDICARE

## 2024-06-11 NOTE — TELEPHONE ENCOUNTER
Called patient to request records for NP appointment with Dr. Warren. Called to confirm if this will be first time patient is seeing a cardiologist and verify we have all records including labs, imaging and any other cardiac testing. No answer, left voicemail to call back.

## 2024-06-12 NOTE — TELEPHONE ENCOUNTER
Caller: Laureen Munoz    Topic/issue: Patient returning call and states last Cardiologist was Dr. Wilks in 2017. No cardiac testing, imaging or labs done outside of [facility].  Echo and labs done recently at [facility].    Callback Number: [phone]    Thank you,  Ebony CHEN

## 2024-06-18 ENCOUNTER — OFFICE VISIT (OUTPATIENT)
Dept: CARDIOLOGY | Facility: MEDICAL CENTER | Age: 74
End: 2024-06-18
Payer: MEDICARE

## 2024-06-18 VITALS
BODY MASS INDEX: 36.96 KG/M2 | SYSTOLIC BLOOD PRESSURE: 110 MMHG | DIASTOLIC BLOOD PRESSURE: 60 MMHG | HEART RATE: 67 BPM | RESPIRATION RATE: 14 BRPM | WEIGHT: 264 LBS | HEIGHT: 71 IN | OXYGEN SATURATION: 92 %

## 2024-06-18 DIAGNOSIS — I50.31 ACUTE DIASTOLIC CONGESTIVE HEART FAILURE (HCC): ICD-10-CM

## 2024-06-18 DIAGNOSIS — I70.1 RENAL ARTERY STENOSIS (HCC): ICD-10-CM

## 2024-06-18 DIAGNOSIS — I10 ESSENTIAL HYPERTENSION: Chronic | ICD-10-CM

## 2024-06-18 DIAGNOSIS — R94.31 NONSPECIFIC ABNORMAL ELECTROCARDIOGRAM (ECG) (EKG): ICD-10-CM

## 2024-06-18 DIAGNOSIS — E78.00 HYPERCHOLESTEREMIA: ICD-10-CM

## 2024-06-18 DIAGNOSIS — I45.10 RIGHT BUNDLE BRANCH BLOCK (RBBB) ON ELECTROCARDIOGRAM (ECG): ICD-10-CM

## 2024-06-18 LAB — EKG IMPRESSION: NORMAL

## 2024-06-18 PROCEDURE — 3074F SYST BP LT 130 MM HG: CPT | Performed by: INTERNAL MEDICINE

## 2024-06-18 PROCEDURE — 99212 OFFICE O/P EST SF 10 MIN: CPT | Performed by: INTERNAL MEDICINE

## 2024-06-18 PROCEDURE — 93005 ELECTROCARDIOGRAM TRACING: CPT | Performed by: INTERNAL MEDICINE

## 2024-06-18 PROCEDURE — 93010 ELECTROCARDIOGRAM REPORT: CPT | Performed by: INTERNAL MEDICINE

## 2024-06-18 PROCEDURE — 99204 OFFICE O/P NEW MOD 45 MIN: CPT | Mod: 25 | Performed by: INTERNAL MEDICINE

## 2024-06-18 PROCEDURE — 3078F DIAST BP <80 MM HG: CPT | Performed by: INTERNAL MEDICINE

## 2024-06-18 PROCEDURE — 1170F FXNL STATUS ASSESSED: CPT | Performed by: INTERNAL MEDICINE

## 2024-06-18 PROCEDURE — 99202 OFFICE O/P NEW SF 15 MIN: CPT | Performed by: INTERNAL MEDICINE

## 2024-06-18 RX ORDER — SPIRONOLACTONE 25 MG/1
25 TABLET ORAL DAILY
Qty: 30 TABLET | Refills: 11 | Status: SHIPPED | OUTPATIENT
Start: 2024-06-18

## 2024-06-18 ASSESSMENT — ENCOUNTER SYMPTOMS
RESPIRATORY NEGATIVE: 1
NEUROLOGICAL NEGATIVE: 1
SHORTNESS OF BREATH: 0
BRUISES/BLEEDS EASILY: 0
COUGH: 0
FEVER: 0
NERVOUS/ANXIOUS: 0
PSYCHIATRIC NEGATIVE: 1
CARDIOVASCULAR NEGATIVE: 1
BLURRED VISION: 0
EYES NEGATIVE: 1
CLAUDICATION: 0
GASTROINTESTINAL NEGATIVE: 1
MUSCULOSKELETAL NEGATIVE: 1
MYALGIAS: 0
NAUSEA: 0
HEADACHES: 0
PALPITATIONS: 0
CONSTITUTIONAL NEGATIVE: 1
DEPRESSION: 0
WEIGHT LOSS: 0
CHILLS: 0
DIZZINESS: 0
DOUBLE VISION: 0
VOMITING: 0
WEAKNESS: 0
ABDOMINAL PAIN: 0
FOCAL WEAKNESS: 0

## 2024-06-18 ASSESSMENT — FIBROSIS 4 INDEX: FIB4 SCORE: 2.28

## 2024-06-18 NOTE — PROGRESS NOTES
Chief Complaint   Patient presents with    New Patient     N/P Dx: Right bundle branch block (RBBB) on electrocardiogram (ECG)  N/P Dx: Pulse irregularity       Subjective     Laureen Munoz is a 73 y.o. male who presents today as a consult from Edilia Prescott for abnormal EKG, right bundle branch block.    Thank you for allowing me to evaluate Mr. Munoz, who as you know is a 73 year old male with prediabetes, hypertension and hyperlipidemia, previous tobacco abuse, strong family history of coronary artery disease. He was scheduled for EGD which was cancelled due to lower extremity edema and elevated blood pressure. He underwent an renal ultrasound which demonstrated renal artery stenosis. He underwent an EKG which was abnormal with new right bundle branch block. He admits to fatigue, shortness of breath, lower extremity edema, dizziness. He denies chest pain, palpitations, syncope. He is inactive. He is retired government.     Past Medical History:   Diagnosis Date    Allergy     Anesthesia 2017    Arthritis unk    Cancer (HCC) 2020    skin cancer sqammish    Cataract 2022    lens implants    High cholesterol 1998    Hypercholesterolemia     Hypertension     Migraine     Neuropathy     PONV (postoperative nausea and vomiting) 2017    Rhinoplasti for deviated septon    Reactive depression 02/21/2023    Renal artery stenosis (HCC)     Sleep apnea 2019    tested    Stroke (HCC) 2023    stroke or contusion    Tinea inguinalis     Tobacco use 10/30/2018    Urinary bladder disorder 1995    bph     Past Surgical History:   Procedure Laterality Date    KS TRANSURETHRAL ELEC-SURG PROSTATECTOM N/A 08/17/2023    Procedure: BIPOLAR TRANSURETHRAL RESECTION OF PROSTATE;  Surgeon: Jacques Mcdowell M.D.;  Location: SURGERY [facility];  Service: Urology    KS CYSTOURETHROSCOPY N/A 08/17/2023    Procedure: CYSTOSCOPY;  Surgeon: Jacques Mcdowell M.D.;  Location: SURGERY [facility];  Service: Urology    KS  CYSTO/URETERO/PYELOSCOPY W/LITHOTRIPSY N/A 2023    Procedure: CYSTOLITHLOPAXY;  Surgeon: Jacques Mcdowell M.D.;  Location: SURGERY [facility];  Service: Urology    CATARACT PHACO WITH IOL      EYE SURGERY      OTHER      cyst removal back of the neck    OTHER ABDOMINAL SURGERY      gallbladder    PROSTATECTOMY, RADICAL RETRO      RHINOPLASTY       Family History   Problem Relation Age of Onset    Heart Disease Mother          at 91yo/heart failure    Heart Attack Mother     Breast Cancer Mother     Heart Disease Father          at 57yo/massive heart attack    Heart Attack Father     Hypertension Father          coronary thrombosis    Cancer Sister          of leukemia    Breast Cancer Sister     Stroke Sister         living lost eye site left eye    No Known Problems Maternal Grandmother     No Known Problems Maternal Grandfather     No Known Problems Paternal Grandmother     Dementia Paternal Grandfather     Hyperlipidemia Neg Hx     Diabetes Neg Hx      Social History     Socioeconomic History    Marital status:      Spouse name: Not on file    Number of children: Not on file    Years of education: Not on file    Highest education level: Associate degree: academic program   Occupational History    Not on file   Tobacco Use    Smoking status: Former     Current packs/day: 0.00     Average packs/day: 1 pack/day for 15.0 years (15.0 ttl pk-yrs)     Types: Cigarettes, Cigars     Start date: 1970     Quit date: 1985     Years since quittin.4    Smokeless tobacco: Never    Tobacco comments:     occasional cigar started in / quit completely    Vaping Use    Vaping status: Never Used   Substance and Sexual Activity    Alcohol use: Yes     Alcohol/week: 1.2 oz     Types: 1 Cans of beer, 1 Shots of liquor per week     Comment: 2 beers / 2 shots in last month    Drug use: Not Currently     Types: Marijuana, Inhaled     Comment: none for 30 years/ tried pot [id number]  didn't care to much for    Sexual activity: Not Currently     Partners: Female     Comment: not sexually active last 3 years, when active male condom   Other Topics Concern    Not on file   Social History Narrative    Not on file     Social Determinants of Health     Financial Resource Strain: Medium Risk (4/15/2023)    Overall Financial Resource Strain (CARDIA)     Difficulty of Paying Living Expenses: Somewhat hard   Food Insecurity: No Food Insecurity (4/15/2023)    Hunger Vital Sign     Worried About Running Out of Food in the Last Year: Never true     Ran Out of Food in the Last Year: Never true   Transportation Needs: No Transportation Needs (4/15/2023)    PRAPARE - Transportation     Lack of Transportation (Medical): No     Lack of Transportation (Non-Medical): No   Physical Activity: Inactive (4/15/2023)    Exercise Vital Sign     Days of Exercise per Week: 0 days     Minutes of Exercise per Session: 0 min   Stress: No Stress Concern Present (4/15/2023)    [employer] - Occupational Stress Questionnaire     Feeling of Stress : Only a little   Social Connections: Socially Isolated (4/15/2023)    Social Connection and Isolation Panel [NHANES]     Frequency of Communication with Friends and Family: More than three times a week     Frequency of Social Gatherings with Friends and Family: Once a week     Attends [employer] Services: Never     Active Member of Clubs or Organizations: No     Attends Club or Organization Meetings: Never     Marital Status:    Intimate Partner Violence: Not on file   Housing Stability: Low Risk  (4/15/2023)    Housing Stability Vital Sign     Unable to Pay for Housing in the Last Year: No     Number of Places Lived in the Last Year: 1     Unstable Housing in the Last Year: No     Allergies   Allergen Reactions    Gabapentin Unspecified     Dizziness, confusion, falling    Hyzaar [Losartan Potassium-Hctz] Cough     Outpatient Encounter Medications as  of 6/18/2024   Medication Sig Dispense Refill    hydroCHLOROthiazide 25 MG Tab Take 1 Tablet by mouth every day. 30 Tablet 1    olmesartan (BENICAR) 20 MG Tab Take 1 Tablet by mouth every day. 30 Tablet 1    metoprolol SR (TOPROL XL) 25 MG TABLET SR 24 HR Take 0.5 Tablets by mouth every morning. Take half tablet by mouth once a day 30 Tablet 3    cetirizine (ZYRTEC) 10 MG Tab Take 10 mg by mouth every day.      triamcinolone acetonide (KENALOG) 0.1 % Cream Apply  topically 2 times a day.      Melatonin 1 MG Cap Take 5 mg by mouth every evening.      metFORMIN ER (GLUCOPHAGE XR) 500 MG TABLET SR 24 HR Take 2 Tablets by mouth every day. 180 Tablet 1    meclizine (ANTIVERT) 12.5 MG Tab Take 1 Tablet by mouth 3 times a day as needed for Dizziness. 30 Tablet 0    fluticasone (FLONASE) 50 MCG/ACT nasal spray Administer 1 Spray into affected nostril(S) every morning.      [DISCONTINUED] fluoruracil (CARAC) 0.5 % cream Apply 0.5 % topically every day.      [DISCONTINUED] ciclopirox (PENLAC) 8 % solution       [DISCONTINUED] rosuvastatin (CRESTOR) 20 MG Tab Take 1 Tablet by mouth every evening. 100 Tablet 0    [DISCONTINUED] alfuzosin (UROXATRAL) 10 MG SR tablet at bedtime.       No facility-administered encounter medications on file as of 6/18/2024.     Review of Systems   Constitutional: Negative.  Negative for chills, fever, malaise/fatigue and weight loss.   HENT: Negative.  Negative for hearing loss.    Eyes: Negative.  Negative for blurred vision and double vision.   Respiratory: Negative.  Negative for cough and shortness of breath.    Cardiovascular: Negative.  Negative for chest pain, palpitations, claudication and leg swelling.   Gastrointestinal: Negative.  Negative for abdominal pain, nausea and vomiting.   Genitourinary: Negative.  Negative for dysuria and urgency.   Musculoskeletal: Negative.  Negative for joint pain and myalgias.   Skin: Negative.  Negative for itching and rash.   Neurological: Negative.   "Negative for dizziness, focal weakness, weakness and headaches.   Endo/Heme/Allergies: Negative.  Does not bruise/bleed easily.   Psychiatric/Behavioral: Negative.  Negative for depression. The patient is not nervous/anxious.               Objective     /60 (BP Location: Left arm, Patient Position: Sitting, BP Cuff Size: Adult)   Pulse 67   Resp 14   Ht 1.803 m (5' 11\")   Wt 120 kg (264 lb)   SpO2 92%   BMI 36.82 kg/m²     Physical Exam  Constitutional:       Appearance: Normal appearance. He is well-developed and normal weight.   HENT:      Head: Normocephalic and atraumatic.   Neck:      Vascular: No JVD.   Cardiovascular:      Rate and Rhythm: Normal rate and regular rhythm.      Heart sounds: Normal heart sounds.   Pulmonary:      Effort: Pulmonary effort is normal.      Breath sounds: Normal breath sounds.   Abdominal:      General: Bowel sounds are normal.      Palpations: Abdomen is soft.      Comments: No hepatosplenomegaly.   Musculoskeletal:         General: Normal range of motion.   Lymphadenopathy:      Cervical: No cervical adenopathy.   Skin:     General: Skin is warm and dry.   Neurological:      Mental Status: He is alert and oriented to person, place, and time.            CARDIAC STUDIES/PROCEDURES:    ABDOMINAL ULTRASOUND (03/22/24)  Ectatic abdominal aorta. No aneurysm identified.   (study result reviewed)     ECHOCARDIOGRAM CONCLUSIONS (06/07/24)  Normal left ventricular systolic function.  The left ventricular ejection fraction is visually estimated to be 60%.  The right ventricle is dilated. Normal right ventricular systolic function.  Mild aortic insufficiency.  Mild tricuspid regurgitation.  Compared to the prior study on 5/10/2023, low ventricular systolic function remains preserved.  (study result reviewed)     Laboratory results of (04/02/24) were reviewed. Cholesterol profile of 124/121/46/54 mg/dL noted.     RENAL ULTRASOUND (05/1/24)  1.  Bilateral proximal and right mid " renal artery velocities are elevated above 180 cm/s indicating stenosis.  2.  Bilateral renal resistive indices are mildly elevated indicating medical renal disease.  3.  Left simple renal cyst measuring 11.4 cm in greatest dimension. No follow up imaging is recommended per consensus guidelines of the 2019 ACR Incidental Findings Committee for probably benign incidental simple appearing renal cystic lesion(s) based on imaging criteria.  4.  Post void residual measures 201 mL.  5.  Enlarged calcified prostate which indents the bladder base.  (study result reviewed)     Assessment & Plan     1. Nonspecific abnormal electrocardiogram (ECG) (EKG)        2. Right bundle branch block (RBBB) on electrocardiogram (ECG)  EKG      3. Acute diastolic congestive heart failure (HCC)        4. Essential hypertension        5. Renal artery stenosis (HCC)        6. Hypercholesteremia            Medical Decision Making: Today's Assessment/Status/Plan:        Abnormal EKG: He is a 73 year old male with prediabetes, hypertension and hyperlipidemia, The EKG is abnormal as described above. We will perform a myocardial perfusion imaging study.   Acute diastolic congestive heart failure: The overall volume status is elevated. We will start jardiance, aldactone, discontinue hydrochlorothiazide. We will refer to [facility].   Hypertension: Blood pressure is well controlled. We will continue with metoprolol, olmesartan.  Renal artery stenosis: We will refer for consideration for renal artery stent placement.   Hyperlipidemia: He is doing well on statin therapy without myalgia symptoms.  Greater than 45 minutes of time was spent to review all above information; of which more than 50% of the time was face to face reviewing thee patient's medical issues, medication evaluation, study result review, lab results review, all records with complex issues.    We will follow up in 3 months.    Thank you for this consult.

## 2024-06-20 ENCOUNTER — HOSPITAL ENCOUNTER (OUTPATIENT)
Dept: RADIOLOGY | Facility: MEDICAL CENTER | Age: 74
End: 2024-06-20
Attending: INTERNAL MEDICINE
Payer: MEDICARE

## 2024-06-20 DIAGNOSIS — I45.10 RIGHT BUNDLE BRANCH BLOCK (RBBB) ON ELECTROCARDIOGRAM (ECG): ICD-10-CM

## 2024-06-20 DIAGNOSIS — R94.31 NONSPECIFIC ABNORMAL ELECTROCARDIOGRAM (ECG) (EKG): ICD-10-CM

## 2024-06-20 PROCEDURE — 700111 HCHG RX REV CODE 636 W/ 250 OVERRIDE (IP): Performed by: INTERNAL MEDICINE

## 2024-06-20 PROCEDURE — 93018 CV STRESS TEST I&R ONLY: CPT | Performed by: INTERNAL MEDICINE

## 2024-06-20 PROCEDURE — 78452 HT MUSCLE IMAGE SPECT MULT: CPT | Mod: 26 | Performed by: INTERNAL MEDICINE

## 2024-06-20 PROCEDURE — 78452 HT MUSCLE IMAGE SPECT MULT: CPT

## 2024-06-20 RX ORDER — AMINOPHYLLINE 25 MG/ML
100 INJECTION, SOLUTION INTRAVENOUS
Status: DISCONTINUED | OUTPATIENT
Start: 2024-06-20 | End: 2024-06-21 | Stop reason: HOSPADM

## 2024-06-20 RX ORDER — REGADENOSON 0.08 MG/ML
0.4 INJECTION, SOLUTION INTRAVENOUS ONCE
Status: COMPLETED | OUTPATIENT
Start: 2024-06-20 | End: 2024-06-20

## 2024-06-20 RX ORDER — REGADENOSON 0.08 MG/ML
0.4 INJECTION, SOLUTION INTRAVENOUS ONCE
Status: DISCONTINUED | OUTPATIENT
Start: 2024-06-20 | End: 2024-06-20

## 2024-06-20 RX ADMIN — REGADENOSON 0.4 MG: 0.08 INJECTION, SOLUTION INTRAVENOUS at 09:30

## 2024-07-03 ENCOUNTER — HOSPITAL ENCOUNTER (OUTPATIENT)
Dept: LAB | Facility: MEDICAL CENTER | Age: 74
End: 2024-07-03
Attending: INTERNAL MEDICINE
Payer: MEDICARE

## 2024-07-03 DIAGNOSIS — I50.31 ACUTE DIASTOLIC CONGESTIVE HEART FAILURE (HCC): ICD-10-CM

## 2024-07-03 LAB
ANION GAP SERPL CALC-SCNC: 12 MMOL/L (ref 7–16)
BUN SERPL-MCNC: 24 MG/DL (ref 8–22)
CALCIUM SERPL-MCNC: 9.2 MG/DL (ref 8.5–10.5)
CHLORIDE SERPL-SCNC: 104 MMOL/L (ref 96–112)
CO2 SERPL-SCNC: 22 MMOL/L (ref 20–33)
CREAT SERPL-MCNC: 0.95 MG/DL (ref 0.5–1.4)
GFR SERPLBLD CREATININE-BSD FMLA CKD-EPI: 85 ML/MIN/1.73 M 2
GLUCOSE SERPL-MCNC: 109 MG/DL (ref 65–99)
POTASSIUM SERPL-SCNC: 4.2 MMOL/L (ref 3.6–5.5)
SODIUM SERPL-SCNC: 138 MMOL/L (ref 135–145)

## 2024-07-03 PROCEDURE — 36415 COLL VENOUS BLD VENIPUNCTURE: CPT

## 2024-07-03 PROCEDURE — 80048 BASIC METABOLIC PNL TOTAL CA: CPT

## 2024-07-08 ENCOUNTER — TELEPHONE (OUTPATIENT)
Dept: CARDIOLOGY | Facility: MEDICAL CENTER | Age: 74
End: 2024-07-08
Payer: MEDICARE

## 2024-07-08 DIAGNOSIS — I70.1 RENAL ARTERY STENOSIS (HCC): ICD-10-CM

## 2024-07-08 DIAGNOSIS — I50.31 ACUTE DIASTOLIC CONGESTIVE HEART FAILURE (HCC): ICD-10-CM

## 2024-07-09 DIAGNOSIS — B35.1 ONYCHOMYCOSIS: ICD-10-CM

## 2024-07-10 ENCOUNTER — HOSPITAL ENCOUNTER (OUTPATIENT)
Dept: RADIOLOGY | Facility: MEDICAL CENTER | Age: 74
End: 2024-07-10
Attending: INTERNAL MEDICINE
Payer: MEDICARE

## 2024-07-10 DIAGNOSIS — I70.1 RENAL ARTERY STENOSIS (HCC): ICD-10-CM

## 2024-07-10 PROCEDURE — 74175 CTA ABDOMEN W/CONTRAST: CPT

## 2024-07-10 PROCEDURE — 700117 HCHG RX CONTRAST REV CODE 255: Performed by: INTERNAL MEDICINE

## 2024-07-10 RX ORDER — SPIRONOLACTONE 25 MG/1
25 TABLET ORAL DAILY
Qty: 30 TABLET | Refills: 11 | OUTPATIENT
Start: 2024-07-10

## 2024-07-10 RX ADMIN — IOHEXOL 100 ML: 350 INJECTION, SOLUTION INTRAVENOUS at 17:21

## 2024-07-15 ENCOUNTER — APPOINTMENT (OUTPATIENT)
Dept: MEDICAL GROUP | Facility: IMAGING CENTER | Age: 74
End: 2024-07-15
Payer: MEDICARE

## 2024-07-15 VITALS
RESPIRATION RATE: 14 BRPM | TEMPERATURE: 97.2 F | DIASTOLIC BLOOD PRESSURE: 58 MMHG | SYSTOLIC BLOOD PRESSURE: 106 MMHG | WEIGHT: 259 LBS | HEART RATE: 59 BPM | BODY MASS INDEX: 36.26 KG/M2 | HEIGHT: 71 IN | OXYGEN SATURATION: 90 %

## 2024-07-15 DIAGNOSIS — R91.1 PULMONARY NODULE, RIGHT: ICD-10-CM

## 2024-07-15 DIAGNOSIS — B35.1 ONYCHOMYCOSIS: ICD-10-CM

## 2024-07-15 DIAGNOSIS — E66.9 OBESITY (BMI 30-39.9): ICD-10-CM

## 2024-07-15 DIAGNOSIS — R73.03 PREDIABETES: ICD-10-CM

## 2024-07-15 DIAGNOSIS — R91.1 INCIDENTAL PULMONARY NODULE, GREATER THAN OR EQUAL TO 8MM: ICD-10-CM

## 2024-07-15 PROCEDURE — 3074F SYST BP LT 130 MM HG: CPT

## 2024-07-15 PROCEDURE — 3078F DIAST BP <80 MM HG: CPT

## 2024-07-15 PROCEDURE — 1170F FXNL STATUS ASSESSED: CPT

## 2024-07-15 PROCEDURE — 99213 OFFICE O/P EST LOW 20 MIN: CPT

## 2024-07-15 RX ORDER — CICLOPIROX 80 MG/ML
SOLUTION TOPICAL
Qty: 6 ML | Refills: 3 | Status: CANCELLED | OUTPATIENT
Start: 2024-07-15

## 2024-07-15 ASSESSMENT — FIBROSIS 4 INDEX: FIB4 SCORE: 2.28

## 2024-07-19 ENCOUNTER — OFFICE VISIT (OUTPATIENT)
Dept: SLEEP MEDICINE | Facility: MEDICAL CENTER | Age: 74
End: 2024-07-19
Payer: MEDICARE

## 2024-07-19 ENCOUNTER — HOSPITAL ENCOUNTER (OUTPATIENT)
Dept: LAB | Facility: MEDICAL CENTER | Age: 74
End: 2024-07-19
Payer: MEDICARE

## 2024-07-19 VITALS
WEIGHT: 257 LBS | BODY MASS INDEX: 35.98 KG/M2 | HEART RATE: 55 BPM | OXYGEN SATURATION: 92 % | SYSTOLIC BLOOD PRESSURE: 110 MMHG | DIASTOLIC BLOOD PRESSURE: 68 MMHG | HEIGHT: 71 IN

## 2024-07-19 DIAGNOSIS — R73.03 PREDIABETES: ICD-10-CM

## 2024-07-19 DIAGNOSIS — E66.9 OBESITY (BMI 30-39.9): ICD-10-CM

## 2024-07-19 DIAGNOSIS — G47.31 COMPLEX SLEEP APNEA SYNDROME: ICD-10-CM

## 2024-07-19 LAB
EST. AVERAGE GLUCOSE BLD GHB EST-MCNC: 134 MG/DL
HBA1C MFR BLD: 6.3 % (ref 4–5.6)

## 2024-07-19 PROCEDURE — 83525 ASSAY OF INSULIN: CPT

## 2024-07-19 PROCEDURE — 36415 COLL VENOUS BLD VENIPUNCTURE: CPT | Mod: GA

## 2024-07-19 PROCEDURE — 3078F DIAST BP <80 MM HG: CPT

## 2024-07-19 PROCEDURE — 83036 HEMOGLOBIN GLYCOSYLATED A1C: CPT | Mod: GA

## 2024-07-19 PROCEDURE — 1170F FXNL STATUS ASSESSED: CPT

## 2024-07-19 PROCEDURE — 99213 OFFICE O/P EST LOW 20 MIN: CPT

## 2024-07-19 PROCEDURE — 3074F SYST BP LT 130 MM HG: CPT

## 2024-07-19 ASSESSMENT — FIBROSIS 4 INDEX: FIB4 SCORE: 2.28

## 2024-07-24 LAB — INSULIN P FAST SERPL-ACNC: 19 UIU/ML (ref 3–25)

## 2024-07-27 DIAGNOSIS — I10 ESSENTIAL HYPERTENSION: ICD-10-CM

## 2024-07-27 DIAGNOSIS — R73.03 PREDIABETES: ICD-10-CM

## 2024-07-28 RX ORDER — METFORMIN HYDROCHLORIDE 500 MG/1
1000 TABLET, EXTENDED RELEASE ORAL DAILY
Qty: 180 TABLET | Refills: 1 | Status: SHIPPED | OUTPATIENT
Start: 2024-07-28

## 2024-07-29 RX ORDER — OLMESARTAN MEDOXOMIL 20 MG/1
20 TABLET ORAL DAILY
Qty: 30 TABLET | Refills: 1 | Status: SHIPPED | OUTPATIENT
Start: 2024-07-29

## 2024-08-05 ENCOUNTER — APPOINTMENT (RX ONLY)
Dept: URBAN - METROPOLITAN AREA CLINIC 4 | Facility: CLINIC | Age: 74
Setting detail: DERMATOLOGY
End: 2024-08-05

## 2024-08-05 DIAGNOSIS — L57.0 ACTINIC KERATOSIS: ICD-10-CM

## 2024-08-05 DIAGNOSIS — L28.1 PRURIGO NODULARIS: ICD-10-CM

## 2024-08-05 PROCEDURE — ? COUNSELING

## 2024-08-05 PROCEDURE — ? LIQUID NITROGEN

## 2024-08-05 PROCEDURE — 17000 DESTRUCT PREMALG LESION: CPT | Mod: 59

## 2024-08-05 PROCEDURE — 17110 DESTRUCTION B9 LES UP TO 14: CPT

## 2024-08-05 ASSESSMENT — LOCATION DETAILED DESCRIPTION DERM
LOCATION DETAILED: LEFT RADIAL DORSAL HAND
LOCATION DETAILED: LEFT DISTAL DORSAL FOREARM
LOCATION DETAILED: RIGHT SUPERIOR CENTRAL BUCCAL CHEEK
LOCATION DETAILED: LEFT SUPERIOR POSTERIOR NECK
LOCATION DETAILED: LEFT PROXIMAL DORSAL FOREARM
LOCATION DETAILED: LEFT PROXIMAL RADIAL DORSAL FOREARM
LOCATION DETAILED: LEFT DISTAL PALMAR INDEX FINGER
LOCATION DETAILED: RIGHT RADIAL DORSAL HAND

## 2024-08-05 ASSESSMENT — LOCATION SIMPLE DESCRIPTION DERM
LOCATION SIMPLE: RIGHT CHEEK
LOCATION SIMPLE: LEFT HAND
LOCATION SIMPLE: LEFT INDEX FINGER
LOCATION SIMPLE: NECK
LOCATION SIMPLE: LEFT FOREARM
LOCATION SIMPLE: RIGHT HAND

## 2024-08-05 ASSESSMENT — LOCATION ZONE DERM
LOCATION ZONE: ARM
LOCATION ZONE: FACE
LOCATION ZONE: HAND
LOCATION ZONE: NECK
LOCATION ZONE: FINGER

## 2024-08-05 NOTE — PROCEDURE: LIQUID NITROGEN
Show Applicator Variable?: Yes
Post-Care Instructions: I reviewed with the patient in detail post-care instructions. Patient is to wear sunprotection, and avoid picking at any of the treated lesions. Pt may apply Vaseline to crusted or scabbing areas.
Render Note In Bullet Format When Appropriate: No
Spray Paint Text: The liquid nitrogen was applied to the skin utilizing a spray paint frosting technique.
Medical Necessity Clause: This procedure was medically necessary because the lesions that were treated were:
Detail Level: Detailed
Consent: The patient's consent was obtained including but not limited to risks of crusting, scabbing, blistering, scarring, darker or lighter pigmentary change, recurrence, incomplete removal and infection.
Medical Necessity Information: It is in your best interest to select a reason for this procedure from the list below. All of these items fulfill various CMS LCD requirements except the new and changing color options.
Duration Of Freeze Thaw-Cycle (Seconds): 3
Application Tool (Optional): Cry-AC
Aperture Size (Optional): C
Number Of Freeze-Thaw Cycles: 1 freeze-thaw cycle

## 2024-08-13 ENCOUNTER — APPOINTMENT (OUTPATIENT)
Dept: RADIOLOGY | Facility: MEDICAL CENTER | Age: 74
DRG: 065 | End: 2024-08-13
Attending: EMERGENCY MEDICINE
Payer: MEDICARE

## 2024-08-13 ENCOUNTER — APPOINTMENT (OUTPATIENT)
Dept: RADIOLOGY | Facility: MEDICAL CENTER | Age: 74
DRG: 065 | End: 2024-08-13
Payer: MEDICARE

## 2024-08-13 ENCOUNTER — HOSPITAL ENCOUNTER (INPATIENT)
Facility: MEDICAL CENTER | Age: 74
LOS: 1 days | DRG: 065 | End: 2024-08-14
Attending: EMERGENCY MEDICINE | Admitting: INTERNAL MEDICINE
Payer: MEDICARE

## 2024-08-13 DIAGNOSIS — I49.8 SINUS ARRHYTHMIA: ICD-10-CM

## 2024-08-13 DIAGNOSIS — R55 NEAR SYNCOPE: ICD-10-CM

## 2024-08-13 DIAGNOSIS — R00.1 BRADYCARDIA: ICD-10-CM

## 2024-08-13 DIAGNOSIS — I61.9 CVA (CEREBROVASCULAR ACCIDENT DUE TO INTRACEREBRAL HEMORRHAGE) (HCC): ICD-10-CM

## 2024-08-13 PROBLEM — R42 DIZZINESS: Status: ACTIVE | Noted: 2024-08-13

## 2024-08-13 LAB
ALBUMIN SERPL BCP-MCNC: 4.1 G/DL (ref 3.2–4.9)
ALBUMIN/GLOB SERPL: 1.5 G/DL
ALP SERPL-CCNC: 101 U/L (ref 30–99)
ALT SERPL-CCNC: 25 U/L (ref 2–50)
ANION GAP SERPL CALC-SCNC: 14 MMOL/L (ref 7–16)
AST SERPL-CCNC: 27 U/L (ref 12–45)
B-OH-BUTYR SERPL-MCNC: 0.33 MMOL/L (ref 0.02–0.27)
BASOPHILS # BLD AUTO: 0.5 % (ref 0–1.8)
BASOPHILS # BLD: 0.03 K/UL (ref 0–0.12)
BILIRUB SERPL-MCNC: 0.9 MG/DL (ref 0.1–1.5)
BUN SERPL-MCNC: 25 MG/DL (ref 8–22)
CALCIUM ALBUM COR SERPL-MCNC: 8.6 MG/DL (ref 8.5–10.5)
CALCIUM SERPL-MCNC: 8.7 MG/DL (ref 8.5–10.5)
CHLORIDE SERPL-SCNC: 105 MMOL/L (ref 96–112)
CO2 SERPL-SCNC: 19 MMOL/L (ref 20–33)
CREAT SERPL-MCNC: 1.08 MG/DL (ref 0.5–1.4)
EKG IMPRESSION: NORMAL
EOSINOPHIL # BLD AUTO: 0.12 K/UL (ref 0–0.51)
EOSINOPHIL NFR BLD: 2 % (ref 0–6.9)
ERYTHROCYTE [DISTWIDTH] IN BLOOD BY AUTOMATED COUNT: 48.4 FL (ref 35.9–50)
EST. AVERAGE GLUCOSE BLD GHB EST-MCNC: 131 MG/DL
GFR SERPLBLD CREATININE-BSD FMLA CKD-EPI: 72 ML/MIN/1.73 M 2
GLOBULIN SER CALC-MCNC: 2.7 G/DL (ref 1.9–3.5)
GLUCOSE BLD STRIP.AUTO-MCNC: 104 MG/DL (ref 65–99)
GLUCOSE SERPL-MCNC: 94 MG/DL (ref 65–99)
HBA1C MFR BLD: 6.2 % (ref 4–5.6)
HCT VFR BLD AUTO: 45.2 % (ref 42–52)
HGB BLD-MCNC: 14.9 G/DL (ref 14–18)
IMM GRANULOCYTES # BLD AUTO: 0.08 K/UL (ref 0–0.11)
IMM GRANULOCYTES NFR BLD AUTO: 1.4 % (ref 0–0.9)
LYMPHOCYTES # BLD AUTO: 1.44 K/UL (ref 1–4.8)
LYMPHOCYTES NFR BLD: 24.4 % (ref 22–41)
MAGNESIUM SERPL-MCNC: 2.3 MG/DL (ref 1.5–2.5)
MCH RBC QN AUTO: 30.1 PG (ref 27–33)
MCHC RBC AUTO-ENTMCNC: 33 G/DL (ref 32.3–36.5)
MCV RBC AUTO: 91.3 FL (ref 81.4–97.8)
MONOCYTES # BLD AUTO: 0.79 K/UL (ref 0–0.85)
MONOCYTES NFR BLD AUTO: 13.4 % (ref 0–13.4)
NEUTROPHILS # BLD AUTO: 3.44 K/UL (ref 1.82–7.42)
NEUTROPHILS NFR BLD: 58.3 % (ref 44–72)
NRBC # BLD AUTO: 0 K/UL
NRBC BLD-RTO: 0 /100 WBC (ref 0–0.2)
NT-PROBNP SERPL IA-MCNC: 279 PG/ML (ref 0–125)
OSMOLALITY SERPL: 293 MOSM/KG H2O (ref 278–298)
PHOSPHATE SERPL-MCNC: 3.6 MG/DL (ref 2.5–4.5)
PLATELET # BLD AUTO: 118 K/UL (ref 164–446)
PMV BLD AUTO: 10.8 FL (ref 9–12.9)
POTASSIUM SERPL-SCNC: 4.7 MMOL/L (ref 3.6–5.5)
PROT SERPL-MCNC: 6.8 G/DL (ref 6–8.2)
RBC # BLD AUTO: 4.95 M/UL (ref 4.7–6.1)
SODIUM SERPL-SCNC: 138 MMOL/L (ref 135–145)
TROPONIN T SERPL-MCNC: 13 NG/L (ref 6–19)
TSH SERPL DL<=0.005 MIU/L-ACNC: 1.38 UIU/ML (ref 0.38–5.33)
VIT B12 SERPL-MCNC: 729 PG/ML (ref 211–911)
WBC # BLD AUTO: 5.9 K/UL (ref 4.8–10.8)

## 2024-08-13 PROCEDURE — G0378 HOSPITAL OBSERVATION PER HR: HCPCS

## 2024-08-13 PROCEDURE — 93005 ELECTROCARDIOGRAM TRACING: CPT

## 2024-08-13 PROCEDURE — 83036 HEMOGLOBIN GLYCOSYLATED A1C: CPT

## 2024-08-13 PROCEDURE — 83735 ASSAY OF MAGNESIUM: CPT

## 2024-08-13 PROCEDURE — 84484 ASSAY OF TROPONIN QUANT: CPT

## 2024-08-13 PROCEDURE — 71045 X-RAY EXAM CHEST 1 VIEW: CPT

## 2024-08-13 PROCEDURE — 84100 ASSAY OF PHOSPHORUS: CPT

## 2024-08-13 PROCEDURE — 93005 ELECTROCARDIOGRAM TRACING: CPT | Performed by: EMERGENCY MEDICINE

## 2024-08-13 PROCEDURE — 82607 VITAMIN B-12: CPT

## 2024-08-13 PROCEDURE — 82962 GLUCOSE BLOOD TEST: CPT

## 2024-08-13 PROCEDURE — 700117 HCHG RX CONTRAST REV CODE 255: Performed by: INTERNAL MEDICINE

## 2024-08-13 PROCEDURE — 99285 EMERGENCY DEPT VISIT HI MDM: CPT

## 2024-08-13 PROCEDURE — 84443 ASSAY THYROID STIM HORMONE: CPT

## 2024-08-13 PROCEDURE — 80053 COMPREHEN METABOLIC PANEL: CPT

## 2024-08-13 PROCEDURE — 36415 COLL VENOUS BLD VENIPUNCTURE: CPT

## 2024-08-13 PROCEDURE — 70450 CT HEAD/BRAIN W/O DYE: CPT

## 2024-08-13 PROCEDURE — 82010 KETONE BODYS QUAN: CPT

## 2024-08-13 PROCEDURE — 83880 ASSAY OF NATRIURETIC PEPTIDE: CPT

## 2024-08-13 PROCEDURE — 99222 1ST HOSP IP/OBS MODERATE 55: CPT | Mod: AI | Performed by: INTERNAL MEDICINE

## 2024-08-13 PROCEDURE — 85025 COMPLETE CBC W/AUTO DIFF WBC: CPT

## 2024-08-13 PROCEDURE — 83930 ASSAY OF BLOOD OSMOLALITY: CPT

## 2024-08-13 RX ORDER — SPIRONOLACTONE 25 MG/1
25 TABLET ORAL DAILY
Status: DISCONTINUED | OUTPATIENT
Start: 2024-08-14 | End: 2024-08-14 | Stop reason: HOSPADM

## 2024-08-13 RX ORDER — ENOXAPARIN SODIUM 100 MG/ML
40 INJECTION SUBCUTANEOUS DAILY
Status: DISCONTINUED | OUTPATIENT
Start: 2024-08-13 | End: 2024-08-14 | Stop reason: HOSPADM

## 2024-08-13 RX ORDER — MECLIZINE HYDROCHLORIDE 25 MG/1
12.5 TABLET ORAL 3 TIMES DAILY PRN
Status: DISCONTINUED | OUTPATIENT
Start: 2024-08-13 | End: 2024-08-14 | Stop reason: HOSPADM

## 2024-08-13 RX ORDER — HYDRALAZINE HYDROCHLORIDE 20 MG/ML
10 INJECTION INTRAMUSCULAR; INTRAVENOUS EVERY 4 HOURS PRN
Status: DISCONTINUED | OUTPATIENT
Start: 2024-08-13 | End: 2024-08-14 | Stop reason: HOSPADM

## 2024-08-13 RX ORDER — CETIRIZINE HYDROCHLORIDE 10 MG/1
10 TABLET ORAL DAILY
Status: DISCONTINUED | OUTPATIENT
Start: 2024-08-14 | End: 2024-08-14 | Stop reason: HOSPADM

## 2024-08-13 RX ORDER — ONDANSETRON 4 MG/1
4 TABLET, ORALLY DISINTEGRATING ORAL EVERY 4 HOURS PRN
Status: DISCONTINUED | OUTPATIENT
Start: 2024-08-13 | End: 2024-08-14 | Stop reason: HOSPADM

## 2024-08-13 RX ORDER — POLYETHYLENE GLYCOL 3350 17 G/17G
1 POWDER, FOR SOLUTION ORAL
Status: DISCONTINUED | OUTPATIENT
Start: 2024-08-13 | End: 2024-08-14 | Stop reason: HOSPADM

## 2024-08-13 RX ORDER — MORPHINE SULFATE 4 MG/ML
4 INJECTION INTRAVENOUS
Status: DISCONTINUED | OUTPATIENT
Start: 2024-08-13 | End: 2024-08-14 | Stop reason: HOSPADM

## 2024-08-13 RX ORDER — ACETAMINOPHEN 325 MG/1
650 TABLET ORAL EVERY 6 HOURS PRN
Status: DISCONTINUED | OUTPATIENT
Start: 2024-08-13 | End: 2024-08-14 | Stop reason: HOSPADM

## 2024-08-13 RX ORDER — AMOXICILLIN 250 MG
2 CAPSULE ORAL EVERY EVENING
Status: DISCONTINUED | OUTPATIENT
Start: 2024-08-14 | End: 2024-08-14 | Stop reason: HOSPADM

## 2024-08-13 RX ORDER — OXYCODONE HYDROCHLORIDE 10 MG/1
10 TABLET ORAL
Status: DISCONTINUED | OUTPATIENT
Start: 2024-08-13 | End: 2024-08-14 | Stop reason: HOSPADM

## 2024-08-13 RX ORDER — ONDANSETRON 2 MG/ML
4 INJECTION INTRAMUSCULAR; INTRAVENOUS EVERY 4 HOURS PRN
Status: DISCONTINUED | OUTPATIENT
Start: 2024-08-13 | End: 2024-08-14 | Stop reason: HOSPADM

## 2024-08-13 RX ORDER — LORAZEPAM 2 MG/ML
0.5 INJECTION INTRAMUSCULAR
Status: DISCONTINUED | OUTPATIENT
Start: 2024-08-13 | End: 2024-08-14 | Stop reason: HOSPADM

## 2024-08-13 RX ORDER — OLMESARTAN MEDOXOMIL 20 MG/1
20 TABLET ORAL DAILY
Status: DISCONTINUED | OUTPATIENT
Start: 2024-08-14 | End: 2024-08-14 | Stop reason: HOSPADM

## 2024-08-13 RX ORDER — OXYCODONE HYDROCHLORIDE 5 MG/1
5 TABLET ORAL
Status: DISCONTINUED | OUTPATIENT
Start: 2024-08-13 | End: 2024-08-14 | Stop reason: HOSPADM

## 2024-08-13 RX ORDER — FLUTICASONE PROPIONATE 50 MCG
1 SPRAY, SUSPENSION (ML) NASAL EVERY MORNING
Status: DISCONTINUED | OUTPATIENT
Start: 2024-08-14 | End: 2024-08-13

## 2024-08-13 RX ADMIN — IOHEXOL 80 ML: 350 INJECTION, SOLUTION INTRAVENOUS at 05:22

## 2024-08-13 ASSESSMENT — SOCIAL DETERMINANTS OF HEALTH (SDOH)
IN THE PAST 12 MONTHS, HAS THE ELECTRIC, GAS, OIL, OR WATER COMPANY THREATENED TO SHUT OFF SERVICE IN YOUR HOME?: NO
WITHIN THE LAST YEAR, HAVE YOU BEEN KICKED, HIT, SLAPPED, OR OTHERWISE PHYSICALLY HURT BY YOUR PARTNER OR EX-PARTNER?: NO
WITHIN THE LAST YEAR, HAVE TO BEEN RAPED OR FORCED TO HAVE ANY KIND OF SEXUAL ACTIVITY BY YOUR PARTNER OR EX-PARTNER?: NO
WITHIN THE LAST YEAR, HAVE YOU BEEN AFRAID OF YOUR PARTNER OR EX-PARTNER?: NO
WITHIN THE LAST YEAR, HAVE YOU BEEN HUMILIATED OR EMOTIONALLY ABUSED IN OTHER WAYS BY YOUR PARTNER OR EX-PARTNER?: NO
WITHIN THE PAST 12 MONTHS, THE FOOD YOU BOUGHT JUST DIDN'T LAST AND YOU DIDN'T HAVE MONEY TO GET MORE: NEVER TRUE
WITHIN THE PAST 12 MONTHS, YOU WORRIED THAT YOUR FOOD WOULD RUN OUT BEFORE YOU GOT THE MONEY TO BUY MORE: NEVER TRUE

## 2024-08-13 ASSESSMENT — LIFESTYLE VARIABLES
TOTAL SCORE: 0
ALCOHOL_USE: NO
SUBSTANCE_ABUSE: 0
HAVE PEOPLE ANNOYED YOU BY CRITICIZING YOUR DRINKING: NO
CONSUMPTION TOTAL: NEGATIVE
HAVE YOU EVER FELT YOU SHOULD CUT DOWN ON YOUR DRINKING: NO
TOTAL SCORE: 0
DOES PATIENT WANT TO STOP DRINKING: NO
EVER FELT BAD OR GUILTY ABOUT YOUR DRINKING: NO
TOTAL SCORE: 0
AVERAGE NUMBER OF DAYS PER WEEK YOU HAVE A DRINK CONTAINING ALCOHOL: 2
ON A TYPICAL DAY WHEN YOU DRINK ALCOHOL HOW MANY DRINKS DO YOU HAVE: 1
HOW MANY TIMES IN THE PAST YEAR HAVE YOU HAD 5 OR MORE DRINKS IN A DAY: 0
EVER HAD A DRINK FIRST THING IN THE MORNING TO STEADY YOUR NERVES TO GET RID OF A HANGOVER: NO

## 2024-08-13 ASSESSMENT — ENCOUNTER SYMPTOMS
NAUSEA: 0
SPEECH CHANGE: 0
HALLUCINATIONS: 0
NERVOUS/ANXIOUS: 0
NECK PAIN: 0
VOMITING: 0
POLYDIPSIA: 0
COUGH: 0
TREMORS: 0
DOUBLE VISION: 0
ORTHOPNEA: 0
BLURRED VISION: 0
HEADACHES: 0
FEVER: 0
PHOTOPHOBIA: 0
HEMOPTYSIS: 0
SPUTUM PRODUCTION: 0
BRUISES/BLEEDS EASILY: 0
PALPITATIONS: 0
WEIGHT LOSS: 0
DIZZINESS: 1
BACK PAIN: 0
FLANK PAIN: 0
FOCAL WEAKNESS: 0
HEARTBURN: 0
CHILLS: 0

## 2024-08-13 ASSESSMENT — PATIENT HEALTH QUESTIONNAIRE - PHQ9
SUM OF ALL RESPONSES TO PHQ9 QUESTIONS 1 AND 2: 0
2. FEELING DOWN, DEPRESSED, IRRITABLE, OR HOPELESS: NOT AT ALL
1. LITTLE INTEREST OR PLEASURE IN DOING THINGS: NOT AT ALL

## 2024-08-13 ASSESSMENT — FIBROSIS 4 INDEX
FIB4 SCORE: 2.28
FIB4 SCORE: 3.29

## 2024-08-14 ENCOUNTER — HOSPITAL ENCOUNTER (OUTPATIENT)
Dept: RADIOLOGY | Facility: MEDICAL CENTER | Age: 74
End: 2024-08-14
Attending: INTERNAL MEDICINE

## 2024-08-14 ENCOUNTER — APPOINTMENT (OUTPATIENT)
Dept: RADIOLOGY | Facility: MEDICAL CENTER | Age: 74
DRG: 065 | End: 2024-08-14
Attending: INTERNAL MEDICINE
Payer: MEDICARE

## 2024-08-14 VITALS
HEART RATE: 73 BPM | WEIGHT: 255.95 LBS | OXYGEN SATURATION: 92 % | BODY MASS INDEX: 35.83 KG/M2 | TEMPERATURE: 97.8 F | RESPIRATION RATE: 14 BRPM | DIASTOLIC BLOOD PRESSURE: 75 MMHG | HEIGHT: 71 IN | SYSTOLIC BLOOD PRESSURE: 119 MMHG

## 2024-08-14 PROBLEM — I61.9 CVA (CEREBROVASCULAR ACCIDENT DUE TO INTRACEREBRAL HEMORRHAGE) (HCC): Status: ACTIVE | Noted: 2024-08-14

## 2024-08-14 PROBLEM — R42 DIZZINESS: Status: RESOLVED | Noted: 2024-08-13 | Resolved: 2024-08-14

## 2024-08-14 LAB
ALBUMIN SERPL BCP-MCNC: 3.7 G/DL (ref 3.2–4.9)
ALBUMIN/GLOB SERPL: 1.4 G/DL
ALP SERPL-CCNC: 97 U/L (ref 30–99)
ALT SERPL-CCNC: 28 U/L (ref 2–50)
ANION GAP SERPL CALC-SCNC: 12 MMOL/L (ref 7–16)
AST SERPL-CCNC: 28 U/L (ref 12–45)
BASOPHILS # BLD AUTO: 0.5 % (ref 0–1.8)
BASOPHILS # BLD: 0.03 K/UL (ref 0–0.12)
BILIRUB SERPL-MCNC: 1.5 MG/DL (ref 0.1–1.5)
BUN SERPL-MCNC: 19 MG/DL (ref 8–22)
CALCIUM ALBUM COR SERPL-MCNC: 8.9 MG/DL (ref 8.5–10.5)
CALCIUM SERPL-MCNC: 8.7 MG/DL (ref 8.5–10.5)
CHLORIDE SERPL-SCNC: 105 MMOL/L (ref 96–112)
CO2 SERPL-SCNC: 19 MMOL/L (ref 20–33)
CREAT SERPL-MCNC: 0.87 MG/DL (ref 0.5–1.4)
EOSINOPHIL # BLD AUTO: 0.09 K/UL (ref 0–0.51)
EOSINOPHIL NFR BLD: 1.6 % (ref 0–6.9)
ERYTHROCYTE [DISTWIDTH] IN BLOOD BY AUTOMATED COUNT: 48.7 FL (ref 35.9–50)
GFR SERPLBLD CREATININE-BSD FMLA CKD-EPI: 91 ML/MIN/1.73 M 2
GLOBULIN SER CALC-MCNC: 2.6 G/DL (ref 1.9–3.5)
GLUCOSE SERPL-MCNC: 97 MG/DL (ref 65–99)
HCT VFR BLD AUTO: 45.8 % (ref 42–52)
HGB BLD-MCNC: 15.1 G/DL (ref 14–18)
IMM GRANULOCYTES # BLD AUTO: 0.06 K/UL (ref 0–0.11)
IMM GRANULOCYTES NFR BLD AUTO: 1.1 % (ref 0–0.9)
LYMPHOCYTES # BLD AUTO: 1.25 K/UL (ref 1–4.8)
LYMPHOCYTES NFR BLD: 22.4 % (ref 22–41)
MCH RBC QN AUTO: 30.2 PG (ref 27–33)
MCHC RBC AUTO-ENTMCNC: 33 G/DL (ref 32.3–36.5)
MCV RBC AUTO: 91.6 FL (ref 81.4–97.8)
MONOCYTES # BLD AUTO: 0.73 K/UL (ref 0–0.85)
MONOCYTES NFR BLD AUTO: 13.1 % (ref 0–13.4)
NEUTROPHILS # BLD AUTO: 3.43 K/UL (ref 1.82–7.42)
NEUTROPHILS NFR BLD: 61.3 % (ref 44–72)
NRBC # BLD AUTO: 0 K/UL
NRBC BLD-RTO: 0 /100 WBC (ref 0–0.2)
PLATELET # BLD AUTO: 115 K/UL (ref 164–446)
PMV BLD AUTO: 10.5 FL (ref 9–12.9)
POTASSIUM SERPL-SCNC: 4.5 MMOL/L (ref 3.6–5.5)
PROT SERPL-MCNC: 6.3 G/DL (ref 6–8.2)
RBC # BLD AUTO: 5 M/UL (ref 4.7–6.1)
SODIUM SERPL-SCNC: 136 MMOL/L (ref 135–145)
WBC # BLD AUTO: 5.6 K/UL (ref 4.8–10.8)

## 2024-08-14 PROCEDURE — 700102 HCHG RX REV CODE 250 W/ 637 OVERRIDE(OP): Performed by: INTERNAL MEDICINE

## 2024-08-14 PROCEDURE — 85025 COMPLETE CBC W/AUTO DIFF WBC: CPT

## 2024-08-14 PROCEDURE — 770001 HCHG ROOM/CARE - MED/SURG/GYN PRIV*

## 2024-08-14 PROCEDURE — 70498 CT ANGIOGRAPHY NECK: CPT

## 2024-08-14 PROCEDURE — A9270 NON-COVERED ITEM OR SERVICE: HCPCS

## 2024-08-14 PROCEDURE — A9579 GAD-BASE MR CONTRAST NOS,1ML: HCPCS | Mod: JZ | Performed by: INTERNAL MEDICINE

## 2024-08-14 PROCEDURE — 99233 SBSQ HOSP IP/OBS HIGH 50: CPT | Performed by: HOSPITALIST

## 2024-08-14 PROCEDURE — 700117 HCHG RX CONTRAST REV CODE 255: Mod: JZ | Performed by: INTERNAL MEDICINE

## 2024-08-14 PROCEDURE — 80053 COMPREHEN METABOLIC PANEL: CPT

## 2024-08-14 PROCEDURE — 99222 1ST HOSP IP/OBS MODERATE 55: CPT

## 2024-08-14 PROCEDURE — 70496 CT ANGIOGRAPHY HEAD: CPT

## 2024-08-14 PROCEDURE — 70553 MRI BRAIN STEM W/O & W/DYE: CPT

## 2024-08-14 PROCEDURE — 700102 HCHG RX REV CODE 250 W/ 637 OVERRIDE(OP)

## 2024-08-14 PROCEDURE — A9270 NON-COVERED ITEM OR SERVICE: HCPCS | Performed by: INTERNAL MEDICINE

## 2024-08-14 RX ORDER — ASPIRIN 81 MG/1
81 TABLET ORAL DAILY
Qty: 90 TABLET | Refills: 0 | Status: SHIPPED | OUTPATIENT
Start: 2024-08-15 | End: 2024-11-13

## 2024-08-14 RX ORDER — ASPIRIN 81 MG/1
81 TABLET ORAL DAILY
Status: DISCONTINUED | OUTPATIENT
Start: 2024-08-15 | End: 2024-08-14 | Stop reason: HOSPADM

## 2024-08-14 RX ORDER — ASPIRIN 325 MG
325 TABLET ORAL ONCE
Status: COMPLETED | OUTPATIENT
Start: 2024-08-14 | End: 2024-08-14

## 2024-08-14 RX ADMIN — ASPIRIN 325 MG: 325 TABLET ORAL at 16:35

## 2024-08-14 RX ADMIN — CETIRIZINE HYDROCHLORIDE 10 MG: 10 TABLET, FILM COATED ORAL at 06:00

## 2024-08-14 RX ADMIN — OLMESARTAN MEDOXOMIL 20 MG: 20 TABLET, FILM COATED ORAL at 06:00

## 2024-08-14 RX ADMIN — GADOTERIDOL 20 ML: 279.3 INJECTION, SOLUTION INTRAVENOUS at 09:14

## 2024-08-14 RX ADMIN — Medication 5 MG: at 00:00

## 2024-08-14 RX ADMIN — SPIRONOLACTONE 25 MG: 25 TABLET ORAL at 06:00

## 2024-08-14 ASSESSMENT — ENCOUNTER SYMPTOMS
GASTROINTESTINAL NEGATIVE: 1
FEVER: 0
WEAKNESS: 1
DIZZINESS: 1
MYALGIAS: 1
TINGLING: 1
RESPIRATORY NEGATIVE: 1
CARDIOVASCULAR NEGATIVE: 1
EYES NEGATIVE: 1
CHILLS: 0
PSYCHIATRIC NEGATIVE: 1

## 2024-08-14 ASSESSMENT — SOCIAL DETERMINANTS OF HEALTH (SDOH)
IN THE PAST 12 MONTHS, HAS THE ELECTRIC, GAS, OIL, OR WATER COMPANY THREATENED TO SHUT OFF SERVICE IN YOUR HOME?: NO
WITHIN THE PAST 12 MONTHS, YOU WORRIED THAT YOUR FOOD WOULD RUN OUT BEFORE YOU GOT THE MONEY TO BUY MORE: NEVER TRUE
WITHIN THE PAST 12 MONTHS, THE FOOD YOU BOUGHT JUST DIDN'T LAST AND YOU DIDN'T HAVE MONEY TO GET MORE: NEVER TRUE

## 2024-08-14 ASSESSMENT — PAIN DESCRIPTION - PAIN TYPE: TYPE: ACUTE PAIN

## 2024-08-14 NOTE — PROGRESS NOTES
"Assumed care of patient at bedside report from ED RN. Updated on plan of care.   Patient currently A & O x 4; on  room air. Able to turn self in bed; without complaints of acute pain. Assessment completed. Patient's last bowel movement 08/13 .   Call light within reach. Hourly rounding and fall precautions in place. Bed locked and in lowest position. All questions answered. No other needs indicated at this time.    Most recent vital signs  BP (!) 158/97   Pulse (!) 59   Temp 36.2 °C (97.1 °F) (Temporal)   Resp 18   Ht 1.803 m (5' 11\")   Wt 116 kg (255 lb 15.3 oz)   SpO2 91%   BMI 35.70 kg/m²     "

## 2024-08-14 NOTE — ASSESSMENT & PLAN NOTE
Recent MRI of pituitary gland in March 2024 showed stable pituitary macroadenoma.  Will repeat MRI of the brain given worsening of dizziness.

## 2024-08-14 NOTE — CONSULTS
Vascular Neurology Initial Consult H&P  Neurovascular Service, [facility]    Referring Physician: Charles Galicia*    Chief Complaint   Patient presents with    Dizziness    Numbness     Both lower limb        HPI: Laureen Munoz is a 74 y.o. male  with a PMHX CVA, diabetes, hypertension, peripheral neuropathy, BPPV, pituitary microadenoma, history of bradycardia and RBBB presenting with dizziness. He has had dizziness for months, treated with meclizine. Symptoms recurred yesterday at the podiatrist with intermittent weakness, light-headed feeling, and dizziness. He was treated with a glucose tablet. Dizziness continued at home, prompting him to come to the ED. On arrival FSBS 94, /82. He had CTH non-contrast with no acute abnormality noted. CTA head and neck with no LVO, flow limiting stenosis, or aneurysm. He was admitted for observation and overnight had significant improvement of symptoms. Due to history of prior stroke and pituitary microadenoma, MRI brain with and without contrast was obtained, revealed tiny acute infarctions in bilateral posterior parietal and right occipital lobes as well as multifocal tiny chronic infarcts scattered throughout supra- and infratentorial brain parenchyma. Pituitary microadenoma again noted, stable. Due to incidental finding of infarct, vascular neurology was consulted.    Review of systems: In addition to what is detailed in the HPI above, all other systems reviewed and are negative.    Past Medical History:    has a past medical history of Allergy, Anesthesia (2017), Arthritis (unk), Cancer ([facility]) (2020), Cataract (2022), High cholesterol (1998), Hypercholesterolemia, Hypertension, Migraine, Neuropathy, PONV (postoperative nausea and vomiting) (2017), Reactive depression (02/21/2023), Renal artery stenosis (HCC), Sleep apnea (2019), Stroke ([facility]) (2023), Tinea inguinalis, Tobacco use (10/30/2018), and Urinary bladder disorder  (1995).    He has no past medical history of Acute nasopharyngitis, Anginal syndrome (HCC), Arrhythmia, Asthma, Blood clotting disorder (HCC), Bowel habit changes, Breath shortness, Bronchitis, Carcinoma in situ of respiratory system, Congestive heart failure (HCC), Continuous ambulatory peritoneal dialysis status (HCC), Coughing blood, Dental disorder, Diabetes (HCC), Dialysis patient (HCC), Disorder of thyroid, Emphysema of lung (HCC), Glaucoma, Gynecological disorder, Heart burn, Heart murmur, Heart valve disease, Hemorrhagic disorder (HCC), Hepatitis A, Hepatitis B, Hepatitis C, Hiatus hernia syndrome, Indigestion, Jaundice, Myocardial infarct (HCC), Pacemaker, Pain, Pneumonia, Pregnant, Renal disorder, Rheumatic fever, Seizure (HCC), Snoring, Tuberculosis, or Urinary incontinence.    FHx:  family history includes Breast Cancer in his mother and sister; Cancer in his sister; Dementia in his paternal grandfather; Heart Attack in his father and mother; Heart Disease in his father and mother; Hypertension in his father; No Known Problems in his maternal grandfather, maternal grandmother, and paternal grandmother; Stroke in his sister.    SHx:   reports that he quit smoking about 39 years ago. His smoking use included cigarettes and cigars. He started smoking about 54 years ago. He has a 15 pack-year smoking history. He has never used smokeless tobacco. He reports current alcohol use of about 1.2 oz of alcohol per week. He reports that he does not currently use drugs after having used the following drugs: Marijuana and Inhaled.    Allergies:  Allergies   Allergen Reactions    Gabapentin Unspecified     Dizziness, confusion, falling    Hyzaar [Losartan Potassium-Hctz] Cough       Medications:    Current Facility-Administered Medications:     enoxaparin (Lovenox) inj 40 mg, 40 mg, Subcutaneous, DAILY AT 1800, Bismark Bright M.D.    senna-docusate (Pericolace Or Senokot S) 8.6-50 MG per tablet 2 Tablet, 2 Tablet,  Oral, Q EVENING **AND** polyethylene glycol/lytes (Miralax) Packet 1 Packet, 1 Packet, Oral, QDAY PRN, Bismark Bright M.D.    acetaminophen (Tylenol) tablet 650 mg, 650 mg, Oral, Q6HRS PRN, Bismark Bright M.D.    Notify provider if pain remains uncontrolled, , , CONTINUOUS **AND** Use the Numeric Rating Scale (NRS), Perez-Baker Faces (WBF), or FLACC on regular floors and Critical-Care Pain Observation Tool (CPOT) on ICUs/Trauma to assess pain, , , CONTINUOUS **AND** Pulse Ox, , , CONTINUOUS **AND** Pharmacy Consult Request ...Pain Management Review 1 Each, 1 Each, Other, PHARMACY TO DOSE **AND** If patient difficult to arouse and/or has respiratory depression (respiratory rate of 10 or less), stop any opiates that are currently infusing and call a Rapid Response., , , CONTINUOUS, Bismark Bright M.D.    oxyCODONE immediate-release (Roxicodone) tablet 5 mg, 5 mg, Oral, Q3HRS PRN **OR** oxyCODONE immediate release (Roxicodone) tablet 10 mg, 10 mg, Oral, Q3HRS PRN **OR** morphine 4 MG/ML injection 4 mg, 4 mg, Intravenous, Q3HRS PRN, Bismark Bright M.D.    ondansetron (Zofran) syringe/vial injection 4 mg, 4 mg, Intravenous, Q4HRS PRN, Bismrak Bright M.D.    ondansetron (Zofran ODT) dispertab 4 mg, 4 mg, Oral, Q4HRS PRN, Bismark Bright M.D.    hydrALAZINE (Apresoline) injection 10 mg, 10 mg, Intravenous, Q4HRS PRN, Bismark Bright M.D.    cetirizine (ZyrTEC) tablet 10 mg, 10 mg, Oral, DAILY, Bismark Bright M.D., 10 mg at 08/14/24 0600    melatonin tablet 5 mg, 5 mg, Oral, Nightly, Bismark Bright M.D., 5 mg at 08/14/24 0000    olmesartan (Benicar) tablet 20 mg, 20 mg, Oral, DAILY, Bismark Bright M.D., 20 mg at 08/14/24 0600    spironolactone (Aldactone) tablet 25 mg, 25 mg, Oral, DAILY, Bismark Bright M.D., 25 mg at 08/14/24 0600    meclizine (Antivert) tablet 12.5 mg, 12.5 mg, Oral, TID PRN, Bismark Bright M.D.    LORazepam (Ativan) injection 0.5 mg, 0.5 mg, Intravenous, Once PRN, Bismark  "MARLO Bright    Physical Examination:    /58   Pulse (!) 55   Temp 36.6 °C (97.8 °F) (Temporal)   Resp 16   Ht 1.803 m (5' 11\")   Wt 116 kg (255 lb 15.3 oz)   SpO2 91%   BMI 35.70 kg/m²     General: Patient is awake and in no acute distress  Eye: Examination of optic disks not indicated at this time given acuity of consult  Neck: There is normal range of motion  CV: regular rate   Extremities:  clear, dry, intact, some peripheral edema    NEUROLOGICAL EXAM:   Mental status: Awake, alert and fully oriented  Speech and language: Naming and repetition intact, fluent speech, follows simple, two-step, multi-step and complex commands.  CRANIAL NERVES:  II: Pupils equal and reactive, no VF deficits  III, IV, VI: EOM intact, no gaze preference or deviation, no nystagmus.  V: normal sensation in V1, V2, and V3 segments bilaterally  VII: no asymmetry, no nasolabial fold flattening  VIII: normal hearing to conversation  IX, X: normal palatal elevation, no uvular deviation  XI: 5/5 head turn and 5/5 shoulder shrug bilaterally  XII: midline tongue protrusion    Motor exam: There is sustained antigravity with no downward drift in bilateral arms and legs, 5/5 strength.  There is no pronator drift. Tone is normal. No abnormal movements were seen on exam.  Sensory exam: Reacts to tactile in all 4 extremities, no neglect to double stim. Decreased sensation in bilateral toes and soles of feet, sensation intact from ankle and up.  Coordination: No ataxia on bilateral finger-to-nose testing  Gait: Deferred     NIHSS: National Institutes of Health Stroke Scale    [0] 1a:Level of Consciousness    0-alert 1-drowsy   2-stupor   3-coma  [0] 1b:LOC Questions                  0-both  1-one      2-neither  [0] 1c:LOC Commands                   0-both  1-one      2-neither  [0] 2: Best Gaze                     0-nl    1-partial  2-forced  [0] 3: Visual Fields                   0-nl    1-partial  2-complete 3-bilat  [0] 4: Facial " Paresis                0-nl    1-minor    2-partial  3-full  MOTOR                       0-nl  [0] 5: Right Arm           1-drift  [0] 6: Left Arm             2-some effort vs gravity  [0] 7: Right Leg           3-no effort vs gravity  [0] 8: Left Leg             4-no movement                             x-untestable  [0] 9: Limb Ataxia                    0-abs   1-1_limb   2-2+_limbs       x-untestable  [0] 10:Sensory                        0-nl    1-partial  2-dense  [0] 11:Best Language/Aphasia         0-nl    1-mild/mod 2-severe   3-mute  [0] 12:Dysarthria                     0-nl    1-mild/mod 2-severe       x-untestable  [0] 13:Neglect/Inattention            0-none  1-partial  2-complete  [0] TOTAL    Baseline Modified Wilkinson Scale (MRS): 0 = No symptoms      Objective Data:    Labs:  Lab Results   Component Value Date/Time    PROTHROMBTM 14.5 08/07/2023 10:24 AM    INR 1.14 (H) 08/07/2023 10:24 AM      Lab Results   Component Value Date/Time    WBC 5.6 08/14/2024 07:06 AM    WBC 9.4 03/01/2010 04:54 PM    RBC 5.00 08/14/2024 07:06 AM    RBC 5.39 03/01/2010 04:54 PM    HEMOGLOBIN 15.1 08/14/2024 07:06 AM    HEMATOCRIT 45.8 08/14/2024 07:06 AM    MCV 91.6 08/14/2024 07:06 AM    MCV 89 03/01/2010 04:54 PM    MCH 30.2 08/14/2024 07:06 AM    MCH 29.3 03/01/2010 04:54 PM    MCHC 33.0 08/14/2024 07:06 AM    MPV 10.5 08/14/2024 07:06 AM    NEUTSPOLYS 61.30 08/14/2024 07:06 AM    LYMPHOCYTES 22.40 08/14/2024 07:06 AM    MONOCYTES 13.10 08/14/2024 07:06 AM    EOSINOPHILS 1.60 08/14/2024 07:06 AM    BASOPHILS 0.50 08/14/2024 07:06 AM    HYPOCHROMIA 1+ 07/15/2013 10:35 AM      Lab Results   Component Value Date/Time    SODIUM 136 08/14/2024 07:06 AM    POTASSIUM 4.5 08/14/2024 07:06 AM    CHLORIDE 105 08/14/2024 07:06 AM    CO2 19 (L) 08/14/2024 07:06 AM    GLUCOSE 97 08/14/2024 07:06 AM    BUN 19 08/14/2024 07:06 AM    CREATININE 0.87 08/14/2024 07:06 AM    CREATININE 0.92 03/01/2010 04:54 PM    BUNCREATRAT 21  03/01/2010 04:54 PM    GLOMRATE >59 03/01/2010 04:54 PM      Lab Results   Component Value Date/Time    CHOLSTRLTOT 124 04/02/2024 08:35 AM    LDL 54 04/02/2024 08:35 AM    HDL 46 04/02/2024 08:35 AM    TRIGLYCERIDE 121 04/02/2024 08:35 AM       Lab Results   Component Value Date/Time    ALKPHOSPHAT 97 08/14/2024 07:06 AM    ASTSGOT 28 08/14/2024 07:06 AM    ALTSGPT 28 08/14/2024 07:06 AM    TBILIRUBIN 1.5 08/14/2024 07:06 AM      Lab Results   Component Value Date/Time    HBA1C 6.2 (H) 08/13/2024 08:15 PM         Imaging/Testing:    I interpreted and/or reviewed the patient's neuroimaging    MR-BRAIN-WITH & W/O   Final Result      Tiny areas of acute infarction the bilateral posterior parietal and right occipital lobes.   Multifocal small chronic infarcts in the supra and infratentorial brain parenchyma.   Mild chronic microvascular ischemic disease.   Mild cerebral volume loss.   Stable small pituitary microadenoma.      CT-CTA NECK WITH & W/O-POST PROCESSING   Final Result         1.  CT angiogram of the neck within normal limits.         CT-CTA HEAD WITH & W/O-POST PROCESS   Final Result         1.  No large vessel occlusion or aneurysm identified.   2.  Atherosclerosis      CT-HEAD W/O   Final Result         1.  No acute intracranial abnormality is identified, there are nonspecific white matter changes, commonly associated with small vessel ischemic disease.  Associated mild cerebral atrophy is noted.   2.  Atherosclerosis.               DX-CHEST-PORTABLE (1 VIEW)   Final Result         1.  Bilateral basilar atelectasis, no focal infiltrate   2.  Cardiomegaly   3.  Atherosclerosis        Echocardiogram 6/7/2024:  CONCLUSIONS  Normal left ventricular systolic function.  The left ventricular ejection fraction is visually estimated to be 60%.  The right ventricle is dilated. Normal right ventricular systolic   function.  Mild aortic insufficiency.  Mild tricuspid regurgitation.  Mildly dilated left atrium. Left  atrial volume index is 40 mL/sq m.   Compared to the prior study on 5/10/2023, low ventricular systolic   function remains preserved.      Assessment and Plan:  Laureen Munoz is a 74 y.o. male presenting for recurrent dizziness whom neurology has been consulted for incidental finding of punctate strokes on MRI. Infarcts are of mixed age, acute and chronic. These appear to be asymptomatic, none are in locations that anatomically correspond to his symptoms. Today he is feeling much better, dizziness has resolved and has no remaining acute focal deficit. Does have chronic peripheral neuropathy in his feet which has been stable. He has already had echocardiogram 2 months ago with LVEF 60%, mildly dilated left atrium. Do not believe we have enough indication to start anticoagulation. Has never worn a holter monitor. Recommend that he start aspirin today and wear a Zio patch on discharge, with follow up in stroke bridge clinic. LDL well controlled on home olmesartan, and A1c is 6.2, continue home regimen. From our standpoint, OK to go home once otherwise medically cleared by primary team.     Problem list:  -- Dizziness, history of BPPV  -- Scattered acute and chronic punctate infarcts     Plan:  - Start aspirin - 325mg today and 81mg daily thereafter.   - Neurochecks Q4.   - Telemetry while inpatient. Zio patch on discharge.   - Has already had MRI brain and echocardiogram.   - BP goal normotension,  - 140  - LDL 54, goal <70. Continue home olmesartan 20mg  - A1C 6.2, goal <7  - Target normoglycemia  while admitted  - PT/OT/SLP  - DVT prophylaxis: OK for chemoprophylaxis  - Follow-up with stroke clinic outpatient  - Vascular neurology will sign off. OK to go home today from our standpoint, with Zio patch and aspirin.       BRISA Hughes  Vascular Neurology, Acute Care Services     The evaluation of the patient, and recommended management, was discussed with Dr. Alba, attending Vascular  Neurologist and Charles Rollins DNP on primary service.

## 2024-08-14 NOTE — PROGRESS NOTES
0015 Monitor called to alert RN that patient was francisco with frequent PAC's. Lowest HR noted was 44.    NOC MD notified    0030  Monitor alerted RN that patient had a first degree HB with a BBB    NOC MD notified

## 2024-08-14 NOTE — H&P
Hospital Medicine History & Physical Note    Date of Service  8/13/2024    Primary Care Physician  NATACHA Hawthorne      Code Status  Full Code    Chief Complaint  Chief Complaint   Patient presents with    Dizziness    Numbness     Both lower limb        History of Presenting Illness  Laureen Munoz is a 73 y.o. male with past medical history of hypertension, migraine, peripheral neuropathy, sleep apnea, CVA, BPPV, pituitary macroadenoma, diastolic heart failure, type 2 diabetes, BPH who presented 8/13/2024 with complaints of worsening of recurrent dizziness.  He states that he has been having vertigo for many months, was treated with meclizine successfully.  It appears to be worsening as the last 2 days.  He went to see his podiatrist yesterday and had near syncopal episode and at the same time spinning sensation and confusion in the position sitting in the chair.  During the episode he was not able to ambulate.  His vitals and heart rate are unknown .   He did not take meclizine this time.  Additionally reported worsening of bilateral lower extremity numbness.  Additionally, reported diarrhea that has been going on in the last week that is becoming more formed.  He had some worsening of difficulties with urination.  Reported chronic word finding difficulties and tinnitus.  In ER blood pressure stable, heart rate in 50s.  On EKG there is PACs and sinus arrhythmia, first-degree AV block, RBBB.  CT head: No acute abnormalities.  Chest x-ray: Bilateral basilar atelectasis, cardiomegaly.        I discussed the plan of care with patient and ERP .    Review of Systems  Review of Systems   Constitutional:  Negative for chills, fever and weight loss.   HENT:  Negative for ear pain, hearing loss and tinnitus.    Eyes:  Negative for blurred vision, double vision and photophobia.   Respiratory:  Negative for cough, hemoptysis and sputum production.    Cardiovascular:  Negative for chest pain, palpitations and  orthopnea.   Gastrointestinal:  Negative for heartburn, nausea and vomiting.   Genitourinary:  Negative for dysuria, flank pain, frequency and hematuria.   Musculoskeletal:  Negative for back pain, joint pain and neck pain.   Skin:  Negative for itching and rash.   Neurological:  Positive for dizziness. Negative for tremors, speech change, focal weakness and headaches.   Endo/Heme/Allergies:  Negative for environmental allergies and polydipsia. Does not bruise/bleed easily.   Psychiatric/Behavioral:  Negative for hallucinations and substance abuse. The patient is not nervous/anxious.        Past Medical History   has a past medical history of Allergy, Anesthesia (2017), Arthritis (unk), Cancer ([facility]) (2020), Cataract (2022), High cholesterol (1998), Hypercholesterolemia, Hypertension, Migraine, Neuropathy, PONV (postoperative nausea and vomiting) (2017), Reactive depression (02/21/2023), Renal artery stenosis (HCC), Sleep apnea (2019), Stroke ([facility]) (2023), Tinea inguinalis, Tobacco use (10/30/2018), and Urinary bladder disorder (1995).    Surgical History   has a past surgical history that includes other abdominal surgery; other (1992); cataract phaco with iol; rhinoplasty; pr transurethral elec-surg prostatectom (N/A, 08/17/2023); pr cystourethroscopy (N/A, 08/17/2023); pr cysto/uretero/pyeloscopy w/lithotripsy (N/A, 08/17/2023); eye surgery; and prostatectomy, radical retro.     Family History  family history includes Breast Cancer in his mother and sister; Cancer in his sister; Dementia in his paternal grandfather; Heart Attack in his father and mother; Heart Disease in his father and mother; Hypertension in his father; No Known Problems in his maternal grandfather, maternal grandmother, and paternal grandmother; Stroke in his sister.   Family history reviewed with patient. There is no family history that is pertinent to the chief complaint.     Social History   reports that he quit smoking about 39 years ago. His  smoking use included cigarettes and cigars. He started smoking about 54 years ago. He has a 15 pack-year smoking history. He has never used smokeless tobacco. He reports current alcohol use of about 1.2 oz of alcohol per week. He reports that he does not currently use drugs after having used the following drugs: Marijuana and Inhaled.    Allergies  Allergies   Allergen Reactions    Gabapentin Unspecified     Dizziness, confusion, falling    Hyzaar [Losartan Potassium-Hctz] Cough       Medications  Prior to Admission Medications   Prescriptions Last Dose Informant Patient Reported? Taking?   Empagliflozin (JARDIANCE) 10 MG Tab tablet   No No   Sig: Take 1 Tablet by mouth every day.   Melatonin 1 MG Cap   Yes No   Sig: Take 5 mg by mouth every evening.   cetirizine (ZYRTEC) 10 MG Tab   Yes No   Sig: Take 10 mg by mouth every day.   fluticasone (FLONASE) 50 MCG/ACT nasal spray  Patient Yes No   Sig: Administer 1 Spray into affected nostril(S) every morning.   meclizine (ANTIVERT) 12.5 MG Tab   No No   Sig: Take 1 Tablet by mouth 3 times a day as needed for Dizziness.   metFORMIN ER (GLUCOPHAGE XR) 500 MG TABLET SR 24 HR   No No   Sig: Take 2 Tablets by mouth every day.   metoprolol SR (TOPROL XL) 25 MG TABLET SR 24 HR   No No   Sig: Take 0.5 Tablets by mouth every morning. Take half tablet by mouth once a day   olmesartan (BENICAR) 20 MG Tab   No No   Sig: Take 1 Tablet by mouth every day.   spironolactone (ALDACTONE) 25 MG Tab   No No   Sig: Take 1 Tablet by mouth every day.   triamcinolone acetonide (KENALOG) 0.1 % Cream   Yes No   Sig: Apply  topically 2 times a day.      Facility-Administered Medications: None       Physical Exam  Pulse:  [53] 53  Resp:  [19] 19  BP: (156)/(82) 156/82  SpO2:  [91 %] 91 %  Blood Pressure : (!) 156/82       Pulse: (!) 53   Respiration: 19   Pulse Oximetry: 91 %       Physical Exam  Vitals and nursing note reviewed.   Constitutional:       General: He is not in acute distress.      "Appearance: Normal appearance.   HENT:      Head: Normocephalic and atraumatic.      Nose: Nose normal.      Mouth/Throat:      Mouth: Mucous membranes are moist.   Eyes:      Extraocular Movements: Extraocular movements intact.      Pupils: Pupils are equal, round, and reactive to light.   Cardiovascular:      Rate and Rhythm: Normal rate and regular rhythm.   Pulmonary:      Effort: Pulmonary effort is normal.      Breath sounds: Normal breath sounds.   Abdominal:      General: Abdomen is flat. There is no distension.      Tenderness: There is no abdominal tenderness.   Musculoskeletal:         General: No swelling or deformity. Normal range of motion.      Cervical back: Normal range of motion and neck supple.   Skin:     General: Skin is warm and dry.      Comments: Eczema and actinic keratosis of the upper extremities, face   Neurological:      General: No focal deficit present.      Mental Status: He is alert and oriented to person, place, and time.   Psychiatric:         Mood and Affect: Mood normal.         Behavior: Behavior normal.         Laboratory:  Recent Labs     08/13/24 2015   WBC 5.9   RBC 4.95   HEMOGLOBIN 14.9   HEMATOCRIT 45.2   MCV 91.3   MCH 30.1   MCHC 33.0   RDW 48.4   PLATELETCT 118*   MPV 10.8     Recent Labs     08/13/24 2015   SODIUM 138   POTASSIUM 4.7   CHLORIDE 105   CO2 19*   GLUCOSE 94   BUN 25*   CREATININE 1.08   CALCIUM 8.7     Recent Labs     08/13/24 2015   ALTSGPT 25   ASTSGOT 27   ALKPHOSPHAT 101*   TBILIRUBIN 0.9   GLUCOSE 94         No results for input(s): \"NTPROBNP\" in the last 72 hours.      Recent Labs     08/13/24 2015   TROPONINT 13       Imaging:  CT-HEAD W/O   Final Result         1.  No acute intracranial abnormality is identified, there are nonspecific white matter changes, commonly associated with small vessel ischemic disease.  Associated mild cerebral atrophy is noted.   2.  Atherosclerosis.               DX-CHEST-PORTABLE (1 VIEW)   Final Result         1. "  Bilateral basilar atelectasis, no focal infiltrate   2.  Cardiomegaly   3.  Atherosclerosis          X-Ray:  I have personally reviewed the images and compared with prior images.    Assessment/Plan:  Justification for Admission Status  I anticipate this patient is appropriate for observation status at this time because dizziness    Patient will need a Telemetry bed on MEDICAL service .  The need is secondary to dizziness.    * Dizziness- (present on admission)  Assessment & Plan  71-year-old male with history of BPPV, pituitary microadenoma and prior lacunar CVAs on MRI of the brain, presented with worsening of vertigo, near syncope, worsening of bilateral lower extremity numbness  EKG: Mild sinus bradycardia in the mid 50s, PAC, AV block first-degree, RBBB  Plan: Telemetry  Neurochecks every 4 hours  CTA head and neck and MRI of the brain given prior history of stroke and pituitary microadenoma, as well as presence of neurosymptoms such as word finding difficulties, transient ataxia  Continue scheduled meclizine  PT OT evaluation    Enlarged pituitary gland (HCC)- (present on admission)  Assessment & Plan  Recent MRI of pituitary gland in March 2024 showed stable pituitary macroadenoma.  Will repeat MRI of the brain given worsening of dizziness.    Benign prostatic hyperplasia- (present on admission)  Assessment & Plan  Will check bladder scan  Consider starting Flomax    Essential hypertension- (present on admission)  Assessment & Plan  Continue Benicar, spironolactone, hold metoprolol due to bradycardia  Monitor blood pressure        VTE prophylaxis: enoxaparin ppx

## 2024-08-14 NOTE — ED PROVIDER NOTES
ED Provider Note    CHIEF COMPLAINT  Chief Complaint   Patient presents with    Dizziness    Numbness     Both lower limb        EXTERNAL RECORDS REVIEWED  Outpatient Notes   Cardiac stress test 6/20/2024   Myocardial Perfusion   Report   NUCLEAR IMAGING INTERPRETATION   Normal Lexiscan myocardial perfusion study.   No evidence of ischemia or infarct.   Diaphragmatic artifact.   SDS 1.   LVEF 50%, visually better.   Sinus rhythm with right bundle branch block at rest.    No ischemic changes with Regadenoson.   Occasional PACs.   No chest pain.   ECG INTERPRETATION   Negative stress ECG for ischemia.    HPI/ROS  LIMITATION TO HISTORY   Select: : None  OUTSIDE HISTORIAN(S):  none    Laureen Munoz is a 73 y.o. male who presents to the emergency department for worsening intermittent weakness with lightheaded dizziness and near syncopal type feeling.  Also notes chronic peripheral neuropathy.  This is largely unchanged but he again he has become more symptomatic of recent at times.  He has had relatively new addition of diuretic which he states is working for his lower extremity edema.  No other significant medication changes of recent.  No recent illness.    PAST MEDICAL HISTORY   has a past medical history of Allergy, Anesthesia (2017), Arthritis (unk), Cancer ([facility]) (2020), Cataract (2022), High cholesterol (1998), Hypercholesterolemia, Hypertension, Migraine, Neuropathy, PONV (postoperative nausea and vomiting) (2017), Reactive depression (02/21/2023), Renal artery stenosis (HCC), Sleep apnea (2019), Stroke ([facility]) (2023), Tinea inguinalis, Tobacco use (10/30/2018), and Urinary bladder disorder (1995).    SURGICAL HISTORY   has a past surgical history that includes other abdominal surgery; other (1992); cataract phaco with iol; rhinoplasty; transurethral elec-surg prostatectom (N/A, 08/17/2023); cystourethroscopy (N/A, 08/17/2023); cysto/uretero/pyeloscopy w/lithotripsy (N/A, 08/17/2023); eye surgery; and  prostatectomy, radical retro.    FAMILY HISTORY  Family History   Problem Relation Age of Onset    Heart Disease Mother          at 91yo/heart failure    Heart Attack Mother     Breast Cancer Mother     Heart Disease Father          at 59yo/massive heart attack    Heart Attack Father     Hypertension Father          coronary thrombosis    Cancer Sister          of leukemia    Breast Cancer Sister     Stroke Sister         living lost eye site left eye    No Known Problems Maternal Grandmother     No Known Problems Maternal Grandfather     No Known Problems Paternal Grandmother     Dementia Paternal Grandfather     Hyperlipidemia Neg Hx     Diabetes Neg Hx        SOCIAL HISTORY  Social History     Tobacco Use    Smoking status: Former     Current packs/day: 0.00     Average packs/day: 1 pack/day for 15.0 years (15.0 ttl pk-yrs)     Types: Cigarettes, Cigars     Start date: 1970     Quit date: 1985     Years since quittin.6    Smokeless tobacco: Never    Tobacco comments:     occasional cigar started in / quit completely    Vaping Use    Vaping status: Never Used   Substance and Sexual Activity    Alcohol use: Yes     Alcohol/week: 1.2 oz     Types: 1 Cans of beer, 1 Shots of liquor per week     Comment: 2 beers / 2 shots in last month    Drug use: Not Currently     Types: Marijuana, Inhaled     Comment: none for 30 years/ tried pot [id number] didn't care to much for    Sexual activity: Not Currently     Partners: Female     Comment: not sexually active last 3 years, when active male condom       CURRENT MEDICATIONS  Home Medications       Reviewed by Deepa Rolle R.N. (Registered Nurse) on 24 at   Med List Status: Partial     Medication Last Dose Status   cetirizine (ZYRTEC) 10 MG Tab  Active   Empagliflozin (JARDIANCE) 10 MG Tab tablet  Active   fluticasone (FLONASE) 50 MCG/ACT nasal spray  Active   meclizine (ANTIVERT) 12.5 MG Tab  Active   Melatonin 1 MG Cap  Active  "  metFORMIN ER (GLUCOPHAGE XR) 500 MG TABLET SR 24 HR  Active   metoprolol SR (TOPROL XL) 25 MG TABLET SR 24 HR  Active   olmesartan (BENICAR) 20 MG Tab  Active   spironolactone (ALDACTONE) 25 MG Tab  Active   triamcinolone acetonide (KENALOG) 0.1 % Cream  Active                  Audit from Redirected Encounters    **Home medications have not yet been reviewed for this encounter**         ALLERGIES  Allergies   Allergen Reactions    Gabapentin Unspecified     Dizziness, confusion, falling    Hyzaar [Losartan Potassium-Hctz] Cough       PHYSICAL EXAM  VITAL SIGNS: /56   Pulse 67   Temp 36.2 °C (97.1 °F) (Temporal)   Resp 18   Ht 1.803 m (5' 11\")   Wt 116 kg (255 lb 15.3 oz)   SpO2 92%   BMI 35.70 kg/m²      Pulse ox interpretation: I interpret this pulse ox as normal.  Constitutional: Alert in no apparent distress.  HENT: No signs of trauma, Bilateral external ears normal, Nose normal.   Eyes: Pupils are equal and reactive  Neck: Normal range of motion, No tenderness, Supple  Cardiovascular: Bradycardic and irregular no m appreciable murmur with distant heart sounds  Thorax & Lungs: Normal breath sounds, No respiratory distress  Skin: Warm, Dry, No erythema, No rash.   Musculoskeletal: Good range of motion in all major joints. No tenderness to palpation or major deformities noted.   Neurologic: Alert , Normal motor function, Normal sensory function, No focal deficits noted.   Psychiatric: Affect normal, Judgment normal, Mood normal.         EKG/LABS  Results for orders placed or performed during the hospital encounter of 08/13/24   CBC with Differential   Result Value Ref Range    WBC 5.9 4.8 - 10.8 K/uL    RBC 4.95 4.70 - 6.10 M/uL    Hemoglobin 14.9 14.0 - 18.0 g/dL    Hematocrit 45.2 42.0 - 52.0 %    MCV 91.3 81.4 - 97.8 fL    MCH 30.1 27.0 - 33.0 pg    MCHC 33.0 32.3 - 36.5 g/dL    RDW 48.4 35.9 - 50.0 fL    Platelet Count 118 (L) 164 - 446 K/uL    MPV 10.8 9.0 - 12.9 fL    Neutrophils-Polys 58.30 " 44.00 - 72.00 %    Lymphocytes 24.40 22.00 - 41.00 %    Monocytes 13.40 0.00 - 13.40 %    Eosinophils 2.00 0.00 - 6.90 %    Basophils 0.50 0.00 - 1.80 %    Immature Granulocytes 1.40 (H) 0.00 - 0.90 %    Nucleated RBC 0.00 0.00 - 0.20 /100 WBC    Neutrophils (Absolute) 3.44 1.82 - 7.42 K/uL    Lymphs (Absolute) 1.44 1.00 - 4.80 K/uL    Monos (Absolute) 0.79 0.00 - 0.85 K/uL    Eos (Absolute) 0.12 0.00 - 0.51 K/uL    Baso (Absolute) 0.03 0.00 - 0.12 K/uL    Immature Granulocytes (abs) 0.08 0.00 - 0.11 K/uL    NRBC (Absolute) 0.00 K/uL   Comp Metabolic Panel   Result Value Ref Range    Sodium 138 135 - 145 mmol/L    Potassium 4.7 3.6 - 5.5 mmol/L    Chloride 105 96 - 112 mmol/L    Co2 19 (L) 20 - 33 mmol/L    Anion Gap 14.0 7.0 - 16.0    Glucose 94 65 - 99 mg/dL    Bun 25 (H) 8 - 22 mg/dL    Creatinine 1.08 0.50 - 1.40 mg/dL    Calcium 8.7 8.5 - 10.5 mg/dL    Correct Calcium 8.6 8.5 - 10.5 mg/dL    AST(SGOT) 27 12 - 45 U/L    ALT(SGPT) 25 2 - 50 U/L    Alkaline Phosphatase 101 (H) 30 - 99 U/L    Total Bilirubin 0.9 0.1 - 1.5 mg/dL    Albumin 4.1 3.2 - 4.9 g/dL    Total Protein 6.8 6.0 - 8.2 g/dL    Globulin 2.7 1.9 - 3.5 g/dL    A-G Ratio 1.5 g/dL   Troponin   Result Value Ref Range    Troponin T 13 6 - 19 ng/L   MAGNESIUM   Result Value Ref Range    Magnesium 2.3 1.5 - 2.5 mg/dL   PHOSPHORUS   Result Value Ref Range    Phosphorus 3.6 2.5 - 4.5 mg/dL   BETA-HYDROXYBUTYRIC ACID   Result Value Ref Range    beta-Hydroxybutyric Acid 0.33 (H) 0.02 - 0.27 mmol/L   HEMOGLOBIN A1C   Result Value Ref Range    Glycohemoglobin 6.2 (H) 4.0 - 5.6 %    Est Avg Glucose 131 mg/dL   OSMOLALITY SERUM   Result Value Ref Range    Osmolality Serum 293 278 - 298 mOsm/kg H2O   ESTIMATED GFR   Result Value Ref Range    GFR (CKD-EPI) 72 >60 mL/min/1.73 m 2   VITAMIN B12   Result Value Ref Range    Vitamin B12 -True Cobalamin 729 211 - 911 pg/mL   proBrain Natriuretic Peptide, NT   Result Value Ref Range    NT-proBNP 279 (H) 0 - 125 pg/mL    TSH WITH REFLEX TO FT4   Result Value Ref Range    TSH 1.380 0.380 - 5.330 uIU/mL   EKG   Result Value Ref Range    Report       [facility] Emergency Dept[facility]    Test Date:  2024  Pt Name:    [facility]                  Department: ER  MRN:        [id number]                      Room:       GR 28  Gender:     Male                         Technician: [id number]  :        1950                   Requested By:TRACEY TRIAGE PROTOCOL  Order #:    [id number]                    Reading MD:    Measurements  Intervals                                Axis  Rate:       48                           P:          6  OH:         202                          QRS:        8  QRSD:       156                          T:          -8  QT:         476  QTc:        426    Interpretive Statements  Sinus bradycardia  Atrial premature complexes  Right bundle branch block  Compared to ECG 2024 09:15:30  Atrial premature complex(es) now present  Sinus rhythm no longer present  First degree AV block no longer present     EKG (NOW)   Result Value Ref Range    Report       [facility] Emergency Dept.    Test Date:  2024  Pt Name:    [facility]                  Department: ER  MRN:        [id number]                      Room:       GR 28  Gender:     Male                         Technician: [id number]  :        1950                   Requested By:MARISA YING  Order #:    [id number]                    Reading MD:    Measurements  Intervals                                Axis  Rate:       43                           P:          4  OH:         213                          QRS:        -13  QRSD:       150                          T:          -15  QT:         471  QTc:        399    Interpretive Statements  Sinus bradycardia  Atrial premature complexes in couplets  Right bundle branch block  Compared to ECG 2024 19:51:13  No significant changes     POCT glucose device results   Result Value  Ref Range    POC Glucose, Blood 104 (H) 65 - 99 mg/dL       I have independently interpreted this EKG    RADIOLOGY/PROCEDURES   I have independently interpreted the diagnostic imaging associated with this visit and am waiting the final reading from the radiologist.   My preliminary interpretation is as follows: No intracranial hemorrhage    Radiologist interpretation:  CT-HEAD W/O   Final Result         1.  No acute intracranial abnormality is identified, there are nonspecific white matter changes, commonly associated with small vessel ischemic disease.  Associated mild cerebral atrophy is noted.   2.  Atherosclerosis.               DX-CHEST-PORTABLE (1 VIEW)   Final Result         1.  Bilateral basilar atelectasis, no focal infiltrate   2.  Cardiomegaly   3.  Atherosclerosis      CT-CTA NECK WITH & W/O-POST PROCESSING    (Results Pending)   CT-CTA HEAD WITH & W/O-POST PROCESS    (Results Pending)   MR-BRAIN-WITH & W/O    (Results Pending)       COURSE & MEDICAL DECISION MAKING    ASSESSMENT, COURSE AND PLAN  Care Narrative: Patient presenting to the Emergency Department with above presentation.  Broad differential.  Will complete labs, EKG as well as imaging to include CT head and chest x-ray.    Patient does have a history of vertigo although this does not seem vertiginous at this time to me.  Patient does have arrhythmia on monitor    DISPOSITION AND DISCUSSIONS  I have discussed management of the patient with the following physicians and SINA's: Hospitalist    Discussion of management with other Q or appropriate source(s): None     74-year-old male presented to the emerged part with above presentation.  Patient has been bradycardic with sinus arrhythmia while here in the ER.  Repeat EKG did not show any acute changes.  Troponin is negative.  Heart score is low.  I do however have concern that the patient's symptoms may be of cardiac etiology given the arrhythmia and bradycardia.  Patient is noted to be on a  beta-blocker (metoprolol).  Noted of diabetic derangement.  Likely chronic peripheral neuropathy secondary to chronic diabetes.  Current A1c is 6.2.      I will have the hospitalist admit at this time.    FINAL DIAGNOSIS  1. Bradycardia    2. Near syncope    3. Sinus arrhythmia         Electronically signed by: Bogdan Polk M.D., 8/13/2024 8:24 PM

## 2024-08-14 NOTE — CARE PLAN
The patient is Stable - Low risk of patient condition declining or worsening    Shift Goals  Clinical Goals: monitor neuro  Patient Goals: updates on POC  Family Goals: na      Problem: Knowledge Deficit - Standard  Goal: Patient and family/care givers will demonstrate understanding of plan of care, disease process/condition, diagnostic tests and medications  Outcome: Progressing  Note: Patient will demonstrate understanding of POC during admission process.      Problem: Neuro Status  Goal: Neuro status will remain stable or improve  Outcome: Progressing  Flowsheets (Taken 8/13/2024 3505)  Level of Consciousness: Alert  Note: Patient will not have any significant changes in neuro status throughout the night.

## 2024-08-14 NOTE — PROGRESS NOTES
[facility] Medicine Daily Progress Note    Date of Service  8/14/2024    Chief Complaint  Laureen Munoz is a 74 y.o. male admitted 8/13/2024 with dizziness    Hospital Course  MrAshley Munoz is a 73 y.o. male with past medical history of hypertension, migraine, peripheral neuropathy, sleep apnea, CVA, BPPV, pituitary macroadenoma, diastolic heart failure, type 2 diabetes, BPH who presented 8/13/2024 with complaints of worsening of recurrent dizziness.      He states that he has been having vertigo for many months, was treated with meclizine successfully.  It appears to be worsening as the last 2 days.  He went to see his podiatrist yesterday and had near syncopal episode and at the same time spinning sensation and confusion in the position sitting in the chair.  During the episode he was not able to ambulate.  His vitals and heart rate are unknown .   He did not take meclizine this time.  Additionally reported worsening of bilateral lower extremity numbness.  Additionally, reported diarrhea that has been going on in the last week that is becoming more formed.  He had some worsening of difficulties with urination.  Reported chronic word finding difficulties and tinnitus.    In ER blood pressure stable, heart rate in 50s.  On EKG there is PACs and sinus arrhythmia, first-degree AV block, RBBB.CT head: No acute abnormalities.  Chest x-ray: Bilateral basilar atelectasis, cardiomegaly.  Patient admitted to hospital medicine for management of care.    During this hospitalization, MRI brain was pursued which noted tiny areas of acute infarct in the bilateral posterior parietal and right occipital lobes with multifocal small chronic infarcts in the supra and infra tentorial brain parenchyma.  Mild chronic microvascular ischemic disease with mild cerebral volume loss and stable small pituitary microadenoma.    Neurology Dr. Alba was consulted for further recommendations due to noted tiny infarcts in the bilateral  posterior parietal and right occipital lobes.    Interval Problem Update  -Patient seen and examined.  Patient reports improvement in all his symptoms.  Patient able to ambulate to the bathroom with no dizziness.  Patient still complains of some mild bilateral lower extremity neuropathy specifically on the left foot.  Discussed with patient all imaging results.  Plan of care: Continue to monitor for any neurological deficits; neurology consulted for further recommendations at this time  Disposition: Patient switched to inpatient status as he is anticipated to stay 2-3 midnights for management of CVA  Lab work: Reviewed; expected  VSS at this time    I have discussed this patient's plan of care and discharge plan at IDT rounds today with Case Management, Nursing, Nursing leadership, and other members of the IDT team.    Consultants/Specialty  neurology    Code Status  Full Code    Disposition  The patient is not medically cleared for discharge to home or a post-acute facility.  Anticipate discharge to: home with close outpatient follow-up    I have placed the appropriate orders for post-discharge needs.    Review of Systems  Review of Systems   Constitutional:  Positive for malaise/fatigue. Negative for chills and fever.   HENT: Negative.     Eyes: Negative.    Respiratory: Negative.     Cardiovascular: Negative.    Gastrointestinal: Negative.    Genitourinary: Negative.    Musculoskeletal:  Positive for myalgias.   Skin: Negative.    Neurological:  Positive for dizziness, tingling and weakness.   Endo/Heme/Allergies: Negative.    Psychiatric/Behavioral: Negative.          Physical Exam  Temp:  [36.2 °C (97.1 °F)-36.8 °C (98.2 °F)] 36.6 °C (97.8 °F)  Pulse:  [45-68] 55  Resp:  [16-19] 16  BP: (115-158)/(56-97) 116/58  SpO2:  [90 %-92 %] 91 %    Physical Exam  Vitals and nursing note reviewed.   Constitutional:       Appearance: He is obese.   HENT:      Head: Normocephalic.      Nose: Nose normal.      Mouth/Throat:       Mouth: Mucous membranes are moist.      Pharynx: Oropharynx is clear.   Eyes:      Pupils: Pupils are equal, round, and reactive to light.   Cardiovascular:      Rate and Rhythm: Normal rate and regular rhythm.      Pulses: Normal pulses.   Pulmonary:      Effort: Pulmonary effort is normal.      Breath sounds: Normal breath sounds.   Abdominal:      General: Bowel sounds are normal.      Palpations: Abdomen is soft.   Musculoskeletal:         General: Tenderness present.      Cervical back: Normal range of motion and neck supple.      Right lower leg: Edema present.      Left lower leg: Edema present.   Skin:     General: Skin is dry.      Capillary Refill: Capillary refill takes 2 to 3 seconds.      Coloration: Skin is pale.   Neurological:      Mental Status: He is alert. Mental status is at baseline.      Motor: Weakness present.         Fluids  No intake or output data in the 24 hours ending 08/14/24 1248    Laboratory  Recent Labs     08/13/24 2015 08/14/24  0706   WBC 5.9 5.6   RBC 4.95 5.00   HEMOGLOBIN 14.9 15.1   HEMATOCRIT 45.2 45.8   MCV 91.3 91.6   MCH 30.1 30.2   MCHC 33.0 33.0   RDW 48.4 48.7   PLATELETCT 118* 115*   MPV 10.8 10.5     Recent Labs     08/13/24 2015 08/14/24  0706   SODIUM 138 136   POTASSIUM 4.7 4.5   CHLORIDE 105 105   CO2 19* 19*   GLUCOSE 94 97   BUN 25* 19   CREATININE 1.08 0.87   CALCIUM 8.7 8.7                   Imaging  MR-BRAIN-WITH & W/O   Final Result      Tiny areas of acute infarction the bilateral posterior parietal and right occipital lobes.   Multifocal small chronic infarcts in the supra and infratentorial brain parenchyma.   Mild chronic microvascular ischemic disease.   Mild cerebral volume loss.   Stable small pituitary microadenoma.      CT-CTA NECK WITH & W/O-POST PROCESSING   Final Result         1.  CT angiogram of the neck within normal limits.         CT-CTA HEAD WITH & W/O-POST PROCESS   Final Result         1.  No large vessel occlusion or aneurysm  identified.   2.  Atherosclerosis      CT-HEAD W/O   Final Result         1.  No acute intracranial abnormality is identified, there are nonspecific white matter changes, commonly associated with small vessel ischemic disease.  Associated mild cerebral atrophy is noted.   2.  Atherosclerosis.               DX-CHEST-PORTABLE (1 VIEW)   Final Result         1.  Bilateral basilar atelectasis, no focal infiltrate   2.  Cardiomegaly   3.  Atherosclerosis           Assessment/Plan  * Dizziness- (present on admission)  Assessment & Plan  71-year-old male with history of BPPV, pituitary microadenoma and prior lacunar CVAs on MRI of the brain, presented with worsening of vertigo, near syncope, worsening of bilateral lower extremity numbness  EKG: Mild sinus bradycardia in the mid 50s, PAC, AV block first-degree, RBBB  Plan: Telemetry  Neurochecks every 4 hours  CTA head and neck and MRI of the brain given prior history of stroke and pituitary microadenoma, as well as presence of neurosymptoms such as word finding difficulties, transient ataxia  Continue scheduled meclizine  PT OT evaluation  MRI Brain: positive for infarcts    Consulted Neurology - [place]    Enlarged pituitary gland (HCC)- (present on admission)  Assessment & Plan  Recent MRI of pituitary gland in March 2024 showed stable pituitary macroadenoma.  Will repeat MRI of the brain given worsening of dizziness.    Benign prostatic hyperplasia- (present on admission)  Assessment & Plan  Will check bladder scan  Consider starting Flomax    Essential hypertension- (present on admission)  Assessment & Plan  Continue Benicar, spironolactone, hold metoprolol due to bradycardia  Monitor blood pressure         VTE prophylaxis:    enoxaparin ppx      I have performed a physical exam and reviewed and updated ROS and Plan today (8/14/2024). In review of yesterday's note (8/13/2024), there are no changes except as documented above.      I, Charles Rollnis, DNP  performed a substantiated portion of the service face-to-face with same patient on the same date of service INDEPENDENTLY FROM THE MD ON ASSESSMENT, EXAMINATION, AND DISCUSSION AND PLAN OF CARE FOR 19 MINUTES.  I was personally involved in reviewing and conducting the medical decision making, including the information as described above.

## 2024-08-14 NOTE — ED NOTES
Patient transferred to the floor accompanied by , patient AAO X4, respirations even and unlabored on room air, patient in possession of personal belongings. Fall interventions in place, family at bedside.

## 2024-08-14 NOTE — PROGRESS NOTES
Recieved bedside report from VIOLETA Duarte @ 0702 Pt A&O x 4 and POC discussed, all questions answered. Pt denies pain at this time, states numbness and tingling on bilateral LE with decreased sensation on LLE. Bed is locked and in lowest position, call light within reach. Pending MRI. Pt denies further needs at this time.

## 2024-08-14 NOTE — ASSESSMENT & PLAN NOTE
71-year-old male with history of BPPV, pituitary microadenoma and prior lacunar CVAs on MRI of the brain, presented with worsening of vertigo, near syncope, worsening of bilateral lower extremity numbness  EKG: Mild sinus bradycardia in the mid 50s, PAC, AV block first-degree, RBBB  Plan: Telemetry  Neurochecks every 4 hours  CTA head and neck and MRI of the brain given prior history of stroke and pituitary microadenoma, as well as presence of neurosymptoms such as word finding difficulties, transient ataxia  Continue scheduled meclizine  PT OT evaluation  MRI Brain: positive for infarcts    Consulted Neurology - [place]

## 2024-08-14 NOTE — ED TRIAGE NOTES
"73 y.o. male Laureen Munoz    Chief Complaint   Patient presents with    Dizziness x 2 days     Numbness x 2 days     Both lower limb      B[facility]   Picked up from home  EMS called by Patient    Patient presented to ED with complaint of dizziness and bilateral lower limb  numbness for 2 days, patient endorses he has neuropathy for 3 years and numbness is persistent, but got worsen over 2 days. Patient AAO X4 , Respirations even and unlabored on room air , afebrile at this time. Denies any alcohol or drug use.     NIH Stroke Scale 01  1A. Level of Consciousness: Alert, Keenly Responsive  11B. Ask Month and Age: Both Questions Right  1C. Blink Eyes & Squeeze Hands: Performs Both Tasks  2. Best Gaze: Normal  3. Visual: No Visual Loss  4. Facial Palsy: Normal Symmetrical Movements  5A. Motor - Left Arm: No Drift  5B. Motor - Right Arm: No Drift  6A. Motor - Left Leg: No Drift  6B. Motor - Right Leg: No Drift  7. Limb Ataxia: Absent  8. Sensory Loss: Mild-to-Moderate Sensory Loss  9. Best Language: No Aphasia  10. Dysarthria: Normal  11. Extinction and Inattention: No Abnormality      Medications given en route:   ml IV     BP (!) 156/82   Pulse (!) 53   Resp 19   Ht 1.558 m (5' 1.32\")   Wt 117 kg (257 lb 15 oz)   SpO2 91%   BMI 48.23 kg/m²     Past Medical History:   Diagnosis Date    Allergy     Anesthesia 2017    Arthritis unk    Cancer (HCC) 2020    skin cancer sqammish    Cataract 2022    lens implants    High cholesterol 1998    Hypercholesterolemia     Hypertension     Migraine     Neuropathy     PONV (postoperative nausea and vomiting) 2017    Rhinoplasti for deviated septon    Reactive depression 02/21/2023    Renal artery stenosis (HCC)     Sleep apnea 2019    tested    Stroke ([facility]) 2023    stroke or contusion    Tinea inguinalis     Tobacco use 10/30/2018    Urinary bladder disorder 1995    bph       Past Surgical History:   Procedure Laterality Date    ND TRANSURETHRAL ELEC-SURG " PROSTATECTOM N/A 08/17/2023    Procedure: BIPOLAR TRANSURETHRAL RESECTION OF PROSTATE;  Surgeon: Jacques Mcdowell M.D.;  Location: [facility];  Service: Urology    [place] CYSTOURETHROSCOPY N/A 08/17/2023    Procedure: CYSTOSCOPY;  Surgeon: Jacques Mcdowell M.D.;  Location: [facility];  Service: Urology    [place] CYSTO/URETERO/PYELOSCOPY W/LITHOTRIPSY N/A 08/17/2023    Procedure: CYSTOLITHLOPAXY;  Surgeon: Jacques Mcdowell M.D.;  Location: [facility];  Service: Urology    CATARACT PHACO WITH IOL      EYE SURGERY      OTHER  1992    cyst removal back of the neck    OTHER ABDOMINAL SURGERY      gallbladder    PROSTATECTOMY, RADICAL RETRO      RHINOPLASTY

## 2024-08-14 NOTE — HOSPITAL COURSE
MrAshley Munoz is a 73 y.o. male with past medical history of hypertension, migraine, peripheral neuropathy, sleep apnea, CVA, BPPV, pituitary macroadenoma, diastolic heart failure, type 2 diabetes, BPH who presented 8/13/2024 with complaints of worsening of recurrent dizziness.      He states that he has been having vertigo for many months, was treated with meclizine successfully.  It appears to be worsening as the last 2 days.  He went to see his podiatrist yesterday and had near syncopal episode and at the same time spinning sensation and confusion in the position sitting in the chair.  During the episode he was not able to ambulate.  His vitals and heart rate are unknown .   He did not take meclizine this time.  Additionally reported worsening of bilateral lower extremity numbness.  Additionally, reported diarrhea that has been going on in the last week that is becoming more formed.  He had some worsening of difficulties with urination.  Reported chronic word finding difficulties and tinnitus.    In ER blood pressure stable, heart rate in 50s.  On EKG there is PACs and sinus arrhythmia, first-degree AV block, RBBB.CT head: No acute abnormalities.  Chest x-ray: Bilateral basilar atelectasis, cardiomegaly.  Patient admitted to hospital medicine for management of care.    During this hospitalization, MRI brain was pursued which noted tiny areas of acute infarct in the bilateral posterior parietal and right occipital lobes with multifocal small chronic infarcts in the supra and infra tentorial brain parenchyma.  Mild chronic microvascular ischemic disease with mild cerebral volume loss and stable small pituitary microadenoma.    Neurology Dr. Alba was consulted for further recommendations due to noted tiny infarcts in the bilateral posterior parietal and right occipital lobes.    Per Neurology, patient able to be discharged home on ASA and Ziopatch for cardiac monitoring. Patient to follow with stroke  bridge clinic. Patient to resume all home medications and follow up with PCP. All questions and concerns answered prior to being discharged. Patient discharged home.

## 2024-08-14 NOTE — PROGRESS NOTES
4 Eyes Skin Assessment Completed by VIOLETA Alejandro and VIOLETA Ochoa.    Head WDL  Ears WDL  Nose WDL  Mouth WDL  Neck WDL  Breast/Chest moisture related redness  Shoulder Blades Redness and Blanching  Spine Redness and Blanching  (R) Arm/Elbow/Hand WDL  (L) Arm/Elbow/Hand WDL  Abdomen WDL  Groin WDL  Scrotum/Coccyx/Buttocks Redness and Blanching  (R) Leg WDL  (L) Leg WDL  (R) Heel/Foot/Toe WDL  (L) Heel/Foot/Toe WDL          Devices In Places Tele Box and Pulse Ox      Interventions In Place Pillows, Heels Loaded W/Pillows, and Pressure Redistribution Mattress    Possible Skin Injury No    Pictures Uploaded Into Epic N/A  Wound Consult Placed N/A  RN Wound Prevention Protocol Ordered No

## 2024-08-14 NOTE — PROGRESS NOTES
Telemetry Monitor Report Summary    Rhythm Sinus Jonah 45-51  HR 45-51  Ectopy F PVC, F PAC, F coup, F big, F trig    Measurements 0.22/ 0.11 /0.47

## 2024-08-15 ENCOUNTER — PATIENT OUTREACH (OUTPATIENT)
Dept: MEDICAL GROUP | Facility: IMAGING CENTER | Age: 74
End: 2024-08-15
Payer: MEDICARE

## 2024-08-15 NOTE — PROGRESS NOTES
IV removed, zio patch placed and DC instructions provided, pt had no further questions. Pt escorted via ambulation to [facility] for relative pick-up.     Discharge Instructions    Discharged to home by car with relative. Discharged via walking, hospital escort: Yes.  Special equipment needed: Not Applicable    Be sure to schedule a follow-up appointment with your primary care doctor or any specialists as instructed.     Discharge Plan:   Diet Plan: Discussed  Activity Level: Discussed  Confirmed Follow up Appointment: Patient to Call and Schedule Appointment  Confirmed Symptoms Management: Discussed  Medication Reconciliation Updated: Yes    I understand that a diet low in cholesterol, fat, and sodium is recommended for good health. Unless I have been given specific instructions below for another diet, I accept this instruction as my diet prescription.   Other diet: cardiac    Special Instructions: None    -Is this patient being discharged with medication to prevent blood clots?  Yes, Aspirin Aspirin Tablets  What is this medication?  ASPIRIN (AS pir in) lowers the risk of heart attack, stroke, or blood clot. It may also be used to treat mild to moderate pain, inflammation, or arthritis. It belongs to a group of medications called NSAIDs.  This medicine may be used for other purposes; ask your health care provider or pharmacist if you have questions.  COMMON BRAND NAME(S): Aspir-Low, Aspir-Mahsa, Aspirtab, Mohit Advanced Aspirin, Mohit Aspirin, Mohit Aspirin Extra Strength, Mohit Aspirin Plus, Mohit Extra Strength, Mohit Extra Strength Plus, Mohit Genuine Aspirin, Mohit Womens Aspirin, Bufferin, Bufferin Extra Strength, Bufferin Low Dose  What should I tell my care team before I take this medication?  They need to know if you have any of these conditions:  Anemia  Asthma  Bleeding problems  Diabetes  Gout  History of stomach ulcers or bleeding  If you often drink alcohol  Kidney disease  Liver  disease  Low level of vitamin K  Lupus  Smoke tobacco  An unusual or allergic reaction to aspirin, tartrazine dye, other medications, dyes, or preservatives  Pregnant or trying to get pregnant  Breast-feeding  How should I use this medication?  Take this medication by mouth with a glass of water. Follow the directions on the package or prescription label. You can take this medication with or without food. If it upsets your stomach, take it with food. Do not take it more often than directed.  Talk to your care team about the use of this medication in children. While this medication may be prescribed for children as young as 12 years of age for selected conditions, precautions do apply. Children and teenagers should not use this medication to treat chicken pox or flu symptoms unless directed by a care team.  Patients over 65 years old may have a stronger reaction and need a smaller dose.  Overdosage: If you think you have taken too much of this medicine contact a poison control center or emergency room at once.  NOTE: This medicine is only for you. Do not share this medicine with others.  What if I miss a dose?  If you are taking this medication on a regular schedule and miss a dose, take it as soon as you can. If it is almost time for your next dose, take only that dose. Do not take double or extra doses.  What may interact with this medication?  Do not take this medication with any of the following:  Cidofovir  Ketorolac  Probenecid  This medication may also interact with the following:  Alcohol  Alendronate  Bismuth subsalicylate  Flavocoxid  Herbal supplements like feverfew, garlic, josue, ginkgo biloba, horse chestnut  Medications for diabetes or glaucoma like acetazolamide, methazolamide  Medications for gout  Medications that prevent or treat blood clots like apixaban, clopidogrel, enoxaparin, heparin, rivaroxaban, warfarin  Other aspirin and aspirin-like medications  NSAIDs, medications for pain and  inflammation, like ibuprofen or naproxen  Pemetrexed  Sulfinpyrazone  Varicella live vaccine  This list may not describe all possible interactions. Give your health care provider a list of all the medicines, herbs, non-prescription drugs, or dietary supplements you use. Also tell them if you smoke, drink alcohol, or use illegal drugs. Some items may interact with your medicine.  What should I watch for while using this medication?  If you are treating yourself for pain, tell your doctor or health care provider if the pain lasts more than 10 days, if it gets worse, or if there is a new or different kind of pain. Tell your doctor if you see redness or swelling. Also, check with your doctor if you have a fever that lasts for more than 3 days. Only take this medication to prevent heart attacks or blood clotting if prescribed by your doctor or health care provider.  Do not take other medications that contain aspirin, ibuprofen, or naproxen with this medication. Side effects such as stomach upset, nausea, or ulcers may be more likely to occur. Many non-prescription medications contain aspirin, ibuprofen, or naproxen. Always read labels carefully.  This medication can cause serious ulcers and bleeding in the stomach. It can happen with no warning. Smoking, drinking alcohol, older age, and poor health can also increase risks. Call your health care provider right away if you have stomach pain or blood in your vomit or stool.  Alcohol may interfere with the effect of this medication. Avoid alcoholic drinks.  This medication may cause serious skin reactions. They can happen weeks to months after starting the medication. Contact your health care provider right away if you notice fevers or flu-like symptoms with a rash. The rash may be red or purple and then turn into blisters or peeling of the skin. Or, you might notice a red rash with swelling of the face, lips or lymph nodes in your neck or under your arms.  Talk to your  health care provider if you are pregnant before taking this medication. Taking this medication between weeks 20 and 30 of pregnancy may harm your unborn baby. Your health care provider will monitor you closely if you need to take it. After 30 weeks of pregnancy, do not take this medication.  Be careful brushing or flossing your teeth or using a toothpick because you may get an infection or bleed more easily. If you have any dental work done, tell your dentist you are receiving this medication.  This medication may make it more difficult to get pregnant. Talk to your health care provider if you are concerned about your fertility.  What side effects may I notice from receiving this medication?  Side effects that you should report to your care team as soon as possible:  Allergic reactions--skin rash, itching, hives, swelling of the face, lips, tongue, or throat  Bleeding--bloody or black, tar-like stools, vomiting blood or brown material that looks like coffee grounds, red or dark brown urine, red or purple spots on skin, unusual bruising or bleeding  Hearing loss, ringing in ears  Kidney injury--decrease in the amount of urine, swelling of the ankles, hands, or feet  Liver injury--right upper belly pain, loss of appetite, nausea, light-colored stool, dark yellow or brown urine, yellowing of the skin or eyes, unusual weakness, fatigue  Rash, fever, and swollen lymph nodes  Side effects that usually do not require medical attention (report to your care team if they continue or are bothersome):  Headache  Loss of appetite  Nausea  Upset stomach  This list may not describe all possible side effects. Call your doctor for medical advice about side effects. You may report side effects to FDA at 5-423-FDA-8352.  Where should I keep my medication?  Keep out of the reach of children and pets.  Store at room temperature between 15 and 30 degrees C (59 and 86 degrees F). Protect from heat and moisture. Get rid of any unused  medication after the expiration date.  Do not use this medication if it has a strong vinegar smell.  To get rid of medications that are no longer needed or have :  Take the medication to a medication take-back program. Check with your pharmacy or law enforcement to find a location.  If you cannot return the medication, check the label or package insert to see if the medication should be thrown out in the garbage or flushed down the toilet. If you are not sure, ask your care team. If it is safe to put it in the trash, empty the medication out of the container. Mix the medication with cat litter, dirt, coffee grounds, or other unwanted substance. Seal the mixture in a bag or container. Put it in the trash.  NOTE: This sheet is a summary. It may not cover all possible information. If you have questions about this medicine, talk to your doctor, pharmacist, or health care provider.  ©  [employer]vier/Gold Standard (2021-10-29 00:00:00)    Is patient discharged on Warfarin / Coumadin?   No

## 2024-08-15 NOTE — DISCHARGE SUMMARY
Discharge Summary    CHIEF COMPLAINT ON ADMISSION  Chief Complaint   Patient presents with    Dizziness    Numbness     Both lower limb        Reason for Admission  EMS     Admission Date  8/13/2024    CODE STATUS  Full Code    HPI & HOSPITAL COURSE    Mr. Laureen Munoz is a 73 y.o. male with past medical history of hypertension, migraine, peripheral neuropathy, sleep apnea, CVA, BPPV, pituitary macroadenoma, diastolic heart failure, type 2 diabetes, BPH who presented 8/13/2024 with complaints of worsening of recurrent dizziness.      He states that he has been having vertigo for many months, was treated with meclizine successfully.  It appears to be worsening as the last 2 days.  He went to see his podiatrist yesterday and had near syncopal episode and at the same time spinning sensation and confusion in the position sitting in the chair.  During the episode he was not able to ambulate.  His vitals and heart rate are unknown .   He did not take meclizine this time.  Additionally reported worsening of bilateral lower extremity numbness.  Additionally, reported diarrhea that has been going on in the last week that is becoming more formed.  He had some worsening of difficulties with urination.  Reported chronic word finding difficulties and tinnitus.    In ER blood pressure stable, heart rate in 50s.  On EKG there is PACs and sinus arrhythmia, first-degree AV block, RBBB.CT head: No acute abnormalities.  Chest x-ray: Bilateral basilar atelectasis, cardiomegaly.  Patient admitted to hospital medicine for management of care.    During this hospitalization, MRI brain was pursued which noted tiny areas of acute infarct in the bilateral posterior parietal and right occipital lobes with multifocal small chronic infarcts in the supra and infra tentorial brain parenchyma.  Mild chronic microvascular ischemic disease with mild cerebral volume loss and stable small pituitary microadenoma.    Neurology Dr. Alba was  consulted for further recommendations due to noted tiny infarcts in the bilateral posterior parietal and right occipital lobes.    Per Neurology, patient able to be discharged home on ASA and Ziopatch for cardiac monitoring. Patient to follow with stroke bridge clinic. Patient to resume all home medications and follow up with PCP. All questions and concerns answered prior to being discharged. Patient discharged home.     Therefore, he is discharged in good and stable condition to home with close outpatient follow-up.    The patient recovered much more quickly than anticipated on admission.    Discharge Date  08/14/24      FOLLOW UP ITEMS POST DISCHARGE  Please call [phone] to schedule PCP appointment for patient.    Required specialty appointments include:       Discharge Instructions per CHRIS HendricksonRANTHONY    - Follow up with PCP s/p hospitalization  - Follow up with Stroke Bridge clinic  - Start taking ASA 81mg daily  - Resume all home medications    DIET: Cardiac    ACTIVITY: as tolerated     DIAGNOSIS: as tolerated    Return to ER if symptoms persists, chest pain, palpitations, shortness of breath, numbness, tingling, weakness and high fevers.     DISCHARGE DIAGNOSES  Principal Problem (Resolved):    Dizziness (POA: Yes)  Active Problems:    Essential hypertension (Chronic) (POA: Yes)    Benign prostatic hyperplasia (Chronic) (POA: Yes)    Enlarged pituitary gland (HCC) (POA: Yes)    CVA (cerebrovascular accident due to intracerebral hemorrhage) (HCC) (POA: Yes)      FOLLOW UP  Future Appointments   Date Time Provider Department Center   8/19/2024 11:00 AM REMIGIO Claros [facility] None   8/20/2024  9:30 AM [facility]   8/21/2024 10:45 AM MARLO [facility] None   9/17/2024 10:20 AM [facility]   10/1/2024 11:30 AM Alexey Soto D.O. [facility] None   10/18/2024 10:00 AM MARLO [facility] None   10/23/2024   9:30 AM NATACHA Sarkar [facility] None     [facility]  [address] [place] [id number] [phone]    Schedule an appointment as soon as possible for a visit in 1 week(s)        MEDICATIONS ON DISCHARGE     Medication List        START taking these medications        Instructions   aspirin 81 MG EC tablet  Start taking on: August 15, 2024   Take 1 Tablet by mouth every day for 90 days.  Dose: 81 mg            CONTINUE taking these medications        Instructions   cetirizine 10 MG Tabs  Commonly known as: ZyrTEC   Take 10 mg by mouth every day.  Dose: 10 mg     Empagliflozin 10 MG Tabs tablet  Commonly known as: Jardiance   Take 1 Tablet by mouth every day.  Dose: 10 mg     fluticasone 50 MCG/ACT nasal spray  Commonly known as: Flonase   Administer 1 Spray into affected nostril(S) every morning.  Dose: 1 Spray     meclizine 12.5 MG Tabs  Commonly known as: Antivert   Take 1 Tablet by mouth 3 times a day as needed for Dizziness.  Dose: 12.5 mg     Melatonin 1 MG Caps   Take 5 mg by mouth every evening.  Dose: 5 mg     metFORMIN  MG Tb24  Commonly known as: Glucophage XR   Take 2 Tablets by mouth every day.  Dose: 1,000 mg     metoprolol SR 25 MG Tb24  Commonly known as: Toprol XL   Take 0.5 Tablets by mouth every morning. Take half tablet by mouth once a day  Dose: 12.5 mg     olmesartan 20 MG Tabs  Commonly known as: Benicar   Take 1 Tablet by mouth every day.  Dose: 20 mg     spironolactone 25 MG Tabs  Commonly known as: Aldactone   Take 1 Tablet by mouth every day.  Dose: 25 mg     triamcinolone acetonide 0.1 % Crea  Commonly known as: Kenalog   Apply  topically 2 times a day.              Allergies  Allergies   Allergen Reactions    Gabapentin Unspecified     Dizziness, confusion, falling    Hyzaar [Losartan Potassium-Hctz] Cough       DIET  Orders Placed This Encounter   Procedures    Diet Order Diet: Cardiac     Standing Status:   Standing     Number of Occurrences:   1      Order Specific Question:   Diet:     Answer:   Cardiac [6]       ACTIVITY  As tolerated.  Weight bearing as tolerated    CONSULTATIONS  Neurology    PROCEDURES  NONE    IMAGING    MR-BRAIN-WITH & W/O   Final Result      Tiny areas of acute infarction the bilateral posterior parietal and right occipital lobes.   Multifocal small chronic infarcts in the supra and infratentorial brain parenchyma.   Mild chronic microvascular ischemic disease.   Mild cerebral volume loss.   Stable small pituitary microadenoma.      CT-CTA NECK WITH & W/O-POST PROCESSING   Final Result         1.  CT angiogram of the neck within normal limits.         CT-CTA HEAD WITH & W/O-POST PROCESS   Final Result         1.  No large vessel occlusion or aneurysm identified.   2.  Atherosclerosis      CT-HEAD W/O   Final Result         1.  No acute intracranial abnormality is identified, there are nonspecific white matter changes, commonly associated with small vessel ischemic disease.  Associated mild cerebral atrophy is noted.   2.  Atherosclerosis.               DX-CHEST-PORTABLE (1 VIEW)   Final Result         1.  Bilateral basilar atelectasis, no focal infiltrate   2.  Cardiomegaly   3.  Atherosclerosis            LABORATORY  Lab Results   Component Value Date    SODIUM 136 08/14/2024    POTASSIUM 4.5 08/14/2024    CHLORIDE 105 08/14/2024    CO2 19 (L) 08/14/2024    GLUCOSE 97 08/14/2024    BUN 19 08/14/2024    CREATININE 0.87 08/14/2024    CREATININE 0.92 03/01/2010    GLOMRATE >59 03/01/2010        Lab Results   Component Value Date    WBC 5.6 08/14/2024    WBC 9.4 03/01/2010    HEMOGLOBIN 15.1 08/14/2024    HEMATOCRIT 45.8 08/14/2024    PLATELETCT 115 (L) 08/14/2024

## 2024-08-15 NOTE — DISCHARGE INSTRUCTIONS
FOLLOW UP ITEMS POST DISCHARGE  Please call [phone] to schedule PCP appointment for patient.    Required specialty appointments include:       Discharge Instructions per NATACHA Hendrickson    - Follow up with PCP s/p hospitalization  - [facility]  - Start taking ASA 81mg daily  - Resume all home medications    DIET: Cardiac    ACTIVITY: as tolerated     DIAGNOSIS: as tolerated    Return to ER if symptoms persists, chest pain, palpitations, shortness of breath, numbness, tingling, weakness and high fevers.       Discharge Instructions    Discharged to home by car with relative. Discharged via wheelchair, hospital escort: Yes.  Special equipment needed: Not Applicable    Be sure to schedule a follow-up appointment with your primary care doctor or any specialists as instructed.     Discharge Plan:   Diet Plan: Discussed  Activity Level: Discussed  Confirmed Follow up Appointment: Patient to Call and Schedule Appointment  Confirmed Symptoms Management: Discussed  Medication Reconciliation Updated: Yes    I understand that a diet low in cholesterol, fat, and sodium is recommended for good health. Unless I have been given specific instructions below for another diet, I accept this instruction as my diet prescription.   Other diet: cardiac    Special Instructions: None    -Is this patient being discharged with medication to prevent blood clots?  Yes, Aspirin Aspirin Tablets  What is this medication?  ASPIRIN (AS pir in) lowers the risk of heart attack, stroke, or blood clot. It may also be used to treat mild to moderate pain, inflammation, or arthritis. It belongs to a group of medications called NSAIDs.  This medicine may be used for other purposes; ask your health care provider or pharmacist if you have questions.  COMMON BRAND NAME(S): Aspir-Low, Aspir-Mahsa, Aspirtab, Mohit Advanced Aspirin, Mohit Aspirin, LoopFuse Aspirin Extra Strength, Mohit Aspirin Plus, Mohit Extra  Strength, Mohit Extra Strength Plus, Mohit Genuine Aspirin, Mohit Womens Aspirin, Bufferin, Bufferin Extra Strength, Bufferin Low Dose  What should I tell my care team before I take this medication?  They need to know if you have any of these conditions:  Anemia  Asthma  Bleeding problems  Diabetes  Gout  History of stomach ulcers or bleeding  If you often drink alcohol  Kidney disease  Liver disease  Low level of vitamin K  Lupus  Smoke tobacco  An unusual or allergic reaction to aspirin, tartrazine dye, other medications, dyes, or preservatives  Pregnant or trying to get pregnant  Breast-feeding  How should I use this medication?  Take this medication by mouth with a glass of water. Follow the directions on the package or prescription label. You can take this medication with or without food. If it upsets your stomach, take it with food. Do not take it more often than directed.  Talk to your care team about the use of this medication in children. While this medication may be prescribed for children as young as 12 years of age for selected conditions, precautions do apply. Children and teenagers should not use this medication to treat chicken pox or flu symptoms unless directed by a care team.  Patients over 65 years old may have a stronger reaction and need a smaller dose.  Overdosage: If you think you have taken too much of this medicine contact a poison control center or emergency room at once.  NOTE: This medicine is only for you. Do not share this medicine with others.  What if I miss a dose?  If you are taking this medication on a regular schedule and miss a dose, take it as soon as you can. If it is almost time for your next dose, take only that dose. Do not take double or extra doses.  What may interact with this medication?  Do not take this medication with any of the following:  Cidofovir  Ketorolac  Probenecid  This medication may also interact with the following:  Alcohol  Alendronate  Bismuth  subsalicylate  Flavocoxid  Herbal supplements like feverfew, garlic, josue, ginkgo biloba, horse chestnut  Medications for diabetes or glaucoma like acetazolamide, methazolamide  Medications for gout  Medications that prevent or treat blood clots like apixaban, clopidogrel, enoxaparin, heparin, rivaroxaban, warfarin  Other aspirin and aspirin-like medications  NSAIDs, medications for pain and inflammation, like ibuprofen or naproxen  Pemetrexed  Sulfinpyrazone  Varicella live vaccine  This list may not describe all possible interactions. Give your health care provider a list of all the medicines, herbs, non-prescription drugs, or dietary supplements you use. Also tell them if you smoke, drink alcohol, or use illegal drugs. Some items may interact with your medicine.  What should I watch for while using this medication?  If you are treating yourself for pain, tell your doctor or health care provider if the pain lasts more than 10 days, if it gets worse, or if there is a new or different kind of pain. Tell your doctor if you see redness or swelling. Also, check with your doctor if you have a fever that lasts for more than 3 days. Only take this medication to prevent heart attacks or blood clotting if prescribed by your doctor or health care provider.  Do not take other medications that contain aspirin, ibuprofen, or naproxen with this medication. Side effects such as stomach upset, nausea, or ulcers may be more likely to occur. Many non-prescription medications contain aspirin, ibuprofen, or naproxen. Always read labels carefully.  This medication can cause serious ulcers and bleeding in the stomach. It can happen with no warning. Smoking, drinking alcohol, older age, and poor health can also increase risks. Call your health care provider right away if you have stomach pain or blood in your vomit or stool.  Alcohol may interfere with the effect of this medication. Avoid alcoholic drinks.  This medication may cause  serious skin reactions. They can happen weeks to months after starting the medication. Contact your health care provider right away if you notice fevers or flu-like symptoms with a rash. The rash may be red or purple and then turn into blisters or peeling of the skin. Or, you might notice a red rash with swelling of the face, lips or lymph nodes in your neck or under your arms.  Talk to your health care provider if you are pregnant before taking this medication. Taking this medication between weeks 20 and 30 of pregnancy may harm your unborn baby. Your health care provider will monitor you closely if you need to take it. After 30 weeks of pregnancy, do not take this medication.  Be careful brushing or flossing your teeth or using a toothpick because you may get an infection or bleed more easily. If you have any dental work done, tell your dentist you are receiving this medication.  This medication may make it more difficult to get pregnant. Talk to your health care provider if you are concerned about your fertility.  What side effects may I notice from receiving this medication?  Side effects that you should report to your care team as soon as possible:  Allergic reactions--skin rash, itching, hives, swelling of the face, lips, tongue, or throat  Bleeding--bloody or black, tar-like stools, vomiting blood or brown material that looks like coffee grounds, red or dark brown urine, red or purple spots on skin, unusual bruising or bleeding  Hearing loss, ringing in ears  Kidney injury--decrease in the amount of urine, swelling of the ankles, hands, or feet  Liver injury--right upper belly pain, loss of appetite, nausea, light-colored stool, dark yellow or brown urine, yellowing of the skin or eyes, unusual weakness, fatigue  Rash, fever, and swollen lymph nodes  Side effects that usually do not require medical attention (report to your care team if they continue or are bothersome):  Headache  Loss of  appetite  Nausea  Upset stomach  This list may not describe all possible side effects. Call your doctor for medical advice about side effects. You may report side effects to FDA at 5-858-IEM-7347.  Where should I keep my medication?  Keep out of the reach of children and pets.  Store at room temperature between 15 and 30 degrees C (59 and 86 degrees F). Protect from heat and moisture. Get rid of any unused medication after the expiration date.  Do not use this medication if it has a strong vinegar smell.  To get rid of medications that are no longer needed or have :  Take the medication to a medication take-back program. Check with your pharmacy or law enforcement to find a location.  If you cannot return the medication, check the label or package insert to see if the medication should be thrown out in the garbage or flushed down the toilet. If you are not sure, ask your care team. If it is safe to put it in the trash, empty the medication out of the container. Mix the medication with cat litter, dirt, coffee grounds, or other unwanted substance. Seal the mixture in a bag or container. Put it in the trash.  NOTE: This sheet is a summary. It may not cover all possible information. If you have questions about this medicine, talk to your doctor, pharmacist, or health care provider.  ©  [employer] Standard (2021-10-29 00:00:00)    Is patient discharged on Warfarin / Coumadin?   No

## 2024-08-15 NOTE — PROGRESS NOTES
Assist RN:  Mariely mclean ordered and registered by Neuroscience unit clerk. RN Stroke Nurse Navigator messaged via voalte by VIOLETA Goodson.

## 2024-08-15 NOTE — PROGRESS NOTES
8/15: 1st attempt to reach pt for TCM outreach. LVM with contact information for pt to call back.     8/16: Transitional Care Management  TCM Outreach Date and Time: Filed (8/15/2024  8:28 AM)    Discharge Questions  Actual Discharge Date: 08/14/24  Now that you are home, how are you feeling?: Very Good (has zio patch in place, to wear for 14 days and then return to cardiology office on [address]. pt states he is feeling much better and has no concerns at this time.)  Did you receive any new prescriptions?: Yes  Were you able to get them filled?: Yes  Meds to Bed or Pharmacy filled?: Pharmacy ([facility])  Do you have any questions about your current medications or new medications (Review Med Rec)?: No  Did you have any durable medical equipment ordered?: No  Do you have a follow up appointment scheduled with your PCP?: Yes  Appointment Date: 09/11/24 (declines to reschedule for sooner appt with alternate provider)  Appointment Time: 1300  Any issues or paperwork you wish to discuss with your PCP?: No  Are you (patient) able to get to the appointment?: Yes  If Home Health was ordered, have they contacted you (Patient): Not Applicable  Did you have enough support after your last discharge?: Yes  Does this patient qualify for the CCM program?: No    Transitional Care  Number of attempts made to contact patient: 2  Current or previous attempts completed within two business days of discharge? 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

## 2024-08-19 ENCOUNTER — HOSPITAL ENCOUNTER (OUTPATIENT)
Dept: HEMATOLOGY ONCOLOGY | Facility: MEDICAL CENTER | Age: 74
End: 2024-08-19
Attending: NURSE PRACTITIONER
Payer: MEDICARE

## 2024-08-19 VITALS
RESPIRATION RATE: 20 BRPM | BODY MASS INDEX: 35.25 KG/M2 | TEMPERATURE: 97.7 F | WEIGHT: 251.77 LBS | SYSTOLIC BLOOD PRESSURE: 98 MMHG | HEART RATE: 60 BPM | DIASTOLIC BLOOD PRESSURE: 52 MMHG | OXYGEN SATURATION: 92 % | HEIGHT: 71 IN

## 2024-08-19 DIAGNOSIS — R91.8 OPACITY OF LUNG ON IMAGING STUDY: ICD-10-CM

## 2024-08-19 PROCEDURE — 99212 OFFICE O/P EST SF 10 MIN: CPT | Performed by: NURSE PRACTITIONER

## 2024-08-19 RX ORDER — DULOXETIN HYDROCHLORIDE 30 MG/1
30 CAPSULE, DELAYED RELEASE ORAL DAILY
COMMUNITY
Start: 2024-08-12

## 2024-08-19 ASSESSMENT — ENCOUNTER SYMPTOMS
DIZZINESS: 1
CONSTIPATION: 0
WEIGHT LOSS: 0
HEADACHES: 0
COUGH: 1
MYALGIAS: 0
PALPITATIONS: 0
CHILLS: 0
NAUSEA: 0
VOMITING: 0
FEVER: 0
DIARRHEA: 1
SHORTNESS OF BREATH: 1
TINGLING: 1

## 2024-08-19 ASSESSMENT — FIBROSIS 4 INDEX: FIB4 SCORE: 3.36

## 2024-08-19 ASSESSMENT — PAIN SCALES - GENERAL: PAINLEVEL: NO PAIN

## 2024-08-19 NOTE — ADDENDUM NOTE
Encounter addended by: Ana M Agarwal, Med Ass't on: 8/19/2024 2:53 PM   Actions taken: Charge Capture section accepted

## 2024-08-19 NOTE — PROGRESS NOTES
"Subjective     Laureen Munoz is a 74 y.o. male who presents with New Patient (IOC / Pulmonary Nodule)          HPI    Patient referred to me, Intake Oncology Coordinator by his PCP BRISA Goodman for lung nodule.  Patient is unaccompanied for today's visit.    Patient was followed and seen by his cardiologist for severe bilateral lower extremity edema and uncontrolled blood pressure.  Patient stated that he was being prepped for a colonoscopy and the day of the procedure there was noted that he had bilateral lower extremity edema and his blood pressure was quite elevated and the procedure was aborted.  Due to concern for possible renal artery stenosis patient was sent for a CTA by cardiology completed on 7/10/2024.    CT scan completed showed ill-defined opacities located within the posterior bases of both lobes consistent with atelectasis/possible pneumonitis.  There was a 9.5 x 13.2 mm nodular opacity in the posterior base of the right lower lobe likely to be a focal atelectasis per radiologist.  There is no mention or concern for malignancy by radiology, however with the lung nodule noted it is warranted for further follow-up.  There is no aortic aneurysm identified, no significant stenosis of the renal arteries, hepatic steatosis and splenomegaly noted, scattered colon diverticuli, and symmetric enhancement of the kidneys with no hydronephrosis.  I did personally review the imaging report images in detail, and I reviewed the report and images in detail with the patient today.    Clinically patient has been having other symptoms.  He was recently hospitalized back on 8/13/2024 with symptoms of dizziness and found to have what he describes as \"mini strokes.\"  He has been seen by neurology and is undergoing a Zio patch at this time.  He does have peripheral neuropathy in his feet.  He does note that he has fatigue but states since getting out of the hospital it is much better.  He does note what he is Pako " is a cough and some shortness of breath but he equates this more to his weight.  It is very minimal and not worrisome to patient.  He does note some soft stool which she has had for the last 2 weeks and does have increased urinary frequency.    Please see past medical and surgical history below.    Patient does have a family history of cancer in his mother who had breast cancer.  His sister had leukemia and breast cancer.    Patient is a very remote cigarette smoker in which he smoked from [id number], at approximately pack per day.  He also smokes cigars from 2931-6360.    Allergies   Allergen Reactions    Gabapentin Unspecified     Dizziness, confusion, falling    Hyzaar [Losartan Potassium-Hctz] Cough     Current Outpatient Medications on File Prior to Encounter   Medication Sig Dispense Refill    DULoxetine (CYMBALTA) 30 MG Cap DR Particles Take 30 mg by mouth every day.      aspirin 81 MG EC tablet Take 1 Tablet by mouth every day for 90 days. 90 Tablet 0    olmesartan (BENICAR) 20 MG Tab Take 1 Tablet by mouth every day. 30 Tablet 1    metFORMIN ER (GLUCOPHAGE XR) 500 MG TABLET SR 24 HR Take 2 Tablets by mouth every day. 180 Tablet 1    Empagliflozin (JARDIANCE) 10 MG Tab tablet Take 1 Tablet by mouth every day. 30 Tablet 11    spironolactone (ALDACTONE) 25 MG Tab Take 1 Tablet by mouth every day. 30 Tablet 11    metoprolol SR (TOPROL XL) 25 MG TABLET SR 24 HR Take 0.5 Tablets by mouth every morning. Take half tablet by mouth once a day 30 Tablet 3    cetirizine (ZYRTEC) 10 MG Tab Take 10 mg by mouth every day.      meclizine (ANTIVERT) 12.5 MG Tab Take 1 Tablet by mouth 3 times a day as needed for Dizziness. 30 Tablet 0    fluticasone (FLONASE) 50 MCG/ACT nasal spray Administer 1 Spray into affected nostril(S) every morning.      triamcinolone acetonide (KENALOG) 0.1 % Cream Apply  topically 2 times a day as needed.      Melatonin 1 MG Cap Take 5 mg by mouth every evening.       No current facility-administered  medications on file prior to encounter.     Past Medical History:   Diagnosis Date    Allergy     Anesthesia 2017    Arthritis unk    Cancer (HCC)     skin cancer squamous on the left cheek    Cataract     lens implants    High cholesterol     Hypercholesterolemia     Hypertension     Migraine     Neuropathy     PONV (postoperative nausea and vomiting)     Rhinoplasti for deviated septon    Reactive depression 2023    Renal artery stenosis ([facility])     Sleep apnea     tested    Stroke ([facility])     stroke or contusion    Tinea inguinalis     Tobacco use 10/30/2018    Urinary bladder disorder     bph         Past Surgical History:   Procedure Laterality Date    UT TRANSURETHRAL ELEC-SURG PROSTATECTOM N/A 2023    Procedure: BIPOLAR TRANSURETHRAL RESECTION OF PROSTATE;  Surgeon: Jacques Mcdowell M.D.;  Location: SURGERY [facility];  Service: Urology    UT CYSTOURETHROSCOPY N/A 2023    Procedure: CYSTOSCOPY;  Surgeon: Jacques Mcdowell M.D.;  Location: SURGERY [facility];  Service: Urology    UT CYSTO/URETERO/PYELOSCOPY W/LITHOTRIPSY N/A 2023    Procedure: CYSTOLITHLOPAXY;  Surgeon: Jacques Mcdowell M.D.;  Location: SURGERY [facility];  Service: Urology    CATARACT PHACO WITH IOL      EYE SURGERY      OTHER      cyst removal back of the neck    OTHER ABDOMINAL SURGERY      gallbladder    PROSTATECTOMY, RADICAL RETRO      RHINOPLASTY           Family History   Problem Relation Age of Onset    Cancer Mother         Breast cancer    Heart Disease Mother          at 89yo/heart failure    Heart Attack Mother     Breast Cancer Mother     Heart Disease Father          at 57yo/massive heart attack    Heart Attack Father     Hypertension Father          coronary thrombosis    Cancer Sister          of leukemia - also had breast cancer    Breast Cancer Sister     Stroke Sister         living lost eye site left eye    No Known Problems Maternal Grandmother      No Known Problems Maternal Grandfather     No Known Problems Paternal Grandmother     Dementia Paternal Grandfather     Hyperlipidemia Neg Hx     Diabetes Neg Hx      Social History     Socioeconomic History    Marital status:     Highest education level: Associate degree: academic program   Tobacco Use    Smoking status: Former     Current packs/day: 0.00     Average packs/day: 1 pack/day for 15.0 years (15.0 ttl pk-yrs)     Types: Cigarettes, Cigars     Start date: 1970     Quit date: 1985     Years since quittin.6    Smokeless tobacco: Never    Tobacco comments:     occasional cigar started in / quit completely    Vaping Use    Vaping status: Never Used   Substance and Sexual Activity    Alcohol use: Yes     Alcohol/week: 1.2 oz     Types: 1 Cans of beer, 1 Shots of liquor per week     Comment: 2 beers / 2 shots in last month - very occasional    Drug use: Not Currently     Types: Marijuana, Inhaled     Comment: none for 30 years/ tried pot [id number] didn't care to much for    Sexual activity: Not Currently     Partners: Female     Comment: not sexually active last 3 years, when active male condom   Social History Narrative    Was in the [employer]    Retired from [employer] -  for [employer]     Social Determinants of Health     Financial Resource Strain: Medium Risk (4/15/2023)    Overall Financial Resource Strain (CARDIA)     Difficulty of Paying Living Expenses: Somewhat hard   Food Insecurity: No Food Insecurity (2024)    Hunger Vital Sign     Worried About Running Out of Food in the Last Year: Never true     Ran Out of Food in the Last Year: Never true   Transportation Needs: No Transportation Needs (2024)    PRAPARE - Transportation     Lack of Transportation (Medical): No     Lack of Transportation (Non-Medical): No   Physical Activity: Inactive (4/15/2023)    Exercise Vital Sign     Days of Exercise per Week: 0 days     Minutes of Exercise per  "Session: 0 min   Stress: No Stress Concern Present (4/15/2023)    [place] [employer] - Occupational Stress Questionnaire     Feeling of Stress : Only a little   Social Connections: Socially Isolated (4/15/2023)    Social Connection and Isolation Panel [NHANES]     Frequency of Communication with Friends and Family: More than three times a week     Frequency of Social Gatherings with Friends and Family: Once a week     Attends Taoism Services: Never     Active Member of Clubs or Organizations: No     Attends Club or Organization Meetings: Never     Marital Status:    Intimate Partner Violence: Not At Risk (8/13/2024)    Humiliation, Afraid, Rape, and Kick questionnaire     Fear of Current or Ex-Partner: No     Emotionally Abused: No     Physically Abused: No     Sexually Abused: No   Housing Stability: Low Risk  (8/14/2024)    Housing Stability Vital Sign     Unable to Pay for Housing in the Last Year: No     Number of Times Moved in the Last Year: 1     Homeless in the Last Year: No           Review of Systems   Constitutional:  Positive for malaise/fatigue (still present but patient stated fatigue has been better lately). Negative for chills, fever and weight loss.   Respiratory:  Positive for cough and shortness of breath.         Per patient he associates cough and SOB with his weight   Cardiovascular:  Negative for chest pain, palpitations and leg swelling.   Gastrointestinal:  Positive for diarrhea (noted in the last 2 weeks - more like soft stool). Negative for constipation, nausea and vomiting.   Genitourinary:  Positive for frequency. Negative for dysuria.   Musculoskeletal:  Negative for myalgias.   Neurological:  Positive for dizziness (recently seen in the hospital for this - following up with neuro for recent \"mini-strokes\") and tingling (in his feet). Negative for headaches.              Objective     BP 98/52 (BP Location: Left arm, Patient Position: Sitting, BP Cuff " "Size: Large adult)   Pulse 60   Temp 36.5 °C (97.7 °F) (Temporal)   Resp 20   Ht 1.803 m (5' 10.98\")   Wt 114 kg (251 lb 12.3 oz)   SpO2 92%   BMI 35.13 kg/m²      Physical Exam  Vitals reviewed.   Constitutional:       General: He is not in acute distress.     Appearance: Normal appearance. He is not diaphoretic.   HENT:      Head: Normocephalic and atraumatic.   Cardiovascular:      Rate and Rhythm: Normal rate and regular rhythm.      Heart sounds: Normal heart sounds. No murmur heard.     No friction rub. No gallop.   Pulmonary:      Effort: Pulmonary effort is normal. No respiratory distress.      Breath sounds: Normal breath sounds. No wheezing.   Musculoskeletal:         General: No swelling or tenderness. Normal range of motion.   Skin:     General: Skin is warm and dry.   Neurological:      Mental Status: He is alert and oriented to person, place, and time.   Psychiatric:         Mood and Affect: Mood normal.         Behavior: Behavior normal.               CT-CTA COMPLETE THORACOABDOMINAL AORTA   07/10/2024  IMPRESSION:     1.  No aortic aneurysm or dissection identified.     2.  No significant stenosis of the renal arteries identified.     3.  Bilateral lung base atelectasis/possible pneumonitis. No pleural effusions.     4.  11.4 mm nodular opacity posterior base right lower lobe most likely focal atelectasis.     5.  Hepatic steatosis and splenomegaly.     6.  Scattered colon diverticula. No evidence diverticulitis. No free fluid.     7.  Symmetric enhancement of the kidneys. No hydronephrosis.       MR-BRAIN-WITH & W/O    Result Date: 8/14/2024 8/14/2024 8:47 AM HISTORY/REASON FOR EXAM:  dizziness. TECHNIQUE/EXAM DESCRIPTION: MR brain with and without contrast. Multiplanar multisequence MR examination of the brain with and without contrast done on 20 MRI scanner. 20 mL ProHance contrast was administered intravenously. COMPARISON:  3/5/2024 FINDINGS: There are a few punctate areas of restricted " diffusion in the bilateral posterior parietal and right occipital lobes. There is chronic cortical infarcts in the bilateral parietal lobes and left frontal lobe. A few nonspecific T2 hyperintensities in the subcortical and periventricular white matter and brainstem. A small hemorrhagic pituitary microadenoma is seen. There are chronic lacunar infarcts the bilateral cerebellar hemispheres. There is no intra-axial space-occupying lesion. The ventricles and the subarachnoid spaces are prominent consistent with mild cerebral volume loss. There is no extra-axial fluid collection, hemorrhage or mass. The visualized flow voids of the cerebral vasculature are unremarkable.  There is no large lesion identified in  the expected course of the intracranial portions of the cranial nerves. The visualized bones, cortical tissue, muscle and the glands are unremarkable in this limited evaluation. There is no abnormal parenchymal or meningeal contrast enhancement.     Tiny areas of acute infarction the bilateral posterior parietal and right occipital lobes. Multifocal small chronic infarcts in the supra and infratentorial brain parenchyma. Mild chronic microvascular ischemic disease. Mild cerebral volume loss. Stable small pituitary microadenoma.      CT-CTA HEAD WITH & W/O-POST PROCESS    Result Date: 8/14/2024 8/14/2024 5:08 AM HISTORY/REASON FOR EXAM:  Dizziness TECHNIQUE/EXAM DESCRIPTION: CT angiogram of the [place] without and with contrast.  Initial precontrast images were obtained of the head from the skull base through the vertex.  Postcontrast images were obtained of the [place] following the power injection of nonionic contrast at 5.0 mL/sec. Thin-section helical images were obtained with overlapping reconstruction interval. Coronal and sagittal multiplanar volume reformats were generated.  3D angiographic images were reviewed on PACS.  Maximum intensity projection (MIP) images were generated and  reviewed. 80 mL of Omnipaque 350 nonionic contrast was injected intravenously. Low dose optimization technique was utilized for this CT exam including automated exposure control and adjustment of the mA and/or kV according to patient size. COMPARISON:  August 13, 2024 FINDINGS: The posterior circulation shows the distal vertebral arteries to be patent. The vertebrobasilar confluence is intact. The basilar artery is patent. No aneurysm or occlusive lesion is evident. Atherosclerotic changes are seen with less than 50% stenosis. The anterior circulation shows no stenotic or occlusive lesion. No aneurysm is evident about the [place]. Mild diffuse parenchymal volume loss is seen. Scattered periventricular and subcortical white matter low-density changes are seen, nonspecific findings commonly associated with small vessel ischemia. 3D angiographic/MIP images of the vasculature confirm the vascular findings as described above.     1.  No large vessel occlusion or aneurysm identified. 2.  Atherosclerosis      CT-CTA NECK WITH & W/O-POST PROCESSING    Result Date: 8/14/2024 8/14/2024 5:08 AM HISTORY/REASON FOR EXAM:  Dizziness TECHNIQUE/EXAM DESCRIPTION:  CT angiogram of the neck with contrast. Postcontrast images were obtained of the neck from the great vessels through the skull base following the power injection of nonionic contrast at 5.0 mL/sec. Thin-section helical images were obtained with overlapping reconstruction interval. Coronal and oblique multiplanar volume reformats were generated. Cervical internal carotid artery percent stenosis is calculated using the standard method according to the NASCET criteria wherein a segment of uniform caliber mid or distal cervical internal carotid is used as the reference denominator. 3D angiographic images were reviewed on PACS.  Maximum intensity projection (MIP) images were generated and reviewed 80 mL of Omnipaque 350 nonionic contrast was injected intravenously.  Low dose optimization technique was utilized for this CT exam including automated exposure control and adjustment of the mA and/or kV according to patient size. COMPARISON: None FINDINGS: Aortic arch: conventional branching pattern. There is atherosclerotic plaque of the aorta. Right common carotid artery: Patent Right internal carotid artery: Atherosclerotic plaque without significant stenosis (less than 50%). Left common carotid artery is patent. Left internal carotid artery: Normal in appearance There is fenestrated appearance of the distal right vertebral artery, otherwise the right vertebral artery is patent without dissection or stenosis. The left vertebral artery is patent without dissection or stenosis. Vertebrobasilar confluence: The vertebrobasilar confluence appears normal. Lung apices are clear The soft tissues of the neck are within normal limits. 3D angiographic/MIP images of the vasculature confirm the vascular findings as described above.     1.  CT angiogram of the neck within normal limits.       CT-HEAD W/O    Result Date: 8/13/2024 8/13/2024 8:25 PM HISTORY/REASON FOR EXAM:   Dizziness and bilateral lower extremity numbness TECHNIQUE/EXAM DESCRIPTION:  CT of the head without contrast. Sequential axial images were obtained from the vertex to the skull base without contrast. Up to date radiation dose reduction adjustments have been utilized to meet ALARA standards for radiation dose reduction. COMPARISON: June 8, 2021 FINDINGS: There is mild diffuse parenchymal volume loss observed. Periventricular and subcortical white matter low-attenuation changes are seen, most commonly associated with small vessel ischemic disease. There is mild bilateral symmetric ventricular dilatation seen, with appearance likely representing ex vacuo dilatation. No space occupying lesions or areas of acute vascular territory infarctions are identified. There are no abnormal extra axial fluid collections or extra axial  hemorrhage identified. The visualized paranasal sinuses and mastoid air cells are well aerated bilaterally. No depressed calvarial fractures are identified. The visualized globes and retrobulbar soft tissues appear within normal limits.  Atherosclerotic intracranial calcifications are seen.     1.  No acute intracranial abnormality is identified, there are nonspecific white matter changes, commonly associated with small vessel ischemic disease.  Associated mild cerebral atrophy is noted. 2.  Atherosclerosis.       DX-CHEST-PORTABLE (1 VIEW)    Result Date: 8/13/2024 8/13/2024 8:15 PM HISTORY/REASON FOR EXAM: Altered consciousness TECHNIQUE/EXAM DESCRIPTION:  Single AP view of the chest. COMPARISON: February 10, 2022 FINDINGS: Overlying cardiac leads are present. Cardiomegaly is observed. Atherosclerotic calcification of the aorta is noted.  The central pulmonary vasculature appears normal. Bilateral lung volumes are diminished.  Hazy linear density in the bilateral lung bases is observed. No significant pleural effusions are identified. The bony structures appear age-appropriate.     1.  Bilateral basilar atelectasis, no focal infiltrate 2.  Cardiomegaly 3.  Atherosclerosis              Assessment & Plan     1. Opacity of lung on imaging study  CT-CHEST (THORAX) W/O          Assessment & Plan  Opacity of lung on imaging study    Orders:    CT-CHEST (THORAX) W/O; Future          Patient with a CTA completed that shows a 11.4 mm nodular opacity in the posterior base of the right lower lobe which according to the radiologist is likely focal atelectasis.  Radiologist did not mention any concern for a malignant process.  However I do recommend continuing to monitor with a repeat CT scan of the chest in approximately 12 weeks.  Did review the chart and he is scheduled to establish care with a pulmonologist on 10/1/2024.  Therefore I have requested repeat CT chest without contrast on 9/30/2024 and would recommend  discussing the results at that time with the pulmonologist.  I will contact the patient with the results but await the recommendations per pulmonary as well.    I discussed this recommendation in detail with the patient today and he did verbalize understanding's and agreement with the plan.      Please note that this dictation was created using voice recognition software. I have made every reasonable attempt to correct obvious errors, but I expect that there are errors of grammar and possibly content that I did not discover before finalizing the note.

## 2024-08-20 ENCOUNTER — OFFICE VISIT (OUTPATIENT)
Dept: ENDOCRINOLOGY | Facility: MEDICAL CENTER | Age: 74
End: 2024-08-20
Attending: INTERNAL MEDICINE
Payer: MEDICARE

## 2024-08-20 VITALS
SYSTOLIC BLOOD PRESSURE: 124 MMHG | HEIGHT: 71 IN | OXYGEN SATURATION: 91 % | HEART RATE: 58 BPM | DIASTOLIC BLOOD PRESSURE: 66 MMHG | BODY MASS INDEX: 35.7 KG/M2 | WEIGHT: 255 LBS

## 2024-08-20 DIAGNOSIS — R42 DIZZINESS: ICD-10-CM

## 2024-08-20 DIAGNOSIS — D35.2 PITUITARY ADENOMA (HCC): ICD-10-CM

## 2024-08-20 DIAGNOSIS — R73.03 PREDIABETES: ICD-10-CM

## 2024-08-20 DIAGNOSIS — E66.9 OBESITY (BMI 35.0-39.9 WITHOUT COMORBIDITY): ICD-10-CM

## 2024-08-20 DIAGNOSIS — E55.9 HYPOVITAMINOSIS D: ICD-10-CM

## 2024-08-20 PROCEDURE — 99211 OFF/OP EST MAY X REQ PHY/QHP: CPT | Performed by: INTERNAL MEDICINE

## 2024-08-20 PROCEDURE — 99214 OFFICE O/P EST MOD 30 MIN: CPT | Performed by: INTERNAL MEDICINE

## 2024-08-20 PROCEDURE — 3078F DIAST BP <80 MM HG: CPT | Performed by: INTERNAL MEDICINE

## 2024-08-20 PROCEDURE — 1170F FXNL STATUS ASSESSED: CPT | Performed by: INTERNAL MEDICINE

## 2024-08-20 PROCEDURE — 3074F SYST BP LT 130 MM HG: CPT | Performed by: INTERNAL MEDICINE

## 2024-08-20 ASSESSMENT — FIBROSIS 4 INDEX: FIB4 SCORE: 3.36

## 2024-08-20 NOTE — PROGRESS NOTES
Chief Complaint: Follow up for nonfunctioning pituitary macroadenoma    HPI:     Laureen Munoz is a 74 y.o. male here for follow up evaluation of pituitary tumor.      He was first diagnosed with a pituitary macroadenoma in 2021.  This showed an 11 x 13 mm macroadenoma on the right side with slight deviation of the pituitary stalk towards the left side with no evidence of optic chiasm impingement.    He has had serial MRIs and visual field exams.  His last pituitary MRI was in 2024 showing a stable pituitary macroadenoma.    His baseline hormonal evaluation 9/8/2021 showed no evidence of hormonal hypersecretion.  Prolactin was 10, IGF-I was normal 102, TSH was normal 1.17 and free T4 was 1.11 and morning cortisol was normal at 10.      He also completed an overnight DST in early 2024 which showed a suppressed morning cortisol of 0.9 and dexamethasone drug level of 421 which rules out Cushing's disease    Since I last saw him he was seen in the hospital for recurrent dizziness and was found to have lacunar infarcts.  He has had a Zio patch placed and has a follow-up with neurology and cardiology    Currently he reports that he is feeling better.  He denies severe headaches  He reports occasional blurring of vision  He denies double vision  He denies changes in his hand and foot size  He denies breast swelling and tenderness  He denies frequent urination, increased thirst and weight loss or weight gain    He did have a fasting insulin test which was normal at 19 but no concomitant glucose was measured.  He has prediabetes with an A1c of 6.3% on July 19, 2024 and he is now taking metformin as prescribed by his primary care    His pituitary labs on February 2024:  Again showed a normal DST with a suppressed morning cortisol of 0.9 and Dex level that was appropriate  TSH was 0.987  Free T4 was 1.19  IGF-I was 112  Prolactin was 12      He had an LH of 16 on September 2022  FSH was 30 on September 2022  Total  testosterone was 318 on February 2023        Patient's medications, allergies, and social histories were reviewed and updated as appropriate.      ROS:       CONS:     No fever, no chills   EYES:     No diplopia, no blurry vision   CV:           No chest pain, no palpitations   PULM:     No SOB, no cough, no hemoptysis.   GI:            No nausea, no vomiting, no diarrhea, no constipation   ENDO:     No polyuria, no polydipsia, no heat intolerance, no cold intolerance     Past Medical History:  Problem List:  2024-08: CVA (cerebrovascular accident due to intracerebral   hemorrhage) ([facility])  2024-08: Dizziness  2024-07: Pulmonary nodule, right  2024-06: Nonspecific abnormal electrocardiogram (ECG) (EKG)  2024-06: Acute diastolic congestive heart failure ([facility])  2024-04: Right bundle branch block (RBBB) on electrocardiogram (ECG)  2024-03: Renal artery stenosis ([facility])  2024-03: Thrombocytopenia, unspecified ([facility])  2024-01: Morbid obesity with BMI of 50.0-59.9, adult ([facility])  2024-01: Complex sleep apnea syndrome  2023-12: Sensory deficit, bilateral  2023-12: Paresthesia of both feet  2023-09: Prediabetes  2023-08: Bladder outlet obstruction  2023-04: BPPV (benign paroxysmal positional vertigo)  2023-04: Cyst of left kidney  2023-03: Localized edema  2023-03: Pulmonary hypertension ([facility])  2023-02: Reactive depression  2021-06: Benign paroxysmal positional vertigo  2021-06: Pituitary adenoma ([facility])  2021-06: Confusion  2021-06: Enlarged pituitary gland ([facility])  2021-03: Thrombocytopenia ([facility])  2020-05: Right knee pain  2020-05: Primary osteoarthritis of right knee  2020-05: IFG (impaired fasting glucose)  2019-10: Rosacea  2019-04: Oral lichen planus  2019-04: Gilbert syndrome  2018-10: Erectile dysfunction  2018-10: Health care maintenance  2018-10: Tobacco use  2018-07: Erectile dysfunction  2018-07: Obesity (BMI 35.0-39.9 without comorbidity)  2014-04: Hypovitaminosis D  2014-04: Migraine aura without headache  2012-07:  Idiopathic neuropathy  2012: Essential hypertension  2012: Hypercholesteremia  2012: Benign prostatic hyperplasia  Tinea inguinalis      Past Surgical History:  Past Surgical History:   Procedure Laterality Date    CT TRANSURETHRAL ELEC-SURG PROSTATECTOM N/A 2023    Procedure: BIPOLAR TRANSURETHRAL RESECTION OF PROSTATE;  Surgeon: Jacques Mcdowell M.D.;  Location: SURGERY [facility];  Service: Urology    CT CYSTOURETHROSCOPY N/A 2023    Procedure: CYSTOSCOPY;  Surgeon: Jacques Mcdowell M.D.;  Location: SURGERY [facility];  Service: Urology    CT CYSTO/URETERO/PYELOSCOPY W/LITHOTRIPSY N/A 2023    Procedure: CYSTOLITHLOPAXY;  Surgeon: Jacques Mcdowell M.D.;  Location: SURGERY [facility];  Service: Urology    CATARACT PHACO WITH IOL      EYE SURGERY      OTHER      cyst removal back of the neck    OTHER ABDOMINAL SURGERY      gallbladder    PROSTATECTOMY, RADICAL RETRO      RHINOPLASTY          Allergies:  Gabapentin and Hyzaar [losartan potassium-hctz]     Social History:  Social History     Tobacco Use    Smoking status: Former     Current packs/day: 0.00     Average packs/day: 1 pack/day for 15.0 years (15.0 ttl pk-yrs)     Types: Cigarettes, Cigars     Start date: 1970     Quit date: 1985     Years since quittin.6    Smokeless tobacco: Never    Tobacco comments:     occasional cigar started in / quit completely    Vaping Use    Vaping status: Never Used   Substance Use Topics    Alcohol use: Yes     Alcohol/week: 1.2 oz     Types: 1 Cans of beer, 1 Shots of liquor per week     Comment: 2 beers / 2 shots in last month - very occasional    Drug use: Not Currently     Types: Marijuana, Inhaled     Comment: none for 30 years/ tried pot [id number] didn't care to much for        Family History:   family history includes Breast Cancer in his mother and sister; Cancer in his mother and sister; Dementia in his paternal grandfather; Heart Attack in his father and  "mother; Heart Disease in his father and mother; Hypertension in his father; No Known Problems in his maternal grandfather, maternal grandmother, and paternal grandmother; Stroke in his sister.      PHYSICAL EXAM:   Vital signs: /66 (BP Location: Left arm, Patient Position: Sitting, BP Cuff Size: Adult long)   Pulse (!) 58   Ht 1.803 m (5' 11\")   Wt 116 kg (255 lb)   SpO2 91%   BMI 35.57 kg/m²   GENERAL: Well-developed, well-nourished in no apparent distress.   EYE:  No ocular asymmetry, PERRLA  HENT: Pink, moist mucous membranes.    NECK: No thyromegaly.   CARDIOVASCULAR:  No murmurs  LUNGS: Clear breath sounds  ABDOMEN: Soft, nontender   EXTREMITIES: No clubbing, cyanosis, or edema.   NEUROLOGICAL: No gross focal motor abnormalities   LYMPH: No cervical adenopathy palpated.   SKIN: No rashes, lesions.       Labs:  Lab Results   Component Value Date/Time    WBC 5.6 08/14/2024 07:06 AM    WBC 9.4 03/01/2010 04:54 PM    RBC 5.00 08/14/2024 07:06 AM    RBC 5.39 03/01/2010 04:54 PM    HEMOGLOBIN 15.1 08/14/2024 07:06 AM    MCV 91.6 08/14/2024 07:06 AM    MCV 89 03/01/2010 04:54 PM    MCH 30.2 08/14/2024 07:06 AM    MCH 29.3 03/01/2010 04:54 PM    MCHC 33.0 08/14/2024 07:06 AM    RDW 48.7 08/14/2024 07:06 AM    RDW 14.0 03/01/2010 04:54 PM    MPV 10.5 08/14/2024 07:06 AM       Lab Results   Component Value Date/Time    SODIUM 136 08/14/2024 07:06 AM    POTASSIUM 4.5 08/14/2024 07:06 AM    CHLORIDE 105 08/14/2024 07:06 AM    CO2 19 (L) 08/14/2024 07:06 AM    ANION 12.0 08/14/2024 07:06 AM    GLUCOSE 97 08/14/2024 07:06 AM    BUN 19 08/14/2024 07:06 AM    CREATININE 0.87 08/14/2024 07:06 AM    CREATININE 0.92 03/01/2010 04:54 PM    CALCIUM 8.7 08/14/2024 07:06 AM    ASTSGOT 28 08/14/2024 07:06 AM    ALTSGPT 28 08/14/2024 07:06 AM    TBILIRUBIN 1.5 08/14/2024 07:06 AM    ALBUMIN 3.7 08/14/2024 07:06 AM    TOTPROTEIN 6.3 08/14/2024 07:06 AM    GLOBULIN 2.6 08/14/2024 07:06 AM    AGRATIO 1.4 08/14/2024 07:06 AM " "      Lab Results   Component Value Date/Time    TSHULTRASEN 1.380 08/13/2024 2314     No results found for: \"FREEDIR\"  Lab Results   Component Value Date/Time    FREET3 3.63 09/23/2022 0900     No results found for: \"THYSTIMIG\"      Imaging:  3/5/2024 1:40 PM     HISTORY/REASON FOR EXAM:  follow up for pituitary adenoma        TECHNIQUE/EXAM DESCRIPTION:  Sella protocol     The study was performed on a [employer] Signa 1.5 Alethea MRI scanner.     20 mL ProHance contrast was administered intravenously.     COMPARISON:  4/30/2023, 6/8/2021     FINDINGS:     Age-related volume loss noted with prominent sulci, cisterns and ventricles. Ventricular dilatation is proportionate to the degree of cerebral volume loss.  Multiple bilateral subcentimeter cerebellar cortical infarcts are noted, stable from the prior study. The pituitary gland measures 15 x 10 x 16 mm and is thicker along the right aspect with slightly heterogeneous T1 hyperintense, T2 hypointense signal in   this region. The gland is thicker along the right aspect and measures approximately 10 mm in maximal thickness. Infundibulum is deviated to the left side.  Dynamic sequences again demonstrate 11 x 11 mm hypoenhancing area in the right aspect of the pituitary gland, stable in size and appearance from the previous examination the surface of this lesion demonstrates superior convexity and projects minimally   into the suprasellar cistern.  There are a few scattered nonspecific T2 hyperintensities are present in the deep white matter, within expected limits for the age of the patient, most likely related to leukoariosis.     Remote subcortical infarcts are noted in the bilateral parietal regions and the right inferolateral occipital region.     Included portions of the orbits are within normal limits. The portions of the mastoid air cells are within normal limits.     IMPRESSION:        Stable appearance of macroadenoma in the right aspect of the pituitary gland which " bulges minimally into the suprasellar cistern without mass effect upon the optic chiasm. Internal mixed T1 and T2 signal most likely represents chronic blood products,   also stable from the previous study.     Stable appearance of multiple remote bilateral parietal and right lateral subcortical occipital infarcts.     Multiple bilateral cerebellar cortical subcentimeter infarcts are noted, stable.  ASSESSMENT/PLAN:     1. Pituitary adenoma (HCC)  Stable  He has a nonfunctioning pituitary macroadenoma without visual field defects  So far there is no evidence of hormonal hypersecretion  There is also no evidence of hormonal deficiencies  Because of his recent episodes of dizziness I am going to recheck his morning ACTH and cortisol to ensure that this is stable  I will update him on the results of his labs  I will see him again in 6 months with repeat labs    2. Prediabetes  Stable continue metformin continue diet and exercise and lifestyle changes    3. Hypovitaminosis D  Stable check calcium vitamin D with future labs    4. Obesity (BMI 35.0-39.9 without comorbidity)  Stable continue metformin and diet and exercise    5. Dizziness  Unstable recheck his morning cortisol and ACTH to make sure that he does not have adrenal insufficiency  He has a pending appointment with cardiology to make sure that he does not have A-fib as a cause of his lacunar infarcts      Return in about 6 months (around 2/20/2025).      Thank you kindly for allowing me to participate in the endocrine care plan for this patient.    Delvis Ordaz MD, FACE, [facility]  08/20/24    CC:   NATACHA Hawthorne

## 2024-08-20 NOTE — PROGRESS NOTES
Cardiology Follow-up Consultation Note    Date of note:    8/21/2024    Primary Care Provider: NATACHA Hawthorne  Referring Provider: Yonathan Warren M.D.     Patient Name: Laureen Munoz   YOB: 1950  MRN:              [id number]    Chief Complaint: HFpEF    History of Present Illness: Mr. Laureen Munoz is a 74 y.o. male whose current medical problems include HFpEF, hypertension, dyslipidemia, renal artery stenosis, and CVA who is here for follow-up HFpEF.    The patient was previously seen by Dr. Warren in 6/18/2024.  Due to HFpEF, the patient was initiated on Aldactone and Jardiance.  He was referred to heart failure clinic for further management. The patient was recently hospitalized for CVA earlier this month, and is wearing an event monitor.     The patient presents today to follow-up. The patient reports feeling well today. He denies any chest pain or shortness of breath on exertion although doesn't exert much due to neuropathy. No orthopnea, PND, or leg swelling. No palpitations. No syncope or presyncopal episodes.     Patient was able to complete 295 m during his 6 minute walk test. his O2 saturation at baseline was 92% and at the end of the test, the O2 saturation was 93%. he reported 3 level of dyspnea on Shahana scale.      Cardiovascular Risk Factors:  1. Smoking status: Former smoker  2. Type II Diabetes Mellitus: Pre-diabetes, on medication  Lab Results   Component Value Date/Time    HBA1C 6.2 (H) 08/13/2024 08:15 PM    HBA1C 6.3 (H) 07/19/2024 10:38 AM     3. Hypertension: On medication  4. Dyslipidemia: None  Cholesterol,Tot   Date Value Ref Range Status   04/02/2024 124 100 - 199 mg/dL Final     LDL   Date Value Ref Range Status   04/02/2024 54 <100 mg/dL Final     HDL   Date Value Ref Range Status   04/02/2024 46 >=40 mg/dL Final     Triglycerides   Date Value Ref Range Status   04/02/2024 121 0 - 149 mg/dL Final     5. Family history of early Coronary Artery Disease in a  first degree relative (Male less than 55 years of age; Female less than 65 years of age): Father with MI  6.  Obesity and/or Metabolic Syndrome: Body mass index is 35.8 kg/m².  7. Sedentary lifestyle: Not active but not sedentary    Review of Systems   Constitutional: Negative for fever, malaise/fatigue and night sweats.   Cardiovascular:  Negative for chest pain, dyspnea on exertion, irregular heartbeat, leg swelling, near-syncope, orthopnea, palpitations, paroxysmal nocturnal dyspnea and syncope.   Respiratory:  Negative for cough, shortness of breath and wheezing.    Gastrointestinal:  Negative for abdominal pain, diarrhea, nausea and vomiting.   Neurological:  Negative for dizziness, focal weakness and weakness.       All other systems reviewed and are negative.     Current Outpatient Medications   Medication Sig Dispense Refill    DULoxetine (CYMBALTA) 30 MG Cap DR Particles Take 30 mg by mouth every day.      aspirin 81 MG EC tablet Take 1 Tablet by mouth every day for 90 days. 90 Tablet 0    olmesartan (BENICAR) 20 MG Tab Take 1 Tablet by mouth every day. 30 Tablet 1    metFORMIN ER (GLUCOPHAGE XR) 500 MG TABLET SR 24 HR Take 2 Tablets by mouth every day. 180 Tablet 1    Empagliflozin (JARDIANCE) 10 MG Tab tablet Take 1 Tablet by mouth every day. 30 Tablet 11    spironolactone (ALDACTONE) 25 MG Tab Take 1 Tablet by mouth every day. 30 Tablet 11    metoprolol SR (TOPROL XL) 25 MG TABLET SR 24 HR Take 0.5 Tablets by mouth every morning. Take half tablet by mouth once a day 30 Tablet 3    cetirizine (ZYRTEC) 10 MG Tab Take 10 mg by mouth every day.      triamcinolone acetonide (KENALOG) 0.1 % Cream Apply  topically 2 times a day as needed.      Melatonin 1 MG Cap Take 5 mg by mouth every evening.      meclizine (ANTIVERT) 12.5 MG Tab Take 1 Tablet by mouth 3 times a day as needed for Dizziness. 30 Tablet 0    fluticasone (FLONASE) 50 MCG/ACT nasal spray Administer 1 Spray into affected nostril(S) every morning.   "     No current facility-administered medications for this visit.         Allergies   Allergen Reactions    Gabapentin Unspecified     Dizziness, confusion, falling    Hyzaar [Losartan Potassium-Hctz] Cough     Physical Exam:  Ambulatory Vitals  /64 (BP Location: Left arm, Patient Position: Sitting, BP Cuff Size: Adult)   Pulse (!) 56   Resp 18   Ht 1.803 m (5' 10.98\")   Wt 116 kg (256 lb 9.6 oz)   SpO2 92%    Oxygen Therapy:  Pulse Oximetry: 92 %  BP Readings from Last 4 Encounters:   08/21/24 118/64   08/20/24 124/66   08/19/24 98/52   08/14/24 119/75       Weight/BMI: Body mass index is 35.8 kg/m².  Wt Readings from Last 4 Encounters:   08/21/24 116 kg (256 lb 9.6 oz)   08/20/24 116 kg (255 lb)   08/19/24 114 kg (251 lb 12.3 oz)   08/13/24 116 kg (255 lb 15.3 oz)         General: Well appearing and in no apparent distress  Eyes: nl conjunctiva, no icteric sclera  ENT: normal external appearance of ears, nose, and throat  Neck: no visible JVP,  no carotid bruits  Lungs: normal respiratory effort, CTAB  Heart: RRR, no murmurs, no rubs or gallops,  no edema bilateral lower extremities. No LV/RV heave on cardiac palpatation. + bilateral radial pulses.  + bilateral dp pulses.   Abdomen: soft, non tender, non distended, no masses, normal bowel sounds.  No HSM.  Extremities/MSK: no clubbing, no cyanosis  Neurological: No focal sensory deficits  Psychiatric: Appropriate affect, A/O x 3, intact judgement and insight  Skin: Warm extremities      Lab Data Review:  Lab Results   Component Value Date/Time    CHOLSTRLTOT 124 04/02/2024 08:35 AM    LDL 54 04/02/2024 08:35 AM    HDL 46 04/02/2024 08:35 AM    TRIGLYCERIDE 121 04/02/2024 08:35 AM       Lab Results   Component Value Date/Time    SODIUM 136 08/14/2024 07:06 AM    POTASSIUM 4.5 08/14/2024 07:06 AM    CHLORIDE 105 08/14/2024 07:06 AM    CO2 19 (L) 08/14/2024 07:06 AM    GLUCOSE 97 08/14/2024 07:06 AM    BUN 19 08/14/2024 07:06 AM    CREATININE 0.87 " 08/14/2024 07:06 AM    CREATININE 0.92 03/01/2010 04:54 PM    BUNCREATRAT 21 03/01/2010 04:54 PM    GLOMRATE >59 03/01/2010 04:54 PM     Lab Results   Component Value Date/Time    ALKPHOSPHAT 97 08/14/2024 07:06 AM    ASTSGOT 28 08/14/2024 07:06 AM    ALTSGPT 28 08/14/2024 07:06 AM    TBILIRUBIN 1.5 08/14/2024 07:06 AM      Lab Results   Component Value Date/Time    WBC 5.6 08/14/2024 07:06 AM    WBC 9.4 03/01/2010 04:54 PM    HEMOGLOBIN 15.1 08/14/2024 07:06 AM     Lab Results   Component Value Date/Time    HBA1C 6.2 (H) 08/13/2024 08:15 PM    HBA1C 6.3 (H) 07/19/2024 10:38 AM         Cardiac Imaging and Procedures Review:    EKG dated 8/13/2024: My personal interpretation is sinus bradycardia, PACs, right bundle branch block    Echo dated 8/7/2024:   CONCLUSIONS  Normal left ventricular systolic function.  The left ventricular ejection fraction is visually estimated to be 60%.  The right ventricle is dilated. Normal right ventricular systolic   function.  Mild aortic insufficiency.  Mild tricuspid regurgitation.     Compared to the prior study on 5/10/2023, low ventricular systolic   function remains preserved.    Nuclear stress test 6/20/2024  NUCLEAR IMAGING INTERPRETATION   Normal Lexiscan myocardial perfusion study.   No evidence of ischemia or infarct.   Diaphragmatic artifact.   SDS 1.   LVEF 50%, visually better.   Sinus rhythm with right bundle branch block at rest.    No ischemic changes with Regadenoson.   Occasional PACs.   No chest pain.   ECG INTERPRETATION   Negative stress ECG for ischemia.    US renal arteries 5/16/2023  IMPRESSION:     1.  Bilateral proximal and right mid renal artery velocities are elevated above 180 cm/s indicating stenosis.  2.  Bilateral renal resistive indices are mildly elevated indicating medical renal disease.  3.  Left simple renal cyst measuring 11.4 cm in greatest dimension. No follow up imaging is recommended per consensus guidelines of the 2019 ACR Incidental Findings  Committee for probably benign incidental simple appearing renal cystic lesion(s) based on   imaging criteria.  4.  Post void residual measures 201 mL.  5.  Enlarged calcified prostate which indents the bladder base.      Assessment & Plan     1. Chronic heart failure with preserved ejection fraction (HCC)        2. CHF (NYHA class II, ACC/AHA stage C) (HCC)        3. Essential hypertension        4. Renal artery stenosis (HCC)  US-RENAL ARTERY DUPLEX COMP      5. Cerebrovascular accident (CVA), unspecified mechanism (HCC)        6. Other forms of dyspnea [R06.09]              Shared Medical Decision Making:    HFpEF  NYHA class II stage C heart failure  Other forms of dyspnea  Euvolemic on exam  -Continue spironolactone and Jardiance    Hypertension  BP controlled this visit  -Continue olmesartan, spironolactone and metoprolol    Renal artery stenosis  BP controlled this visit  -Continue olmesartan  -Repeat renal ultrasound prior to next visit    CVA  -Patient is currently wearing an event monitor  -Continue to follow up with neurology  -Will plan ILR if event monitor unremarkable as per neurology.   -Continue asa as per neurology      All of the patient's excellent questions were answered to the best of my knowledge and to his satisfaction.  It was a pleasure seeing Mr. Laureen Munoz in my clinic today. Return in about 6 months (around 2/21/2025), or if symptoms worsen or fail to improve. Patient is aware to call the cardiology clinic with any questions or concerns.      Gabbi Diaz MD  [facility]  [facility], [address]  [address] [address]  Phone: [phone]  Fax: [phone]

## 2024-08-21 ENCOUNTER — OFFICE VISIT (OUTPATIENT)
Dept: CARDIOLOGY | Facility: MEDICAL CENTER | Age: 74
End: 2024-08-21
Attending: INTERNAL MEDICINE
Payer: MEDICARE

## 2024-08-21 VITALS
HEIGHT: 71 IN | HEART RATE: 56 BPM | BODY MASS INDEX: 35.92 KG/M2 | OXYGEN SATURATION: 92 % | WEIGHT: 256.6 LBS | DIASTOLIC BLOOD PRESSURE: 64 MMHG | RESPIRATION RATE: 18 BRPM | SYSTOLIC BLOOD PRESSURE: 118 MMHG

## 2024-08-21 DIAGNOSIS — I70.1 RENAL ARTERY STENOSIS (HCC): ICD-10-CM

## 2024-08-21 DIAGNOSIS — I10 ESSENTIAL HYPERTENSION: ICD-10-CM

## 2024-08-21 DIAGNOSIS — R06.09 OTHER FORMS OF DYSPNEA: ICD-10-CM

## 2024-08-21 DIAGNOSIS — I50.9 CHF (NYHA CLASS II, ACC/AHA STAGE C) (HCC): ICD-10-CM

## 2024-08-21 DIAGNOSIS — I50.32 CHRONIC HEART FAILURE WITH PRESERVED EJECTION FRACTION (HCC): ICD-10-CM

## 2024-08-21 DIAGNOSIS — I63.9 CEREBROVASCULAR ACCIDENT (CVA), UNSPECIFIED MECHANISM (HCC): ICD-10-CM

## 2024-08-21 PROCEDURE — 94618 PULMONARY STRESS TESTING: CPT | Performed by: STUDENT IN AN ORGANIZED HEALTH CARE EDUCATION/TRAINING PROGRAM

## 2024-08-21 PROCEDURE — 3078F DIAST BP <80 MM HG: CPT | Performed by: STUDENT IN AN ORGANIZED HEALTH CARE EDUCATION/TRAINING PROGRAM

## 2024-08-21 PROCEDURE — 99214 OFFICE O/P EST MOD 30 MIN: CPT | Performed by: STUDENT IN AN ORGANIZED HEALTH CARE EDUCATION/TRAINING PROGRAM

## 2024-08-21 PROCEDURE — 1170F FXNL STATUS ASSESSED: CPT | Performed by: STUDENT IN AN ORGANIZED HEALTH CARE EDUCATION/TRAINING PROGRAM

## 2024-08-21 PROCEDURE — 94618 PULMONARY STRESS TESTING: CPT | Mod: 26 | Performed by: STUDENT IN AN ORGANIZED HEALTH CARE EDUCATION/TRAINING PROGRAM

## 2024-08-21 PROCEDURE — 3074F SYST BP LT 130 MM HG: CPT | Performed by: STUDENT IN AN ORGANIZED HEALTH CARE EDUCATION/TRAINING PROGRAM

## 2024-08-21 PROCEDURE — 99213 OFFICE O/P EST LOW 20 MIN: CPT | Performed by: STUDENT IN AN ORGANIZED HEALTH CARE EDUCATION/TRAINING PROGRAM

## 2024-08-21 ASSESSMENT — ENCOUNTER SYMPTOMS
COUGH: 0
PND: 0
VOMITING: 0
NAUSEA: 0
PALPITATIONS: 0
ABDOMINAL PAIN: 0
NEAR-SYNCOPE: 0
WEAKNESS: 0
IRREGULAR HEARTBEAT: 0
ORTHOPNEA: 0
SYNCOPE: 0
SHORTNESS OF BREATH: 0
DYSPNEA ON EXERTION: 0
NIGHT SWEATS: 0
DIARRHEA: 0
FOCAL WEAKNESS: 0
DIZZINESS: 0
FEVER: 0
WHEEZING: 0

## 2024-08-21 ASSESSMENT — MINNESOTA LIVING WITH HEART FAILURE QUESTIONNAIRE (MLHF)
MAKING YOU STAY IN A HOSPITAL: 5
TIRED, FATIGUED OR LOW ON ENERGY: 4
DIFFICULTY SLEEPING WELL AT NIGHT: 0
MAKING YOU FEEL DEPRESSED: 0
TOTAL_SCORE: 29
DIFFICULTY GOING AWAY FROM HOME: 0
DIFFICULTY WITH RECREATIONAL PASTIMES, SPORTS, HOBBIES: 0
DIFFICULTY WORKING TO EARN A LIVING: 0
SWELLING IN ANKLES OR LEGS: 0
WORKING AROUND THE HOUSE OR YARD DIFFICULT: 4
DIFFICULTY WITH SEXUAL ACTIVITIES: 0
LOSS OF SELF CONTROL IN YOUR LIFE: 2
COSTING YOU MONEY FOR MEDICAL CARE: 0
WALKING ABOUT OR CLIMBING STAIRS DIFFICULT: 4
MAKING YOU WORRY: 0
FEELING LIKE A BURDEN TO FAMILY AND FRIENDS: 0
DIFFICULTY TO CONCENTRATE OR REMEMBERING THINGS: 2
HAVING TO SIT OR LIE DOWN DURING THE DAY: 4
DIFFICULTY SOCIALIZING WITH FAMILY OR FRIENDS: 0
EATING LESS FOODS YOU LIKE: 0
MAKING YOU SHORT OF BREATH: 4
GIVING YOU SIDE EFFECTS FROM TREATMENTS: 0

## 2024-08-21 ASSESSMENT — 6 MINUTE WALK TEST (6MWT): TOTAL DISTANCE WALKED (METERS): 295

## 2024-08-21 ASSESSMENT — FIBROSIS 4 INDEX: FIB4 SCORE: 3.36

## 2024-08-21 NOTE — PATIENT INSTRUCTIONS
Schedule renal ultrasound prior to next visit  We will implanted loop recorder if your ziopatch is normal

## 2024-09-04 ENCOUNTER — TELEPHONE (OUTPATIENT)
Dept: CARDIOLOGY | Facility: MEDICAL CENTER | Age: 74
End: 2024-09-04
Payer: MEDICARE

## 2024-09-07 ENCOUNTER — TELEPHONE (OUTPATIENT)
Dept: CARDIOLOGY | Facility: MEDICAL CENTER | Age: 74
End: 2024-09-07
Payer: MEDICARE

## 2024-09-07 NOTE — TELEPHONE ENCOUNTER
Please notify the patient of Zio monitor showing no evidence of atrial fibrillation for recent stroke evaluation  ordered by neurology. We will discuss Implantable Loop Recorder placement on follow up visit. Thank you.  AFIA

## 2024-09-10 ENCOUNTER — OFFICE VISIT (OUTPATIENT)
Dept: NEUROLOGY | Facility: MEDICAL CENTER | Age: 74
End: 2024-09-10
Attending: PSYCHIATRY & NEUROLOGY
Payer: MEDICARE

## 2024-09-10 VITALS
TEMPERATURE: 95.9 F | RESPIRATION RATE: 24 BRPM | HEART RATE: 62 BPM | SYSTOLIC BLOOD PRESSURE: 102 MMHG | HEIGHT: 71 IN | BODY MASS INDEX: 35.12 KG/M2 | DIASTOLIC BLOOD PRESSURE: 56 MMHG | OXYGEN SATURATION: 95 % | WEIGHT: 250.88 LBS

## 2024-09-10 DIAGNOSIS — I63.439 CEREBROVASCULAR ACCIDENT (CVA) DUE TO EMBOLISM OF POSTERIOR CEREBRAL ARTERY, UNSPECIFIED BLOOD VESSEL LATERALITY (HCC): ICD-10-CM

## 2024-09-10 PROCEDURE — 1170F FXNL STATUS ASSESSED: CPT | Performed by: PSYCHIATRY & NEUROLOGY

## 2024-09-10 PROCEDURE — 99215 OFFICE O/P EST HI 40 MIN: CPT | Performed by: PSYCHIATRY & NEUROLOGY

## 2024-09-10 PROCEDURE — 99212 OFFICE O/P EST SF 10 MIN: CPT | Performed by: PSYCHIATRY & NEUROLOGY

## 2024-09-10 PROCEDURE — 3078F DIAST BP <80 MM HG: CPT | Performed by: PSYCHIATRY & NEUROLOGY

## 2024-09-10 PROCEDURE — 3074F SYST BP LT 130 MM HG: CPT | Performed by: PSYCHIATRY & NEUROLOGY

## 2024-09-10 RX ORDER — ATORVASTATIN CALCIUM 40 MG/1
40 TABLET, FILM COATED ORAL NIGHTLY
Qty: 30 TABLET | Refills: 3 | Status: SHIPPED | OUTPATIENT
Start: 2024-09-10

## 2024-09-10 ASSESSMENT — FIBROSIS 4 INDEX: FIB4 SCORE: 3.36

## 2024-09-10 NOTE — PROGRESS NOTES
Vascular Neurology Clinic Note  [facility]    Referring Physician: NATACHA Hawthorne    HPI: Laureen Munoz is a pleasant 74 y.o. right-handed male presenting to stroke bridge clinic for follow up.  The patient presented to the hospital on 8/14/2024 due to dizziness and was found to have small area of acute infarct in the bilateral posterior parietal and right occipital head region with also evidence of multiple tiny chronic infarcts scattered throughout the brain.  Patient was started on aspirin and discharged with Zio patch.  Zio patch does not reveal evidence of atrial fibrillation.  Echocardiogram revealed mildly dilated left atrium.  His symptom has mostly resolved..    Review of systems: In addition to what is detailed in the HPI above, all other systems reviewed and are negative.    Past Medical History:    has a past medical history of Allergy, Anesthesia (2017), Arthritis (unk), Cancer (HCC) (2020), Cataract (2022), High cholesterol (1998), Hypercholesterolemia, Hypertension, Migraine, Neuropathy, PONV (postoperative nausea and vomiting) (2017), Reactive depression (02/21/2023), Renal artery stenosis (HCC), Sleep apnea (2019), Stroke ([facility]) (2023), Tinea inguinalis, Tobacco use (10/30/2018), and Urinary bladder disorder (1995).    He has no past medical history of Acute nasopharyngitis, Anginal syndrome (HCC), Arrhythmia, Asthma, Blood clotting disorder (HCC), Bowel habit changes, Breath shortness, Bronchitis, Carcinoma in situ of respiratory system, Congestive heart failure ([facility]), Continuous ambulatory peritoneal dialysis status ([facility]), Coughing blood, Dental disorder, Diabetes (HCC), Dialysis patient ([facility]), Disorder of thyroid, Emphysema of lung (HCC), Glaucoma, Gynecological disorder, Heart burn, Heart murmur, Heart valve disease, Hemorrhagic disorder ([facility]), Hepatitis A, Hepatitis B, Hepatitis C, Hiatus hernia syndrome, Indigestion, Jaundice, Myocardial infarct (HCC),  Pacemaker, Pain, Pneumonia, Pregnant, Renal disorder, Rheumatic fever, Seizure (HCC), Snoring, Tuberculosis, or Urinary incontinence.    FHx:  family history includes Breast Cancer in his mother and sister; Cancer in his mother and sister; Dementia in his paternal grandfather; Heart Attack in his father and mother; Heart Disease in his father and mother; Hypertension in his father; No Known Problems in his maternal grandfather, maternal grandmother, and paternal grandmother; Stroke in his sister.    SHx:   reports that he quit smoking about 39 years ago. His smoking use included cigarettes and cigars. He started smoking about 54 years ago. He has a 15 pack-year smoking history. He has never used smokeless tobacco. He reports that he does not currently use alcohol after a past usage of about 1.2 oz of alcohol per week. He reports that he does not currently use drugs after having used the following drugs: Marijuana and Inhaled.    Allergies:  Allergies   Allergen Reactions    Gabapentin Unspecified     Dizziness, confusion, falling    Hyzaar [Losartan Potassium-Hctz] Cough       Medications:    Current Outpatient Medications:     atorvastatin (LIPITOR) 40 MG Tab, Take 1 Tablet by mouth every evening., Disp: 30 Tablet, Rfl: 3    DULoxetine (CYMBALTA) 30 MG Cap DR Particles, Take 30 mg by mouth every day., Disp: , Rfl:     aspirin 81 MG EC tablet, Take 1 Tablet by mouth every day for 90 days., Disp: 90 Tablet, Rfl: 0    olmesartan (BENICAR) 20 MG Tab, Take 1 Tablet by mouth every day., Disp: 30 Tablet, Rfl: 1    metFORMIN ER (GLUCOPHAGE XR) 500 MG TABLET SR 24 HR, Take 2 Tablets by mouth every day., Disp: 180 Tablet, Rfl: 1    Empagliflozin (JARDIANCE) 10 MG Tab tablet, Take 1 Tablet by mouth every day., Disp: 30 Tablet, Rfl: 11    spironolactone (ALDACTONE) 25 MG Tab, Take 1 Tablet by mouth every day., Disp: 30 Tablet, Rfl: 11    metoprolol SR (TOPROL XL) 25 MG TABLET SR 24 HR, Take 0.5 Tablets by mouth every morning.  Take half tablet by mouth once a day, Disp: 30 Tablet, Rfl: 3    cetirizine (ZYRTEC) 10 MG Tab, Take 10 mg by mouth every day., Disp: , Rfl:     triamcinolone acetonide (KENALOG) 0.1 % Cream, Apply  topically 2 times a day as needed., Disp: , Rfl:     meclizine (ANTIVERT) 12.5 MG Tab, Take 1 Tablet by mouth 3 times a day as needed for Dizziness., Disp: 30 Tablet, Rfl: 0    Melatonin 1 MG Cap, Take 5 mg by mouth every evening., Disp: , Rfl:     fluticasone (FLONASE) 50 MCG/ACT nasal spray, Administer 1 Spray into affected nostril(S) every morning., Disp: , Rfl:     Physical Examination:     NEUROLOGICAL EXAM:     Mental status: Awake, alert and fully oriented  Speech and language: Speech is clear and fluent. The patient is able to name and repeat, and follow commands  Cranial nerve exam:  Visual fields are full, extraocular movements are intact, face is symmetric  Motor exam: There is sustained antigravity with no downward drift in bilateral arms and legs.    Sensory exam:  Intact to light touch in all 4 distal extremities, there is no neglect to double stim  Coordination: No ataxia on bilateral finger-to-nose testing  Gait: Deferred due to patient preference      NIHSS: National Institutes of Health Stroke Scale    1a. Level of Consciousness (Alert, drowsy, etc): 0= Alert    1b. LOC Questions (Month, age): 0= Answers both correctly    1c. LOC Commands (Open/close eyes make fist/let go): 0= Obeys both correctly    2.   Best Gaze (Eyes open - patient follows examiner's finger on face): 0= Normal    3.   Visual Fields (introduce visual stimulus/threat to patient's field quadrants): 0= No visual loss  4.   Facial Paresis (Show teeth, raise eyebrows and squeeze eyes shut): 0= Normal     5a. Motor Arm - Left (Elevate arm to 90 degrees if patient is sitting, 45 degrees if  supine): 0= No drift    5b. Motor Arm - Right (Elevate arm to 90 degrees if patient is sitting, 45 degrees if supine): 0= No drift    6a. Motor Leg -  Left (Elevate leg 30 degrees with patient supine): 0= No drift    6b. Motor Leg - Right  (Elevate leg 30 degrees with patient supine): 0= No drift    7.   Limb Ataxia (Finger-nose, heel down shin): 0= No ataxia    8.   Sensory (Pin prick to face, arm, trunk and leg - compare side to side): 0= Normal    9.  Best Language (Name item, describe a picture and read sentences): 0= No aphasia    10. Dysarthria (Evaluate speech clarity by patient repeating listed words): 0= Normal articulation    11. Extinction and Inattention (Use information from prior testing to identify neglect or  double simultaneous stimuli testing): 0= No neglect    Total NIH Score: 0      Baseline Modified Preston Scale (MRS): 0 = No symptoms    Objective Data:    Labs  Lab Results   Component Value Date/Time    WBC 5.6 08/14/2024 07:06 AM    WBC 9.4 03/01/2010 04:54 PM    RBC 5.00 08/14/2024 07:06 AM    RBC 5.39 03/01/2010 04:54 PM    HEMOGLOBIN 15.1 08/14/2024 07:06 AM    HEMATOCRIT 45.8 08/14/2024 07:06 AM    PLATELETCT 115 (L) 08/14/2024 07:06 AM      Lab Results   Component Value Date/Time    PROTHROMBTM 14.5 08/07/2023 10:24 AM    INR 1.14 (H) 08/07/2023 10:24 AM     Lab Results   Component Value Date/Time    SODIUM 136 08/14/2024 07:06 AM    POTASSIUM 4.5 08/14/2024 07:06 AM    CHLORIDE 105 08/14/2024 07:06 AM    CO2 19 (L) 08/14/2024 07:06 AM    GLUCOSE 97 08/14/2024 07:06 AM    BUN 19 08/14/2024 07:06 AM    CREATININE 0.87 08/14/2024 07:06 AM    CREATININE 0.92 03/01/2010 04:54 PM    BUNCREATRAT 21 03/01/2010 04:54 PM    GLOMRATE >59 03/01/2010 04:54 PM      Lab Results   Component Value Date/Time    CHOLSTRLTOT 124 04/02/2024 08:35 AM    LDL 54 04/02/2024 08:35 AM    HDL 46 04/02/2024 08:35 AM    TRIGLYCERIDE 121 04/02/2024 08:35 AM       Lab Results   Component Value Date/Time    HBA1C 6.2 (H) 08/13/2024 08:15 PM           Imaging/Testing:  I interpreted and/or reviewed the patient's neuroimaging    MRI brain ( ):      CT angiogram of head  ( ):      CT angiogram of neck ( ):    No orders to display         Assessment:  Laureen Munoz is a pleasant 74 y.o. right-handed male presenting to stroke bridge clinic for follow up.  The patient presented to the hospital on 8/14/2024 due to dizziness and was found to have small area of acute infarct in the bilateral posterior parietal and right occipital head region with also evidence of multiple tiny chronic infarcts scattered throughout the brain.  Patient was started on aspirin and discharged with Zio patch.  Zio patch does not reveal evidence of atrial fibrillation.  Echocardiogram revealed mildly dilated left atrium.  His symptom has mostly resolved.  - LDL was 54 on 4/2/2024      Plan:  -He will continue with aspirin 81 mg daily.  - He is not on statin therapy and I will start Lipitor 40 mg daily given presence of atherosclerotic disease on CTA of the head.  - Echocardiogram with mildly dilated left atrium and given embolic appearing of his tiny strokes and negative Zio patch, I will refer him to cardiology for loop recorder implantation rule out occult atrial fibrillation.  - Patient will have blood work by his primary care physician I have suggested to add lipid panel and recheck his LDL given it has been more than 3 months since last cholesterol check.  - He was counseled about lifestyle modification including daily exercise and more plant-based diet.  I have printed out a sample of Mediterranean diet for him to review.  - He will follow-up with his primary care physician and cardiology for loop recorder implantation.        Please note that this dictation was created using voice recognition software. I have made every reasonable attempt to correct obvious errors, but I expect that there are errors of grammar and possibly content that I did not discover before finalizing the note.       Shruti Alejandra MD  Vascular Neurology

## 2024-09-11 ENCOUNTER — APPOINTMENT (OUTPATIENT)
Dept: MEDICAL GROUP | Facility: IMAGING CENTER | Age: 74
End: 2024-09-11
Payer: MEDICARE

## 2024-09-11 VITALS
WEIGHT: 250.2 LBS | SYSTOLIC BLOOD PRESSURE: 126 MMHG | TEMPERATURE: 96.5 F | OXYGEN SATURATION: 95 % | HEIGHT: 71 IN | BODY MASS INDEX: 35.03 KG/M2 | HEART RATE: 56 BPM | DIASTOLIC BLOOD PRESSURE: 60 MMHG

## 2024-09-11 DIAGNOSIS — Z09 HOSPITAL DISCHARGE FOLLOW-UP: ICD-10-CM

## 2024-09-11 DIAGNOSIS — I63.9 CEREBROVASCULAR ACCIDENT (CVA), UNSPECIFIED MECHANISM (HCC): ICD-10-CM

## 2024-09-11 PROCEDURE — 99215 OFFICE O/P EST HI 40 MIN: CPT

## 2024-09-11 ASSESSMENT — ENCOUNTER SYMPTOMS
MUSCULOSKELETAL NEGATIVE: 1
CARDIOVASCULAR NEGATIVE: 1
EYES NEGATIVE: 1
NEUROLOGICAL NEGATIVE: 1
RESPIRATORY NEGATIVE: 1
GASTROINTESTINAL NEGATIVE: 1
PSYCHIATRIC NEGATIVE: 1
CONSTITUTIONAL NEGATIVE: 1

## 2024-09-11 ASSESSMENT — FIBROSIS 4 INDEX: FIB4 SCORE: 3.36

## 2024-09-11 NOTE — PROGRESS NOTES
Subjective:     Chief Complaint   Patient presents with    Follow-Up     Ed visit 8/13-8/14  Stroke    Laureen Munoz is a 74 y.o. male who presents for Hospital Follow-up.    HPI:   Recently hospitalized on 8/13-8/14 for CVA.    In ER blood pressure stable, heart rate in 50s. On EKG there is PACs and sinus arrhythmia, first-degree AV block, RBBB.CT head: No acute abnormalities. Chest x-ray: Bilateral basilar atelectasis, cardiomegaly.   MRI brain tiny areas of acute infarct in the bilateral posterior parietal and right occipital lobes with multifocal small chronic infarcts in the supra and infra tentorial brain parenchyma. Mild chronic microvascular ischemic disease with mild cerebral volume loss and stable small pituitary microadenoma.   Per Neurology, patient able to be discharged home on ASA and Ziopatch for cardiac monitoring.     Since discharge, patient has followed up with endocrinology for pituitary adenoma.  He f/u with cardiology on 8/21. Zio patch was negative.  He f/u with neurology yesterday who recommends he continue ASA 81 mg. He was started on lipitor 40 mg daily.   Neurology also recommend loop recorder implantation rule out occult atrial fibrillation which he will discuss with cardiology.     Overall, he reports feeling well. He denies:  -confusion, amnesia, mood or cognitive changes, severe headache, dizziness, loss of balance, delayed speech, memory deficits, seizures, syncope, vision changes, nausea, vomiting, weakness, numbness to any extremities, urinary or bowel incontinence, severe pain, chest pain, SOB, difficulty breathing.       Current medicines (including reconciliation performed today)  Current Outpatient Medications   Medication Sig Dispense Refill    atorvastatin (LIPITOR) 40 MG Tab Take 1 Tablet by mouth every evening. 30 Tablet 3    DULoxetine (CYMBALTA) 30 MG Cap DR Particles Take 30 mg by mouth every day.      aspirin 81 MG EC tablet Take 1 Tablet by mouth every day for 90  days. 90 Tablet 0    olmesartan (BENICAR) 20 MG Tab Take 1 Tablet by mouth every day. 30 Tablet 1    metFORMIN ER (GLUCOPHAGE XR) 500 MG TABLET SR 24 HR Take 2 Tablets by mouth every day. 180 Tablet 1    Empagliflozin (JARDIANCE) 10 MG Tab tablet Take 1 Tablet by mouth every day. 30 Tablet 11    spironolactone (ALDACTONE) 25 MG Tab Take 1 Tablet by mouth every day. 30 Tablet 11    metoprolol SR (TOPROL XL) 25 MG TABLET SR 24 HR Take 0.5 Tablets by mouth every morning. Take half tablet by mouth once a day 30 Tablet 3    cetirizine (ZYRTEC) 10 MG Tab Take 10 mg by mouth every day.      triamcinolone acetonide (KENALOG) 0.1 % Cream Apply  topically 2 times a day as needed.      meclizine (ANTIVERT) 12.5 MG Tab Take 1 Tablet by mouth 3 times a day as needed for Dizziness. 30 Tablet 0    Melatonin 1 MG Cap Take 5 mg by mouth every evening.      fluticasone (FLONASE) 50 MCG/ACT nasal spray Administer 1 Spray into affected nostril(S) every morning.       No current facility-administered medications for this visit.       Allergies:   Gabapentin and Hyzaar [losartan potassium-hctz]    Social History     Tobacco Use    Smoking status: Former     Current packs/day: 0.00     Average packs/day: 1 pack/day for 15.0 years (15.0 ttl pk-yrs)     Types: Cigarettes, Cigars     Start date: 1970     Quit date: 1985     Years since quittin.7    Smokeless tobacco: Never    Tobacco comments:     occasional cigar started in / quit completely    Vaping Use    Vaping status: Never Used   Substance Use Topics    Alcohol use: Not Currently     Alcohol/week: 1.2 oz     Types: 1 Cans of beer, 1 Shots of liquor per week     Comment: 2 beers / 2 shots in last month - very occasional    Drug use: Not Currently     Types: Marijuana, Inhaled     Comment: none for 30 years/ tried pot [id number] didn't care to much for       ROS:  Review of Systems   Constitutional: Negative.    HENT: Negative.     Eyes: Negative.    Respiratory:  "Negative.     Cardiovascular: Negative.    Gastrointestinal: Negative.    Genitourinary: Negative.    Musculoskeletal: Negative.    Skin: Negative.    Neurological: Negative.    Endo/Heme/Allergies: Negative.    Psychiatric/Behavioral: Negative.           Objective:     Vitals:    09/11/24 1245   BP: 126/60   BP Location: Left arm   Patient Position: Sitting   BP Cuff Size: Adult   Pulse: (!) 56   Temp: 35.8 °C (96.5 °F)   TempSrc: Temporal   SpO2: 95%   Weight: 113 kg (250 lb 3.2 oz)   Height: 1.803 m (5' 11\")     Body mass index is 34.9 kg/m².    Physical Exam:  Constitutional: Well-developed and well-nourished. Not diaphoretic. No distress.   Skin: Skin is warm and dry. No rash noted.  Head: Atraumatic without lesions.  Eyes: Conjunctivae and extraocular motions are normal. Pupils are equal, round, and reactive to light. No scleral icterus.   Ears:  External ears unremarkable. Tympanic membranes clear and intact.  Nose: Nares patent. Septum midline. Turbinates without erythema nor edema. No discharge.   Mouth/Throat: Tongue normal. Oropharynx is clear and moist. Posterior pharynx without erythema or exudates.  Neck: Supple, trachea midline. Normal range of motion. No thyromegaly present. No lymphadenopathy--cervical or supraclavicular.  Cardiovascular: Regular rate and rhythm, S1 and S2 without murmur, rubs, or gallops.  Lungs: Normal inspiratory effort, CTA bilaterally, no wheezes/rhonchi/rales  Abdomen: Soft, non tender, and without distention. Active bowel sounds in all four quadrants. No rebound, guarding, masses or HSM.  Extremities: No cyanosis, clubbing, erythema, nor edema. Distal pulses intact and symmetric.   Musculoskeletal: All major joints AROM full in all directions without pain.  Neurological: Alert and oriented x 3. No cranial nerve deficit. 5/5 myotomes. Sensation intact.   Psychiatric:  Behavior, mood, and affect are appropriate.      Assessment and Plan:   1. Hospital discharge follow-up  2. " Cerebrovascular accident (CVA), unspecified mechanism (HCC)  Acute on chronic condition. Pt reports personal hx of stroke roughly 2-3 years ago without sustaining any deficits.  He was recently hospitalized from 8/13-8/14 for acute CVA. MRI brain confirmed acute infarct in the bilateral posterior parietal and right occipital lobes with multifocal small chronic infarcts in the supra and infra tentorial brain parenchyma.   Patient did not sustain any neurological deficits.  Patient was treated with ASA 81 mg which he continues to take.  He has followed up with specialty providers such as neurology, cardiology, and endocrinology.  Overall, he is doing well.  Vital signs are stable.  Neuro examination negative for deficits.  Recommend to follow-up with specialty providers as scheduled.  ED symptoms discussed.    - Chart and discharge summary were reviewed.   - Hospitalization and results reviewed with patient.   - Medications reviewed including instructions regarding high risk medications, dosing and side effects.  - Recommended Services: No services needed at this time    I spent a total of 42 minutes with record review, exam, communication with the patient, communication with other providers, and documentation of this encounter.     Follow-up:Return in about 3 months (around 12/11/2024), or if symptoms worsen or fail to improve.    Thank you, Edilia LEDEMZA  [facility]      Medical Decision Making/Course:  In the course of preparing for this visit with review of the pertinent past medical history, recent and past clinic visits, current medications, and performing chart, immunization, medical history and medication reconciliation, and in the further course of obtaining the current history pertinent to the clinic visit today, performing an exam and evaluation, ordering and independently evaluating labs, tests, and/or procedures, prescribing any recommended new medications as noted above, providing any  pertinent counseling and education and recommending further coordination of care. This was discussed with patient in a shared-decision making conversation, and they understand and agreed with plan of care.

## 2024-09-17 ENCOUNTER — APPOINTMENT (OUTPATIENT)
Dept: MEDICAL GROUP | Facility: IMAGING CENTER | Age: 74
End: 2024-09-17
Payer: MEDICARE

## 2024-09-19 ENCOUNTER — HOSPITAL ENCOUNTER (OUTPATIENT)
Dept: RADIOLOGY | Facility: MEDICAL CENTER | Age: 74
End: 2024-09-19
Attending: STUDENT IN AN ORGANIZED HEALTH CARE EDUCATION/TRAINING PROGRAM
Payer: MEDICARE

## 2024-09-19 ENCOUNTER — HOSPITAL ENCOUNTER (OUTPATIENT)
Dept: LAB | Facility: MEDICAL CENTER | Age: 74
End: 2024-09-19
Attending: INTERNAL MEDICINE
Payer: MEDICARE

## 2024-09-19 DIAGNOSIS — E55.9 HYPOVITAMINOSIS D: ICD-10-CM

## 2024-09-19 DIAGNOSIS — I70.1 RENAL ARTERY STENOSIS (HCC): ICD-10-CM

## 2024-09-19 DIAGNOSIS — E66.9 OBESITY (BMI 35.0-39.9 WITHOUT COMORBIDITY): ICD-10-CM

## 2024-09-19 DIAGNOSIS — D35.2 PITUITARY ADENOMA (HCC): ICD-10-CM

## 2024-09-19 DIAGNOSIS — R73.03 PREDIABETES: ICD-10-CM

## 2024-09-19 DIAGNOSIS — R42 DIZZINESS: ICD-10-CM

## 2024-09-19 LAB
HCT VFR BLD AUTO: 48.6 % (ref 42–52)
HGB BLD-MCNC: 16.1 G/DL (ref 14–18)

## 2024-09-19 PROCEDURE — 84270 ASSAY OF SEX HORMONE GLOBUL: CPT

## 2024-09-19 PROCEDURE — 84403 ASSAY OF TOTAL TESTOSTERONE: CPT

## 2024-09-19 PROCEDURE — 83002 ASSAY OF GONADOTROPIN (LH): CPT

## 2024-09-19 PROCEDURE — 93975 VASCULAR STUDY: CPT

## 2024-09-19 PROCEDURE — 84402 ASSAY OF FREE TESTOSTERONE: CPT

## 2024-09-19 PROCEDURE — 36415 COLL VENOUS BLD VENIPUNCTURE: CPT

## 2024-09-19 PROCEDURE — 85014 HEMATOCRIT: CPT

## 2024-09-19 PROCEDURE — 85018 HEMOGLOBIN: CPT

## 2024-09-19 PROCEDURE — 83001 ASSAY OF GONADOTROPIN (FSH): CPT

## 2024-09-20 LAB
FSH SERPL-ACNC: 41.6 MIU/ML (ref 1.5–12.4)
LH SERPL-ACNC: 28.2 IU/L (ref 1.7–8.6)

## 2024-09-21 LAB
SHBG SERPL-SCNC: 79 NMOL/L (ref 19–76)
TESTOST FREE MFR SERPL: 1 % (ref 1.6–2.9)
TESTOST FREE SERPL-MCNC: 25 PG/ML (ref 47–244)
TESTOST SERPL-MCNC: 256 NG/DL (ref 300–720)

## 2024-09-29 DIAGNOSIS — I10 ESSENTIAL HYPERTENSION: ICD-10-CM

## 2024-09-30 ENCOUNTER — APPOINTMENT (OUTPATIENT)
Dept: RADIOLOGY | Facility: MEDICAL CENTER | Age: 74
End: 2024-09-30
Attending: NURSE PRACTITIONER
Payer: MEDICARE

## 2024-09-30 ENCOUNTER — APPOINTMENT (RX ONLY)
Dept: URBAN - METROPOLITAN AREA CLINIC 4 | Facility: CLINIC | Age: 74
Setting detail: DERMATOLOGY
End: 2024-09-30

## 2024-09-30 DIAGNOSIS — L57.0 ACTINIC KERATOSIS: ICD-10-CM

## 2024-09-30 DIAGNOSIS — L28.1 PRURIGO NODULARIS: ICD-10-CM

## 2024-09-30 PROBLEM — D48.5 NEOPLASM OF UNCERTAIN BEHAVIOR OF SKIN: Status: ACTIVE | Noted: 2024-09-30

## 2024-09-30 PROCEDURE — 99214 OFFICE O/P EST MOD 30 MIN: CPT

## 2024-09-30 PROCEDURE — ? COUNSELING

## 2024-09-30 PROCEDURE — ? PRESCRIPTION

## 2024-09-30 PROCEDURE — ? PRESCRIPTION MEDICATION MANAGEMENT

## 2024-09-30 RX ORDER — CLOBETASOL PROPIONATE 0.5 MG/G
OINTMENT TOPICAL BID
Qty: 45 | Refills: 0 | Status: ERX | COMMUNITY
Start: 2024-09-30

## 2024-09-30 RX ORDER — OLMESARTAN MEDOXOMIL 20 MG/1
20 TABLET ORAL DAILY
Qty: 90 TABLET | Refills: 0 | Status: SHIPPED | OUTPATIENT
Start: 2024-09-30

## 2024-09-30 RX ADMIN — CLOBETASOL PROPIONATE: 0.5 OINTMENT TOPICAL at 00:00

## 2024-09-30 ASSESSMENT — LOCATION DETAILED DESCRIPTION DERM
LOCATION DETAILED: LEFT PROXIMAL RADIAL DORSAL FOREARM
LOCATION DETAILED: LEFT DISTAL DORSAL FOREARM
LOCATION DETAILED: LEFT RADIAL DORSAL HAND
LOCATION DETAILED: RIGHT RADIAL DORSAL HAND
LOCATION DETAILED: LEFT PROXIMAL DORSAL FOREARM
LOCATION DETAILED: RIGHT SUPERIOR CENTRAL BUCCAL CHEEK

## 2024-09-30 ASSESSMENT — LOCATION ZONE DERM
LOCATION ZONE: HAND
LOCATION ZONE: FACE
LOCATION ZONE: ARM

## 2024-09-30 ASSESSMENT — LOCATION SIMPLE DESCRIPTION DERM
LOCATION SIMPLE: RIGHT HAND
LOCATION SIMPLE: LEFT FOREARM
LOCATION SIMPLE: LEFT HAND
LOCATION SIMPLE: RIGHT CHEEK

## 2024-09-30 NOTE — TELEPHONE ENCOUNTER
Received request via: Pharmacy    Was the patient seen in the last year in this department? Yes    Does the patient have an active prescription (recently filled or refills available) for medication(s) requested? No    Pharmacy Name: [facility]    Does the patient have skilled nursing Plus and need 100-day supply? (This applies to ALL medications) Patient does not have SCP

## 2024-09-30 NOTE — PROCEDURE: PRESCRIPTION MEDICATION MANAGEMENT
Initiate Treatment: Clobetasol to areas BID
Render In Strict Bullet Format?: No
Detail Level: Zone
Initiate Treatment: Triamcinolone to these areas qd

## 2024-10-01 ENCOUNTER — APPOINTMENT (OUTPATIENT)
Dept: SLEEP MEDICINE | Facility: MEDICAL CENTER | Age: 74
End: 2024-10-01
Attending: INTERNAL MEDICINE
Payer: MEDICARE

## 2024-10-05 ENCOUNTER — HOSPITAL ENCOUNTER (OUTPATIENT)
Dept: RADIOLOGY | Facility: MEDICAL CENTER | Age: 74
End: 2024-10-05
Attending: NURSE PRACTITIONER
Payer: MEDICARE

## 2024-10-05 DIAGNOSIS — R91.8 OPACITY OF LUNG ON IMAGING STUDY: ICD-10-CM

## 2024-10-05 PROCEDURE — 71250 CT THORAX DX C-: CPT

## 2024-10-08 ENCOUNTER — TELEPHONE (OUTPATIENT)
Dept: HEMATOLOGY ONCOLOGY | Facility: MEDICAL CENTER | Age: 74
End: 2024-10-08
Payer: MEDICARE

## 2024-10-14 ENCOUNTER — TELEPHONE (OUTPATIENT)
Dept: CARDIOLOGY | Facility: MEDICAL CENTER | Age: 74
End: 2024-10-14
Payer: MEDICARE

## 2024-10-18 ENCOUNTER — OFFICE VISIT (OUTPATIENT)
Dept: CARDIOLOGY | Facility: MEDICAL CENTER | Age: 74
End: 2024-10-18
Attending: INTERNAL MEDICINE
Payer: MEDICARE

## 2024-10-18 ENCOUNTER — TELEPHONE (OUTPATIENT)
Dept: CARDIOLOGY | Facility: MEDICAL CENTER | Age: 74
End: 2024-10-18

## 2024-10-18 VITALS
OXYGEN SATURATION: 94 % | SYSTOLIC BLOOD PRESSURE: 96 MMHG | DIASTOLIC BLOOD PRESSURE: 60 MMHG | WEIGHT: 247.2 LBS | RESPIRATION RATE: 18 BRPM | HEART RATE: 66 BPM | HEIGHT: 71 IN | BODY MASS INDEX: 34.61 KG/M2

## 2024-10-18 DIAGNOSIS — I50.32 CHRONIC DIASTOLIC CONGESTIVE HEART FAILURE (HCC): ICD-10-CM

## 2024-10-18 DIAGNOSIS — I10 ESSENTIAL HYPERTENSION: Chronic | ICD-10-CM

## 2024-10-18 DIAGNOSIS — I61.9 CVA (CEREBROVASCULAR ACCIDENT DUE TO INTRACEREBRAL HEMORRHAGE) (HCC): ICD-10-CM

## 2024-10-18 DIAGNOSIS — E78.00 HYPERCHOLESTEREMIA: ICD-10-CM

## 2024-10-18 PROCEDURE — 3078F DIAST BP <80 MM HG: CPT | Performed by: INTERNAL MEDICINE

## 2024-10-18 PROCEDURE — 1170F FXNL STATUS ASSESSED: CPT | Performed by: INTERNAL MEDICINE

## 2024-10-18 PROCEDURE — 99213 OFFICE O/P EST LOW 20 MIN: CPT | Performed by: INTERNAL MEDICINE

## 2024-10-18 PROCEDURE — 3074F SYST BP LT 130 MM HG: CPT | Performed by: INTERNAL MEDICINE

## 2024-10-18 PROCEDURE — 99214 OFFICE O/P EST MOD 30 MIN: CPT | Performed by: INTERNAL MEDICINE

## 2024-10-18 ASSESSMENT — ENCOUNTER SYMPTOMS
GASTROINTESTINAL NEGATIVE: 1
PALPITATIONS: 0
CONSTITUTIONAL NEGATIVE: 1
RESPIRATORY NEGATIVE: 1
DOUBLE VISION: 0
PSYCHIATRIC NEGATIVE: 1
FOCAL WEAKNESS: 0
WEIGHT LOSS: 0
DEPRESSION: 0
CLAUDICATION: 0
MUSCULOSKELETAL NEGATIVE: 1
BRUISES/BLEEDS EASILY: 0
BLURRED VISION: 0
HEADACHES: 0
SHORTNESS OF BREATH: 0
WEAKNESS: 0
COUGH: 0
CHILLS: 0
CARDIOVASCULAR NEGATIVE: 1
MYALGIAS: 0
FEVER: 0
NERVOUS/ANXIOUS: 0
VOMITING: 0
NAUSEA: 0
EYES NEGATIVE: 1
NEUROLOGICAL NEGATIVE: 1
ABDOMINAL PAIN: 0
DIZZINESS: 0

## 2024-10-18 ASSESSMENT — FIBROSIS 4 INDEX: FIB4 SCORE: 3.36

## 2024-10-23 ENCOUNTER — OFFICE VISIT (OUTPATIENT)
Dept: SLEEP MEDICINE | Facility: MEDICAL CENTER | Age: 74
End: 2024-10-23
Payer: MEDICARE

## 2024-10-23 VITALS
DIASTOLIC BLOOD PRESSURE: 70 MMHG | SYSTOLIC BLOOD PRESSURE: 126 MMHG | BODY MASS INDEX: 34.02 KG/M2 | OXYGEN SATURATION: 90 % | HEIGHT: 71 IN | HEART RATE: 64 BPM | WEIGHT: 243 LBS

## 2024-10-23 DIAGNOSIS — G47.34 NOCTURNAL HYPOXIA: ICD-10-CM

## 2024-10-23 DIAGNOSIS — G47.39 COMPLEX SLEEP APNEA SYNDROME: ICD-10-CM

## 2024-10-23 PROCEDURE — 3074F SYST BP LT 130 MM HG: CPT

## 2024-10-23 PROCEDURE — 99213 OFFICE O/P EST LOW 20 MIN: CPT

## 2024-10-23 PROCEDURE — 1170F FXNL STATUS ASSESSED: CPT

## 2024-10-23 PROCEDURE — 3078F DIAST BP <80 MM HG: CPT

## 2024-10-23 RX ORDER — CLOBETASOL PROPIONATE 0.5 MG/G
OINTMENT TOPICAL
COMMUNITY
Start: 2024-09-30

## 2024-10-23 ASSESSMENT — FIBROSIS 4 INDEX: FIB4 SCORE: 3.36

## 2024-10-28 ENCOUNTER — APPOINTMENT (OUTPATIENT)
Dept: ADMISSIONS | Facility: MEDICAL CENTER | Age: 74
End: 2024-10-28
Attending: STUDENT IN AN ORGANIZED HEALTH CARE EDUCATION/TRAINING PROGRAM
Payer: MEDICARE

## 2024-11-01 ENCOUNTER — APPOINTMENT (OUTPATIENT)
Dept: ADMISSIONS | Facility: MEDICAL CENTER | Age: 74
End: 2024-11-01
Attending: STUDENT IN AN ORGANIZED HEALTH CARE EDUCATION/TRAINING PROGRAM
Payer: MEDICARE

## 2024-11-05 ENCOUNTER — NON-PROVIDER VISIT (OUTPATIENT)
Dept: CARDIOLOGY | Facility: MEDICAL CENTER | Age: 74
End: 2024-11-05
Attending: INTERNAL MEDICINE
Payer: MEDICARE

## 2024-11-05 DIAGNOSIS — I49.8 VENTRICULAR TRIGEMINY: ICD-10-CM

## 2024-11-05 DIAGNOSIS — I47.10 SVT (SUPRAVENTRICULAR TACHYCARDIA) (HCC): ICD-10-CM

## 2024-11-05 DIAGNOSIS — I61.9 CVA (CEREBROVASCULAR ACCIDENT DUE TO INTRACEREBRAL HEMORRHAGE) (HCC): ICD-10-CM

## 2024-11-05 DIAGNOSIS — I49.8 VENTRICULAR BIGEMINY: ICD-10-CM

## 2024-11-05 DIAGNOSIS — I49.1 APC (ATRIAL PREMATURE CONTRACTIONS): ICD-10-CM

## 2024-11-05 DIAGNOSIS — I49.3 PVC'S (PREMATURE VENTRICULAR CONTRACTIONS): ICD-10-CM

## 2024-11-05 PROCEDURE — 93246 EXT ECG>7D<15D RECORDING: CPT

## 2024-11-05 NOTE — PROGRESS NOTES
Patient enrolled in the 14 day o Holter monitoring program per Yonathan Warren MD.  >Office hook-up, serial # [id number].  >Currently pending EOS.

## 2024-11-08 ENCOUNTER — HOME STUDY (OUTPATIENT)
Dept: SLEEP MEDICINE | Facility: MEDICAL CENTER | Age: 74
End: 2024-11-08
Payer: MEDICARE

## 2024-11-08 DIAGNOSIS — G47.34 NOCTURNAL HYPOXIA: ICD-10-CM

## 2024-11-08 DIAGNOSIS — G47.39 COMPLEX SLEEP APNEA SYNDROME: ICD-10-CM

## 2024-11-08 PROCEDURE — 94762 N-INVAS EAR/PLS OXIMTRY CONT: CPT | Performed by: INTERNAL MEDICINE

## 2024-11-08 PROCEDURE — 94762 N-INVAS EAR/PLS OXIMTRY CONT: CPT

## 2024-11-11 NOTE — PROCEDURES
Overnight Pulse Oximetry    This study was performed on ASV EPAP 6, PS 3-15.    Impression:      428.5 minutes below 88% and SpO2 desaturation index is 4.8. This is consistent with nocturnal hypoxia due to an underlying lung condition. Recommend prescription for nocturnal supplemental oxygen via nasal cannula.     I, Alexey Soto DO, am the author of this note.     Alexey Soto DO, [facility]  Staff Pulmonologist and Intensivist  [facility]     Please note that this dictation was created using voice recognition software. The accuracy of the dictation is limited to the abilities of the software. I have made every reasonable attempt to correct obvious errors, but I expect that there are errors of grammar and possibly content that I did not discover before finalizing the note.

## 2024-11-21 ENCOUNTER — HOSPITAL ENCOUNTER (OUTPATIENT)
Facility: MEDICAL CENTER | Age: 74
End: 2024-11-21
Attending: STUDENT IN AN ORGANIZED HEALTH CARE EDUCATION/TRAINING PROGRAM | Admitting: STUDENT IN AN ORGANIZED HEALTH CARE EDUCATION/TRAINING PROGRAM
Payer: MEDICARE

## 2024-11-21 ENCOUNTER — APPOINTMENT (OUTPATIENT)
Dept: CARDIOLOGY | Facility: MEDICAL CENTER | Age: 74
End: 2024-11-21
Attending: INTERNAL MEDICINE
Payer: MEDICARE

## 2024-11-21 VITALS
BODY MASS INDEX: 34.6 KG/M2 | TEMPERATURE: 97.3 F | DIASTOLIC BLOOD PRESSURE: 66 MMHG | RESPIRATION RATE: 20 BRPM | WEIGHT: 247.14 LBS | SYSTOLIC BLOOD PRESSURE: 122 MMHG | OXYGEN SATURATION: 92 % | HEIGHT: 71 IN | HEART RATE: 54 BPM

## 2024-11-21 DIAGNOSIS — I61.9 CVA (CEREBROVASCULAR ACCIDENT DUE TO INTRACEREBRAL HEMORRHAGE) (HCC): ICD-10-CM

## 2024-11-21 PROCEDURE — 160002 HCHG RECOVERY MINUTES (STAT)

## 2024-11-21 PROCEDURE — 700101 HCHG RX REV CODE 250

## 2024-11-21 PROCEDURE — C1764 EVENT RECORDER, CARDIAC: HCPCS

## 2024-11-21 PROCEDURE — 160046 HCHG PACU - 1ST 60 MINS PHASE II

## 2024-11-21 PROCEDURE — 33285 INSJ SUBQ CAR RHYTHM MNTR: CPT | Performed by: STUDENT IN AN ORGANIZED HEALTH CARE EDUCATION/TRAINING PROGRAM

## 2024-11-21 RX ORDER — LIDOCAINE HYDROCHLORIDE AND EPINEPHRINE 10; 10 MG/ML; UG/ML
INJECTION, SOLUTION INFILTRATION; PERINEURAL
Status: COMPLETED
Start: 2024-11-21 | End: 2024-11-21

## 2024-11-21 RX ADMIN — LIDOCAINE HYDROCHLORIDE AND EPINEPHRINE: 10; 10 INJECTION, SOLUTION INFILTRATION; PERINEURAL at 12:15

## 2024-11-21 ASSESSMENT — FIBROSIS 4 INDEX: FIB4 SCORE: 3.36

## 2024-11-21 NOTE — DISCHARGE INSTRUCTIONS
Implantable Loop Recorder    A loop recorder is a device that is implanted under the skin and can be used to watch for causes of fainting, palpitations, or other abnormal heart rhythms.  It works as a continuous EKG and can have automatic triggers to store recordings or can be patient activated to store recordings.  Loop Recorders can be kept in place for up to 2-3 years.  When the recorder is no longer necessary, it will be removed.   Instructions to give patients with a loop recorder placement:  Keep your dressing dry for 7 days if gauze and tegaderm in place.  Keep dressing in place until follow-up appointment.  Do not submerge site, until cleared at follow-up (no baths, swimming, hot tubs, etc.)  Any questions or concerns please call [facility] at [phone]  If any questions arise, call your provider.  If your provider is not available, please feel free to call the Surgical Center at [phone].    MEDICATIONS: Resume taking daily medication.  Take prescribed pain medication with food.  If no medication is prescribed, you may take non-aspirin pain medication if needed.  PAIN MEDICATION CAN BE VERY CONSTIPATING.  Take a stool softener or laxative such as senokot, pericolace, or milk of magnesia if needed.    Last pain medication given at

## 2024-11-21 NOTE — OR NURSING
1220  Access site left chest is clean, dry and soft.  Patient denies discomfort.  1225 Patient up to edge of bed, denies discomfort.  Reviewed discharge paperwork with pt. Discussed diet, activity, medications, follow up care and worsening symptoms. No questions at this time.  Pt discharged home with self via walking by CNA.

## 2024-11-21 NOTE — OP REPORT
PROCEDURE PERFORMED: Implantable Loop Recorder    DATE OF SERVICE: 11/21/2024    INDICATION: Cryptogenic stroke    : Bhavin Mcgowan MD, PhD    ASSISTANT: None    ANESTHESIA: Local    EBL: <5 cc    SPECIMENS: None    STATEMENT OF MEDICAL NECESSITY:    DESCRIPTION OF PROCEDURE:  After informed written consent, the patient was brought to the cath lab pre/post procedure area. The patient was prepped and draped in the usual sterile fashion. The procedure was performed with local anesthetic. Using the supplied incision tool, a <1 cm incision was made in the skin about 2 cm leftward and lateral to the sternal border. Using the supplied insertion tool, a tunnel was made in the subcutaneous tissue at a 45 degree angle to the sternum and the device was inserted with the supplied plunger. Manual compression and 4-0 vicryl suture were used until hemostasis achieved. Sterile dressing was applied.    IMPLANTED DEVICE INFORMATION:  Model: [employer] Model M312  Serial number:  [id number]  RV wave amplitude: 0.1 mV    IMPRESSIONS:  1. Successful implantable loop recorder implantation    RECOMMENDATIONS:  1. Routine follow-up and device interrogation

## 2024-11-22 ENCOUNTER — TELEPHONE (OUTPATIENT)
Dept: CARDIOLOGY | Facility: MEDICAL CENTER | Age: 74
End: 2024-11-22
Payer: MEDICARE

## 2024-11-22 NOTE — TELEPHONE ENCOUNTER
PRABHA EOS to 's nurse, Olya, on 11/22/2024      Of note:  SVT detected within +/- 45 seconds of symptomatic patient events    Preliminary findings:    1 episode of VT  with an avg rate of 133 bpm    64 episodes of SVT  with an avg rate of 112 bpm    Sinus rhythm 40-88 with an avg rate of 57 bpm    16.6% isolated SVE(s), 2.2% SVE couplets, rare SVE triplets    1.1% isolated VE(s), rare VE couplets, no noted VE triplets, Ventricular bigeminy and trigeminy present    15 events associated with:  SVT   SR 53-68  SVE(s)  VE(s)

## 2024-11-23 NOTE — TELEPHONE ENCOUNTER
JI: please read/ sign ZIO EOS. Thank you    Phone Number Called: [phone]    Call outcome: Spoke to patient regarding message below.    Message: Called to discuss pt events- pt states he was just pressing the button to see if the monitor was working, no events to report. Pt currently has no complaints, feels good.

## 2024-11-24 ENCOUNTER — TELEPHONE (OUTPATIENT)
Dept: CARDIOLOGY | Facility: MEDICAL CENTER | Age: 74
End: 2024-11-24
Payer: MEDICARE

## 2024-11-24 DIAGNOSIS — I10 ESSENTIAL HYPERTENSION: ICD-10-CM

## 2024-11-24 PROCEDURE — 93248 EXT ECG>7D<15D REV&INTERPJ: CPT | Performed by: INTERNAL MEDICINE

## 2024-11-25 ENCOUNTER — APPOINTMENT (RX ONLY)
Dept: URBAN - METROPOLITAN AREA CLINIC 4 | Facility: CLINIC | Age: 74
Setting detail: DERMATOLOGY
End: 2024-11-25

## 2024-11-25 ENCOUNTER — TELEPHONE (OUTPATIENT)
Dept: CARDIOLOGY | Facility: MEDICAL CENTER | Age: 74
End: 2024-11-25
Payer: MEDICARE

## 2024-11-25 DIAGNOSIS — L28.1 PRURIGO NODULARIS: ICD-10-CM

## 2024-11-25 PROCEDURE — 99213 OFFICE O/P EST LOW 20 MIN: CPT

## 2024-11-25 PROCEDURE — ? COUNSELING

## 2024-11-25 PROCEDURE — ? PRESCRIPTION

## 2024-11-25 RX ORDER — METOPROLOL SUCCINATE 25 MG/1
25 TABLET, EXTENDED RELEASE ORAL EVERY MORNING
Qty: 90 TABLET | Refills: 3 | Status: SHIPPED | OUTPATIENT
Start: 2024-11-25

## 2024-11-25 RX ORDER — TACROLIMUS 1 MG/G
OINTMENT TOPICAL
Qty: 60 | Refills: 0 | Status: ERX | COMMUNITY
Start: 2024-11-25

## 2024-11-25 RX ADMIN — TACROLIMUS: 1 OINTMENT TOPICAL at 00:00

## 2024-11-25 ASSESSMENT — LOCATION DETAILED DESCRIPTION DERM
LOCATION DETAILED: LEFT PROXIMAL DORSAL FOREARM
LOCATION DETAILED: LEFT RADIAL DORSAL HAND
LOCATION DETAILED: LEFT DISTAL DORSAL FOREARM
LOCATION DETAILED: RIGHT RADIAL DORSAL HAND
LOCATION DETAILED: LEFT PROXIMAL RADIAL DORSAL FOREARM

## 2024-11-25 ASSESSMENT — LOCATION SIMPLE DESCRIPTION DERM
LOCATION SIMPLE: RIGHT HAND
LOCATION SIMPLE: LEFT FOREARM
LOCATION SIMPLE: LEFT HAND

## 2024-11-25 ASSESSMENT — LOCATION ZONE DERM
LOCATION ZONE: HAND
LOCATION ZONE: ARM

## 2024-11-25 NOTE — TELEPHONE ENCOUNTER
Phone Number Called: [phone]    Call outcome: Spoke to patient regarding message below.    Message: Called to provide JI recommendation. Pt will increase metoprolol to 25mg daily. Pt verbalized understanding, appreciative of call.    Med updated to pharmacy

## 2024-11-25 NOTE — TELEPHONE ENCOUNTER
Please notify the patient of Zio Patch showing no evidence of atrial fibrillation ordered for stroke. Supraventricular tachycardia noted. Please increase metoprolol to 25 mg QD.Thank you.  AFIA

## 2024-11-26 NOTE — TELEPHONE ENCOUNTER
"Patients device transmitted via home monitor-- Episodes loop recorder is \"calling\" AF.  Appears to be PAC's noted on EGM, however difficult to determine at times.    Report scanned for review.   "

## 2024-11-26 NOTE — TELEPHONE ENCOUNTER
Your message noted. Our recent Zio monitor did not  atrial fibrillation. Please renotify us when definitive ,atrial fibrillation is noted. Thank you.  AFIA

## 2024-11-27 ENCOUNTER — TELEPHONE (OUTPATIENT)
Dept: CARDIOLOGY | Facility: MEDICAL CENTER | Age: 74
End: 2024-11-27
Payer: MEDICARE

## 2024-11-27 NOTE — TELEPHONE ENCOUNTER
Remote transmission received via home monitor, device shows multiple AF episodes and 1 symptom episode, ?? Of AF vs. SR w/ PACs, full report scanned into Media for review.

## 2024-11-27 NOTE — TELEPHONE ENCOUNTER
We will continue to evaluate with Implantable Loop Recorder which was recently place. Thank you.  AFIA

## 2024-12-02 ENCOUNTER — NON-PROVIDER VISIT (OUTPATIENT)
Dept: CARDIOLOGY | Facility: MEDICAL CENTER | Age: 74
End: 2024-12-02
Attending: STUDENT IN AN ORGANIZED HEALTH CARE EDUCATION/TRAINING PROGRAM
Payer: MEDICARE

## 2024-12-02 ENCOUNTER — NON-PROVIDER VISIT (OUTPATIENT)
Dept: CARDIOLOGY | Facility: MEDICAL CENTER | Age: 74
End: 2024-12-02

## 2024-12-02 DIAGNOSIS — I63.9 CRYPTOGENIC STROKE (HCC): ICD-10-CM

## 2024-12-02 DIAGNOSIS — Z45.09 ENCOUNTER FOR LOOP RECORDER CHECK: ICD-10-CM

## 2024-12-02 PROCEDURE — 93291 INTERROG DEV EVAL SCRMS IP: CPT | Performed by: STUDENT IN AN ORGANIZED HEALTH CARE EDUCATION/TRAINING PROGRAM

## 2024-12-02 PROCEDURE — 93291 INTERROG DEV EVAL SCRMS IP: CPT | Mod: 26 | Performed by: STUDENT IN AN ORGANIZED HEALTH CARE EDUCATION/TRAINING PROGRAM

## 2024-12-02 NOTE — PROGRESS NOTES
Wound site is healing well. Pt advised to watch for increased redness, swelling, oozing or fever. Pt verbalized understanding.Normal device function and no changes made. See flowsheet.    Billing and remote monitoring explained and acknowledged.

## 2024-12-03 NOTE — CARDIAC REMOTE MONITOR - SCAN
Device transmission reviewed. Device demonstrated appropriate function.       Electronically Signed by: Bhavin Mcgowan MD, PhD    12/3/2024  12:44 PM

## 2024-12-11 ENCOUNTER — APPOINTMENT (OUTPATIENT)
Dept: MEDICAL GROUP | Facility: IMAGING CENTER | Age: 74
End: 2024-12-11
Payer: MEDICARE

## 2024-12-11 VITALS
BODY MASS INDEX: 34.5 KG/M2 | SYSTOLIC BLOOD PRESSURE: 120 MMHG | OXYGEN SATURATION: 96 % | TEMPERATURE: 97.3 F | WEIGHT: 246.4 LBS | HEART RATE: 52 BPM | RESPIRATION RATE: 18 BRPM | HEIGHT: 71 IN | DIASTOLIC BLOOD PRESSURE: 66 MMHG

## 2024-12-11 DIAGNOSIS — G60.9 IDIOPATHIC NEUROPATHY: ICD-10-CM

## 2024-12-11 DIAGNOSIS — R20.2 PARESTHESIA OF BOTH FEET: ICD-10-CM

## 2024-12-11 PROCEDURE — 3078F DIAST BP <80 MM HG: CPT

## 2024-12-11 PROCEDURE — 3074F SYST BP LT 130 MM HG: CPT

## 2024-12-11 PROCEDURE — 1170F FXNL STATUS ASSESSED: CPT

## 2024-12-11 PROCEDURE — 99213 OFFICE O/P EST LOW 20 MIN: CPT

## 2024-12-11 PROCEDURE — 1126F AMNT PAIN NOTED NONE PRSNT: CPT

## 2024-12-11 RX ORDER — TACROLIMUS 1 MG/G
OINTMENT TOPICAL 2 TIMES DAILY
COMMUNITY
Start: 2024-11-25 | End: 2024-12-19

## 2024-12-11 RX ORDER — SODIUM FLUORIDE 1.5 MG/G
1 PASTE, DENTIFRICE DENTAL DAILY
Qty: 90 TABLET | Refills: 1 | Status: SHIPPED | OUTPATIENT
Start: 2024-12-11

## 2024-12-11 ASSESSMENT — PAIN SCALES - GENERAL: PAINLEVEL_OUTOF10: NO PAIN

## 2024-12-11 ASSESSMENT — FIBROSIS 4 INDEX: FIB4 SCORE: 3.36

## 2024-12-11 NOTE — PROGRESS NOTES
Subjective:     CC:   Chief Complaint   Patient presents with    Follow-Up    Medication Problem     Duloxetine hcl ec 30 mg cap recalled, has stopped taking it       HPI:   Laureen presents today to discuss:    S/p implantable loop recorder placement on . Overall, pt reports doing well and feels improvement with symptoms. He mentioned having 3 episodes of dizziness with palpitation lasting a few seconds since placement. He states cardiology is aware and not concerned. He will f/u with cardiology on   He has not these symptoms       Paresthesia of both feet- chronic condition. Pt reports seeing podiatry and was prescribed Cymbalta 6 months ago.  He tolerated medications well without adverse side effects.  However, he did not feel significant movement in his symptoms. Patient reports stopping medication 2 weeks ago after seeing the recall on this medication a few weeks ago.   He is withdrawal symptoms.  He restarted taking Nervive supplements which was more effective.      Past Medical History:   Diagnosis Date    Allergy     Anesthesia 2017    Arthritis unk    Cancer ([facility])     skin cancer squamous on the left cheek    Cataract     lens implants    CHF (congestive heart failure) ([facility])     Diabetes ([facility])     pre-diabetic    High cholesterol 1998    Hypercholesterolemia     Hypertension     Migraine     Neuropathy     JUAN on CPAP 2024    PONV (postoperative nausea and vomiting) 2017    Rhinoplasti for deviated septon    Presence of implantable pulmonary artery pressure and heart rate monitoring system 2024    Reactive depression 2023    Renal artery stenosis ([facility])     Sleep apnea 2019    tested    Stroke ([facility])     stroke or contusion    Tinea inguinalis     Tobacco use 10/30/2018    Urinary bladder disorder 1995    bph     Family History   Problem Relation Age of Onset    Cancer Mother         Breast cancer    Heart Disease Mother          at 89yo/heart failure    Heart Attack  Mother     Breast Cancer Mother     Heart Disease Father          at 57yo/massive heart attack    Heart Attack Father     Hypertension Father          coronary thrombosis    Cancer Sister          of leukemia    Breast Cancer Sister     Stroke Sister         living lost eye site left eye    No Known Problems Maternal Grandmother     No Known Problems Maternal Grandfather     No Known Problems Paternal Grandmother     Dementia Paternal Grandfather     Hyperlipidemia Neg Hx     Diabetes Neg Hx      Past Surgical History:   Procedure Laterality Date    GA TRANSURETHRAL ELEC-SURG PROSTATECTOM N/A 2023    Procedure: BIPOLAR TRANSURETHRAL RESECTION OF PROSTATE;  Surgeon: Jacques Mcdowell M.D.;  Location: SURGERY [facility];  Service: Urology    GA CYSTOURETHROSCOPY N/A 2023    Procedure: CYSTOSCOPY;  Surgeon: Jacques Mcdowell M.D.;  Location: SURGERY [facility];  Service: Urology    GA CYSTO/URETERO/PYELOSCOPY W/LITHOTRIPSY N/A 2023    Procedure: CYSTOLITHLOPAXY;  Surgeon: Jacques Mcdowell M.D.;  Location: SURGERY [facility];  Service: Urology    CATARACT PHACO WITH IOL      EYE SURGERY      OTHER      cyst removal back of the neck    OTHER ABDOMINAL SURGERY      gallbladder    PROSTATECTOMY, RADICAL RETRO      RHINOPLASTY       Social History     Tobacco Use    Smoking status: Former     Current packs/day: 0.00     Average packs/day: 1 pack/day for 15.0 years (15.0 ttl pk-yrs)     Types: Cigarettes, Cigars     Start date: 1970     Quit date: 1985     Years since quittin.9    Smokeless tobacco: Never    Tobacco comments:     occasional cigar started in / quit completely    Vaping Use    Vaping status: Never Used   Substance Use Topics    Alcohol use: Yes     Comment: Occasionally    Drug use: Not Currently     Types: Inhaled     Comment: none for 30 years/ tried pot [id number] didn't care to much for     Social History     Social History Narrative    Was in the  "[employer]    Retired from [employer] -  for [employer]     Current Outpatient Medications Ordered in Epic   Medication Sig Dispense Refill    Specialty Vitamins Products (NERVIVE NERVE RELIEF) Tab Take 1 Tablet by mouth every day. 90 Tablet 1    metoprolol SR (TOPROL XL) 25 MG TABLET SR 24 HR Take 1 Tablet by mouth every morning. 90 Tablet 3    olmesartan (BENICAR) 20 MG Tab TAKE ONE TABLET BY MOUTH ONE TIME DAILY (Patient taking differently: Take 20 mg by mouth every morning.) 90 Tablet 0    atorvastatin (LIPITOR) 40 MG Tab Take 1 Tablet by mouth every evening. 30 Tablet 3    metFORMIN ER (GLUCOPHAGE XR) 500 MG TABLET SR 24 HR Take 2 Tablets by mouth every day. (Patient taking differently: Take 1,000 mg by mouth every evening.) 180 Tablet 1    Empagliflozin (JARDIANCE) 10 MG Tab tablet Take 1 Tablet by mouth every day. (Patient taking differently: Take 10 mg by mouth every morning.) 30 Tablet 11    spironolactone (ALDACTONE) 25 MG Tab Take 1 Tablet by mouth every day. (Patient taking differently: Take 25 mg by mouth every evening.) 30 Tablet 11    cetirizine (ZYRTEC) 10 MG Tab Take 10 mg by mouth every morning.      tacrolimus (PROTOPIC) 0.1 % Ointment Apply  topically 2 times a day. (Patient not taking: Reported on 12/11/2024)      meclizine (ANTIVERT) 12.5 MG Tab Take 1 Tablet by mouth 3 times a day as needed for Dizziness. 30 Tablet 0     No current Epic-ordered facility-administered medications on file.     Losartan, Gabapentin, and Hyzaar [losartan potassium-hctz]    ROS: see hpi  Gen: no fevers/chills  Pulm: no sob, no cough  CV: no chest pain, no palpitations, no edema  GI: no nausea/vomiting, no diarrhea  Skin: no rash    Objective:   Exam:  /66 (BP Location: Left arm, Patient Position: Sitting, BP Cuff Size: Adult)   Pulse (!) 52   Temp 36.3 °C (97.3 °F) (Temporal)   Resp 18   Ht 1.803 m (5' 11\")   Wt 112 kg (246 lb 6.4 oz)   SpO2 96%   BMI 34.37 kg/m²    Body mass index is " 34.37 kg/m².    Gen: Alert and oriented, No apparent distress.  HEENT: Head atraumatic, normocephalic. Pupils equal and round.  Neck: Neck is supple without lymphadenopathy.   Lungs: Normal effort, CTA bilaterally, no wheezes, rhonchi, or rales  CV: Regular rate and rhythm. No murmurs, rubs, or gallops.  ABD: +BS. Non-tender, non-distended. No rebound, rigidity, or guarding.  Ext: No clubbing, cyanosis, edema.    Assessment & Plan:     74 y.o. male with the following -     1. Paresthesia of both feet  2.  Idiopathic neuropathy  Chronic condition, stable.  Patient reports improvement with symptoms since starting spironolactone and with Nervive supplements.   He stopped taking Cymbalta 2 weeks ago. Denies withdrawal symptoms.   Go to ED for symptoms: agitation, anxiety, restlessness, irritability, alternating feelings of warmth and chills, diaphoresis, dizziness, dysphoria, lethargy, severe headache, insomnia, controlled/involuntary muscle movements, myalgias, nausea, vomiting, insomnia, psychosis, paresthesia, rhinorrhea, vertigo, palpitations, and tremors.     - Specialty Vitamins Products (NERVIVE NERVE RELIEF) Tab; Take 1 Tablet by mouth every day.  Dispense: 90 Tablet; Refill: 1    Medical Decision Making/Course:  In the course of preparing for this visit with review of the pertinent past medical history, recent and past clinic visits, current medications, and performing chart, immunization, medical history and medication reconciliation, and in the further course of obtaining the current history pertinent to the clinic visit today, performing an exam and evaluation, ordering and independently evaluating labs, tests, and/or procedures, prescribing any recommended new medications as noted above, providing any pertinent counseling and education and recommending further coordination of care. This was discussed with patient in a shared-decision making conversation, and they understand and agreed with plan of care.      Return in about 6 months (around 6/11/2025), or if symptoms worsen or fail to improve.    NATACHA Hawthorne   [facility]    Medical Decision Making/Course:  In the course of preparing for this visit with review of the pertinent past medical history, recent and past clinic visits, current medications, and performing chart, immunization, medical history and medication reconciliation, and in the further course of obtaining the current history pertinent to the clinic visit today, performing an exam and evaluation, ordering and independently evaluating labs, tests, and/or procedures, prescribing any recommended new medications as noted above, providing any pertinent counseling and education and recommending further coordination of care. This was discussed with patient in a shared-decision making conversation, and they understand and agreed with plan of care.     Please note that this dictation was created using voice recognition software. I have made every reasonable attempt to correct obvious errors, but I expect that there are errors of grammar and possibly content that I did not discover before finalizing the note.

## 2024-12-12 ENCOUNTER — TELEPHONE (OUTPATIENT)
Dept: CARDIOLOGY | Facility: MEDICAL CENTER | Age: 74
End: 2024-12-12
Payer: MEDICARE

## 2024-12-12 NOTE — TELEPHONE ENCOUNTER
Called patient back and confirmed he was looking at ID card from [employer] and noticed it shows SS as provider but he currently sees JI for cardiac care. I advised that SS is director of EP and likely that is why he is listed as the provider. Patient understood, had no further questions and was appreciative of call.

## 2024-12-12 NOTE — TELEPHONE ENCOUNTER
MAGALI Bear: Luareen Munoz     Topic/issue: The patient is filling out his [employer] paperwork following his procedure of 11/21/24 and wants to confirm that Dr. MAGALYS Wilks should be listed as the provider. Please advise.     Callback Number: [phone]     Thank you,  Franco MAHAN

## 2024-12-16 ENCOUNTER — TELEPHONE (OUTPATIENT)
Dept: CARDIOLOGY | Facility: MEDICAL CENTER | Age: 74
End: 2024-12-16
Payer: MEDICARE

## 2024-12-16 NOTE — TELEPHONE ENCOUNTER
Called patient to go over the process he was using to try sending transmission and educated patient on the correct steps to take. He was able to successfully send transmission while on the phone with me.     Patient noted that he was making his bed and was about intermediate done when he started to feel dizzy/lightheaded while walking around the bed.     Transmission shows 4 AF episodes on 12/15/2024, ?? Of true AF vs. SR w/ PAC/PVCs, scanned for review.

## 2024-12-16 NOTE — TELEPHONE ENCOUNTER
SS      Caller: Laureen Munoz      Topic/issue: Patient was calling about his heart monitor and he had an event at 11 am on Earl 12/15. He was experiencing dizziness and difficulty walking and he moved the magnet to measure the event and he was wanting to make us aware of this and what was going on with it. He said that he is fine now. Please advise      Callback Number: [phone]    Thank you    -Kyle JAIN

## 2024-12-17 ENCOUNTER — TELEPHONE (OUTPATIENT)
Dept: CARDIOLOGY | Facility: MEDICAL CENTER | Age: 74
End: 2024-12-17
Payer: MEDICARE

## 2024-12-17 DIAGNOSIS — I48.91 ATRIAL FIBRILLATION, UNSPECIFIED TYPE (HCC): ICD-10-CM

## 2024-12-17 NOTE — TELEPHONE ENCOUNTER
Please notify patient of Implantable Loop Recorder placement showing new atrial fibrillation episodes. Please start Eliquis 5 mg BID. Please sent to [facility]. Thank you.  AFIA

## 2024-12-17 NOTE — TELEPHONE ENCOUNTER
Schedulers: Please schedule pt with MT if possible per MAGALI. Thank you!   =======================  Device Team: Pt is asking if you can identify exactly when he had an AF episode?  =======================    Phone Number Called: [phone]    Call outcome: Spoke to patient regarding message below.    Message: Called to discuss. Pt states he had an incident Sunday with dizziness combined with neuropathy and unable to walk for a short episode. Discussed medication ordered and wait time for insurance authorization. Discussed referral to EP. Pt verbalized understanding. Appreciative of call.

## 2024-12-18 NOTE — TELEPHONE ENCOUNTER
The episodes JI reviewed on transmission occurred on 12/15, reported on tel enc noting ?? of true AF vs PACs.

## 2024-12-19 ENCOUNTER — PATIENT MESSAGE (OUTPATIENT)
Dept: SLEEP MEDICINE | Facility: MEDICAL CENTER | Age: 74
End: 2024-12-19
Payer: MEDICARE

## 2024-12-20 ENCOUNTER — TELEPHONE (OUTPATIENT)
Dept: CARDIOLOGY | Facility: MEDICAL CENTER | Age: 74
End: 2024-12-20
Payer: MEDICARE

## 2024-12-20 NOTE — TELEPHONE ENCOUNTER
MT- appt scheduled 12/31/2024    Remote transmission received via home monitor, device shows 1 daytime pause, full report scanned into Media.    Jonah Episode  -Episode Onset: 12/19/2024 @ 12:37 PM  -Duration: approx 35 seconds  -Average Rate: 36 bpm

## 2024-12-20 NOTE — TELEPHONE ENCOUNTER
Phone Number Called: [phone]    Call outcome: Spoke to patient regarding message below.    Message: Called to inform patient of transmission. Patient does not recall anything at the time of the event. He was really busy yesterday and does not recall any issues. Advised patient of ER precautions.     To MT: Any interventions needed prior to patient follow up on 12/31? Currently asymptomatic. Report shows pause of about 3 sec.

## 2024-12-23 ENCOUNTER — NON-PROVIDER VISIT (OUTPATIENT)
Dept: CARDIOLOGY | Facility: MEDICAL CENTER | Age: 74
End: 2024-12-23
Payer: MEDICARE

## 2024-12-23 PROCEDURE — 93298 REM INTERROG DEV EVAL SCRMS: CPT | Mod: 26 | Performed by: INTERNAL MEDICINE

## 2024-12-24 NOTE — CARDIAC REMOTE MONITOR - SCAN
Device transmission reviewed. Device demonstrated appropriate function.       Electronically Signed by: Dima Wilks M.D.    12/24/2024  10:51 AM

## 2024-12-30 DIAGNOSIS — I10 ESSENTIAL HYPERTENSION: ICD-10-CM

## 2024-12-31 ENCOUNTER — TELEPHONE (OUTPATIENT)
Dept: CARDIOLOGY | Facility: MEDICAL CENTER | Age: 74
End: 2024-12-31
Payer: MEDICARE

## 2024-12-31 ENCOUNTER — OFFICE VISIT (OUTPATIENT)
Dept: CARDIOLOGY | Facility: MEDICAL CENTER | Age: 74
End: 2024-12-31
Attending: INTERNAL MEDICINE
Payer: MEDICARE

## 2024-12-31 VITALS
WEIGHT: 248 LBS | HEIGHT: 71 IN | DIASTOLIC BLOOD PRESSURE: 74 MMHG | OXYGEN SATURATION: 96 % | BODY MASS INDEX: 34.72 KG/M2 | HEART RATE: 70 BPM | RESPIRATION RATE: 16 BRPM | SYSTOLIC BLOOD PRESSURE: 120 MMHG

## 2024-12-31 DIAGNOSIS — I48.91 ATRIAL FIBRILLATION, UNSPECIFIED TYPE (HCC): ICD-10-CM

## 2024-12-31 DIAGNOSIS — I10 ESSENTIAL HYPERTENSION: Chronic | ICD-10-CM

## 2024-12-31 DIAGNOSIS — I61.9 CVA (CEREBROVASCULAR ACCIDENT DUE TO INTRACEREBRAL HEMORRHAGE) (HCC): ICD-10-CM

## 2024-12-31 LAB — EKG IMPRESSION: NORMAL

## 2024-12-31 PROCEDURE — 93005 ELECTROCARDIOGRAM TRACING: CPT | Mod: TC | Performed by: NURSE PRACTITIONER

## 2024-12-31 PROCEDURE — 99212 OFFICE O/P EST SF 10 MIN: CPT | Performed by: NURSE PRACTITIONER

## 2024-12-31 RX ORDER — OLMESARTAN MEDOXOMIL 20 MG/1
20 TABLET ORAL DAILY
Qty: 90 TABLET | Refills: 2 | Status: SHIPPED | OUTPATIENT
Start: 2024-12-31

## 2024-12-31 ASSESSMENT — ENCOUNTER SYMPTOMS
NAUSEA: 0
RESPIRATORY NEGATIVE: 1
EYES NEGATIVE: 1
GASTROINTESTINAL NEGATIVE: 1
DIZZINESS: 1
SENSORY CHANGE: 0
SHORTNESS OF BREATH: 0
MUSCULOSKELETAL NEGATIVE: 1
NERVOUS/ANXIOUS: 0
BRUISES/BLEEDS EASILY: 0
PALPITATIONS: 0
HALLUCINATIONS: 0
ORTHOPNEA: 0
CONSTITUTIONAL NEGATIVE: 1
DEPRESSION: 0
WHEEZING: 0
CLAUDICATION: 0
PND: 0

## 2024-12-31 ASSESSMENT — FIBROSIS 4 INDEX: FIB4 SCORE: 3.36

## 2024-12-31 NOTE — PROGRESS NOTES
Atrial Fibrillation (AF) Clinic New Consult Note    DOS: 12/31/2024  [id number]  Laureen Munoz    Chief complaint/Reason for consult: paroxysmal atrial fibrillation     HPI: Pt is a 74 y.o. male who presents to the clinic today in consultation for PAF. Patient has a past medical history significant for but not limited to: pulmonary hypertension, chronic diastolic heart failure, h/o CVA, hypertension, BPH, loop recorder in situ, atrial fibrillation. Patient on loop recorder has some episodes of atrial fibrillation as well as some SVR at noon for 5 seconds and a lot of atrial bigeminy episodes. On last heart monitor prior to loop, had a  16 percent burden of atrial ectopy. Discussed that an ablation or antiarrhythmic medication may help to better control his cardiac arrhythmia disease, although it may uncover underlying conductive issues. Patient states he has been told before he had low heart rates and one day may need a pacemaker. Patient tolerating his Eliquis well. Discussion about oral anticoagulants versus Watchman appendage closure. Would like to think about his options before deciding.    The risks, benefits,and alternatives to Atrial Fibrillation ablation with general anesthesia were discussed in great detail. Catheter ablation is associated with less AF recurrence compared to antiarrhythmic medications per several studies (MARICARMEN, EARLY-AF, STOP-AF).We discussed pulmonary vein isolation for therapeutic management and continued rhythm control. We discussed the risks and benefits of this procedure, with risks to include but not limited to: Risks include 1-3% risk of major cardiovascular event including stroke, myocardial infarction, phrenic nerve damage, esophageal injury and/or fistula formation, cardiac perforation, pericardial effusion, tamponade, major bleeding, or death.  Also mentioned was a 1/400 (0.25%) occurrence of atrial esophageal fistula, which is an abnormal communication between the esophagus  and the left atrium; this complication is often fatal. Lastly the risks of death, myocardial infarction, stroke, DVT and PE were discussed. The patient verbalized understanding of these potential complications and wishes to proceed with this procedure.I quoted a 70 to 80% chance free of atrial fibrillation at 12 months.  We discussed that he may also need a second procedure.     CHADS2 vasc score:   Congestive heart failure, Hypertension , Age 65-74, Diabetes, and Stroke/TIA (2 points)  Total score 6(yearly stroke risk 9.8%)     Has bled score:  History of stroke, Age> 65, and Drugs antiplatelets or NSAIDs  Total score 3       The patient and I have discussed dofetilide for the maintenance of sinus rhythm and reduction of atrial fibrillation or atrial flutter burden. Patient understands they will be admitted for 5 doses (two evenings) in the hospital for monitoring and dosage titration purposes. Patient will get EKG's after each dose to watch for excessive QT prolongation and increased risk for torsades de pointes.  Dofetilide can cause QT prolongation and torsades de pointes. Side effects that are most common include: headache, chest pain, AV block and bradyarrhythmias, skin rash, abdominal discomfort, dizziness, dyspnea, back pain and insomnia. Per the ANGELITA-CHF trial showed a safety efficacy for use of dofetilide and a 12 % increase in restoration and maintenance in sinus rhythm. Caution with QTc >479 msec. In ANGELITA-MI, dofetilide was found to be safe for patients with EF<35% and recent MI. SAFIRE-D trials also showed good efficacy in restoration of sinus rhythm and prevention of recurrent atrial arrhythmias.     Patient and I have discussed and plan to use sotalol for anti-arrhythmic maintenance post conversion to sinus rhythm. Sotalol is a good maintenance and preventative antiarrhythmic. It has both antiarrhtymic (d isomer) and beta blocking (I isomer) properties. It can however cause reverse use  dependence in that it can lower the heart rate as well as increase the QT interval. Therefore the patient and I have discussed admittance to the hospital for at least 5 doses (two evenings) for initiation and dosage titration. Patient will be monitored with telemetry and will get EKG after each dose. Side effects include but not limited to: fatigue, bradycardia, dyspnea, dizziness and anxiety. It is excreted via the kidneys and creatinine clearance and GFR should be monitored while patient take medication. Attention should be made to GFR in patients with CKD as it can prolong the half life. Do not use in patients whose creatinine clearance is <40 mL/min. . Do not use in patients with uncontrolled asthma, sinus bradycardia, Mobitz 2 AV block or complete AV block, congenital or acquired QT syndrome, cardiogenic shock or uncontrolled heart failure. Good for VT, AF, and ARVC               Past Medical History:   Diagnosis Date    Allergy     Anesthesia 2017    Arthritis unk    Cancer ([facility]) 2020    skin cancer squamous on the left cheek    Cataract 2022    lens implants    CHF (congestive heart failure) ([facility])     Diabetes ([facility]) 2023    pre-diabetic    High cholesterol 1998    Hypercholesterolemia     Hypertension     Migraine     Neuropathy     JUAN on CPAP 11/01/2024    PONV (postoperative nausea and vomiting) 2017    Rhinoplasti for deviated septon    Presence of implantable pulmonary artery pressure and heart rate monitoring system 11/21/2024    Reactive depression 02/21/2023    Renal artery stenosis ([facility])     Sleep apnea 2019    tested    Stroke ([facility]) 2023    stroke or contusion    Tinea inguinalis     Tobacco use 10/30/2018    Urinary bladder disorder 1995    bph       Past Surgical History:   Procedure Laterality Date    NE TRANSURETHRAL ELEC-SURG PROSTATECTOM N/A 08/17/2023    Procedure: BIPOLAR TRANSURETHRAL RESECTION OF PROSTATE;  Surgeon: Jacques Mcdowell M.D.;  Location: SURGERY [facility];  Service: Urology     NH CYSTOURETHROSCOPY N/A 2023    Procedure: CYSTOSCOPY;  Surgeon: Jacques Mcdowell M.D.;  Location: SURGERY [facility];  Service: Urology    NH CYSTO/URETERO/PYELOSCOPY W/LITHOTRIPSY N/A 2023    Procedure: CYSTOLITHLOPAXY;  Surgeon: Jacques Mcdowell M.D.;  Location: SURGERY [facility];  Service: Urology    CATARACT PHACO WITH IOL      EYE SURGERY      OTHER      cyst removal back of the neck    OTHER ABDOMINAL SURGERY      gallbladder    PROSTATECTOMY, RADICAL RETRO      RHINOPLASTY         Social History     Socioeconomic History    Marital status:      Spouse name: Not on file    Number of children: Not on file    Years of education: Not on file    Highest education level: Associate degree: academic program   Occupational History    Not on file   Tobacco Use    Smoking status: Former     Current packs/day: 0.00     Average packs/day: 1 pack/day for 15.0 years (15.0 ttl pk-yrs)     Types: Cigarettes, Cigars     Start date: 1970     Quit date: 1985     Years since quittin.0    Smokeless tobacco: Never    Tobacco comments:     occasional cigar started in / quit completely    Vaping Use    Vaping status: Never Used   Substance and Sexual Activity    Alcohol use: Yes     Comment: Occasionally    Drug use: Not Currently     Types: Inhaled     Comment: none for 30 years/ tried pot [id number] didn't care to much for    Sexual activity: Not Currently     Partners: Female     Comment: not sexually active last 3 years, when active male condom   Other Topics Concern    Not on file   Social History Narrative    Was in the [employer]    Retired from [employer] -  for [employer]     Social Drivers of Health     Financial Resource Strain: Medium Risk (4/15/2023)    Overall Financial Resource Strain (CARDIA)     Difficulty of Paying Living Expenses: Somewhat hard   Food Insecurity: No Food Insecurity (2024)    Hunger Vital Sign     Worried About Running Out of  Food in the Last Year: Never true     Ran Out of Food in the Last Year: Never true   Transportation Needs: No Transportation Needs (2024)    PRAPARE - Transportation     Lack of Transportation (Medical): No     Lack of Transportation (Non-Medical): No   Physical Activity: Inactive (4/15/2023)    Exercise Vital Sign     Days of Exercise per Week: 0 days     Minutes of Exercise per Session: 0 min   Stress: No Stress Concern Present (4/15/2023)    [employer] - Occupational Stress Questionnaire     Feeling of Stress : Only a little   Social Connections: Socially Isolated (4/15/2023)    Social Connection and Isolation Panel [NHANES]     Frequency of Communication with Friends and Family: More than three times a week     Frequency of Social Gatherings with Friends and Family: Once a week     Attends Yazdanism Services: Never     Active Member of Clubs or Organizations: No     Attends Club or Organization Meetings: Never     Marital Status:    Intimate Partner Violence: Not At Risk (2024)    Humiliation, Afraid, Rape, and Kick questionnaire     Fear of Current or Ex-Partner: No     Emotionally Abused: No     Physically Abused: No     Sexually Abused: No   Housing Stability: Low Risk  (2024)    Housing Stability Vital Sign     Unable to Pay for Housing in the Last Year: No     Number of Times Moved in the Last Year: 1     Homeless in the Last Year: No       Family History   Problem Relation Age of Onset    Cancer Mother         Breast cancer    Heart Disease Mother          at 89yo/heart failure    Heart Attack Mother     Breast Cancer Mother     Heart Disease Father          at 57yo/massive heart attack    Heart Attack Father     Hypertension Father          coronary thrombosis    Cancer Sister          of leukemia    Breast Cancer Sister     Stroke Sister         living lost eye site left eye    No Known Problems Maternal Grandmother     No Known  Problems Maternal Grandfather     No Known Problems Paternal Grandmother     Dementia Paternal Grandfather     Hyperlipidemia Neg Hx     Diabetes Neg Hx        Allergies   Allergen Reactions    Losartan Cough    Gabapentin Unspecified     Dizziness, confusion, falling    Hyzaar [Losartan Potassium-Hctz] Cough       Current Outpatient Medications   Medication Sig Dispense Refill    apixaban (ELIQUIS) 5mg Tab Take 1 Tablet by mouth 2 times a day. 180 Tablet 3    Specialty Vitamins Products (NERVIVE NERVE RELIEF) Tab Take 1 Tablet by mouth every day. 90 Tablet 1    metoprolol SR (TOPROL XL) 25 MG TABLET SR 24 HR Take 1 Tablet by mouth every morning. 90 Tablet 3    atorvastatin (LIPITOR) 40 MG Tab Take 1 Tablet by mouth every evening. 30 Tablet 3    metFORMIN ER (GLUCOPHAGE XR) 500 MG TABLET SR 24 HR Take 2 Tablets by mouth every day. (Patient taking differently: Take 1,000 mg by mouth every evening.) 180 Tablet 1    Empagliflozin (JARDIANCE) 10 MG Tab tablet Take 1 Tablet by mouth every day. (Patient taking differently: Take 10 mg by mouth every morning.) 30 Tablet 11    spironolactone (ALDACTONE) 25 MG Tab Take 1 Tablet by mouth every day. (Patient taking differently: Take 25 mg by mouth every evening.) 30 Tablet 11    cetirizine (ZYRTEC) 10 MG Tab Take 10 mg by mouth every morning.      meclizine (ANTIVERT) 12.5 MG Tab Take 1 Tablet by mouth 3 times a day as needed for Dizziness. 30 Tablet 0    olmesartan (BENICAR) 20 MG Tab Take 1 Tablet by mouth every day. 90 Tablet 2     No current facility-administered medications for this visit.       Vitals:    12/31/24 0816   BP: 120/74   Pulse: 70   Resp: 16   SpO2: 96%         Review of Systems   Constitutional: Negative.  Negative for malaise/fatigue.   HENT: Negative.     Eyes: Negative.    Respiratory: Negative.  Negative for shortness of breath and wheezing.    Cardiovascular:  Negative for chest pain, palpitations, orthopnea, claudication, leg swelling and PND.    Gastrointestinal: Negative.  Negative for nausea.   Genitourinary: Negative.    Musculoskeletal: Negative.    Skin: Negative.    Neurological:  Positive for dizziness. Negative for sensory change.        Intermittent    Endo/Heme/Allergies: Negative.  Does not bruise/bleed easily.   Psychiatric/Behavioral:  Negative for depression and hallucinations. The patient is not nervous/anxious.             EKG interpreted by me: Sinus with atrial bigeminy     Physical Exam  Constitutional:       Appearance: Normal appearance.   HENT:      Head: Normocephalic.   Eyes:      Pupils: Pupils are equal, round, and reactive to light.   Neck:      Vascular: No JVD.   Cardiovascular:      Rate and Rhythm: Normal rate and regular rhythm.      Pulses: Normal pulses.      Heart sounds: Normal heart sounds.   Pulmonary:      Effort: Pulmonary effort is normal.      Breath sounds: Normal breath sounds.   Abdominal:      General: Abdomen is flat.      Palpations: Abdomen is soft.   Musculoskeletal:      Cervical back: Normal range of motion.      Right lower leg: No edema.      Left lower leg: No edema.   Skin:     General: Skin is warm and dry.   Neurological:      Mental Status: He is alert and oriented to person, place, and time.   Psychiatric:         Mood and Affect: Mood normal.         Behavior: Behavior normal.          Data:  Lipids:   Lab Results   Component Value Date/Time    CHOLSTRLTOT 124 04/02/2024 08:35 AM    TRIGLYCERIDE 121 04/02/2024 08:35 AM    HDL 46 04/02/2024 08:35 AM    LDL 54 04/02/2024 08:35 AM        BMP:  Lab Results   Component Value Date/Time    SODIUM 136 08/14/2024 0706    POTASSIUM 4.5 08/14/2024 0706    CHLORIDE 105 08/14/2024 0706    CO2 19 (L) 08/14/2024 0706    GLUCOSE 97 08/14/2024 0706    BUN 19 08/14/2024 0706    CREATININE 0.87 08/14/2024 0706    CALCIUM 8.7 08/14/2024 0706    ANION 12.0 08/14/2024 0706       GFR:  Lab Results   Component Value Date/Time    IFAFRICA >60 01/21/2022 0846    IFNOTAFR  ">60 01/21/2022 0846        TSH:   Lab Results   Component Value Date/Time    TSHULTRASEN 1.380 08/13/2024 2314       MAGNESIUM:  Lab Results   Component Value Date/Time    MAGNESIUM 2.3 08/13/2024 2015        THYROXINE (T4):   No results found for: \"FREEDIR\"     CBC:   Lab Results   Component Value Date/Time    WBC 5.6 08/14/2024 07:06 AM    WBC 9.4 03/01/2010 04:54 PM    RBC 5.00 08/14/2024 07:06 AM    RBC 5.39 03/01/2010 04:54 PM    HEMOGLOBIN 16.1 09/19/2024 11:07 AM    HEMATOCRIT 48.6 09/19/2024 11:07 AM    MCV 91.6 08/14/2024 07:06 AM    MCV 89 03/01/2010 04:54 PM    MCH 30.2 08/14/2024 07:06 AM    MCH 29.3 03/01/2010 04:54 PM    MCHC 33.0 08/14/2024 07:06 AM    RDW 48.7 08/14/2024 07:06 AM    RDW 14.0 03/01/2010 04:54 PM    PLATELETCT 115 (L) 08/14/2024 07:06 AM    MPV 10.5 08/14/2024 07:06 AM    NEUTSPOLYS 61.30 08/14/2024 07:06 AM    LYMPHOCYTES 22.40 08/14/2024 07:06 AM    MONOCYTES 13.10 08/14/2024 07:06 AM    EOSINOPHILS 1.60 08/14/2024 07:06 AM    BASOPHILS 0.50 08/14/2024 07:06 AM    IMMGRAN 1.10 (H) 08/14/2024 07:06 AM    IMMGRAN 1 (H) 03/01/2010 04:54 PM    NRBC 0.00 08/14/2024 07:06 AM    NEUTS 3.43 08/14/2024 07:06 AM    NEUTS 5.8 03/01/2010 04:54 PM    LYMPHS 1.25 08/14/2024 07:06 AM    LYMPHS 2.4 03/01/2010 04:54 PM    MONOS 0.73 08/14/2024 07:06 AM    MONOS 1.1 (H) 03/01/2010 04:54 PM    EOS 0.09 08/14/2024 07:06 AM    EOS 0.1 03/01/2010 04:54 PM    BASO 0.03 08/14/2024 07:06 AM    BASO 0.0 03/01/2010 04:54 PM    IMMGRANAB 0.06 08/14/2024 07:06 AM    IMMGRANAB 0.1 (H) 03/01/2010 04:54 PM    NRBCAB 0.00 08/14/2024 07:06 AM        CBC w/o DIFF  Lab Results   Component Value Date/Time    WBC 5.6 08/14/2024 07:06 AM    WBC 9.4 03/01/2010 04:54 PM    RBC 5.00 08/14/2024 07:06 AM    RBC 5.39 03/01/2010 04:54 PM    HEMOGLOBIN 16.1 09/19/2024 11:07 AM    MCV 91.6 08/14/2024 07:06 AM    MCV 89 03/01/2010 04:54 PM    MCH 30.2 08/14/2024 07:06 AM    MCH 29.3 03/01/2010 04:54 PM    MCHC 33.0 08/14/2024 07:06 " "AM    RDW 48.7 08/14/2024 07:06 AM    RDW 14.0 03/01/2010 04:54 PM    MPV 10.5 08/14/2024 07:06 AM       LIVER:  Lab Results   Component Value Date/Time    ALKPHOSPHAT 97 08/14/2024 07:06 AM    ASTSGOT 28 08/14/2024 07:06 AM    ALTSGPT 28 08/14/2024 07:06 AM    TBILIRUBIN 1.5 08/14/2024 07:06 AM       BNP:  No results found for: \"BNPBTYPENAT\"    PT/INR:  Lab Results   Component Value Date/Time    PROTHROMBTM 14.5 08/07/2023 10:24 AM    INR 1.14 (H) 08/07/2023 10:24 AM             Impression/Plan:  Essential hypertension   - stable   - no need to change   - long discussion about weight loss and exercise to help with blood pressure as well as glycemia     CVA (cerebrovascular accident due to intracerebral hemorrhage) (HCC)   - should stay on chronic oral anticoagulation    - this would be preferable over Watchman     Atrial fibrillation (HCC)   - short salvos on loop recorder   - on proper anticoagulation   - CHADS 2 score 6   - metoprolol for beta blockade    - should consider antiarrhythmic medications or ablation for better long term optimization cardiac    - loop placed by RY       Lifestyle Modification for better heart health care as well as beneficial for prevention of worsening the atrial arrhythmia progression. Lifestyle modification discussed includes diet and reduction of sodium. Patients should eat more of a Mediterranean diet and be very careful with how much processed and fast food they eat. Excessive sodium intake can result in fluid retention which can add additional strain to patients cardiac electrical system. Weight loss can also help long term with cardiac health. For patients with BMI above 25kg/m2, studies have shown a greater chance of progression of disease as well as recurrence after interventions. ARREST-AF study showed less recurrence of AF and slower disease progression in patients with BMI below 27kg/m2. Smoking and vaping should be stopped. Moderation on caffeine and alcohol. Excessive " alcohol or caffeine can result in reactions and antagonize the hearts electrical system. Patient that fit the matrix for sleep apnea should be referred for a formal study and should try to be compliant with treatment for sleep apnea. Stay hydrated and stay active. Studies have shown that staying physically active can help heart health. The CARDIO-FIT study showed sedentary individuals lead to faster time of recurrence of arrhythmia. Exercise goals should be trying to maintain 120-200 minutes per week of exercise.      A total of 41 minutes of time was spent on day of encounter reviewing medical record, performing history and examination, counseling, ordering medication/test/consults, collaborating with referring service, and documentation.    Delvis Hammond AGACNP-EP  Cardiac Electrophysiology

## 2024-12-31 NOTE — ASSESSMENT & PLAN NOTE
- short salvos on loop recorder   - on proper anticoagulation   - CHADS 2 score 6   - metoprolol for beta blockade    - should consider antiarrhythmic medications or ablation for better long term optimization cardiac    - loop placed by RY

## 2024-12-31 NOTE — ASSESSMENT & PLAN NOTE
- stable   - no need to change   - long discussion about weight loss and exercise to help with blood pressure as well as glycemia

## 2025-01-06 ENCOUNTER — TELEPHONE (OUTPATIENT)
Dept: CARDIOLOGY | Facility: MEDICAL CENTER | Age: 75
End: 2025-01-06
Payer: MEDICARE

## 2025-01-06 NOTE — TELEPHONE ENCOUNTER
Last OV: 12/31/2024  Proposed Surgery: colonoscopy with deep propofol sedation   Surgery Date: 11/29/2024  Requesting Office Name: GI consultant   Fax Number: [phone]  Preference of Location (default is surgery center unless specified by Cardiologist or [facility])  Prior Clearance Addressed: No    Is this a general clearance? YES   Anticoags/Antiplatelets: Apixaban   Anticoags/Antiplatelet managed by Cardiology? YES    Outstanding Cardiac Imaging : No  Ablation, Cardioversion, Stent, Cardiac Devices, Catheterization, Watchman: Yes  Within the last 6 months. Forward to provider to review.  TAVR/Valve, Mitral Clip, Watchman (including open heart),: N/A   Recent Cardiac Hospitalization: No            When: N/A  History (cardiac history):   Past Medical History:   Diagnosis Date    Allergy     Anesthesia 2017    Arthritis unk    Cancer ([facility]) 2020    skin cancer squamous on the left cheek    Cataract 2022    lens implants    CHF (congestive heart failure) ([facility])     Diabetes ([facility]) 2023    pre-diabetic    High cholesterol 1998    Hypercholesterolemia     Hypertension     Migraine     Neuropathy     JUAN on CPAP 11/01/2024    PONV (postoperative nausea and vomiting) 2017    Rhinoplasti for deviated septon    Presence of implantable pulmonary artery pressure and heart rate monitoring system 11/21/2024    Reactive depression 02/21/2023    Renal artery stenosis ([facility])     Sleep apnea 2019    tested    Stroke ([facility]) 2023    stroke or contusion    Tinea inguinalis     Tobacco use 10/30/2018    Urinary bladder disorder 1995    bph           Is this a dental clearance? NO  Ablation, Cardioversion, Watchman, Stents, Cath, Devices within the last 3 months? Yes    If yes- Send dental wait letter, do not forward to provider for review.     TAVR / Valve, Mitral clip within the last 6 months? No  If yes- Send dental wait letter, do not forward to provider for review.     If completing a general clearance, continue per protocol.            Surgical Clearance Letter Sent: No Provider to advise.   **Scan clearance request letter into [facility].**

## 2025-01-06 NOTE — LETTER
PROCEDURE/SURGERY CLEARANCE FORM      Encounter Date: 1/6/2025    Patient: Laureen Munoz  YOB: 1950    CARDIOLOGIST:  NATACHA Buenrostro    REFERRING DOCTOR:  No ref. provider found  The following procedure/surgery: colonoscopy with deep propofol sedation                                            Additional comments: Do not hold Eliquis for more than 2-3 days prior and restart as soon after procedure as deemed safe by procedural physician. If medication is needing to be held longer due to unforseen complications during case, that physician needs to assume responsibility for getting patient back on medication in timely fashion for stroke protection due to diagnosis of atrial fibrillation.          Electronically signed         MD Signature   NATACHA Buenrostro    PROCEDURE/SURGERY CLEARANCE FORM    Date: 1/7/2025   Patient Name: Laureen Munoz    Dear Surgeon or Proceduralist,      Thank you for your request for cardiac stratification of our mutual patient Laureen Munoz 1950. We have reviewed their [facility] records; and to the best of our understanding this patient has not had stenting, ablation, watchman, cardiothoracic surgery or hospitalization for cardiovascular reasons in the past 6 months.  Laureen Munoz has been seen within the past 15 months and is considered to have non-modifiable cardiac risk for this low-risk procedure/surgery. They may proceed from a cardiovascular standpoint and may hold their antiplatelet/anticoagulation as briefly as possible. Please have patient resume this medication when hemodynamically stable to do so.     Aspirin or Prasugrel   - hold 7 days prior to procedure/surgery, resume when hemodynamically stable      Clopidrogrel or Ticagrelor  - hold 7 days for all neurological procedures, hold 5 days prior to all other procedure/surgery,  resume when hemodynamically stable     Warfarin - hold 7 days for all neurological procedures, hold 5  days prior to all other procedure/surgery and coordinate with [facility] ([phone]) INR testing and dose management.      If they have a mechanical heart valve, please coordinate with [facility] ([phone]) the proper management of their anticoagulant in the periprocedural or perioperative period.      Some patients have higher risk for cardiovascular complications or holding medication. If our patient has had prior complications of holding antiplatelet or anticoagulants in the past and we have seen them after these events, we have addressed these concerns with the patient. They are at an unknown degree of increased risk for recurrent complication.  You may hold anticoagulation/antiplatelets for the procedure or surgery if the benefits of the procedure or surgery outweigh this nonmodifiable risk.      If Laureen Munoz 1950 has new symptoms of heart failure decompensation, unstable arrythmia, or angina please reach out and we will assess the patient.      If you have other patient-specific concerns, please feel free to reach out to the patient's cardiologist directly at [phone].     Thank you,       [facility]

## 2025-01-13 LAB — EKG IMPRESSION: NORMAL

## 2025-01-17 DIAGNOSIS — H81.10 BENIGN PAROXYSMAL POSITIONAL VERTIGO, UNSPECIFIED LATERALITY: ICD-10-CM

## 2025-01-17 RX ORDER — MECLIZINE HCL 12.5 MG 12.5 MG/1
TABLET ORAL
Qty: 30 TABLET | Refills: 0 | Status: SHIPPED | OUTPATIENT
Start: 2025-01-17

## 2025-01-17 NOTE — TELEPHONE ENCOUNTER
Received request via: Pharmacy    Was the patient seen in the last year in this department? Yes    Does the patient have an active prescription (recently filled or refills available) for medication(s) requested? No    Pharmacy Name: [facility]    Does the patient have long term Plus and need 100-day supply? (This applies to ALL medications) Patient does not have SCP

## 2025-01-27 ENCOUNTER — NON-PROVIDER VISIT (OUTPATIENT)
Dept: CARDIOLOGY | Facility: MEDICAL CENTER | Age: 75
End: 2025-01-27
Payer: MEDICARE

## 2025-01-28 PROCEDURE — 93298 REM INTERROG DEV EVAL SCRMS: CPT | Performed by: INTERNAL MEDICINE

## 2025-01-28 NOTE — CARDIAC REMOTE MONITOR - SCAN
Device transmission reviewed. Device demonstrated appropriate function.       Electronically Signed by: Oleg Dillon M.D.    1/28/2025  2:34 PM

## 2025-01-30 DIAGNOSIS — R73.03 PREDIABETES: ICD-10-CM

## 2025-01-30 RX ORDER — METFORMIN HYDROCHLORIDE 500 MG/1
1000 TABLET, EXTENDED RELEASE ORAL DAILY
Qty: 180 TABLET | Refills: 1 | Status: SHIPPED | OUTPATIENT
Start: 2025-01-30

## 2025-02-09 DIAGNOSIS — I50.31 ACUTE DIASTOLIC CONGESTIVE HEART FAILURE (HCC): ICD-10-CM

## 2025-02-10 ENCOUNTER — HOSPITAL ENCOUNTER (OUTPATIENT)
Dept: LAB | Facility: MEDICAL CENTER | Age: 75
End: 2025-02-10
Attending: INTERNAL MEDICINE
Payer: MEDICARE

## 2025-02-10 DIAGNOSIS — D35.2 PITUITARY ADENOMA (HCC): ICD-10-CM

## 2025-02-10 DIAGNOSIS — E55.9 HYPOVITAMINOSIS D: ICD-10-CM

## 2025-02-10 DIAGNOSIS — E66.9 OBESITY (BMI 35.0-39.9 WITHOUT COMORBIDITY): ICD-10-CM

## 2025-02-10 DIAGNOSIS — R42 DIZZINESS: ICD-10-CM

## 2025-02-10 DIAGNOSIS — R73.03 PREDIABETES: ICD-10-CM

## 2025-02-10 LAB
ALBUMIN SERPL BCP-MCNC: 4.4 G/DL (ref 3.2–4.9)
ALBUMIN/GLOB SERPL: 1.5 G/DL
ALP SERPL-CCNC: 106 U/L (ref 30–99)
ALT SERPL-CCNC: 35 U/L (ref 2–50)
ANION GAP SERPL CALC-SCNC: 12 MMOL/L (ref 7–16)
AST SERPL-CCNC: 31 U/L (ref 12–45)
BILIRUB SERPL-MCNC: 1 MG/DL (ref 0.1–1.5)
BUN SERPL-MCNC: 24 MG/DL (ref 8–22)
CALCIUM ALBUM COR SERPL-MCNC: 9.3 MG/DL (ref 8.5–10.5)
CALCIUM SERPL-MCNC: 9.6 MG/DL (ref 8.5–10.5)
CHLORIDE SERPL-SCNC: 101 MMOL/L (ref 96–112)
CO2 SERPL-SCNC: 22 MMOL/L (ref 20–33)
CORTIS SERPL-MCNC: 12.9 UG/DL (ref 0–23)
CREAT SERPL-MCNC: 1.06 MG/DL (ref 0.5–1.4)
GFR SERPLBLD CREATININE-BSD FMLA CKD-EPI: 74 ML/MIN/1.73 M 2
GLOBULIN SER CALC-MCNC: 3 G/DL (ref 1.9–3.5)
GLUCOSE SERPL-MCNC: 100 MG/DL (ref 65–99)
POTASSIUM SERPL-SCNC: 4.2 MMOL/L (ref 3.6–5.5)
PROLACTIN SERPL-MCNC: 11.2 NG/ML (ref 2.1–17.7)
PROT SERPL-MCNC: 7.4 G/DL (ref 6–8.2)
SODIUM SERPL-SCNC: 135 MMOL/L (ref 135–145)
T4 FREE SERPL-MCNC: 1.25 NG/DL (ref 0.93–1.7)
TSH SERPL-ACNC: 1.65 UIU/ML (ref 0.35–5.5)

## 2025-02-10 PROCEDURE — 82533 TOTAL CORTISOL: CPT

## 2025-02-10 PROCEDURE — 82024 ASSAY OF ACTH: CPT

## 2025-02-10 PROCEDURE — 84305 ASSAY OF SOMATOMEDIN: CPT

## 2025-02-10 PROCEDURE — 84146 ASSAY OF PROLACTIN: CPT

## 2025-02-10 PROCEDURE — 80053 COMPREHEN METABOLIC PANEL: CPT

## 2025-02-10 PROCEDURE — 84443 ASSAY THYROID STIM HORMONE: CPT

## 2025-02-10 PROCEDURE — 84439 ASSAY OF FREE THYROXINE: CPT

## 2025-02-10 PROCEDURE — 36415 COLL VENOUS BLD VENIPUNCTURE: CPT

## 2025-02-10 RX ORDER — SPIRONOLACTONE 25 MG/1
25 TABLET ORAL DAILY
Qty: 90 TABLET | Refills: 1 | Status: SHIPPED | OUTPATIENT
Start: 2025-02-10

## 2025-02-12 LAB
ACTH PLAS-MCNC: 29.7 PG/ML (ref 7.2–63.3)
IGF-I SERPL-MCNC: 160 NG/ML (ref 24–236)
IGF-I Z-SCORE SERPL: 1

## 2025-02-19 ENCOUNTER — OFFICE VISIT (OUTPATIENT)
Dept: MEDICAL GROUP | Facility: IMAGING CENTER | Age: 75
End: 2025-02-19
Payer: MEDICARE

## 2025-02-19 VITALS
OXYGEN SATURATION: 91 % | HEART RATE: 60 BPM | BODY MASS INDEX: 34.75 KG/M2 | HEIGHT: 71 IN | DIASTOLIC BLOOD PRESSURE: 62 MMHG | SYSTOLIC BLOOD PRESSURE: 112 MMHG | WEIGHT: 248.2 LBS | RESPIRATION RATE: 16 BRPM | TEMPERATURE: 97 F

## 2025-02-19 DIAGNOSIS — R19.8 GI PROBLEM: ICD-10-CM

## 2025-02-19 DIAGNOSIS — J34.89 NASAL DRAINAGE: ICD-10-CM

## 2025-02-19 DIAGNOSIS — K58.2 IRRITABLE BOWEL SYNDROME WITH BOTH CONSTIPATION AND DIARRHEA: ICD-10-CM

## 2025-02-19 PROCEDURE — 99213 OFFICE O/P EST LOW 20 MIN: CPT

## 2025-02-19 PROCEDURE — 3074F SYST BP LT 130 MM HG: CPT

## 2025-02-19 PROCEDURE — 3078F DIAST BP <80 MM HG: CPT

## 2025-02-19 PROCEDURE — 1170F FXNL STATUS ASSESSED: CPT

## 2025-02-19 PROCEDURE — 1126F AMNT PAIN NOTED NONE PRSNT: CPT

## 2025-02-19 ASSESSMENT — PAIN SCALES - GENERAL: PAINLEVEL_OUTOF10: NO PAIN

## 2025-02-19 ASSESSMENT — FIBROSIS 4 INDEX: FIB4 SCORE: 3.33

## 2025-02-19 ASSESSMENT — PATIENT HEALTH QUESTIONNAIRE - PHQ9: CLINICAL INTERPRETATION OF PHQ2 SCORE: 0

## 2025-02-19 NOTE — PROGRESS NOTES
Subjective:     CC:   Chief Complaint   Patient presents with    GI Problem     Bowels, constipations at 3:30 am and by 5 it would be diarrhea, happens often, not frequent, 1-2 times a week. Ongoing for couple of months, happened before but getting worse now    Other     Runny nose, thinks it could be an allergy. Thinks it could be from one of his medications but doesn't know which medication. Started after jardiance       HPI:   Laureen presents today to discuss:    GI problem  Patient reports irregular bowel patterns consisting of constipation and diarrhea. Pt admits having history of constipation and takes stool soften which causes diarrhea.   He stopped taking magnesium and stool softeners in December; however, his symptoms remain.   He is having episodes of incontinence with diarrhea episodes.   No alleviating or exacerbating factors identified.  OTC imodium does not help.  He started taking benefiber without improvement.   He is due to repeat colonoscopy.  Denies n/v, anorexia, abdominal pain, hematochezia, melena, unintentional weight loss.   Denies fever, recent travel, or exposure to new/raw food.    Nasal drainage  Pt reports developing clear nasal drainage 2 months ago roughly around the same time he started jardiance and eliquis. He is concern that this could be reaction from medication.   His symptoms occurs first thing in am. Then improves by the end of the day.   He is using flonase and takes zyrtec daily for allergy which helps minimally.   He denies nasal congestion, sinus pressure, sneezing, coughing, difficulty breathing.             Past Medical History:   Diagnosis Date    Allergy     Anesthesia 2017    Arthritis unk    Back pain     Cancer ([facility]) 2020    skin cancer squamous on the left cheek    Cataract 2022    lens implants    CHF (congestive heart failure) ([facility])     Chickenpox     Daytime sleepiness     Diabetes ([facility]) 2023    pre-diabetic    Dizziness     Frequent urination     Armenian measles      High cholesterol 1998    Hypercholesterolemia     Hypertension     Insomnia     Kidney stone     Migraine     Mumps     Neuropathy     Obesity     JUAN on CPAP 2024    PONV (postoperative nausea and vomiting) 2017    Rhinoplasti for deviated septon    Presence of implantable pulmonary artery pressure and heart rate monitoring system 2024    Reactive depression 2023    Renal artery stenosis (HCC)     Ringing in ears     Shortness of breath     Sleep apnea     tested    Stroke (HCC)     stroke or contusion    Swelling of lower extremity     Tinea inguinalis     Tobacco use 10/30/2018    Urinary bladder disorder     bph    Wears glasses     Wheezing      Family History   Problem Relation Age of Onset    Cancer Mother         Breast cancer    Heart Disease Mother          at 89yo/heart failure    Heart Attack Mother     Breast Cancer Mother     Heart Disease Father          at 59yo/massive heart attack    Heart Attack Father     Hypertension Father          coronary thrombosis    Cancer Sister          of leukemia    Breast Cancer Sister     Allergies Sister     Stroke Sister         living lost eye site left eye    Arthritis Sister     No Known Problems Maternal Grandmother     No Known Problems Maternal Grandfather     No Known Problems Paternal Grandmother     Dementia Paternal Grandfather     Alcohol/Drug Other         uncle    Allergies Sister     Hyperlipidemia Neg Hx     Diabetes Neg Hx      Past Surgical History:   Procedure Laterality Date    MA TRANSURETHRAL ELEC-SURG PROSTATECTOM N/A 2023    Procedure: BIPOLAR TRANSURETHRAL RESECTION OF PROSTATE;  Surgeon: Jacques Mcdowell M.D.;  Location: SURGERY [facility];  Service: Urology    MA CYSTOURETHROSCOPY N/A 2023    Procedure: CYSTOSCOPY;  Surgeon: Jacques Mcdowell M.D.;  Location: SURGERY [facility];  Service: Urology    MA CYSTO/URETERO/PYELOSCOPY W/LITHOTRIPSY N/A 2023    Procedure:  CYSTOLITHLOPAXY;  Surgeon: Jacques Mcdowell M.D.;  Location: SURGERY [facility];  Service: Urology    CATARACT PHACO WITH IOL      EYE SURGERY      OTHER      cyst removal back of the neck    OTHER ABDOMINAL SURGERY      gallbladder    MO REMV 2ND CATARACT,CORN-SCLER SECTN      PROSTATECTOMY, RADICAL RETRO      RHINOPLASTY      SINUSCOPE       Social History     Tobacco Use    Smoking status: Former     Current packs/day: 0.00     Average packs/day: 1 pack/day for 15.0 years (15.0 ttl pk-yrs)     Types: Cigarettes, Cigars     Start date: 1970     Quit date: 1985     Years since quittin.1    Smokeless tobacco: Never    Tobacco comments:     occasional cigar started in / quit completely    Vaping Use    Vaping status: Never Used   Substance Use Topics    Alcohol use: Yes     Comment: Occasionally    Drug use: Not Currently     Types: Inhaled     Comment: none for 30 years/ tried pot [id number] didn't care to much for     Social History     Social History Narrative    Was in the [employer]    Retired from [employer] -  for [employer]     Current Outpatient Medications Ordered in Epic   Medication Sig Dispense Refill    Empagliflozin (JARDIANCE) 10 MG Tab tablet Take 1 Tablet by mouth every day. 90 Tablet 3    spironolactone (ALDACTONE) 25 MG Tab Take 1 Tablet by mouth every day. 90 Tablet 1    metFORMIN ER (GLUCOPHAGE XR) 500 MG TABLET SR 24 HR Take 2 Tablets by mouth every day. 180 Tablet 1    meclizine (ANTIVERT) 12.5 MG Tab TAKE ONE TABLET BY MOUTH THREE TIMES DAILY AS NEEDED FOR DIZZINESS 30 Tablet 0    olmesartan (BENICAR) 20 MG Tab Take 1 Tablet by mouth every day. 90 Tablet 2    apixaban (ELIQUIS) 5mg Tab Take 1 Tablet by mouth 2 times a day. 180 Tablet 3    metoprolol SR (TOPROL XL) 25 MG TABLET SR 24 HR Take 1 Tablet by mouth every morning. 90 Tablet 3    atorvastatin (LIPITOR) 40 MG Tab Take 1 Tablet by mouth every evening. 30 Tablet 3    cetirizine (ZYRTEC) 10 MG Tab  "Take 10 mg by mouth every morning.      Specialty Vitamins Products (NERVIVE NERVE RELIEF) Tab Take 1 Tablet by mouth every day. 90 Tablet 1     No current Epic-ordered facility-administered medications on file.     Losartan, Gabapentin, and Hyzaar [losartan potassium-hctz]    ROS: see hpi  Gen: no fevers/chills  Pulm: no sob, no cough  CV: no chest pain, no palpitations, no edema  GI: no nausea/vomiting, no diarrhea  Skin: no rash    Objective:   Exam:  /62 (BP Location: Right arm, Patient Position: Sitting, BP Cuff Size: Adult)   Pulse 60   Temp 36.1 °C (97 °F) (Temporal)   Resp 16   Ht 1.803 m (5' 11\")   Wt 113 kg (248 lb 3.2 oz)   SpO2 91%   BMI 34.62 kg/m²    Body mass index is 34.62 kg/m².    Gen: Alert and oriented, No apparent distress.  HEENT: Head atraumatic, normocephalic. Pupils equal and round.  Neck: Neck is supple without lymphadenopathy.   Lungs: Normal effort, CTA bilaterally, no wheezes, rhonchi, or rales  CV: Regular rate and rhythm. No murmurs, rubs, or gallops.  ABD: +BS. Non-tender, non-distended. No rebound, rigidity, or guarding.  Ext: No clubbing, cyanosis, edema.    Assessment & Plan:     74 y.o. male with the following -     1. Irritable bowel syndrome with both constipation and diarrhea  2. GI problem  Acute on chronic condition. Etiology unknown. Suspect IBS vs drug induced.   Recommend:  -FODMOP diet. Avoid Gluten, Dairy, Vegetable/seed oils, Seeds, Nuts, and Corn  -No alcohol or smoking  -No NSAIDs  -Pre/probiotics to help repopulate the microbes in your colon  -continue with fiber supplements  -avoid stool softer/laxatives  Will refer to GI for work up    - Referral to Gastroenterology    3. Nasal drainage  Established condition. Likely allergies. No s/s of infectious etiology.   Recommend:  -mist humidifier at home  -use nasal saline wash (i.e. Nedi-Pot)  -use warm compress over face to help alleviate congestion  -may use nasal corticosteroid spray such as Flonase, 2 " sprays each nostril daily  -anti-histamine such as Zyrtec or Claritin for allergy symptoms        Return if symptoms worsen or fail to improve.    NATACHA Hawthorne   [facility]    Medical Decision Making/Course:  In the course of preparing for this visit with review of the pertinent past medical history, recent and past clinic visits, current medications, and performing chart, immunization, medical history and medication reconciliation, and in the further course of obtaining the current history pertinent to the clinic visit today, performing an exam and evaluation, ordering and independently evaluating labs, tests, and/or procedures, prescribing any recommended new medications as noted above, providing any pertinent counseling and education and recommending further coordination of care. This was discussed with patient in a shared-decision making conversation, and they understand and agreed with plan of care.     Please note that this dictation was created using voice recognition software. I have made every reasonable attempt to correct obvious errors, but I expect that there are errors of grammar and possibly content that I did not discover before finalizing the note.

## 2025-02-20 NOTE — Clinical Note
REFERRAL APPROVAL NOTICE         Sent on February 20, 2025                   Laureen Munoz  [address]  [place] [address]                   Dear Mr. Munoz,    After a careful review of the medical information and benefit coverage, [facility] has processed your referral. See below for additional details.    If applicable, you must be actively enrolled with your insurance for coverage of the authorized service. If you have any questions regarding your coverage, please contact your insurance directly.    REFERRAL INFORMATION   Referral #:  [id number]  Referred-To Provider    Referred-By Provider:  Gastroenterology    NATACHA Hawthorne   [facility]      [address] [place] [address]  [phone] [address] [place] [address] [phone]    Referral Start Date:  02/19/2025  Referral End Date:   02/19/2026             SCHEDULING  If you do not already have an appointment, please call [phone] to make an appointment.     MORE INFORMATION  If you do not already have a [employer] account, sign up at: [email or web address]  You can access your medical information, make appointments, see lab results, billing information, and more.  If you have questions regarding this referral, please contact  the [facility] Referrals department at:             [phone]. Monday - Friday 8:00AM - 5:00PM.     Sincerely,    [facility]

## 2025-03-02 ENCOUNTER — NON-PROVIDER VISIT (OUTPATIENT)
Dept: CARDIOLOGY | Facility: MEDICAL CENTER | Age: 75
End: 2025-03-02
Payer: MEDICARE

## 2025-03-03 PROCEDURE — 93298 REM INTERROG DEV EVAL SCRMS: CPT | Mod: 26 | Performed by: STUDENT IN AN ORGANIZED HEALTH CARE EDUCATION/TRAINING PROGRAM

## 2025-03-04 NOTE — CARDIAC REMOTE MONITOR - SCAN
Device transmission reviewed. Device demonstrated appropriate function.       Electronically Signed by: Bhavin Mcgowan MD, PhD    3/5/2025  4:05 PM

## 2025-03-11 ENCOUNTER — OFFICE VISIT (OUTPATIENT)
Dept: MEDICAL GROUP | Facility: IMAGING CENTER | Age: 75
End: 2025-03-11
Payer: MEDICARE

## 2025-03-11 VITALS
WEIGHT: 251 LBS | SYSTOLIC BLOOD PRESSURE: 122 MMHG | DIASTOLIC BLOOD PRESSURE: 58 MMHG | OXYGEN SATURATION: 95 % | TEMPERATURE: 96.7 F | HEART RATE: 56 BPM | RESPIRATION RATE: 16 BRPM | HEIGHT: 71 IN | BODY MASS INDEX: 35.14 KG/M2

## 2025-03-11 DIAGNOSIS — Z79.01 ON ANTICOAGULANT THERAPY: ICD-10-CM

## 2025-03-11 DIAGNOSIS — Z01.812 PRE-OPERATIVE LABORATORY EXAMINATION: ICD-10-CM

## 2025-03-11 DIAGNOSIS — Z01.818 PRE-OPERATIVE EXAMINATION: ICD-10-CM

## 2025-03-11 PROCEDURE — 99213 OFFICE O/P EST LOW 20 MIN: CPT

## 2025-03-11 PROCEDURE — 3074F SYST BP LT 130 MM HG: CPT

## 2025-03-11 PROCEDURE — 3078F DIAST BP <80 MM HG: CPT

## 2025-03-11 PROCEDURE — 1170F FXNL STATUS ASSESSED: CPT

## 2025-03-11 RX ORDER — MELATONIN 3 MG
15 CAPSULE ORAL
COMMUNITY
End: 2025-03-26

## 2025-03-11 ASSESSMENT — FIBROSIS 4 INDEX: FIB4 SCORE: 3.33

## 2025-03-11 ASSESSMENT — PAIN SCALES - GENERAL: PAINLEVEL_OUTOF10: NO PAIN

## 2025-03-11 NOTE — PROGRESS NOTES
Chief Complaint   Patient presents with    Colonoscopy     Confirm suitable to get colonoscopy done     HPI:  Preoperative Examination   Purpose: Provide assistance to medical personel in the preoperative, operative and postoperative period, It is not intended to provide health maintanence.   Date of surgery: 4/2  Surgeon: Dr. Baltazar  Place of Surgery:  [facility]  Type of Surgery: Colonoscopy  Anesthesia Problems? Yes, reports aspiration during septoplasty   Functional status: independent  Smoking/Etoh/Drug use: none    NSQIP scoring: https://riskcalculator.facs.org/RiskCalculator/PatientInfo.jsp     Cardiopulmonary hx (HTN, CAD, CHF, COPD, JUAN, asthma, LINARES): yes  Family h/o bleeding disorder: on eliquies from CVA  Personal history of bleeding issue with surgery or dental procedure: none  Unprovoked bleeding on the trunk more than 5cm: none  Kidney or liver disease: none  Snoring, apnea, daytime somnolence: yes    Functional status: No limit  Exercise tolerance: fair  Cardiopulmonary symptoms (cp, linares, syncope, palpation, sob, edema):  Unable to walk more than 2 blocks without feeling SOB.   Directed bleeding history: unprovoked bruising on the trunk more than 5 cm in diameter, frequent unprovoked nosebleeds or gingival bleeds, heavy periods, hemarthrosis with mild trauma, prior accessible surgical blood loss, family history of abnormal bleeding, presence of severe kidney or liver disease, the use of medications that impair coagulation including nutritional supplements and herbal remedies: None    IIntrathoracic, intraperitoneal, or suprainguinal vascular surgery: none  H/o mi: no  HF: yes  Insulin tx for dmII: no  Serum cr greater than 2mg/dl: no  H/o cerebrovascular disease:   Each gets one point  None 0.4%  One 1%  Two 2.4%  More than 2 5.4%  Less than 2 low risk    Past Medical History:   Diagnosis Date    Allergy     Anesthesia 2017    Arthritis unk    Back pain     Cancer (HCC) 2020    skin cancer  squamous on the left cheek    Cataract 2022    lens implants    CHF (congestive heart failure) ([facility])     Chickenpox     Daytime sleepiness     Diabetes ([facility]) 2023    pre-diabetic    Dizziness     Frequent urination     Bulgarian measles     High cholesterol 1998    Hypercholesterolemia     Hypertension     Insomnia     Kidney stone     Migraine     Mumps     Neuropathy     Obesity     JUAN on CPAP 11/01/2024    PONV (postoperative nausea and vomiting) 2017    Rhinoplasti for deviated septon    Presence of implantable pulmonary artery pressure and heart rate monitoring system 11/21/2024    Reactive depression 02/21/2023    Renal artery stenosis ([facility])     Ringing in ears     Shortness of breath     Sleep apnea 2019    tested    Stroke ([facility]) 2023    stroke or contusion    Swelling of lower extremity     Tinea inguinalis     Tobacco use 10/30/2018    Urinary bladder disorder 1995    bph    Wears glasses     Wheezing      Past Surgical History:   Procedure Laterality Date    MS TRANSURETHRAL ELEC-SURG PROSTATECTOM N/A 08/17/2023    Procedure: BIPOLAR TRANSURETHRAL RESECTION OF PROSTATE;  Surgeon: Jacques Mcdowell M.D.;  Location: SURGERY [facility];  Service: Urology    MS CYSTOURETHROSCOPY N/A 08/17/2023    Procedure: CYSTOSCOPY;  Surgeon: Jacques Mcdowell M.D.;  Location: [facility];  Service: Urology    MS CYSTO/URETERO/PYELOSCOPY W/LITHOTRIPSY N/A 08/17/2023    Procedure: CYSTOLITHLOPAXY;  Surgeon: Jacques Mcdowell M.D.;  Location: [facility];  Service: Urology    CATARACT PHACO WITH IOL      EYE SURGERY      OTHER  1992    cyst removal back of the neck    OTHER ABDOMINAL SURGERY      gallbladder    MS REMV 2ND CATARACT,CORN-SCLER SECTN      PROSTATECTOMY, RADICAL RETRO      RHINOPLASTY      SINUSCOPE       ROS  Gen: no fevers/chills  Pulm: no sob, no cough  CV: no chest pain, no palpitations, no edema  GI: no nausea/vomiting, no diarrhea  Skin: no rash    No current facility-administered  medications for this visit.     Physical Exam    Physical examination   Constitutional: Alert, no distress, well-groomed.  Skin: Warm, dry, good turgor, no rashes in visible areas.  Eye: Equal, round and reactive, conjunctiva clear, lids normal.  ENMT: Lips without lesions, good dentition, moist mucous membranes.  Neck: Trachea midline, no masses, no thyromegaly.  Respiratory: Unlabored respiratory effort, no cough.  MSK: Normal gait, moves all extremities.  Neuro: Grossly non-focal.   Psych: Alert and oriented x3, normal affect and mood.      A/P:     1. Pre-operative examination  Patient is scheduled for colonoscopy with deep (propofol) sedation on 4/2 at [facility].   Pt reports getting clearance from cardiology-paperwork submitted to [facility].   Overall, pt presents with moderate-high risk for complications r/t multiple co-morbidities: JUAN, hx of CVA on 8/14/2024 on long term anticoagulation therapy,  afib, CHF, obesity.   Patient will be cleared for surgery once lab results return if labs were indicated.   Reviewed all meds and made recommendations for holding meds and when.   It is also recommended that pt have this procedure done in a hospital setting.     - CBC WITHOUT DIFFERENTIAL; Future  - Comp Metabolic Panel; Future  - Prothrombin Time; Future    2. Pre-operative laboratory examination    - CBC WITHOUT DIFFERENTIAL; Future  - Comp Metabolic Panel; Future  - Prothrombin Time; Future    3. On anticoagulant therapy  Stop eliquis at least 3 days prior to procedure and 3 days after-per cardiology's instructions.    - CBC WITHOUT DIFFERENTIAL; Future  - Comp Metabolic Panel; Future  - Prothrombin Time; Future

## 2025-03-12 ENCOUNTER — HOSPITAL ENCOUNTER (OUTPATIENT)
Dept: LAB | Facility: MEDICAL CENTER | Age: 75
End: 2025-03-12
Payer: MEDICARE

## 2025-03-12 DIAGNOSIS — Z79.01 ON ANTICOAGULANT THERAPY: ICD-10-CM

## 2025-03-12 DIAGNOSIS — Z01.818 PRE-OPERATIVE EXAMINATION: ICD-10-CM

## 2025-03-12 DIAGNOSIS — Z01.812 PRE-OPERATIVE LABORATORY EXAMINATION: ICD-10-CM

## 2025-03-12 LAB
ALBUMIN SERPL BCP-MCNC: 4.3 G/DL (ref 3.2–4.9)
ALBUMIN/GLOB SERPL: 1.4 G/DL
ALP SERPL-CCNC: 113 U/L (ref 30–99)
ALT SERPL-CCNC: 33 U/L (ref 2–50)
ANION GAP SERPL CALC-SCNC: 12 MMOL/L (ref 7–16)
AST SERPL-CCNC: 34 U/L (ref 12–45)
BILIRUB SERPL-MCNC: 0.9 MG/DL (ref 0.1–1.5)
BUN SERPL-MCNC: 28 MG/DL (ref 8–22)
CALCIUM ALBUM COR SERPL-MCNC: 9.2 MG/DL (ref 8.5–10.5)
CALCIUM SERPL-MCNC: 9.4 MG/DL (ref 8.5–10.5)
CHLORIDE SERPL-SCNC: 102 MMOL/L (ref 96–112)
CO2 SERPL-SCNC: 20 MMOL/L (ref 20–33)
CREAT SERPL-MCNC: 1.18 MG/DL (ref 0.5–1.4)
ERYTHROCYTE [DISTWIDTH] IN BLOOD BY AUTOMATED COUNT: 44 FL (ref 35.9–50)
GFR SERPLBLD CREATININE-BSD FMLA CKD-EPI: 65 ML/MIN/1.73 M 2
GLOBULIN SER CALC-MCNC: 3 G/DL (ref 1.9–3.5)
GLUCOSE SERPL-MCNC: 105 MG/DL (ref 65–99)
HCT VFR BLD AUTO: 44.6 % (ref 42–52)
HGB BLD-MCNC: 14.7 G/DL (ref 14–18)
INR PPP: 1.23 (ref 0.87–1.13)
MCH RBC QN AUTO: 31.1 PG (ref 27–33)
MCHC RBC AUTO-ENTMCNC: 33 G/DL (ref 32.3–36.5)
MCV RBC AUTO: 94.5 FL (ref 81.4–97.8)
PLATELET # BLD AUTO: 165 K/UL (ref 164–446)
PMV BLD AUTO: 10.9 FL (ref 9–12.9)
POTASSIUM SERPL-SCNC: 4.6 MMOL/L (ref 3.6–5.5)
PROT SERPL-MCNC: 7.3 G/DL (ref 6–8.2)
PROTHROMBIN TIME: 15.6 SEC (ref 12–14.6)
RBC # BLD AUTO: 4.72 M/UL (ref 4.7–6.1)
SODIUM SERPL-SCNC: 134 MMOL/L (ref 135–145)
WBC # BLD AUTO: 6.5 K/UL (ref 4.8–10.8)

## 2025-03-12 PROCEDURE — 85610 PROTHROMBIN TIME: CPT

## 2025-03-12 PROCEDURE — 36415 COLL VENOUS BLD VENIPUNCTURE: CPT

## 2025-03-12 PROCEDURE — 85027 COMPLETE CBC AUTOMATED: CPT

## 2025-03-12 PROCEDURE — 80053 COMPREHEN METABOLIC PANEL: CPT

## 2025-03-13 DIAGNOSIS — R73.03 PREDIABETES: ICD-10-CM

## 2025-03-13 RX ORDER — METFORMIN HYDROCHLORIDE 500 MG/1
1000 TABLET, EXTENDED RELEASE ORAL DAILY
Qty: 180 TABLET | Refills: 1 | Status: SHIPPED
Start: 2025-03-13 | End: 2025-03-26

## 2025-03-17 ENCOUNTER — OFFICE VISIT (OUTPATIENT)
Dept: URGENT CARE | Facility: CLINIC | Age: 75
End: 2025-03-17
Payer: MEDICARE

## 2025-03-17 VITALS
WEIGHT: 252 LBS | HEIGHT: 71 IN | DIASTOLIC BLOOD PRESSURE: 58 MMHG | SYSTOLIC BLOOD PRESSURE: 104 MMHG | TEMPERATURE: 97.8 F | BODY MASS INDEX: 35.28 KG/M2 | HEART RATE: 60 BPM | OXYGEN SATURATION: 94 % | RESPIRATION RATE: 12 BRPM

## 2025-03-17 DIAGNOSIS — L03.119 CELLULITIS OF FOOT: ICD-10-CM

## 2025-03-17 DIAGNOSIS — S91.109A OPEN WOUND OF TOE, INITIAL ENCOUNTER: ICD-10-CM

## 2025-03-17 PROCEDURE — 3074F SYST BP LT 130 MM HG: CPT | Performed by: PHYSICIAN ASSISTANT

## 2025-03-17 PROCEDURE — 3078F DIAST BP <80 MM HG: CPT | Performed by: PHYSICIAN ASSISTANT

## 2025-03-17 PROCEDURE — 1170F FXNL STATUS ASSESSED: CPT | Performed by: PHYSICIAN ASSISTANT

## 2025-03-17 PROCEDURE — 99213 OFFICE O/P EST LOW 20 MIN: CPT | Performed by: PHYSICIAN ASSISTANT

## 2025-03-17 RX ORDER — DOXYCYCLINE HYCLATE 100 MG
100 TABLET ORAL 2 TIMES DAILY
Qty: 14 TABLET | Refills: 0 | Status: SHIPPED | OUTPATIENT
Start: 2025-03-17 | End: 2025-03-24

## 2025-03-17 ASSESSMENT — FIBROSIS 4 INDEX: FIB4 SCORE: 2.62

## 2025-03-17 NOTE — PROGRESS NOTES
Subjective:   Laureen Munoz is a 74 y.o. male who presents for Foot Injury (Tried trimming his toenails and missed x1 week ago, redness and swelling x 3 days now)      HPI  The patient presents to the Urgent Care with complaints of a toe infection of his left second toe.  About a week ago he was trimming his toenails and accidentally trimmed part of the skin of his toe.  He has neuropathy so he does not feel pain to that area.  For about 3 days he has noticed increased redness and swelling.  No pain.  He has not noticed any drainage or discharge.  Denies any fevers.        Past Medical History:   Diagnosis Date    Allergy     Anesthesia 2017    Arthritis unk    Back pain     Cancer ([facility]) 2020    skin cancer squamous on the left cheek    Cataract 2022    lens implants    CHF (congestive heart failure) ([facility])     Chickenpox     Daytime sleepiness     Diabetes ([facility]) 2023    pre-diabetic    Dizziness     Frequent urination     Italian measles     High cholesterol 1998    Hypercholesterolemia     Hypertension     Insomnia     Kidney stone     Migraine     Mumps     Neuropathy     Obesity     JUAN on CPAP 11/01/2024    PONV (postoperative nausea and vomiting) 2017    Rhinoplasti for deviated septon    Presence of implantable pulmonary artery pressure and heart rate monitoring system 11/21/2024    Reactive depression 02/21/2023    Renal artery stenosis ([facility])     Ringing in ears     Shortness of breath     Sleep apnea 2019    tested    Stroke ([facility]) 2023    stroke or contusion    Swelling of lower extremity     Tinea inguinalis     Tobacco use 10/30/2018    Urinary bladder disorder 1995    bph    Wears glasses     Wheezing      Allergies   Allergen Reactions    Losartan Cough    Gabapentin Unspecified     Dizziness, confusion, falling    Hyzaar [Losartan Potassium-Hctz] Cough        Objective:     /58 (BP Location: Left arm, Patient Position: Sitting, BP Cuff Size: Large adult)   Pulse 60   Temp 36.6 °C (97.8 °F)  "(Temporal)   Resp 12   Ht 1.803 m (5' 11\")   Wt 114 kg (252 lb)   SpO2 94%   BMI 35.15 kg/m²     Physical Exam  Vitals reviewed.   Constitutional:       General: He is not in acute distress.     Appearance: Normal appearance. He is not ill-appearing or toxic-appearing.   Eyes:      Conjunctiva/sclera: Conjunctivae normal.   Cardiovascular:      Rate and Rhythm: Normal rate.   Pulmonary:      Effort: Pulmonary effort is normal.   Musculoskeletal:      Cervical back: Neck supple. No rigidity.        Feet:       Comments: Left 2nd toe with a healing wound distally. Scant purulent discharge easily drained with palpation. Surrounding erythema and edema of the entire toe extending proximally with mild erythema and edema to the dorsal foot. Full ROM of toes. Cap refill < 2 seconds. No proximal streaking.    Skin:     General: Skin is warm and dry.   Neurological:      General: No focal deficit present.      Mental Status: He is alert and oriented to person, place, and time.   Psychiatric:         Mood and Affect: Mood normal.         Behavior: Behavior normal.         Diagnosis and associated orders:     1. Cellulitis of foot  - doxycycline (VIBRAMYCIN) 100 MG Tab; Take 1 Tablet by mouth 2 times a day for 7 days.  Dispense: 14 Tablet; Refill: 0    2. Open wound of toe, initial encounter       Comments/MDM:     Patient's presenting symptoms and exam findings are consistent with cellulitis of left 2nd toe and foot secondary to wound.   The area was thoroughly irrigated with normal saline and dressed with nonstick dressing.  Start doxycycline.  Recommend warm Epsom salt soaks 2-3 times daily  If no improvement in about 3 days or any worsening redness, swelling recommend return to the urgent care.  Watch for any fevers.       I personally reviewed prior external notes and test results pertinent to today's visit. Pathogenesis of diagnosis discussed including typical length and natural progression. Supportive care, natural " history, differential diagnoses, and indications for immediate follow-up discussed. Patient expresses understanding and agrees to plan. Patient denies any other questions or concerns.     Follow-up with the primary care physician for recheck, reevaluation, and consideration of further management.    Please note that this dictation was created using voice recognition software. I have made a reasonable attempt to correct obvious errors, but I expect that there are errors of grammar and possibly content that I did not discover before finalizing the note.    This note was electronically signed by Anuel Izaguirre PA-C

## 2025-03-24 DIAGNOSIS — I10 ESSENTIAL HYPERTENSION: ICD-10-CM

## 2025-03-24 RX ORDER — OLMESARTAN MEDOXOMIL 20 MG/1
20 TABLET ORAL DAILY
Qty: 90 TABLET | Refills: 2 | Status: SHIPPED | OUTPATIENT
Start: 2025-03-24

## 2025-03-24 NOTE — TELEPHONE ENCOUNTER
Received request via: Patient    Was the patient seen in the last year in this department? Yes    Does the patient have an active prescription (recently filled or refills available) for medication(s) requested? No    Pharm[facility] Name: [facility]    Does the patient have [facility] Plus and need 100-day supply? (This applies to ALL medications) Patient does not have SCP

## 2025-03-26 ENCOUNTER — OFFICE VISIT (OUTPATIENT)
Dept: ENDOCRINOLOGY | Facility: MEDICAL CENTER | Age: 75
End: 2025-03-26
Attending: INTERNAL MEDICINE
Payer: MEDICARE

## 2025-03-26 ENCOUNTER — TELEPHONE (OUTPATIENT)
Dept: CARDIOLOGY | Facility: MEDICAL CENTER | Age: 75
End: 2025-03-26

## 2025-03-26 VITALS
BODY MASS INDEX: 35.56 KG/M2 | OXYGEN SATURATION: 93 % | HEIGHT: 71 IN | HEART RATE: 56 BPM | SYSTOLIC BLOOD PRESSURE: 126 MMHG | DIASTOLIC BLOOD PRESSURE: 66 MMHG | WEIGHT: 254 LBS

## 2025-03-26 DIAGNOSIS — E66.9 OBESITY (BMI 35.0-39.9 WITHOUT COMORBIDITY): ICD-10-CM

## 2025-03-26 DIAGNOSIS — E29.1 PRIMARY HYPOGONADISM IN MALE: ICD-10-CM

## 2025-03-26 DIAGNOSIS — Z86.19: ICD-10-CM

## 2025-03-26 DIAGNOSIS — R73.03 PREDIABETES: ICD-10-CM

## 2025-03-26 DIAGNOSIS — Z12.5 SCREENING FOR PROSTATE CANCER: ICD-10-CM

## 2025-03-26 DIAGNOSIS — D35.2 PITUITARY ADENOMA (HCC): ICD-10-CM

## 2025-03-26 LAB
HBA1C MFR BLD: 5.8 % (ref ?–5.8)
POCT INT CON NEG: NEGATIVE
POCT INT CON POS: POSITIVE

## 2025-03-26 PROCEDURE — 83036 HEMOGLOBIN GLYCOSYLATED A1C: CPT | Performed by: INTERNAL MEDICINE

## 2025-03-26 PROCEDURE — 99211 OFF/OP EST MAY X REQ PHY/QHP: CPT | Performed by: INTERNAL MEDICINE

## 2025-03-26 RX ORDER — TESTOSTERONE 1.62 MG/G
40.5 GEL TRANSDERMAL DAILY
Qty: 75 G | Refills: 5 | Status: SHIPPED | OUTPATIENT
Start: 2025-03-26 | End: 2025-04-25

## 2025-03-26 ASSESSMENT — FIBROSIS 4 INDEX: FIB4 SCORE: 2.62

## 2025-03-26 NOTE — TELEPHONE ENCOUNTER
----- Message from Physician Yonathan Warren M.D. sent at 3/26/2025  2:50 PM PDT -----  Please discontinue spironolactone as requested. Thank you.  MAGALI.  ----- Message -----  From: Delvis Ordaz M.D.  Sent: 3/26/2025  10:45 AM PDT  To: NATACHA Hawthorne; Yonathan Warren M.D.    Dear Dr. Warren,    Good morning !    I am Dr. 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 Munoz interestingly has hypogonadism but it is primary not secondary in nature  This means that it is not from his pituitary adenoma  His hypogonadism is because of his history of mumps orchitis      This is proven by his labs showing an elevated LH and FSH but low testosterone levels    (If it was from the pituitary adenoma his LH and FSH would be low testosterone would be low)    His calculated free testosterone is low at 2 using a free testosterone calculator    He does have an symptoms of hypogonadism such as fatigue decreased motivation      I plan to start him on androgen replacement therapy with testosterone gel as he is averse to needles    However I noted that he is on spironolactone which could potentially counteract the benefits of androgen replacement therapy      I was wondering if you would be willing to replace his MRA with nonsteroidal MRA like Kerendia which still maintains cardiovascular benefits for heart failure but has no counter regulatory effects on the testosterone receptor and would therefore not interact with his androgen replacement therapy      I hope to hear from you soon      Best regards    Dr. Ordaz      I copied the primary care Edilia Jerez so that she is updated on what is happening on the patient

## 2025-03-26 NOTE — PROGRESS NOTES
Chief Complaint: Follow up for nonfunctioning pituitary macroadenoma    HPI:     Laureen Munoz is a 74 y.o. male here for follow up evaluation of pituitary tumor.      He was first diagnosed with a pituitary macroadenoma in 2021.    This showed an 11 x 13 mm macroadenoma on the right side with slight deviation of the pituitary stalk towards the left side with no evidence of optic chiasm impingement.    He has had serial MRIs and visual field exams.  His last pituitary MRI was in 2024 showing a stable pituitary macroadenoma.      He had a visual field exam on January 2025 which showed no visual field defects                His baseline hormonal evaluation 9/8/2021 showed no evidence of hormonal hypersecretion  Prolactin was 10, IGF-I was normal 102, TSH was normal 1.17 and free T4 was 1.11 and morning cortisol was normal at 10.      He also completed an overnight DST in early 2024 which showed a suppressed morning cortisol of 0.9 and dexamethasone drug level of 421 which rules out Cushing's disease          He does have a medical history of obesity, Atrial fibrillation, and diastolic congestive heart failure and is now on guideline directed medical therapy and is followed by Dr. Warren  He is on Jardiance, Toprol-XL, olmesartan and spironolactone  He is also on Eliquis for anticoagulation            Currently he reports that he is feeling fair  He denies severe headaches  He denies blurry vision  He denies double vision  He denies changes in his hand and foot size  He denies breast swelling and tenderness  He denies frequent urination, increased thirst and weight loss or weight gain  He denies nocturnal hot flashes  He reports fatigue decreased muscle mass and low energy levels        His pituitary labs on March 2025  Showed no evidence of adrenal insufficiency, secondary hypothyroidism growth hormone deficiency and normal prolactin levels   Latest Reference Range & Units 02/10/25 08:24   Cortisol 0.0 - 23.0 ug/dL  12.9   TSH 0.350 - 5.500 uIU/mL 1.650   Free T-4 0.93 - 1.70 ng/dL 1.25      Latest Reference Range & Units 02/10/25 08:24   Acth 7.2 - 63.3 pg/mL 29.7   IGF1 24 - 236 ng/mL 160   IGF-1 Z Score Calculation  1.0   Prolactin 2.10 - 17.70 ng/mL 11.20       His testosterone labs suggest primary hypogonadism (high LH low T)  Or hypergonadotropic hypogonadism  He has a hx of mumps     He denies decreased libido  He does report hypogonadal symptoms such as decreased motivation decreased muscle mass and decreased energy  He is interested in testosterone replacement therapy but does not want shots  He is afraid of needles  We talked about testosterone gel    I did note that he is on spironolactone which is a testosterone receptor blocker  I told him that I will talk to his cardiologist     Latest Reference Range & Units 09/19/24 11:07   Follicle Stimulating Hormone 1.5 - 12.4 mIU/mL 41.6 (H)   Free Testosterone 47 - 244 pg/mL 25 (L)   Luteinizing Hormone 1.7 - 8.6 IU/L 28.2 (H)   Sex Hormone Bind Globulin 19 - 76 nmol/L 79 (H)   Testosterone % Free 1.6 - 2.9 % 1.0 (L)   Testosterone,Total 300 - 720 ng/dL 256 (L)             His pituitary labs on February 2024:  Again showed a normal DST with a suppressed morning cortisol of 0.9 and Dex level that was appropriate  TSH was 0.987  Free T4 was 1.19  IGF-I was 112  Prolactin was 12      He had an LH of 16 on September 2022  FSH was 30 on September 2022  Total testosterone was 318 on February 2023          Recent VF exam was normal (1/2025)              He did have a fasting insulin test which was normal at 19 but no concomitant glucose was measured.  He has prediabetes with an A1c of 6.3% on July 19, 2024 and he is now taking metformin as prescribed by his primary care          Patient's medications, allergies, and social histories were reviewed and updated as appropriate.      ROS:       CONS:     No fever, no chills   EYES:     No diplopia, no blurry vision   CV:           No  chest pain, no palpitations   PULM:     No SOB, no cough, no hemoptysis.   GI:            No nausea, no vomiting, no diarrhea, no constipation   ENDO:     No polyuria, no polydipsia, no heat intolerance, no cold intolerance     Past Medical History:  Problem List:  2024-12: Atrial fibrillation ([facility])  2024-10: Nocturnal hypoxia  2024-10: Chronic diastolic congestive heart failure ([facility])  2024-08: CVA (cerebrovascular accident due to intracerebral   hemorrhage) ([facility])  2024-08: Dizziness  2024-07: Pulmonary nodule, right  2024-06: Nonspecific abnormal electrocardiogram (ECG) (EKG)  2024-06: Acute diastolic congestive heart failure ([facility])  2024-04: Right bundle branch block (RBBB) on electrocardiogram (ECG)  2024-03: Renal artery stenosis ([facility])  2024-03: Thrombocytopenia, unspecified ([facility])  2024-01: Morbid obesity with BMI of 50.0-59.9, adult ([facility])  2024-01: Complex sleep apnea syndrome  2023-12: Sensory deficit, bilateral  2023-12: Paresthesia of both feet  2023-09: Prediabetes  2023-08: Bladder outlet obstruction  2023-04: BPPV (benign paroxysmal positional vertigo)  2023-04: Cyst of left kidney  2023-03: Localized edema  2023-03: Pulmonary hypertension ([facility])  2023-02: Reactive depression  2021-06: Benign paroxysmal positional vertigo  2021-06: Pituitary adenoma ([facility])  2021-06: Confusion  2021-06: Enlarged pituitary gland ([facility])  2021-03: Thrombocytopenia ([facility])  2020-05: Right knee pain  2020-05: Primary osteoarthritis of right knee  2020-05: IFG (impaired fasting glucose)  2019-10: Rosacea  2019-04: Oral lichen planus  2019-04: Gilbert syndrome  2018-10: Erectile dysfunction  2018-10: Health care maintenance  2018-10: Tobacco use  2018-07: Erectile dysfunction  2018-07: Obesity (BMI 35.0-39.9 without comorbidity)  2014-04: Hypovitaminosis D  2014-04: Migraine aura without headache  2012-07: Idiopathic neuropathy  2012-07: Essential hypertension  2012-07: Hypercholesteremia  2012-07: Benign prostatic hyperplasia  Tinea  inguinalis      Past Surgical History:  Past Surgical History:   Procedure Laterality Date    VA TRANSURETHRAL ELEC-SURG PROSTATECTOM N/A 2023    Procedure: BIPOLAR TRANSURETHRAL RESECTION OF PROSTATE;  Surgeon: Jacques Mcdowell M.D.;  Location: SURGERY [facility];  Service: Urology    VA CYSTOURETHROSCOPY N/A 2023    Procedure: CYSTOSCOPY;  Surgeon: Jaqcues Mcdowell M.D.;  Location: SURGERY [facility];  Service: Urology    VA CYSTO/URETERO/PYELOSCOPY W/LITHOTRIPSY N/A 2023    Procedure: CYSTOLITHLOPAXY;  Surgeon: Jacques Mcdowell M.D.;  Location: SURGERY [facility];  Service: Urology    CATARACT PHACO WITH IOL      EYE SURGERY      OTHER      cyst removal back of the neck    OTHER ABDOMINAL SURGERY      gallbladder    VA REMV 2ND CATARACT,CORN-SCLER SECTN      PROSTATECTOMY, RADICAL RETRO      RHINOPLASTY      SINUSCOPE          Allergies:  Gabapentin and Hyzaar [losartan potassium-hctz]     Social History:  Social History     Tobacco Use    Smoking status: Former     Current packs/day: 0.00     Average packs/day: 1 pack/day for 15.0 years (15.0 ttl pk-yrs)     Types: Cigarettes, Cigars     Start date: 1970     Quit date: 1985     Years since quittin.2    Smokeless tobacco: Never    Tobacco comments:     occasional cigar started in / quit completely    Vaping Use    Vaping status: Never Used   Substance Use Topics    Alcohol use: Yes     Comment: Occasionally    Drug use: Not Currently     Types: Inhaled     Comment: none for 30 years/ tried pot [id number] didn't care to much for        Family History:   family history includes Alcohol/Drug in an other family member; Allergies in his sister and sister; Arthritis in his sister; Breast Cancer in his mother and sister; Cancer in his mother and sister; Dementia in his paternal grandfather; Heart Attack in his father and mother; Heart Disease in his father and mother; Hypertension in his father; No Known Problems in his  "maternal grandfather, maternal grandmother, and paternal grandmother; Stroke in his sister.      PHYSICAL EXAM:   Vital signs: /66 (BP Location: Left arm, Patient Position: Sitting, BP Cuff Size: Adult long)   Pulse (!) 56   Ht 1.803 m (5' 11\")   Wt 115 kg (254 lb)   SpO2 93%   BMI 35.43 kg/m²   GENERAL: Well-developed, well-nourished in no apparent distress.   EYE:  No ocular asymmetry, PERRLA  HENT: Pink, moist mucous membranes.    NECK: No thyromegaly.   CARDIOVASCULAR:  No murmurs  LUNGS: Clear breath sounds  ABDOMEN: Soft, nontender   EXTREMITIES: No clubbing, cyanosis, or edema.   NEUROLOGICAL: No gross focal motor abnormalities   LYMPH: No cervical adenopathy palpated.   SKIN: No rashes, lesions.       Labs:  Lab Results   Component Value Date/Time    WBC 6.5 03/12/2025 08:27 AM    WBC 9.4 03/01/2010 04:54 PM    RBC 4.72 03/12/2025 08:27 AM    RBC 5.39 03/01/2010 04:54 PM    HEMOGLOBIN 14.7 03/12/2025 08:27 AM    MCV 94.5 03/12/2025 08:27 AM    MCV 89 03/01/2010 04:54 PM    MCH 31.1 03/12/2025 08:27 AM    MCH 29.3 03/01/2010 04:54 PM    MCHC 33.0 03/12/2025 08:27 AM    RDW 44.0 03/12/2025 08:27 AM    RDW 14.0 03/01/2010 04:54 PM    MPV 10.9 03/12/2025 08:27 AM       Lab Results   Component Value Date/Time    SODIUM 134 (L) 03/12/2025 08:27 AM    POTASSIUM 4.6 03/12/2025 08:27 AM    CHLORIDE 102 03/12/2025 08:27 AM    CO2 20 03/12/2025 08:27 AM    ANION 12.0 03/12/2025 08:27 AM    GLUCOSE 105 (H) 03/12/2025 08:27 AM    BUN 28 (H) 03/12/2025 08:27 AM    CREATININE 1.18 03/12/2025 08:27 AM    CREATININE 0.92 03/01/2010 04:54 PM    CALCIUM 9.4 03/12/2025 08:27 AM    ASTSGOT 34 03/12/2025 08:27 AM    ALTSGPT 33 03/12/2025 08:27 AM    TBILIRUBIN 0.9 03/12/2025 08:27 AM    ALBUMIN 4.3 03/12/2025 08:27 AM    TOTPROTEIN 7.3 03/12/2025 08:27 AM    GLOBULIN 3.0 03/12/2025 08:27 AM    AGRATIO 1.4 03/12/2025 08:27 AM       Lab Results   Component Value Date/Time    TSHULTRASEN 1.380 08/13/2024 2314     No " "results found for: \"FREEDIR\"  Lab Results   Component Value Date/Time    FREET3 3.63 09/23/2022 0900     No results found for: \"THYSTIMIG\"      Imaging:  3/5/2024 1:40 PM     HISTORY/REASON FOR EXAM:  follow up for pituitary adenoma        TECHNIQUE/EXAM DESCRIPTION:  Sella protocol     The study was performed on a [employer] Signa 1.5 Alethea MRI scanner.     20 mL ProHance contrast was administered intravenously.     COMPARISON:  4/30/2023, 6/8/2021     FINDINGS:     Age-related volume loss noted with prominent sulci, cisterns and ventricles. Ventricular dilatation is proportionate to the degree of cerebral volume loss.  Multiple bilateral subcentimeter cerebellar cortical infarcts are noted, stable from the prior study. The pituitary gland measures 15 x 10 x 16 mm and is thicker along the right aspect with slightly heterogeneous T1 hyperintense, T2 hypointense signal in   this region. The gland is thicker along the right aspect and measures approximately 10 mm in maximal thickness. Infundibulum is deviated to the left side.  Dynamic sequences again demonstrate 11 x 11 mm hypoenhancing area in the right aspect of the pituitary gland, stable in size and appearance from the previous examination the surface of this lesion demonstrates superior convexity and projects minimally   into the suprasellar cistern.  There are a few scattered nonspecific T2 hyperintensities are present in the deep white matter, within expected limits for the age of the patient, most likely related to leukoariosis.     Remote subcortical infarcts are noted in the bilateral parietal regions and the right inferolateral occipital region.     Included portions of the orbits are within normal limits. The portions of the mastoid air cells are within normal limits.     IMPRESSION:        Stable appearance of macroadenoma in the right aspect of the pituitary gland which bulges minimally into the suprasellar cistern without mass effect upon the optic chiasm. " Internal mixed T1 and T2 signal most likely represents chronic blood products,   also stable from the previous study.     Stable appearance of multiple remote bilateral parietal and right lateral subcortical occipital infarcts.     Multiple bilateral cerebellar cortical subcentimeter infarcts are noted, stable.      ASSESSMENT/PLAN:     1. Pituitary adenoma (HCC)  Stable nonfunctioning macroadenoma without visual field defects  Prolactin IGF-I and Cushing's disease workup was negative  He does not have adrenal insufficiency  Repeat pituitary MRI later this year  Continue visual field exams yearly    2. Prediabetes  Controlled  His A1c is below 6%  He is now on Jardiance for heart failure  He wants to stop metformin  Continue diet and exercise for diabetes prevention    3. Primary hypogonadism in male  Unstable  He has a history of mumps orchitis which may be the etiology of his hypogonadotrophic hypogonadism  He has symptoms of hypogonadism  He has needle phobia  Start androgen replacement therapy with testosterone gel 1.62% 1 pump on each shoulder daily or 2 pumps daily which is equal to 40.5 mg daily  75 g bottle for 30 days with 5 refills  Repeat testosterone and PSA with next labs  Will talk to cardiology about stopping spironolactone and replacing it with a nonsteroidal MRA    4. Obesity (BMI 35.0-39.9 without comorbidity)  Stable  Continue diet and exercise   caloric restriction by as much is 30% and exercise of at least 150 minutes/week is recommended    5. History of mumps orchitis  This is the likely etiology of his hypogonadism his LH and FSH is high total testosterone is low    6. Screening for prostate cancer  Will check PSA with next labs      Total time spent on day of service was over 60 minutes which included obtaining a detailed history and physical exam, ordering labs, coordinating care and scheduling future follow-up    Return in about 7 months (around 10/26/2025).      Thank you kindly for  allowing me to participate in the endocrine care plan for this patient.    Delvis Ordaz MD, FACE, [facility]      CC:   NATACHA Hawthorne

## 2025-04-03 ENCOUNTER — TELEPHONE (OUTPATIENT)
Dept: PHARMACY | Facility: MEDICAL CENTER | Age: 75
End: 2025-04-03
Payer: MEDICARE

## 2025-04-03 NOTE — TELEPHONE ENCOUNTER
Prior Authorization for Testosterone 20.25 MG/ACT (1.62%) Gel  has been approved for a quantity of 75G , day supply 30    Prior Authorization reference number: [id number]  Insurance: [employer]  Effective dates: 01/03/2025-04/03/2026  Copay: $10.00     Is patient eligible to fill with [facility]? Yes    Next Steps: The patient's copay exceeds $5.00. Proceed with contacting patient to offer financial assistance.    Prior Authorization has been submitted via Cover My Meds: Key ([id number])    Will follow up in 24-48 business hours.     Received Renewal PA request via  [facility]   for Testosterone (ANDROGEL) 20.25 MG/ACT (1.62%) Gel . (Quantity:75G, Day Supply:30)     Insurance: [employer]  Member ID:  [id number]  BIN: [id number]  PCN: [id number]  Group: [id number]     Ran Test claim via [place] & medication Rejects stating prior authorization is required.    Estrella Kaye, Pharmacy Liaison/ RX Coordinator

## 2025-04-07 ENCOUNTER — NON-PROVIDER VISIT (OUTPATIENT)
Dept: CARDIOLOGY | Facility: MEDICAL CENTER | Age: 75
End: 2025-04-07
Payer: MEDICARE

## 2025-04-07 PROCEDURE — 93298 REM INTERROG DEV EVAL SCRMS: CPT | Mod: 26 | Performed by: INTERNAL MEDICINE

## 2025-04-07 NOTE — CARDIAC REMOTE MONITOR - SCAN
Device transmission reviewed. Device demonstrated appropriate function.       Electronically Signed by: Oleg Dillon M.D.    4/10/2025  4:26 PM

## 2025-04-08 SDOH — ECONOMIC STABILITY: INCOME INSECURITY: IN THE LAST 12 MONTHS, WAS THERE A TIME WHEN YOU WERE NOT ABLE TO PAY THE MORTGAGE OR RENT ON TIME?: NO

## 2025-04-08 SDOH — HEALTH STABILITY: PHYSICAL HEALTH: ON AVERAGE, HOW MANY DAYS PER WEEK DO YOU ENGAGE IN MODERATE TO STRENUOUS EXERCISE (LIKE A BRISK WALK)?: 2 DAYS

## 2025-04-08 SDOH — ECONOMIC STABILITY: FOOD INSECURITY: WITHIN THE PAST 12 MONTHS, YOU WORRIED THAT YOUR FOOD WOULD RUN OUT BEFORE YOU GOT MONEY TO BUY MORE.: NEVER TRUE

## 2025-04-08 SDOH — ECONOMIC STABILITY: INCOME INSECURITY: HOW HARD IS IT FOR YOU TO PAY FOR THE VERY BASICS LIKE FOOD, HOUSING, MEDICAL CARE, AND HEATING?: NOT HARD AT ALL

## 2025-04-08 SDOH — HEALTH STABILITY: PHYSICAL HEALTH: ON AVERAGE, HOW MANY MINUTES DO YOU ENGAGE IN EXERCISE AT THIS LEVEL?: 20 MIN

## 2025-04-08 SDOH — ECONOMIC STABILITY: FOOD INSECURITY: WITHIN THE PAST 12 MONTHS, THE FOOD YOU BOUGHT JUST DIDN'T LAST AND YOU DIDN'T HAVE MONEY TO GET MORE.: NEVER TRUE

## 2025-04-08 ASSESSMENT — SOCIAL DETERMINANTS OF HEALTH (SDOH)
HOW HARD IS IT FOR YOU TO PAY FOR THE VERY BASICS LIKE FOOD, HOUSING, MEDICAL CARE, AND HEATING?: NOT HARD AT ALL
DO YOU BELONG TO ANY CLUBS OR ORGANIZATIONS SUCH AS CHURCH GROUPS UNIONS, FRATERNAL OR ATHLETIC GROUPS, OR SCHOOL GROUPS?: NO
HOW OFTEN DO YOU ATTENT MEETINGS OF THE CLUB OR ORGANIZATION YOU BELONG TO?: NEVER
HOW OFTEN DO YOU HAVE SIX OR MORE DRINKS ON ONE OCCASION: NEVER
HOW OFTEN DO YOU ATTEND CHURCH OR RELIGIOUS SERVICES?: NEVER
HOW MANY DRINKS CONTAINING ALCOHOL DO YOU HAVE ON A TYPICAL DAY WHEN YOU ARE DRINKING: 1 OR 2
WITHIN THE PAST 12 MONTHS, YOU WORRIED THAT YOUR FOOD WOULD RUN OUT BEFORE YOU GOT THE MONEY TO BUY MORE: NEVER TRUE
HOW OFTEN DO YOU HAVE A DRINK CONTAINING ALCOHOL: 2-4 TIMES A MONTH
HOW OFTEN DO YOU ATTEND CHURCH OR RELIGIOUS SERVICES?: NEVER
IN THE PAST 12 MONTHS, HAS THE ELECTRIC, GAS, OIL, OR WATER COMPANY THREATENED TO SHUT OFF SERVICE IN YOUR HOME?: NO
HOW OFTEN DO YOU GET TOGETHER WITH FRIENDS OR RELATIVES?: ONCE A WEEK
IN A TYPICAL WEEK, HOW MANY TIMES DO YOU TALK ON THE PHONE WITH FAMILY, FRIENDS, OR NEIGHBORS?: TWICE A WEEK
HOW OFTEN DO YOU GET TOGETHER WITH FRIENDS OR RELATIVES?: ONCE A WEEK
IN A TYPICAL WEEK, HOW MANY TIMES DO YOU TALK ON THE PHONE WITH FAMILY, FRIENDS, OR NEIGHBORS?: TWICE A WEEK
HOW OFTEN DO YOU ATTENT MEETINGS OF THE CLUB OR ORGANIZATION YOU BELONG TO?: NEVER
DO YOU BELONG TO ANY CLUBS OR ORGANIZATIONS SUCH AS CHURCH GROUPS UNIONS, FRATERNAL OR ATHLETIC GROUPS, OR SCHOOL GROUPS?: NO

## 2025-04-08 ASSESSMENT — LIFESTYLE VARIABLES
HOW OFTEN DO YOU HAVE SIX OR MORE DRINKS ON ONE OCCASION: NEVER
HOW MANY STANDARD DRINKS CONTAINING ALCOHOL DO YOU HAVE ON A TYPICAL DAY: 1 OR 2
SKIP TO QUESTIONS 9-10: 1
HOW OFTEN DO YOU HAVE A DRINK CONTAINING ALCOHOL: 2-4 TIMES A MONTH
AUDIT-C TOTAL SCORE: 2

## 2025-04-11 ENCOUNTER — OFFICE VISIT (OUTPATIENT)
Dept: SLEEP MEDICINE | Facility: MEDICAL CENTER | Age: 75
End: 2025-04-11
Payer: MEDICARE

## 2025-04-11 VITALS
HEIGHT: 71 IN | WEIGHT: 255 LBS | DIASTOLIC BLOOD PRESSURE: 50 MMHG | RESPIRATION RATE: 17 BRPM | BODY MASS INDEX: 35.7 KG/M2 | HEART RATE: 58 BPM | SYSTOLIC BLOOD PRESSURE: 90 MMHG | OXYGEN SATURATION: 92 %

## 2025-04-11 DIAGNOSIS — G47.39 COMPLEX SLEEP APNEA SYNDROME: ICD-10-CM

## 2025-04-11 DIAGNOSIS — R68.89 ABNORMAL VITAL SIGNS: ICD-10-CM

## 2025-04-11 PROCEDURE — 99213 OFFICE O/P EST LOW 20 MIN: CPT

## 2025-04-11 PROCEDURE — 99212 OFFICE O/P EST SF 10 MIN: CPT

## 2025-04-11 PROCEDURE — 3078F DIAST BP <80 MM HG: CPT

## 2025-04-11 PROCEDURE — 1170F FXNL STATUS ASSESSED: CPT

## 2025-04-11 PROCEDURE — 3074F SYST BP LT 130 MM HG: CPT

## 2025-04-11 ASSESSMENT — FIBROSIS 4 INDEX: FIB4 SCORE: 2.62

## 2025-04-11 NOTE — PROGRESS NOTES
"  [facility] Follow-up Visit    Date of Visit: 4/11/2025     CC:   Follow-up for JUAN management      HPI:  Laureen Munoz is a very pleasant 73 y.o. year old male former smoker (15 pack-years, quit in 1985), with a PMHx of JUAN, pulmonary hypertension, elevated BMI, [id number] mm pituitary macroadenoma being followed by neurosx, idiopathic neuropathy, HTN who presented to the [facility] for a regular follow up. Last seen in the office on 10/23/2024 with myself.     Patient presents for compliance on ASV and supplemental oxygen.  Patient reports that he returned the ASV machine and oxygen concentrator because he could not tolerate treatment for the noise.  He states that he currently has an implanted heart rate monitor from cardiology.  Per his report, he has not had any arrhythmias.  He also reports that he has had a rhinoplasty done by ENT in states his breathing has significantly improved.  He states that since being off of treatment \" feels great and is sleeping better\".  He is not interested in going back on treatment.  We did review risk factors for untreated sleep apnea.  He does state that he has daytime drowsiness and will have multiple awakenings at night.  He denies any significant morning headaches, drowsiness while driving, issues falling asleep, snoring, gasping, or palpitations.  Patient does sleep 6 hours a night and awaken 2-3 times at night to use the bathroom but will not have any issues going back to sleep.  He will nap once a day for 45-60 minutes.  His blood pressure today in the office was 90/50 with a heart rate of 58.  We did check this twice.  Patient reports that this is low for him.  He denies any lightheadedness, dizziness, or chest pain.        PSG titration 1/23/2024-        PSG titration ASV 3/25/2024-      ECHO 5/10/2023-  Compared to the prior study on 06/09/2021, there has been a decrease in   PAP although study is technically difficult with off axis views.    Clinical " correlation recommended.  The left ventricular ejection fraction is visually estimated to be 55-  60%.  No significant valvular abnormalities.   Estimated right ventricular systolic pressure is 35 mmHg.    OPO on ASV (Date: 11/8/2024)-  428.5 minutes below 88% and SpO2 desaturation index is 4.8. This is consistent with nocturnal hypoxia due to an underlying lung condition. Recommend prescription for nocturnal supplemental oxygen via nasal cannula.       Patient Active Problem List    Diagnosis Date Noted    Complex sleep apnea syndrome 01/23/2024    Abnormal vital signs 04/11/2025    Nocturnal hypoxia 10/23/2024    BMI 35.0-35.9,adult 07/26/2018    Primary hypogonadism in male 03/26/2025    History of mumps orchitis 03/26/2025    Atrial fibrillation (HCC) 12/31/2024    Chronic diastolic congestive heart failure (HCC) 10/18/2024    CVA (cerebrovascular accident due to intracerebral hemorrhage) (HCC) 08/14/2024    Pulmonary nodule, right 07/15/2024    Nonspecific abnormal electrocardiogram (ECG) (EKG) 06/18/2024    Acute diastolic congestive heart failure (HCC) 06/18/2024    Right bundle branch block (RBBB) on electrocardiogram (ECG) 04/15/2024    Renal artery stenosis (HCC) 03/28/2024    Thrombocytopenia, unspecified (HCC) 03/28/2024    Morbid obesity with BMI of 50.0-59.9, adult (HCC) 01/23/2024    Sensory deficit, bilateral 12/27/2023    Paresthesia of both feet 12/27/2023    Prediabetes 09/27/2023    Bladder outlet obstruction 08/17/2023    BPPV (benign paroxysmal positional vertigo) 04/18/2023    Cyst of left kidney 04/04/2023    Localized edema 03/07/2023    Pulmonary hypertension (HCC) 03/07/2023    Pituitary adenoma (HCC) 06/09/2021    Enlarged pituitary gland (HCC) 06/08/2021    IFG (impaired fasting glucose) 05/28/2020    Rosacea 10/30/2019    Erectile dysfunction 10/30/2018    Hypovitaminosis D 04/11/2014    Idiopathic neuropathy 07/20/2012    Essential hypertension 07/20/2012    Hypercholesteremia  07/20/2012    Benign prostatic hyperplasia 07/20/2012     Past Medical History:   Diagnosis Date    Allergy     Anesthesia 2017    Arthritis unk    Back pain     Cancer ([facility]) 2020    skin cancer squamous on the left cheek    Cataract 2022    lens implants    CHF (congestive heart failure) (HCC)     Chickenpox     Daytime sleepiness     Diabetes ([facility]) 2023    pre-diabetic    Dizziness     Frequent urination     [place]vad[place] measles     High cholesterol 1998    Hypercholesterolemia     Hypertension     Insomnia     Kidney stone     Migraine     Mumps     Neuropathy     Obesity     JUNA on CPAP 11/01/2024    PONV (postoperative nausea and vomiting) 2017    Rhinoplasti for deviated septon    Presence of implantable pulmonary artery pressure and heart rate monitoring system 11/21/2024    Reactive depression 02/21/2023    Renal artery stenosis ([facility])     Ringing in ears     Shortness of breath     Sleep apnea 2019    tested    Stroke ([facility]) 2023    stroke or contusion    Swelling of lower extremity     Tinea inguinalis     Tobacco use 10/30/2018    Urinary bladder disorder 1995    bph    Wears glasses     Wheezing       Past Surgical History:   Procedure Laterality Date    MO TRANSURETHRAL ELEC-SURG PROSTATECTOM N/A 08/17/2023    Procedure: BIPOLAR TRANSURETHRAL RESECTION OF PROSTATE;  Surgeon: Jacques Mcdowell M.D.;  Location: SURGERY [facility];  Service: Urology    MO CYSTOURETHROSCOPY N/A 08/17/2023    Procedure: CYSTOSCOPY;  Surgeon: Jacques Mcdowell M.D.;  Location: SURGERY [facility];  Service: Urology    MO CYSTO/URETERO/PYELOSCOPY W/LITHOTRIPSY N/A 08/17/2023    Procedure: CYSTOLITHLOPAXY;  Surgeon: Jacques Mcdowell M.D.;  Location: SURGERY [facility];  Service: Urology    CATARACT PHACO WITH IOL      EYE SURGERY      OTHER  1992    cyst removal back of the neck    OTHER ABDOMINAL SURGERY      gallbladder    MO REMV 2ND CATARACT,CORN-SCLER SECTN      PROSTATECTOMY, RADICAL RETRO      RHINOPLASTY      SINUSCOPE        Family History   Problem Relation Age of Onset    Cancer Mother         Breast cancer    Heart Disease Mother          at 89yo/heart failure    Heart Attack Mother     Breast Cancer Mother     Heart Disease Father          at 59yo/massive heart attack    Heart Attack Father     Hypertension Father          coronary thrombosis    Cancer Sister          of leukemia    Breast Cancer Sister     Allergies Sister     Stroke Sister         living lost eye site left eye    Arthritis Sister     No Known Problems Maternal Grandmother     No Known Problems Maternal Grandfather     No Known Problems Paternal Grandmother     Dementia Paternal Grandfather     Alcohol/Drug Other         uncle    Allergies Sister     Hyperlipidemia Neg Hx     Diabetes Neg Hx      Social History     Socioeconomic History    Marital status:      Spouse name: Not on file    Number of children: Not on file    Years of education: Not on file    Highest education level: Associate degree: academic program   Occupational History    Not on file   Tobacco Use    Smoking status: Former     Current packs/day: 0.00     Average packs/day: 1 pack/day for 15.0 years (15.0 ttl pk-yrs)     Types: Cigarettes, Cigars     Start date: 1970     Quit date: 1985     Years since quittin.3    Smokeless tobacco: Never    Tobacco comments:     occasional cigar started in / quit completely    Vaping Use    Vaping status: Never Used   Substance and Sexual Activity    Alcohol use: Yes     Comment: Occasionally    Drug use: Not Currently     Types: Inhaled     Comment: none for 30 years/ tried pot [id number] didn't care to much for    Sexual activity: Not Currently     Partners: Female     Comment: not sexually active last 3 years, when active male condom   Other Topics Concern    Not on file   Social History Narrative    Was in the [employer]    Retired from [employer] -  for [employer]     Social Drivers of Health      Financial Resource Strain: Low Risk  (4/8/2025)    Overall Financial Resource Strain (CARDIA)     Difficulty of Paying Living Expenses: Not hard at all   Food Insecurity: No Food Insecurity (4/8/2025)    Hunger Vital Sign     Worried About Running Out of Food in the Last Year: Never true     Ran Out of Food in the Last Year: Never true   Transportation Needs: No Transportation Needs (4/8/2025)    PRAPARE - Transportation     Lack of Transportation (Medical): No     Lack of Transportation (Non-Medical): No   Physical Activity: Insufficiently Active (4/8/2025)    Exercise Vital Sign     Days of Exercise per Week: 2 days     Minutes of Exercise per Session: 20 min   Stress: No Stress Concern Present (4/8/2025)    Citizen of [place] of Occupational Health - Occupational Stress Questionnaire     Feeling of Stress : Only a little   Social Connections: Socially Isolated (4/8/2025)    Social Connection and Isolation Panel [NHANES]     Frequency of Communication with Friends and Family: Twice a week     Frequency of Social Gatherings with Friends and Family: Once a week     Attends [employer] Services: Never     Active Member of Clubs or Organizations: No     Attends Club or Organization Meetings: Never     Marital Status:    Intimate Partner Violence: Not At Risk (8/13/2024)    Humiliation, Afraid, Rape, and Kick questionnaire     Fear of Current or Ex-Partner: No     Emotionally Abused: No     Physically Abused: No     Sexually Abused: No   Housing Stability: Low Risk  (4/8/2025)    Housing Stability Vital Sign     Unable to Pay for Housing in the Last Year: No     Number of Times Moved in the Last Year: 1     Homeless in the Last Year: No     Current Outpatient Medications   Medication Sig Dispense Refill    Testosterone (ANDROGEL) 20.25 MG/ACT (1.62%) Gel Place 40.5 mg on the skin every day for 30 days. 75 g 5    olmesartan (BENICAR) 20 MG Tab Take 1 Tablet by mouth every day. 90 Tablet 2    Empagliflozin  "(JARDIANCE) 10 MG Tab tablet Take 1 Tablet by mouth every day. 90 Tablet 3    apixaban (ELIQUIS) 5mg Tab Take 1 Tablet by mouth 2 times a day. 180 Tablet 3    metoprolol SR (TOPROL XL) 25 MG TABLET SR 24 HR Take 1 Tablet by mouth every morning. 90 Tablet 3    atorvastatin (LIPITOR) 40 MG Tab Take 1 Tablet by mouth every evening. 30 Tablet 3    cetirizine (ZYRTEC) 10 MG Tab Take 10 mg by mouth every morning.       No current facility-administered medications for this visit.      ALLERGIES: Gabapentin    ROS:  Constitutional: Denies fever, chills, sweats,  weight loss, fatigue  Cardiovascular: Denies chest pain, tightness, palpitations, swelling in legs/feet  Respiratory: Denies shortness of breath, cough, sputum, wheezing, painful breathing   Sleep: per HPI  Gastrointestinal: Denies  difficulty swallowing, nausea, abdominal pain, diarrhea, constipation, heartburn.  Musculoskeletal: Denies painful joints, sore muscles,       PHYSICAL EXAM:  BP 90/50 (BP Location: Right arm, Patient Position: Sitting, BP Cuff Size: Adult)   Pulse (!) 58   Resp 17   Ht 1.803 m (5' 11\")   Wt 116 kg (255 lb)   SpO2 92%   BMI 35.57 kg/m²   Appearance: Well-nourished, well-developed, no acute distress  Eyes:  No scleral icterus , EOMI  ENMT: No redness of the oropharynx  Lung auscultation:  No wheezes rhonchi rubs or rales  Cardiac: No murmurs, rubs, or gallops; regular rhythm, normal rate; no edema  Musculoskeletal:  Grossly normal; gait and station normal; digits and nails normal  Skin:  No rashes, petechiae, cyanosis  Neurologic: without focal signs; oriented to person, time, place, and purpose; judgement intact  Psychiatric:  No depression, anxiety, agitation  Mallampati score: Class III    Assessment and Plan:    The medical record was reviewed.    Diagnostic and titration nocturnal polysomnogram's, home sleep apnea tests, continuous nocturnal oximetry results, multiple sleep latency tests, and compliance reports " reviewed.    Problem List Items Addressed This Visit          Pulmonary/Sleep Medicine Problems    Complex sleep apnea syndrome    I reviewed with him in depth the risk factors for untreated sleep apnea.  His original sleep study showed mild obstructive sleep apnea and his titration study showed treatment emergent complex sleep apnea.  I also reviewed with him that he would be ineligible for the inspire implant based upon his last sleep study.  I did offer to repeat a sleep study to see if he would not qualify.  Patient would like time to think it over.  --Patient would let us know if he would like to proceed with another PSG to qualify him for the inspire implant.            Other    Abnormal vital signs    Blood pressure was 90/50 and heart rate was 58.  Patient reports that this is low for him.  He is on metoprolol and Benicar.  Patient denies being symptomatic.  --Advised patient to monitor vital signs later today and if it is low to alert his primary care and/or cardiologist.  If patient becomes symptomatic, I provided patient with ED parameters.         BMI 35.0-35.9,adult     Have advised the patient to follow up with the appropriate healthcare practitioners for all other medical problems and issues.    Return if symptoms worsen or fail to improve, for compliance, with Antonietta.    Please note portions of this record was created using voice recognition software. I have made every reasonable attempt to correct obvious errors, but I expect that there are errors of grammar and possibly content I did not discover before finalizing the note.    Time spent in record review prior to patient arrival, reviewing results, and in face-to-face encounter totaled 25 min.  __________  BRISA Brown  Pulmonary & Sleep Medicine  [facility]

## 2025-04-11 NOTE — ASSESSMENT & PLAN NOTE
I reviewed with him in depth the risk factors for untreated sleep apnea.  His original sleep study showed mild obstructive sleep apnea and his titration study showed treatment emergent complex sleep apnea.  I also reviewed with him that he would be ineligible for the inspire implant based upon his last sleep study.  I did offer to repeat a sleep study to see if he would not qualify.  Patient would like time to think it over.  --Patient would let us know if he would like to proceed with another PSG to qualify him for the inspire implant.

## 2025-04-11 NOTE — ASSESSMENT & PLAN NOTE
Blood pressure was 90/50 and heart rate was 58.  Patient reports that this is low for him.  He is on metoprolol and Benicar.  Patient denies being symptomatic.  --Advised patient to monitor vital signs later today and if it is low to alert his primary care and/or cardiologist.  If patient becomes symptomatic, I provided patient with ED parameters.

## 2025-04-14 ENCOUNTER — APPOINTMENT (OUTPATIENT)
Dept: MEDICAL GROUP | Facility: IMAGING CENTER | Age: 75
End: 2025-04-14
Payer: MEDICARE

## 2025-04-14 VITALS
TEMPERATURE: 98.3 F | SYSTOLIC BLOOD PRESSURE: 118 MMHG | WEIGHT: 256.8 LBS | DIASTOLIC BLOOD PRESSURE: 62 MMHG | HEART RATE: 64 BPM | OXYGEN SATURATION: 94 % | HEIGHT: 71 IN | RESPIRATION RATE: 16 BRPM | BODY MASS INDEX: 35.95 KG/M2

## 2025-04-14 DIAGNOSIS — I50.32 CHRONIC DIASTOLIC CONGESTIVE HEART FAILURE (HCC): ICD-10-CM

## 2025-04-14 DIAGNOSIS — I10 ESSENTIAL HYPERTENSION: Chronic | ICD-10-CM

## 2025-04-14 DIAGNOSIS — E66.9 OBESITY (BMI 30-39.9): ICD-10-CM

## 2025-04-14 DIAGNOSIS — Z79.01 LONG TERM (CURRENT) USE OF ANTICOAGULANTS: ICD-10-CM

## 2025-04-14 DIAGNOSIS — I61.9 CVA (CEREBROVASCULAR ACCIDENT DUE TO INTRACEREBRAL HEMORRHAGE) (HCC): ICD-10-CM

## 2025-04-14 DIAGNOSIS — D35.2 PITUITARY ADENOMA (HCC): ICD-10-CM

## 2025-04-14 DIAGNOSIS — Z00.00 ENCOUNTER FOR MEDICARE ANNUAL WELLNESS EXAM: ICD-10-CM

## 2025-04-14 DIAGNOSIS — G47.39 COMPLEX SLEEP APNEA SYNDROME: ICD-10-CM

## 2025-04-14 DIAGNOSIS — I48.20 CHRONIC ATRIAL FIBRILLATION (HCC): ICD-10-CM

## 2025-04-14 PROCEDURE — G0439 PPPS, SUBSEQ VISIT: HCPCS

## 2025-04-14 PROCEDURE — 3074F SYST BP LT 130 MM HG: CPT

## 2025-04-14 PROCEDURE — 3078F DIAST BP <80 MM HG: CPT

## 2025-04-14 PROCEDURE — 1170F FXNL STATUS ASSESSED: CPT

## 2025-04-14 PROCEDURE — 1126F AMNT PAIN NOTED NONE PRSNT: CPT

## 2025-04-14 ASSESSMENT — PAIN SCALES - GENERAL: PAINLEVEL_OUTOF10: NO PAIN

## 2025-04-14 ASSESSMENT — ENCOUNTER SYMPTOMS: GENERAL WELL-BEING: GOOD

## 2025-04-14 ASSESSMENT — ACTIVITIES OF DAILY LIVING (ADL): BATHING_REQUIRES_ASSISTANCE: 0

## 2025-04-14 ASSESSMENT — PATIENT HEALTH QUESTIONNAIRE - PHQ9: CLINICAL INTERPRETATION OF PHQ2 SCORE: 0

## 2025-04-14 ASSESSMENT — FIBROSIS 4 INDEX: FIB4 SCORE: 2.62

## 2025-04-14 NOTE — PROGRESS NOTES
Chief Complaint   Patient presents with    Medicare Annual Wellness       HPI:  Laurene Munoz is a 74 y.o. here for Medicare Annual Wellness Visit     Essential hypertension  Afib  CHF  Hx CVA  Chronic and stable condition on metoprolol 25 mg daily, olmesartan 20 mg daily, Jardiance 10 mg daily, Eliquis 5 mg twice daily, and atorvastatin 40 mg nightly.    No longer on spironolactone  No side effects  He is staying active with daily house work and yard work.   He reports eating well balanced diet-more chicken,no red meat, more fiber.   Managed by Dr. Warren, cardiology whom he will be following up with on 4/29  Patient denies chest pain, chest pressure,  palpitations, syncope, orthopnea, dyspnea or exertion, or leg swelling.  denies epistaxis, hematemesis, hemoptysis, petechiae, hematuria, hematochezia.    Pituitary microadenoma (HCC)   Prediabetes  Hypogonadism  Chronic and stable condition.    Repeat MRI for pituitary adenoma this year.  Prediabetes controlled on jardiance.  Pt on testosterone gel.  No longer on spironolactone  Managed by Dr. Rodriguez, endocrinology.  Recommend to follow-up as scheduled.    JUAN  Chronic, stable condition. Pt is compliant on ASV. Not on CPAP, bipap, or supplemental oxygen.   Managed by pulmonary    Colonoscopy 6/18    Patient Active Problem List    Diagnosis Date Noted    Abnormal vital signs 04/11/2025    Primary hypogonadism in male 03/26/2025    History of mumps orchitis 03/26/2025    Atrial fibrillation (HCC) 12/31/2024    Nocturnal hypoxia 10/23/2024    Chronic diastolic congestive heart failure (HCC) 10/18/2024    CVA (cerebrovascular accident due to intracerebral hemorrhage) (HCC) 08/14/2024    Pulmonary nodule, right 07/15/2024    Nonspecific abnormal electrocardiogram (ECG) (EKG) 06/18/2024    Acute diastolic congestive heart failure (HCC) 06/18/2024    Right bundle branch block (RBBB) on electrocardiogram (ECG) 04/15/2024    Renal artery stenosis (HCC) 03/28/2024     Thrombocytopenia, unspecified (HCC) 03/28/2024    Morbid obesity with BMI of 50.0-59.9, adult ([facility]) 01/23/2024    Complex sleep apnea syndrome 01/23/2024    Sensory deficit, bilateral 12/27/2023    Paresthesia of both feet 12/27/2023    Prediabetes 09/27/2023    Bladder outlet obstruction 08/17/2023    BPPV (benign paroxysmal positional vertigo) 04/18/2023    Cyst of left kidney 04/04/2023    Localized edema 03/07/2023    Pulmonary hypertension (HCC) 03/07/2023    Pituitary adenoma ([facility]) 06/09/2021    Enlarged pituitary gland ([facility]) 06/08/2021    IFG (impaired fasting glucose) 05/28/2020    Rosacea 10/30/2019    Erectile dysfunction 10/30/2018    BMI 35.0-35.9,adult 07/26/2018    Hypovitaminosis D 04/11/2014    Idiopathic neuropathy 07/20/2012    Essential hypertension 07/20/2012    Hypercholesteremia 07/20/2012    Benign prostatic hyperplasia 07/20/2012       Current Outpatient Medications   Medication Sig Dispense Refill    Testosterone (ANDROGEL) 20.25 MG/ACT (1.62%) Gel Place 40.5 mg on the skin every day for 30 days. 75 g 5    olmesartan (BENICAR) 20 MG Tab Take 1 Tablet by mouth every day. 90 Tablet 2    Empagliflozin (JARDIANCE) 10 MG Tab tablet Take 1 Tablet by mouth every day. 90 Tablet 3    apixaban (ELIQUIS) 5mg Tab Take 1 Tablet by mouth 2 times a day. 180 Tablet 3    metoprolol SR (TOPROL XL) 25 MG TABLET SR 24 HR Take 1 Tablet by mouth every morning. 90 Tablet 3    atorvastatin (LIPITOR) 40 MG Tab Take 1 Tablet by mouth every evening. 30 Tablet 3    cetirizine (ZYRTEC) 10 MG Tab Take 10 mg by mouth every morning.       No current facility-administered medications for this visit.          Current supplements as per medication list.     Allergies: Losartan, Gabapentin, and Hyzaar [losartan potassium-hctz]    Current social contact/activities: Involved in [employer] houses, buying and selling things     He  reports that he quit smoking about 40 years ago. His smoking use included cigarettes and cigars. He  started smoking about 55 years ago. He has a 15 pack-year smoking history. He has never used smokeless tobacco. He reports current alcohol use. He reports that he does not currently use drugs after having used the following drugs: Marijuana, Cocaine, and Inhaled.  Counseling given: Not Answered  Tobacco comments: occasional cigar started in 1996/ quit completely 2021      ROS:    Gait: Uses a cane  Ostomy: No  Other tubes: No  Amputations: No  Chronic oxygen use: No  Last eye exam: 04/2025  Wears hearing aids: No   : Reports urinary leakage during the last 6 months that has not interfered at all with their daily activities or sleep.    Screening:    Depression Screening  Little interest or pleasure in doing things?  0 - not at all  Feeling down, depressed , or hopeless? 0 - not at all  Patient Health Questionnaire Score: 0     If depressive symptoms identified deferred to follow up visit unless specifically addressed in assessment and plan.    Interpretation of PHQ-9 Total Score   Score Severity   1-4 No Depression   5-9 Mild Depression   10-14 Moderate Depression   15-19 Moderately Severe Depression   20-27 Severe Depression    Screening for Cognitive Impairment  Do you or any of your friends or family members have any concern about your memory? No  Three Minute Recall (Village, Kitchen, Baby) 2/3    [place] clock face with all 12 numbers and set the hands to show 10 minutes past 11.  Yes    Cognitive concerns identified deferred for follow up unless specifically addressed in assessment and plan.    Fall Risk Assessment  Has the patient had two or more falls in the last year or any fall with injury in the last year?  No    Safety Assessment  Do you always wear your seatbelt?  Yes  Any changes to home needed to function safely? No  Difficulty hearing.  No  Patient counseled about all safety risks that were identified.    Functional Assessment ADLs  Are there any barriers preventing you from cooking for yourself or  meeting nutritional needs?  No.    Are there any barriers preventing you from driving safely or obtaining transportation?  No.    Are there any barriers preventing you from using a telephone or calling for help?  No    Are there any barriers preventing you from shopping?  No.    Are there any barriers preventing you from taking care of your own finances?  No    Are there any barriers preventing you from managing your medications?  No    Are there any barriers preventing you from showering, bathing or dressing yourself? No    Are there any barriers preventing you from doing housework or laundry? No  Are there any barriers preventing you from using the toilet?No  Are you currently engaging in any exercise or physical activity?  Yes.      Self-Assessment of Health  What is your perception of your health? Good    Do you sleep more than six hours a night? No    In the past 7 days, how much did pain keep you from doing your normal work? None    Do you spend quality time with family or friends (virtually or in person)? Yes    Do you usually eat a heart healthy diet that constists of a variety of fruits, vegetables, whole grains and fiber? Yes    Do you eat foods high in fat and/or Fast Food more than three times per week? Yes    How concerned are you that your medical conditions are not being well managed? Not at all    Are you worried that in the next 2 months, you may not have stable housing that you own, rent, or stay in as part of a household? No      Advance Care Planning  Do you have an Advance Directive, Living Will, Durable Power of , or POLST? No                 Health Maintenance Summary            Current Care Gaps       Zoster (Shingles) Vaccines (2 of 3) Overdue since 11/10/2016      09/15/2016  Imm Admin: Zoster Vaccine Live (ZVL) (Zostavax) - HISTORICAL DATA              Colorectal Cancer Screening (Colonoscopy - Every 10 Years) Overdue since 5/20/2024 03/28/2024  Order placed for Referral to  GI for Colonoscopy by NATACHA Hawthorne    05/20/2014  AMB REFERRAL TO GI FOR COLONOSCOPY              COVID-19 Vaccine (7 - 2024-25 season) Overdue since 3/26/2025      09/26/2024  Imm Admin: Covid-19 Mrna (Spikevax) Moderna 12+ Years    10/30/2023  Imm Admin: Covid-19 Mrna (Spikevax) Moderna 12+ Years    07/30/2022  Imm Admin: MODERNA SARS-COV-2 VACCINE (12+)    12/06/2021  Imm Admin: MODERNA SARS-COV-2 VACCINE (12+)    04/10/2021  Imm Admin: MODERNA SARS-COV-2 VACCINE (12+)     Only the first 5 history entries have been loaded, but more history exists.            Annual Wellness Visit (Yearly) Overdue since 3/28/2025      03/28/2024  Level of Service: ANNUAL WELLNESS VISIT-INCLUDES PPPS SUBSEQUE*    03/28/2024  Visit Dx: Encounter for Medicare annual wellness exam    04/18/2023  Level of Service: [place] ANNUAL WELLNESS VISIT-INCLUDES PPPS SUBSEQUE*    04/18/2023  Visit Dx: Encounter for Medicare annual wellness exam    09/08/2021  Subsequent Annual Wellness Visit - Includes PPPS ()      Only the first 5 history entries have been loaded, but more history exists.                      Needs Review       Hepatitis C Screening  Tentatively Complete      05/14/2020  Hepatitis C Antibody component of HEP C VIRUS ANTIBODY                      Upcoming       Pneumococcal Vaccine: 50+ Years (1 of 2 - PCV) Postponed until 4/18/2025      No completion history exists for this topic.              IMM DTaP/Tdap/Td Vaccine (3 - Td or Tdap) Next due on 2/21/2033 02/21/2023  Imm Admin: Tdap Vaccine    01/16/2013  Imm Admin: Tdap Vaccine                      Completed or No Longer Recommended       Influenza Vaccine (Series Information) Completed      09/26/2024  Imm Admin: Influenza, unspecified formulation    09/27/2023  Imm Admin: Influenza Vaccine Adult HD    10/01/2022  Imm Admin: Influenza, Unspecified - HISTORICAL DATA    10/01/2020  Imm Admin: Influenza Vac Subunit Quad Inj (Pf)    10/30/2019  Imm Admin:  Influenza Vaccine Adult HD     Only the first 5 history entries have been loaded, but more history exists.            Abdominal Aortic Aneurysm (AAA) Screening  Completed      07/10/2024  CT-CTA COMPLETE THORACOABDOMINAL AORTA    2023  US-ABDOMINAL AORTA W/O DOPPLER              Hepatitis A Vaccine (Hep A) (Series Information) Aged Out      No completion history exists for this topic.              Hepatitis B Vaccine (Hep B) (Series Information) Aged Out     No completion history exists for this topic.              HPV Vaccines (Series Information) Aged Out     No completion history exists for this topic.              Polio Vaccine (Inactivated Polio) (Series Information) Aged Out     No completion history exists for this topic.              Meningococcal Immunization (Series Information) Aged Out     No completion history exists for this topic.                            Patient Care Team:  NATACHA Hawthorne as PCP - General (Nurse Practitioner Family)  Julio Ayon O.D. as Attending Team Physician (Optometry)  Matilda as Respiratory Therapist (Durable Medical Equipment & Medical Supplies)        Social History     Tobacco Use    Smoking status: Former     Current packs/day: 0.00     Average packs/day: 1 pack/day for 15.0 years (15.0 ttl pk-yrs)     Types: Cigarettes, Cigars     Start date: 1970     Quit date: 1985     Years since quittin.3    Smokeless tobacco: Never    Tobacco comments:     occasional cigar started in / quit completely    Vaping Use    Vaping status: Never Used   Substance Use Topics    Alcohol use: Yes     Comment: Occ    Drug use: Not Currently     Types: Marijuana, Cocaine, Inhaled     Comment: none for 30 years/ tried pot [id number] didn't care to much for     Family History   Problem Relation Age of Onset    Cancer Mother         Breast cancer    Heart Disease Mother          at 91yo/heart failure    Heart Attack Mother     Breast Cancer Mother      Heart Disease Father          at 57yo/massive heart attack    Heart Attack Father     Hypertension Father          coronary thrombosis    Cancer Sister          of leukemia    Breast Cancer Sister     Allergies Sister     Stroke Sister         living lost eye site left eye    Arthritis Sister     No Known Problems Maternal Grandmother     No Known Problems Maternal Grandfather     No Known Problems Paternal Grandmother     Dementia Paternal Grandfather     Alcohol/Drug Other         uncle    Allergies Sister     Hyperlipidemia Neg Hx     Diabetes Neg Hx      He  has a past medical history of Allergy, Anesthesia (), Arthritis (), Back pain, Cancer ([facility]) (), Cataract (), CHF (congestive heart failure) (), Chickenpox, Daytime sleepiness, Diabetes ([facility]) (), Dizziness, Frequent urination, Yoruba measles, High cholesterol (), Hypercholesterolemia, Hypertension, Insomnia, Kidney stone, Migraine, Mumps, Neuropathy, Obesity, JUAN on CPAP (2024), PONV (postoperative nausea and vomiting) (), Presence of implantable pulmonary artery pressure and heart rate monitoring system (2024), Presence of implantable pulmonary artery pressure and heart rate monitoring system (2024), Reactive depression (2023), Renal artery stenosis (), Ringing in ears, Shortness of breath, Sleep apnea (), Stroke ([facility]) (), Swelling of lower extremity, Tinea inguinalis, Tobacco use (10/30/2018), Urinary bladder disorder (), Wears glasses, and Wheezing.    He has no past medical history of Acute nasopharyngitis, Anginal syndrome (), Arrhythmia, Asthma, Blood clotting disorder (), Bowel habit changes, Breath shortness, Bronchitis, Carcinoma in situ of respiratory system, Congestive heart failure (), Continuous ambulatory peritoneal dialysis status (), Coughing blood, Dental disorder, Dialysis patient ([facility]), Disorder of thyroid, Emphysema of lung (), Glaucoma,  "Gynecological disorder, Heart burn, Heart murmur, Heart valve disease, Hemorrhagic disorder (HCC), Hepatitis A, Hepatitis B, Hepatitis C, Hiatus hernia syndrome, Indigestion, Jaundice, Myocardial infarct (HCC), Pacemaker, Pain, Pneumonia, Pregnant, Renal disorder, Rheumatic fever, Seizure (HCC), Snoring, Tuberculosis, or Urinary incontinence.   Past Surgical History:   Procedure Laterality Date    OK TRANSURETHRAL ELEC-SURG PROSTATECTOM N/A 08/17/2023    Procedure: BIPOLAR TRANSURETHRAL RESECTION OF PROSTATE;  Surgeon: Jacques Mcdowell M.D.;  Location: SURGERY [facility];  Service: Urology    OK CYSTOURETHROSCOPY N/A 08/17/2023    Procedure: CYSTOSCOPY;  Surgeon: Jacques Mcdowell M.D.;  Location: SURGERY [facility];  Service: Urology    OK CYSTO/URETERO/PYELOSCOPY W/LITHOTRIPSY N/A 08/17/2023    Procedure: CYSTOLITHLOPAXY;  Surgeon: Jacques Mcdowell M.D.;  Location: SURGERY [facility];  Service: Urology    CATARACT PHACO WITH IOL      EYE SURGERY      OTHER  1992    cyst removal back of the neck    OTHER ABDOMINAL SURGERY      gallbladder    OK REMV 2ND CATARACT,CORN-SCLER SECTN      PROSTATECTOMY, RADICAL RETRO      RHINOPLASTY      SINUSCOPE         Exam:   /62 (BP Location: Left arm, Patient Position: Sitting, BP Cuff Size: Adult)   Pulse 64   Temp 36.8 °C (98.3 °F) (Temporal)   Resp 16   Ht 1.803 m (5' 11\")   Wt 116 kg (256 lb 12.8 oz)   SpO2 94%  Body mass index is 35.82 kg/m².    Hearing excellent.    Dentition fair, recommend to f/u with dentist  Alert, oriented in no acute distress.  Eye contact is good, speech goal directed, affect calm    Assessment and Plan. The following treatment and monitoring plan is recommended:      1. Encounter for Medicare annual wellness exam  PMH/PSH/FH/Social history reviewed. Medication reconciled. Vaccinations discussed. Previous records and labs reviewed. Discussed age appropriate anticipatory guidance.  Recommend shingles vaccine and COVID booster.  " Patient will schedule with pharmacy.    2. Obesity BMI 30.0-39.9,adult  Discussed lifestyle and dietary changes such as low-carb diet, high in vegetables and fresh fruits.  Encouraged to increase water intake.  Regular physical exercise at least 30 minutes/day 5 days a week. Weight reduction if applicable (aim for 5% to 10% body weight increments).     3. Chronic diastolic congestive heart failure (HCC)  Chronic and stable condition on Jardiance 10 mg daily.  Managed by cardiology and is scheduled for follow-up on 4/29.    4. Chronic atrial fibrillation (HCC)  Chronic and stable condition on metoprolol 25 mg daily and eliquis 5 mg BID. VSS. Asymptomatic.  Managed by cardiology and is scheduled for follow-up on 4/29.    5. Long term (current) use of anticoagulants  Chronic and stable condition. No s/s of bleeding. Will monitor.    6. Essential hypertension  Chronic and stable condition on olmesartan 20 mg daily. BP at goal.  Recommend healthy lifestyle modification.  Patient will follow-up with cardiology on 4/29.    7. CVA (cerebrovascular accident due to intracerebral hemorrhage) (HCC)  Chronic and stable condition on atorvastatin 40 mg daily. LFT WNL. Lipid panel at goal.  Patient is managed by cardiology and is scheduled to follow-up on 4/29    8. Pituitary adenoma (HCC)  Chronic and stable condition.  Patient is scheduled to repeat MRI this year.  Managed by endocrinology.    9. Complex sleep apnea syndrome  Established condition.  Managed by pulmonary.  Recommend follow-up as scheduled.    Services suggested: No services needed at this time  Health Care Screening: Age-appropriate preventive services recommended by USPTF and ACIP covered by Medicare were discussed today. Services ordered if indicated and agreed upon by the patient.  Referrals offered: Community-based lifestyle interventions to reduce health risks and promote self-management and wellness, fall prevention, nutrition, physical activity, tobacco-use  cessation, weight loss, and mental health services as per orders if indicated.    Discussion today about general wellness and lifestyle habits:    Prevent falls and reduce trip hazards; Cautioned about securing or removing rugs.  Have a working fire alarm and carbon monoxide detector;   Engage in regular physical activity and social activities     Follow-up: Return in about 3 months (around 7/14/2025), or if symptoms worsen or fail to improve.    Thank you, Edilia LEDEZMA  [facility]

## 2025-04-29 ENCOUNTER — OFFICE VISIT (OUTPATIENT)
Dept: CARDIOLOGY | Facility: MEDICAL CENTER | Age: 75
End: 2025-04-29
Attending: INTERNAL MEDICINE
Payer: MEDICARE

## 2025-04-29 ENCOUNTER — TELEPHONE (OUTPATIENT)
Dept: CARDIOLOGY | Facility: MEDICAL CENTER | Age: 75
End: 2025-04-29

## 2025-04-29 VITALS
WEIGHT: 259.6 LBS | HEIGHT: 71 IN | HEART RATE: 60 BPM | OXYGEN SATURATION: 91 % | SYSTOLIC BLOOD PRESSURE: 140 MMHG | DIASTOLIC BLOOD PRESSURE: 70 MMHG | RESPIRATION RATE: 16 BRPM | BODY MASS INDEX: 36.34 KG/M2

## 2025-04-29 DIAGNOSIS — I10 ESSENTIAL HYPERTENSION: Chronic | ICD-10-CM

## 2025-04-29 DIAGNOSIS — D68.69 HYPERCOAGULABLE STATE DUE TO PAROXYSMAL ATRIAL FIBRILLATION (HCC): ICD-10-CM

## 2025-04-29 DIAGNOSIS — Z79.01 CHRONIC ANTICOAGULATION: ICD-10-CM

## 2025-04-29 DIAGNOSIS — E78.00 HYPERCHOLESTEREMIA: ICD-10-CM

## 2025-04-29 DIAGNOSIS — I48.0 PAROXYSMAL ATRIAL FIBRILLATION (HCC): ICD-10-CM

## 2025-04-29 DIAGNOSIS — I48.0 HYPERCOAGULABLE STATE DUE TO PAROXYSMAL ATRIAL FIBRILLATION (HCC): ICD-10-CM

## 2025-04-29 DIAGNOSIS — I50.33 ACUTE ON CHRONIC DIASTOLIC CONGESTIVE HEART FAILURE (HCC): ICD-10-CM

## 2025-04-29 PROBLEM — I48.91 HYPERCOAGULABILITY DUE TO ATRIAL FIBRILLATION (HCC): Status: ACTIVE | Noted: 2025-04-29

## 2025-04-29 PROBLEM — I50.31 ACUTE DIASTOLIC CONGESTIVE HEART FAILURE (HCC): Status: RESOLVED | Noted: 2024-06-18 | Resolved: 2025-04-29

## 2025-04-29 PROCEDURE — 99213 OFFICE O/P EST LOW 20 MIN: CPT | Performed by: INTERNAL MEDICINE

## 2025-04-29 RX ORDER — EPLERENONE 25 MG/1
25 TABLET ORAL DAILY
Qty: 100 TABLET | Refills: 3 | Status: SHIPPED | OUTPATIENT
Start: 2025-04-29

## 2025-04-29 RX ORDER — TESTOSTERONE 20.25 MG/1.25G
GEL TOPICAL
COMMUNITY
Start: 2025-04-03

## 2025-04-29 ASSESSMENT — ENCOUNTER SYMPTOMS
COUGH: 0
WEIGHT LOSS: 0
ABDOMINAL PAIN: 0
CLAUDICATION: 0
PALPITATIONS: 0
WEAKNESS: 0
CHILLS: 0
BLURRED VISION: 0
MYALGIAS: 0
DEPRESSION: 0
RESPIRATORY NEGATIVE: 1
NAUSEA: 0
HEADACHES: 0
VOMITING: 0
GASTROINTESTINAL NEGATIVE: 1
MUSCULOSKELETAL NEGATIVE: 1
FEVER: 0
EYES NEGATIVE: 1
FOCAL WEAKNESS: 0
CARDIOVASCULAR NEGATIVE: 1
BRUISES/BLEEDS EASILY: 0
NEUROLOGICAL NEGATIVE: 1
PSYCHIATRIC NEGATIVE: 1
SHORTNESS OF BREATH: 0
NERVOUS/ANXIOUS: 0
DOUBLE VISION: 0
DIZZINESS: 0
CONSTITUTIONAL NEGATIVE: 1

## 2025-04-29 ASSESSMENT — FIBROSIS 4 INDEX: FIB4 SCORE: 2.62

## 2025-04-29 NOTE — PROGRESS NOTES
Chief Complaint   Patient presents with    Atrial Fibrillation       Subjective     Laureen Munoz is a 74 y.o. male who presents today for follow up of congestive heart failure, hypertension and hyperlipidemia, renal artery stenosis, atrial fibrillation on chronic anticoagulation therapy, right bundle branch block, prior stroke.    Since the patient's last visit on 10/18/24, he has been doing well clinically from cardiac standpoint. He admit to lower extremity edema. He denies fatigue, chest pain, shortness of breath, palpitations, dizziness or syncope. He was diagnowed with atrial fibrillation, started on DOAC and has consulted with electrophysiology service. He has been inactive.      Past Medical History:   Diagnosis Date    Allergy     Anesthesia 2017    Arthritis unk    Back pain     Cancer ([facility]) 2020    skin cancer squamous on the left cheek    Cataract 2022    lens implants    CHF (congestive heart failure) ([facility])     Chickenpox     Daytime sleepiness     Diabetes ([facility]) 2023    pre-diabetic    Dizziness     Frequent urination     [place] measles     High cholesterol 1998    Hypercholesterolemia     Hypertension     Insomnia     Kidney stone     Migraine     Mumps     Neuropathy     Obesity     JUAN on CPAP 11/01/2024    PONV (postoperative nausea and vomiting) 2017    Rhinoplasti for deviated septon    Presence of implantable pulmonary artery pressure and heart rate monitoring system 11/21/2024    Presence of implantable pulmonary artery pressure and heart rate monitoring system 11/21/2024    Reactive depression 02/21/2023    Renal artery stenosis ([facility])     Ringing in ears     Shortness of breath     Sleep apnea 2019    tested    Stroke ([facility]) 2023    stroke or contusion    Swelling of lower extremity     Tinea inguinalis     Tobacco use 10/30/2018    Urinary bladder disorder 1995    bph    Wears glasses     Wheezing      Past Surgical History:   Procedure Laterality Date    MN TRANSURETHRAL ELEC-SURG  PROSTATECTOM N/A 2023    Procedure: BIPOLAR TRANSURETHRAL RESECTION OF PROSTATE;  Surgeon: Jacques Mcdowell M.D.;  Location: SURGERY [facility];  Service: Urology    WY CYSTOURETHROSCOPY N/A 2023    Procedure: CYSTOSCOPY;  Surgeon: Jacques Mcdowell M.D.;  Location: SURGERY [facility];  Service: Urology    WY CYSTO/URETERO/PYELOSCOPY W/LITHOTRIPSY N/A 2023    Procedure: CYSTOLITHLOPAXY;  Surgeon: Jacques Mcdowell M.D.;  Location: SURGERY [facility];  Service: Urology    CATARACT PHACO WITH IOL      EYE SURGERY      OTHER      cyst removal back of the neck    OTHER ABDOMINAL SURGERY      gallbladder    WY REMV 2ND CATARACT,CORN-SCLER SECTN      PROSTATECTOMY, RADICAL RETRO      RHINOPLASTY      SINUSCOPE       Family History   Problem Relation Age of Onset    Cancer Mother         Breast cancer    Heart Disease Mother          at 89yo/heart failure    Heart Attack Mother     Breast Cancer Mother     Heart Disease Father          at 59yo/massive heart attack    Heart Attack Father     Hypertension Father          coronary thrombosis    Cancer Sister          of leukemia    Breast Cancer Sister     Allergies Sister     Stroke Sister         living lost eye site left eye    Arthritis Sister     No Known Problems Maternal Grandmother     No Known Problems Maternal Grandfather     No Known Problems Paternal Grandmother     Dementia Paternal Grandfather     Alcohol/Drug Other         uncle    Allergies Sister     Hyperlipidemia Neg Hx     Diabetes Neg Hx      Social History     Socioeconomic History    Marital status:      Spouse name: Not on file    Number of children: Not on file    Years of education: Not on file    Highest education level: Associate degree: academic program   Occupational History    Not on file   Tobacco Use    Smoking status: Former     Current packs/day: 0.00     Average packs/day: 1 pack/day for 15.0 years (15.0 ttl pk-yrs)     Types: Cigarettes,  Cigars     Start date: 1970     Quit date: 1985     Years since quittin.3    Smokeless tobacco: Never    Tobacco comments:     occasional cigar started in / quit completely    Vaping Use    Vaping status: Never Used   Substance and Sexual Activity    Alcohol use: Yes     Comment: Occ    Drug use: Not Currently     Types: Marijuana, Cocaine, Inhaled     Comment: none for 30 years/ tried pot [id number] didn't care to much for    Sexual activity: Yes     Partners: Female     Birth control/protection: Condom     Comment: not sexually active last 3 years, when active male condom   Other Topics Concern    Not on file   Social History Narrative    Was in the [employer]    Retired from [employer] -  for [employer]     Social Drivers of Health     Financial Resource Strain: Low Risk  (2025)    Overall Financial Resource Strain (CARDIA)     Difficulty of Paying Living Expenses: Not hard at all   Food Insecurity: No Food Insecurity (2025)    Hunger Vital Sign     Worried About Running Out of Food in the Last Year: Never true     Ran Out of Food in the Last Year: Never true   Transportation Needs: No Transportation Needs (2025)    PRAPARE - Transportation     Lack of Transportation (Medical): No     Lack of Transportation (Non-Medical): No   Physical Activity: Insufficiently Active (2025)    Exercise Vital Sign     Days of Exercise per Week: 2 days     Minutes of Exercise per Session: 20 min   Stress: No Stress Concern Present (2025)    [employer] [place] [employer] - Occupational Stress Questionnaire     Feeling of Stress : Only a little   Social Connections: Socially Isolated (2025)    Social Connection and Isolation Panel [NHANES]     Frequency of Communication with Friends and Family: Twice a week     Frequency of Social Gatherings with Friends and Family: Once a week     Attends Christianity Services: Never     Active Member of Clubs or Organizations: No      Attends Club or Organization Meetings: Never     Marital Status:    Intimate Partner Violence: Not At Risk (8/13/2024)    Humiliation, Afraid, Rape, and Kick questionnaire     Fear of Current or Ex-Partner: No     Emotionally Abused: No     Physically Abused: No     Sexually Abused: No   Housing Stability: Low Risk  (4/8/2025)    Housing Stability Vital Sign     Unable to Pay for Housing in the Last Year: No     Number of Times Moved in the Last Year: 1     Homeless in the Last Year: No     Allergies   Allergen Reactions    Losartan Cough    Gabapentin Unspecified     Dizziness, confusion, falling    Hyzaar [Losartan Potassium-Hctz] Cough     (Medications reviewed.)  Outpatient Encounter Medications as of 4/29/2025   Medication Sig Dispense Refill    Testosterone 1.62 % Gel       olmesartan (BENICAR) 20 MG Tab Take 1 Tablet by mouth every day. 90 Tablet 2    Empagliflozin (JARDIANCE) 10 MG Tab tablet Take 1 Tablet by mouth every day. 90 Tablet 3    apixaban (ELIQUIS) 5mg Tab Take 1 Tablet by mouth 2 times a day. 180 Tablet 3    metoprolol SR (TOPROL XL) 25 MG TABLET SR 24 HR Take 1 Tablet by mouth every morning. 90 Tablet 3    atorvastatin (LIPITOR) 40 MG Tab Take 1 Tablet by mouth every evening. 30 Tablet 3    cetirizine (ZYRTEC) 10 MG Tab Take 10 mg by mouth every morning.       No facility-administered encounter medications on file as of 4/29/2025.     Review of Systems   Constitutional: Negative.  Negative for chills, fever, malaise/fatigue and weight loss.   HENT: Negative.  Negative for hearing loss.    Eyes: Negative.  Negative for blurred vision and double vision.   Respiratory: Negative.  Negative for cough and shortness of breath.    Cardiovascular: Negative.  Negative for chest pain, palpitations, claudication and leg swelling.   Gastrointestinal: Negative.  Negative for abdominal pain, nausea and vomiting.   Genitourinary: Negative.  Negative for dysuria and urgency.   Musculoskeletal: Negative.   "Negative for joint pain and myalgias.   Skin: Negative.  Negative for itching and rash.   Neurological: Negative.  Negative for dizziness, focal weakness, weakness and headaches.   Endo/Heme/Allergies: Negative.  Does not bruise/bleed easily.   Psychiatric/Behavioral: Negative.  Negative for depression. The patient is not nervous/anxious.               Objective     BP (!) 140/70 (BP Location: Right arm, Patient Position: Sitting, BP Cuff Size: Adult)   Pulse 60   Resp 16   Ht 1.803 m (5' 11\")   Wt 118 kg (259 lb 9.6 oz)   SpO2 91%   BMI 36.21 kg/m²     Physical Exam  Constitutional:       Appearance: Normal appearance. He is well-developed and normal weight.   HENT:      Head: Normocephalic and atraumatic.   Neck:      Vascular: No JVD.   Cardiovascular:      Rate and Rhythm: Normal rate and regular rhythm.      Heart sounds: Normal heart sounds.   Pulmonary:      Effort: Pulmonary effort is normal.      Breath sounds: Normal breath sounds.   Abdominal:      General: Bowel sounds are normal.      Palpations: Abdomen is soft.      Comments: No hepatosplenomegaly.   Musculoskeletal:         General: Normal range of motion.   Lymphadenopathy:      Cervical: No cervical adenopathy.   Skin:     General: Skin is warm and dry.   Neurological:      Mental Status: He is alert and oriented to person, place, and time.            CARDIAC STUDIES/PROCEDURES:     ABDOMINAL ULTRASOUND (03/22/24)  Ectatic abdominal aorta. No aneurysm identified.      ECHOCARDIOGRAM CONCLUSIONS (06/07/24)  Normal left ventricular systolic function.  The left ventricular ejection fraction is visually estimated to be 60%.  The right ventricle is dilated. Normal right ventricular systolic function.  Mild aortic insufficiency.  Mild tricuspid regurgitation.  Compared to the prior study on 5/10/2023, low ventricular systolic function remains preserved.     EKG performed on (08/13/24) was reviewed: EKG personally interpreted shows sinus " bradycardia.     IMPLANTABLE LOOP RECORDER by Bhavin Gonzalez (11/21/24)   Successful implantable loop recorder implantation     Laboratory results of (04/02/24) Cholesterol profile of 124/121/46/54 mg/dL noted.      MYOCARDIAL PERFUSION STUDY CONCLUSIONS (06/20/24)  NUCLEAR IMAGING INTERPRETATION  Normal Lexiscan myocardial perfusion study.  No evidence of ischemia or infarct.  Diaphragmatic artifact.  SDS 1.  LVEF 50%, visually better.  Sinus rhythm with right bundle branch block at rest.   No ischemic changes with Regadenoson.  Occasional PACs.  No chest pain.  ECG INTERPRETATION  Negative stress ECG for ischemia.    MRI OF BRAIN (08/14/24)  Tiny areas of acute infarction the bilateral posterior parietal and right occipital lobes.  Multifocal small chronic infarcts in the supra and infratentorial brain parenchyma.  Mild chronic microvascular ischemic disease.  Mild cerebral volume loss.  Stable small pituitary microadenoma.    RENAL ULTRASOUND (05/1/24)  1.  Bilateral proximal and right mid renal artery velocities are elevated above 180 cm/s indicating stenosis.  2.  Bilateral renal resistive indices are mildly elevated indicating medical renal disease.  3.  Left simple renal cyst measuring 11.4 cm in greatest dimension. No follow up imaging is recommended per consensus guidelines of the 2019 ACR Incidental Findings Committee for probably benign incidental simple appearing renal cystic lesion(s) based on imaging criteria.  4.  Post void residual measures 201 mL.  5.  Enlarged calcified prostate which indents the bladder base.     ZIO AT REPORT (08/14/24-08/28/24)   Zio study showing predominately sinus rhythm with maximum rate of 160 bpm, minimum rate of 42 bpm, average of 59 bpm.   Atrial fibrillation: None.   Supraventricular tachycardia: 64 episodes of supraventricular tachycardia/atrial tachycardia with fastest interval lasting 11 beats with a max rate of 154 bpm with longest lasting 17.7 seconds with  average rate of 99 bpm noted.   Pauses: None.   Heart block: None.   Ventricular tachycardia: 3 episodes of ventricular tachycardia with fastest interval lasting 5 beats with a max rate of 160 bpm with longest lasting 18 beats with average rate of 116 bpm noted.   Frequent 13.8% burden of premature atrial contractions, occasional 3.2% couplets.   Occasional 4.8% burden of premature ventricular contractions.   Ventricular bigeminy (longest episode lasting 7.7 seconds) and trigeminy (longest episode lasting 2 minutes 47 seconds) were noted.   Patient Events: None.        Assessment & Plan     1. Acute on chronic diastolic congestive heart failure (HCC)        2. Essential hypertension        3. Hypercholesteremia        4. Paroxysmal atrial fibrillation (HCC)        5. Hypercoagulable state due to paroxysmal atrial fibrillation (HCC)        6. Chronic anticoagulation            Medical Decision Making: Today's Assessment/Status/Plan:        Chronic diastolic congestive heart failure:  He is a 74 year old male with chronic diastolic congestive heart failure, hypertension and hyperlipidemia, atrial fibrillation on chronic anticoagulation therapy, right bundle branch block, prior stroke. The overall volume status is mildly elevated off of spironolactone. We will start eplerenone.   Hypertension: Blood pressure has been high. We will start diuretic therapy, continue with beta blockade therapy and angiotensin receptor blockade and reassess the blood pressure.   Hyperlipidemia: He is doing well on statin therapy without myalgia symptoms.  Atrial fibrillation on anticoagulation therapy (Eliquis): He is doing well on anticoagulation without palpitations.  Right bundle branch block  History of stroke (08/14/24): (Managed by Shruti Russ)  Health maintenance: Spironolactone was discontinued as requested by his endocrinologist.   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     We will follow up in 3 months.    CC  Edilia Prescott

## 2025-04-29 NOTE — TELEPHONE ENCOUNTER
Received New Start request via [employer]  for eplerenone (INSPRA) 25 MG Tab . (Quantity:90, Day Supply:90)     Insurance: [facility] MEDICARE D  Member ID:  [id number]  BIN: [id number]  PCN: [id number]  Group: [id number]     Ran Test claim via [employer] & medication Pays for a $5.00 copay. Will outreach to patient to offer specialty pharmacy services and or release to preferred pharmacy    Pt wants to fill at [facility]

## 2025-05-08 NOTE — TELEPHONE ENCOUNTER
Caro EOS to 's nurse, Maria Luisa, on 9/4/2024    Established patient    Preliminary findings:    3 episodes of VT  with an avg rate of 124 bpm    64 episodes of SVT  with an avg rate of 111 bpm    Sinus rhythm  with an avg rate of 59 bpm    13.8% isolated SVE(s), 3.2% SVE couplets, rare SVE triplets    4.8% isolated VE(s), rare VE couplets and triplets    No patient events   Pt's wife notified. States the pt has started using a cream for his knee pain which seems to be helping.

## 2025-05-11 ENCOUNTER — NON-PROVIDER VISIT (OUTPATIENT)
Dept: CARDIOLOGY | Facility: MEDICAL CENTER | Age: 75
End: 2025-05-11
Payer: MEDICARE

## 2025-05-12 PROCEDURE — 93298 REM INTERROG DEV EVAL SCRMS: CPT | Mod: 26 | Performed by: STUDENT IN AN ORGANIZED HEALTH CARE EDUCATION/TRAINING PROGRAM

## 2025-05-12 NOTE — CARDIAC REMOTE MONITOR - SCAN
Device transmission reviewed. Device demonstrated appropriate function.       Electronically Signed by: Bhavin Mcgowan MD, PhD    5/16/2025  7:47 AM

## 2025-05-18 DIAGNOSIS — I50.31 ACUTE DIASTOLIC CONGESTIVE HEART FAILURE (HCC): ICD-10-CM

## 2025-05-18 DIAGNOSIS — I10 ESSENTIAL HYPERTENSION: ICD-10-CM

## 2025-05-19 RX ORDER — METOPROLOL SUCCINATE 25 MG/1
25 TABLET, EXTENDED RELEASE ORAL EVERY MORNING
Qty: 90 TABLET | Refills: 3 | OUTPATIENT
Start: 2025-05-19

## 2025-06-03 ENCOUNTER — OFFICE VISIT (OUTPATIENT)
Dept: MEDICAL GROUP | Facility: IMAGING CENTER | Age: 75
End: 2025-06-03
Payer: MEDICARE

## 2025-06-03 VITALS
HEIGHT: 71 IN | HEART RATE: 58 BPM | RESPIRATION RATE: 16 BRPM | OXYGEN SATURATION: 92 % | WEIGHT: 259 LBS | BODY MASS INDEX: 36.26 KG/M2 | TEMPERATURE: 97.9 F | DIASTOLIC BLOOD PRESSURE: 62 MMHG | SYSTOLIC BLOOD PRESSURE: 110 MMHG

## 2025-06-03 DIAGNOSIS — E87.1 HYPONATREMIA: ICD-10-CM

## 2025-06-03 DIAGNOSIS — M79.89 LEG SWELLING: Primary | ICD-10-CM

## 2025-06-03 DIAGNOSIS — M79.672 LEFT FOOT PAIN: ICD-10-CM

## 2025-06-03 DIAGNOSIS — R22.43 LOCALIZED SWELLING OF BOTH FEET: ICD-10-CM

## 2025-06-03 PROCEDURE — 99214 OFFICE O/P EST MOD 30 MIN: CPT

## 2025-06-03 PROCEDURE — 1125F AMNT PAIN NOTED PAIN PRSNT: CPT

## 2025-06-03 PROCEDURE — 1170F FXNL STATUS ASSESSED: CPT

## 2025-06-03 PROCEDURE — 3074F SYST BP LT 130 MM HG: CPT

## 2025-06-03 PROCEDURE — 3078F DIAST BP <80 MM HG: CPT

## 2025-06-03 RX ORDER — FUROSEMIDE 20 MG/1
20 TABLET ORAL
Qty: 30 TABLET | Refills: 3 | Status: SHIPPED
Start: 2025-06-03 | End: 2025-06-13

## 2025-06-03 ASSESSMENT — PAIN SCALES - GENERAL: PAINLEVEL_OUTOF10: 5=MODERATE PAIN

## 2025-06-03 ASSESSMENT — FIBROSIS 4 INDEX: FIB4 SCORE: 2.62

## 2025-06-03 NOTE — PROGRESS NOTES
Subjective:     CC:   Chief Complaint   Patient presents with    Foot Problem     Bilateral feet swelling and pain, left foot worse than right.  Worsened in the last 3 days    Medication Management     Eplerenone is not helpful. Leg swelling is getting worse       HPI:   Laureen presents today to discuss:    BLE swelling  Left foot pain and swelling  Chronic condition. Patient with history of congestive heart failure w/ongoing lower extremity edema which was well controlled on spironolactone.   Patient currently takes jardiance 10 mg daily and was recently switched to eplerenone 25 mg daily in April from spironolactone by cardiology per endocrinology's recommendation.  Since starting eplerenone, he reports bilateral leg swelling reappeared and have continued to worsen. He reports swelling is more localized to bilateral feet with left foot worse than right.   He developed significant swelling, pain, and bruising on left foot. Walking and applying pressure provokes pain.   He is currently on eliquis.   He admits doubling his eplerenone dose to BID one week ago without any relief   Pt reports developing calf pain last night, however, his this has improved today.    He denies recent trauma, injury or fall on lower extremity, specifically left foot.   Denies fevers, left foot warmth, numbness/tingling or weakness, SOB, chest pain, dizziness, or syncope.          Past Medical History[1]  Family History   Problem Relation Age of Onset    Cancer Mother         Breast cancer    Heart Disease Mother          at 91yo/heart failure    Heart Attack Mother     Breast Cancer Mother     Heart Disease Father          at 57yo/massive heart attack    Heart Attack Father     Hypertension Father          coronary thrombosis    Cancer Sister          of leukemia    Breast Cancer Sister     Allergies Sister     Stroke Sister         living lost eye site left eye    Arthritis Sister     No Known Problems Maternal Grandmother   "   No Known Problems Maternal Grandfather     No Known Problems Paternal Grandmother     Dementia Paternal Grandfather     Alcohol/Drug Other         uncle    Allergies Sister     Hyperlipidemia Neg Hx     Diabetes Neg Hx      Past Surgical History[2]  Social History[3]  Social History     Social History Narrative    Was in the [employer]    Retired from [employer] -  for [employer]     Current Medications and Prescriptions Ordered in Epic[4]  Losartan, Gabapentin, and Hyzaar [losartan potassium-hctz]    ROS: see hpi  Gen: no fevers/chills  Pulm: no sob, no cough  CV: no chest pain, no palpitations, yes BLE edema  GI: no nausea/vomiting, no diarrhea  Skin: no rash    Objective:   Exam:  /62 (BP Location: Left arm, Patient Position: Sitting, BP Cuff Size: Adult)   Pulse (!) 58   Temp 36.6 °C (97.9 °F) (Temporal)   Resp 16   Ht 1.803 m (5' 11\")   Wt 117 kg (259 lb)   SpO2 92%   BMI 36.12 kg/m²    Body mass index is 36.12 kg/m².    Gen: Alert and oriented, No apparent distress.  HEENT: Head atraumatic, normocephalic. Pupils equal and round.  Neck: Neck is supple without lymphadenopathy.   Lungs: Normal effort, CTA bilaterally, no wheezes, rhonchi, or rales  CV: Regular rate and rhythm. No murmurs, rubs, or gallops.  ABD: +BS. Non-tender, non-distended. No rebound, rigidity, or guarding.  Ext: No clubbing, cyanosis, BLE 2+ edema.  BLE legs and feet    Assessment & Plan:     74 y.o. male with the following -     1. Leg swelling (Primary)  Acute on chronic. Condition worsened after starting eplerenone.   Spironolactone was d/c by cardiology and endocrinology due to potential adverse interaction with testosterone.  DD: CHF, drug induced, blood clots, PAD/PVD  Recommend to stop eplerenone, f/u with cardiology.   Will start on low dose furosemide 20 mg daily as needed.  Will check BMP in 1 to 2 weeks.  Discussed supportive measures that include avoidance of prolonged sitting and standing; leg " elevation at least at heart level for 30 minutes 3-4 times per day; calf muscle pump exercise; daily walking and simple ankle flexion exercises.  ED symptoms discussed.    - furosemide (LASIX) 20 MG Tab; Take 1 Tablet by mouth 1 time a day as needed (leg swelling).  Dispense: 30 Tablet; Refill: 3  - proBrain Natriuretic Peptide, NT; Future  - Basic Metabolic Panel; Future  - US-EXTREMITY ARTERY LOWER BILAT W/JESUS (COMBO); Future  - proBrain Natriuretic Peptide, NT; Future    2. Localized swelling of both feet  3. Left foot pain  New issue. Etiology unknown,suspect condition above. However, cannot completely r/o gout or cellulitis. VSS, no s/s of hemodynamic or limb compromise.  Will order labs to evaluate.   Strict ED prec discussed.    - URIC ACID; Future  - CRP QUANTITIVE (NON-CARDIAC); Future  - Sed Rate; Future  - US-EXTREMITY ARTERY LOWER BILAT W/JESUS (COMBO); Future  - proBrain Natriuretic Peptide, NT; Future    4. Hyponatremia  Pt at risk for hyponatremia and hypokalemia with furosemide. Will check bmp after 1-2 weeks of lasix use.     - Basic Metabolic Panel; Future      Return if symptoms worsen or fail to improve.    ANISA Hawthorne.   [facility]    Medical Decision Making/Course:  In the course of preparing for this visit with review of the pertinent past medical history, recent and past clinic visits, current medications, and performing chart, immunization, medical history and medication reconciliation, and in the further course of obtaining the current history pertinent to the clinic visit today, performing an exam and evaluation, ordering and independently evaluating labs, tests, and/or procedures, prescribing any recommended new medications as noted above, providing any pertinent counseling and education and recommending further coordination of care. This was discussed with patient in a shared-decision making conversation, and they understand and agreed with plan of care.      Please note that this dictation was created using voice recognition software. I have made every reasonable attempt to correct obvious errors, but I expect that there are errors of grammar and possibly content that I did not discover before finalizing the note.         [1]   Past Medical History:  Diagnosis Date    Allergy     Anesthesia 2017    Arthritis unk    Atrial fibrillation (HCC)     Back pain     Cancer ([facility]) 2020    skin cancer squamous on the left cheek    Cataract 2022    lens implants    CHF (congestive heart failure) (HCC)     Chickenpox     Daytime sleepiness     Diabetes (HCC) 2023    pre-diabetic    Dizziness     Frequent urination     Sinhala measles     High cholesterol 1998    Hypercholesterolemia     Hypertension     Insomnia     Kidney stone     Migraine     Mumps     Neuropathy     Obesity     JUAN on CPAP 11/01/2024    PONV (postoperative nausea and vomiting) 2017    Rhinoplasti for deviated septon    Presence of implantable pulmonary artery pressure and heart rate monitoring system 11/21/2024    Presence of implantable pulmonary artery pressure and heart rate monitoring system 11/21/2024    Reactive depression 02/21/2023    Renal artery stenosis ([facility])     Ringing in ears     Shortness of breath     Sleep apnea 2019    tested    Stroke ([facility]) 2023    stroke or contusion    Swelling of lower extremity     Tinea inguinalis     Tobacco use 10/30/2018    Urinary bladder disorder 1995    bph    Wears glasses     Wheezing    [2]   Past Surgical History:  Procedure Laterality Date    ND TRANSURETHRAL ELEC-SURG PROSTATECTOM N/A 08/17/2023    Procedure: BIPOLAR TRANSURETHRAL RESECTION OF PROSTATE;  Surgeon: Jacques Mcdowell M.D.;  Location: SURGERY [facility];  Service: Urology    ND CYSTOURETHROSCOPY N/A 08/17/2023    Procedure: CYSTOSCOPY;  Surgeon: Jacques Mcdowell M.D.;  Location: SURGERY [facility];  Service: Urology    ND CYSTO/URETERO/PYELOSCOPY W/LITHOTRIPSY N/A 08/17/2023    Procedure:  CYSTOLITHLOPAXY;  Surgeon: Jacques Mcdowell M.D.;  Location: SURGERY [facility];  Service: Urology    CATARACT PHACO WITH IOL      EYE SURGERY      OTHER      cyst removal back of the neck    OTHER ABDOMINAL SURGERY      gallbladder    KS REMV 2ND CATARACT,CORN-SCLER SECTN      PROSTATECTOMY, RADICAL RETRO      RHINOPLASTY      SINUSCOPE     [3]   Social History  Tobacco Use    Smoking status: Former     Current packs/day: 0.00     Average packs/day: 1 pack/day for 15.0 years (15.0 ttl pk-yrs)     Types: Cigarettes, Cigars     Start date: 1970     Quit date: 1985     Years since quittin.4    Smokeless tobacco: Never    Tobacco comments:     occasional cigar started in / quit completely    Vaping Use    Vaping status: Never Used   Substance Use Topics    Alcohol use: Not Currently     Comment: Occ    Drug use: Not Currently     Types: Marijuana, Cocaine, Inhaled     Comment: none for 30 years/ tried pot [id number] didn't care to much for   [4]   Current Outpatient Medications Ordered in Epic   Medication Sig Dispense Refill    furosemide (LASIX) 20 MG Tab Take 1 Tablet by mouth 1 time a day as needed (leg swelling). 30 Tablet 3    Testosterone 1.62 % Gel       eplerenone (INSPRA) 25 MG Tab Take 1 Tablet by mouth every day. 100 Tablet 3    olmesartan (BENICAR) 20 MG Tab Take 1 Tablet by mouth every day. 90 Tablet 2    Empagliflozin (JARDIANCE) 10 MG Tab tablet Take 1 Tablet by mouth every day. 90 Tablet 3    apixaban (ELIQUIS) 5mg Tab Take 1 Tablet by mouth 2 times a day. 180 Tablet 3    metoprolol SR (TOPROL XL) 25 MG TABLET SR 24 HR Take 1 Tablet by mouth every morning. 90 Tablet 3    cetirizine (ZYRTEC) 10 MG Tab Take 10 mg by mouth every morning.      atorvastatin (LIPITOR) 40 MG Tab Take 1 Tablet by mouth every evening. 30 Tablet 3     No current Epic-ordered facility-administered medications on file.

## 2025-06-04 ENCOUNTER — PATIENT MESSAGE (OUTPATIENT)
Dept: CARDIOLOGY | Facility: MEDICAL CENTER | Age: 75
End: 2025-06-04
Payer: MEDICARE

## 2025-06-04 ENCOUNTER — TELEPHONE (OUTPATIENT)
Dept: CARDIOLOGY | Facility: MEDICAL CENTER | Age: 75
End: 2025-06-04
Payer: MEDICARE

## 2025-06-05 ENCOUNTER — HOSPITAL ENCOUNTER (OUTPATIENT)
Dept: LAB | Facility: MEDICAL CENTER | Age: 75
End: 2025-06-05
Attending: INTERNAL MEDICINE
Payer: MEDICARE

## 2025-06-05 DIAGNOSIS — E78.00 HYPERCHOLESTEREMIA: ICD-10-CM

## 2025-06-05 PROCEDURE — 80061 LIPID PANEL: CPT

## 2025-06-05 PROCEDURE — 36415 COLL VENOUS BLD VENIPUNCTURE: CPT

## 2025-06-05 PROCEDURE — 80053 COMPREHEN METABOLIC PANEL: CPT

## 2025-06-06 ENCOUNTER — RESULTS FOLLOW-UP (OUTPATIENT)
Dept: CARDIOLOGY | Facility: MEDICAL CENTER | Age: 75
End: 2025-06-06

## 2025-06-06 LAB
ALBUMIN SERPL BCP-MCNC: 4.3 G/DL (ref 3.2–4.9)
ALBUMIN/GLOB SERPL: 1.3 G/DL
ALP SERPL-CCNC: 112 U/L (ref 30–99)
ALT SERPL-CCNC: 35 U/L (ref 2–50)
ANION GAP SERPL CALC-SCNC: 11 MMOL/L (ref 7–16)
AST SERPL-CCNC: 38 U/L (ref 12–45)
BILIRUB SERPL-MCNC: 1.3 MG/DL (ref 0.1–1.5)
BUN SERPL-MCNC: 26 MG/DL (ref 8–22)
CALCIUM ALBUM COR SERPL-MCNC: 9 MG/DL (ref 8.5–10.5)
CALCIUM SERPL-MCNC: 9.2 MG/DL (ref 8.5–10.5)
CHLORIDE SERPL-SCNC: 104 MMOL/L (ref 96–112)
CHOLEST SERPL-MCNC: 200 MG/DL (ref 100–199)
CO2 SERPL-SCNC: 22 MMOL/L (ref 20–33)
CREAT SERPL-MCNC: 1.31 MG/DL (ref 0.5–1.4)
GFR SERPLBLD CREATININE-BSD FMLA CKD-EPI: 57 ML/MIN/1.73 M 2
GLOBULIN SER CALC-MCNC: 3.2 G/DL (ref 1.9–3.5)
GLUCOSE SERPL-MCNC: 94 MG/DL (ref 65–99)
HDLC SERPL-MCNC: 39 MG/DL
LDLC SERPL CALC-MCNC: 126 MG/DL
POTASSIUM SERPL-SCNC: 4.7 MMOL/L (ref 3.6–5.5)
PROT SERPL-MCNC: 7.5 G/DL (ref 6–8.2)
SODIUM SERPL-SCNC: 137 MMOL/L (ref 135–145)
TRIGL SERPL-MCNC: 176 MG/DL (ref 0–149)

## 2025-06-12 ENCOUNTER — APPOINTMENT (OUTPATIENT)
Dept: ADMISSIONS | Facility: MEDICAL CENTER | Age: 75
End: 2025-06-12
Attending: INTERNAL MEDICINE
Payer: MEDICARE

## 2025-06-13 ENCOUNTER — OFFICE VISIT (OUTPATIENT)
Dept: CARDIOLOGY | Facility: MEDICAL CENTER | Age: 75
End: 2025-06-13
Attending: INTERNAL MEDICINE
Payer: MEDICARE

## 2025-06-13 VITALS
SYSTOLIC BLOOD PRESSURE: 120 MMHG | HEART RATE: 64 BPM | BODY MASS INDEX: 35.84 KG/M2 | WEIGHT: 256 LBS | OXYGEN SATURATION: 93 % | RESPIRATION RATE: 14 BRPM | HEIGHT: 71 IN | DIASTOLIC BLOOD PRESSURE: 68 MMHG

## 2025-06-13 DIAGNOSIS — E78.00 HYPERCHOLESTEREMIA: ICD-10-CM

## 2025-06-13 DIAGNOSIS — I48.0 HYPERCOAGULABLE STATE DUE TO PAROXYSMAL ATRIAL FIBRILLATION (HCC): ICD-10-CM

## 2025-06-13 DIAGNOSIS — I48.0 PAROXYSMAL ATRIAL FIBRILLATION (HCC): ICD-10-CM

## 2025-06-13 DIAGNOSIS — I63.439 CEREBROVASCULAR ACCIDENT (CVA) DUE TO EMBOLISM OF POSTERIOR CEREBRAL ARTERY, UNSPECIFIED BLOOD VESSEL LATERALITY (HCC): ICD-10-CM

## 2025-06-13 DIAGNOSIS — I10 ESSENTIAL HYPERTENSION: Chronic | ICD-10-CM

## 2025-06-13 DIAGNOSIS — Z79.01 CHRONIC ANTICOAGULATION: ICD-10-CM

## 2025-06-13 DIAGNOSIS — D68.69 HYPERCOAGULABLE STATE DUE TO PAROXYSMAL ATRIAL FIBRILLATION (HCC): ICD-10-CM

## 2025-06-13 DIAGNOSIS — I50.32 CHRONIC DIASTOLIC CONGESTIVE HEART FAILURE (HCC): Primary | ICD-10-CM

## 2025-06-13 PROCEDURE — 1170F FXNL STATUS ASSESSED: CPT | Performed by: INTERNAL MEDICINE

## 2025-06-13 PROCEDURE — 99213 OFFICE O/P EST LOW 20 MIN: CPT | Performed by: INTERNAL MEDICINE

## 2025-06-13 PROCEDURE — 3074F SYST BP LT 130 MM HG: CPT | Performed by: INTERNAL MEDICINE

## 2025-06-13 PROCEDURE — 99214 OFFICE O/P EST MOD 30 MIN: CPT | Performed by: INTERNAL MEDICINE

## 2025-06-13 PROCEDURE — 3078F DIAST BP <80 MM HG: CPT | Performed by: INTERNAL MEDICINE

## 2025-06-13 RX ORDER — SPIRONOLACTONE 25 MG/1
25 TABLET ORAL DAILY
Qty: 100 TABLET | Refills: 3 | Status: SHIPPED | OUTPATIENT
Start: 2025-06-13

## 2025-06-13 RX ORDER — FUROSEMIDE 40 MG/1
40 TABLET ORAL DAILY
Qty: 100 TABLET | Refills: 3 | Status: SHIPPED | OUTPATIENT
Start: 2025-06-13

## 2025-06-13 RX ORDER — ATORVASTATIN CALCIUM 40 MG/1
40 TABLET, FILM COATED ORAL NIGHTLY
Qty: 100 TABLET | Refills: 3 | Status: SHIPPED | OUTPATIENT
Start: 2025-06-13

## 2025-06-13 ASSESSMENT — ENCOUNTER SYMPTOMS
FEVER: 0
RESPIRATORY NEGATIVE: 1
HEADACHES: 0
DOUBLE VISION: 0
DEPRESSION: 0
FOCAL WEAKNESS: 0
COUGH: 0
NEUROLOGICAL NEGATIVE: 1
VOMITING: 0
PSYCHIATRIC NEGATIVE: 1
ABDOMINAL PAIN: 0
PALPITATIONS: 0
WEAKNESS: 0
SHORTNESS OF BREATH: 0
EYES NEGATIVE: 1
NAUSEA: 0
BLURRED VISION: 0
CLAUDICATION: 0
CHILLS: 0
GASTROINTESTINAL NEGATIVE: 1
NERVOUS/ANXIOUS: 0
DIZZINESS: 0
MYALGIAS: 0
MUSCULOSKELETAL NEGATIVE: 1
BRUISES/BLEEDS EASILY: 0
CONSTITUTIONAL NEGATIVE: 1
WEIGHT LOSS: 0

## 2025-06-13 ASSESSMENT — FIBROSIS 4 INDEX: FIB4 SCORE: 2.84

## 2025-06-13 NOTE — PROGRESS NOTES
Chief Complaint   Patient presents with    Hypertension     F/V Dx:Essential hypertension        Hyperlipidemia     F/V Dx: Hypercholesteremia    Atrial Fibrillation     F/V Dx: Atrial fibrillation (HCC)          Subjective     Laureen Munoz is a 74 y.o. male who presents today for follow up of congestive heart failure, hypertension and hyperlipidemia, renal artery stenosis, atrial fibrillation on chronic anticoagulation therapy, right bundle branch block, prior stroke.    Since the patient's last visit on 25, he has been doing well clinically from cardiac standpoint. He admit to lower extremity edema. He denies fatigue, chest pain, shortness of breath, palpitations, dizziness or syncope. He could not tolerate eplerenone. He is moving out of his ex-wife's house and is looking to 55 year and older community.     Past Medical History[1]  Past Surgical History[2]  Family History   Problem Relation Age of Onset    Cancer Mother         Breast cancer    Heart Disease Mother          at 91yo/heart failure    Heart Attack Mother     Breast Cancer Mother     Heart Disease Father          at 57yo/massive heart attack    Heart Attack Father     Hypertension Father          coronary thrombosis    Cancer Sister          of leukemia    Breast Cancer Sister     Allergies Sister     Stroke Sister         living lost eye site left eye    Arthritis Sister     No Known Problems Maternal Grandmother     No Known Problems Maternal Grandfather     No Known Problems Paternal Grandmother     Dementia Paternal Grandfather     Alcohol/Drug Other         uncle    Allergies Sister     Hyperlipidemia Neg Hx     Diabetes Neg Hx      Social History     Socioeconomic History    Marital status: Single     Spouse name: Not on file    Number of children: Not on file    Years of education: Not on file    Highest education level: Associate degree: academic program   Occupational History    Not on file   Tobacco Use    Smoking  status: Former     Current packs/day: 0.00     Average packs/day: 1 pack/day for 15.0 years (15.0 ttl pk-yrs)     Types: Cigarettes, Cigars     Start date: 1970     Quit date: 1985     Years since quittin.4    Smokeless tobacco: Never    Tobacco comments:     occasional cigar started in / quit completely    Vaping Use    Vaping status: Never Used   Substance and Sexual Activity    Alcohol use: Not Currently     Comment: Occ    Drug use: Not Currently     Types: Marijuana, Cocaine, Inhaled     Comment: none for 30 years/ tried pot [id number] didn't care to much for    Sexual activity: Yes     Partners: Female     Birth control/protection: Condom     Comment: not sexually active last 3 years, when active male condom   Other Topics Concern    Not on file   Social History Narrative    Was in the [employer]    Retired from [employer] -  for [employer]     Social Drivers of Health     Financial Resource Strain: Low Risk  (2025)    Overall Financial Resource Strain (CARDIA)     Difficulty of Paying Living Expenses: Not hard at all   Food Insecurity: No Food Insecurity (2025)    Hunger Vital Sign     Worried About Running Out of Food in the Last Year: Never true     Ran Out of Food in the Last Year: Never true   Transportation Needs: No Transportation Needs (2025)    PRAPARE - Transportation     Lack of Transportation (Medical): No     Lack of Transportation (Non-Medical): No   Physical Activity: Insufficiently Active (2025)    Exercise Vital Sign     Days of Exercise per Week: 2 days     Minutes of Exercise per Session: 20 min   Stress: No Stress Concern Present (2025)    [place] [employer] - Occupational Stress Questionnaire     Feeling of Stress : Only a little   Social Connections: Socially Isolated (2025)    Social Connection and Isolation Panel [NHANES]     Frequency of Communication with Friends and Family: Twice a week     Frequency of  "Social Gatherings with Friends and Family: Once a week     Attends Mu-ism Services: Never     Active Member of Clubs or Organizations: No     Attends Club or Organization Meetings: Never     Marital Status:    Intimate Partner Violence: Not At Risk (8/13/2024)    Humiliation, Afraid, Rape, and Kick questionnaire     Fear of Current or Ex-Partner: No     Emotionally Abused: No     Physically Abused: No     Sexually Abused: No   Housing Stability: Low Risk  (4/8/2025)    Housing Stability Vital Sign     Unable to Pay for Housing in the Last Year: No     Number of Times Moved in the Last Year: 1     Homeless in the Last Year: No     Allergies[3]  Encounter Medications[4]  Review of Systems   Constitutional: Negative.  Negative for chills, fever, malaise/fatigue and weight loss.   HENT: Negative.  Negative for hearing loss.    Eyes: Negative.  Negative for blurred vision and double vision.   Respiratory: Negative.  Negative for cough and shortness of breath.    Cardiovascular:  Positive for leg swelling. Negative for chest pain, palpitations and claudication.   Gastrointestinal: Negative.  Negative for abdominal pain, nausea and vomiting.   Genitourinary: Negative.  Negative for dysuria and urgency.   Musculoskeletal: Negative.  Negative for joint pain and myalgias.   Skin: Negative.  Negative for itching and rash.   Neurological: Negative.  Negative for dizziness, focal weakness, weakness and headaches.   Endo/Heme/Allergies: Negative.  Does not bruise/bleed easily.   Psychiatric/Behavioral: Negative.  Negative for depression. The patient is not nervous/anxious.               Objective     /68 (BP Location: Left arm, Patient Position: Sitting, BP Cuff Size: Adult)   Pulse 64   Resp 14   Ht 1.803 m (5' 11\")   Wt 116 kg (256 lb)   SpO2 93%   BMI 35.70 kg/m²     Physical Exam  Constitutional:       Appearance: Normal appearance. He is well-developed and normal weight.   HENT:      Head: " Normocephalic and atraumatic.   Neck:      Vascular: No JVD.   Cardiovascular:      Rate and Rhythm: Normal rate and regular rhythm.      Heart sounds: Normal heart sounds.   Pulmonary:      Effort: Pulmonary effort is normal.      Breath sounds: Normal breath sounds.   Abdominal:      General: Bowel sounds are normal.      Palpations: Abdomen is soft.      Comments: No hepatosplenomegaly.   Musculoskeletal:         General: Normal range of motion.   Lymphadenopathy:      Cervical: No cervical adenopathy.   Skin:     General: Skin is warm and dry.   Neurological:      Mental Status: He is alert and oriented to person, place, and time.            CARDIAC STUDIES/PROCEDURES:     ABDOMINAL ULTRASOUND (03/22/24)  Ectatic abdominal aorta. No aneurysm identified.      ECHOCARDIOGRAM CONCLUSIONS (06/07/24)  Normal left ventricular systolic function.  The left ventricular ejection fraction is visually estimated to be 60%.  The right ventricle is dilated. Normal right ventricular systolic function.  Mild aortic insufficiency.  Mild tricuspid regurgitation.  Compared to the prior study on 5/10/2023, low ventricular systolic function remains preserved.     EKG performed on (01/13/25) was reviewed: EKG personally interpreted shows sinus rhythm.   EKG performed on (08/13/24) EKG shows sinus bradycardia.     IMPLANTABLE LOOP RECORDER by Bhavin Gonzalez (11/21/24)   Successful implantable loop recorder implantation      Laboratory results of (06/05/25) were reviewed. Cholesterol profile of 200/176/39/126 mg/dL noted.  Laboratory results of (04/02/24) Cholesterol profile of 124/121/46/54 mg/dL noted.      MYOCARDIAL PERFUSION STUDY CONCLUSIONS (06/20/24)  NUCLEAR IMAGING INTERPRETATION  Normal Lexiscan myocardial perfusion study.  No evidence of ischemia or infarct.  Diaphragmatic artifact.  SDS 1.  LVEF 50%, visually better.  Sinus rhythm with right bundle branch block at rest.   No ischemic changes with Regadenoson.  Occasional  PACs.  No chest pain.  ECG INTERPRETATION  Negative stress ECG for ischemia.     MRI OF BRAIN (08/14/24)  Tiny areas of acute infarction the bilateral posterior parietal and right occipital lobes.  Multifocal small chronic infarcts in the supra and infratentorial brain parenchyma.  Mild chronic microvascular ischemic disease.  Mild cerebral volume loss.  Stable small pituitary microadenoma.     RENAL ULTRASOUND (05/1/24)  1.  Bilateral proximal and right mid renal artery velocities are elevated above 180 cm/s indicating stenosis.  2.  Bilateral renal resistive indices are mildly elevated indicating medical renal disease.  3.  Left simple renal cyst measuring 11.4 cm in greatest dimension. No follow up imaging is recommended per consensus guidelines of the 2019 ACR Incidental Findings Committee for probably benign incidental simple appearing renal cystic lesion(s) based on imaging criteria.  4.  Post void residual measures 201 mL.  5.  Enlarged calcified prostate which indents the bladder base.     ZIO AT REPORT (08/14/24-08/28/24)   Zio study showing predominately sinus rhythm with maximum rate of 160 bpm, minimum rate of 42 bpm, average of 59 bpm.   Atrial fibrillation: None.   Supraventricular tachycardia: 64 episodes of supraventricular tachycardia/atrial tachycardia with fastest interval lasting 11 beats with a max rate of 154 bpm with longest lasting 17.7 seconds with average rate of 99 bpm noted.   Pauses: None.   Heart block: None.   Ventricular tachycardia: 3 episodes of ventricular tachycardia with fastest interval lasting 5 beats with a max rate of 160 bpm with longest lasting 18 beats with average rate of 116 bpm noted.   Frequent 13.8% burden of premature atrial contractions, occasional 3.2% couplets.   Occasional 4.8% burden of premature ventricular contractions.   Ventricular bigeminy (longest episode lasting 7.7 seconds) and trigeminy (longest episode lasting 2 minutes 47 seconds) were noted.    Patient Events: None.     Assessment & Plan     1. Chronic diastolic congestive heart failure (HCC)        2. Essential hypertension        3. Hypercholesteremia        4. Paroxysmal atrial fibrillation (HCC)        5. Hypercoagulable state due to paroxysmal atrial fibrillation (HCC)        6. Chronic anticoagulation            Medical Decision Making: Today's Assessment/Status/Plan:        Chronic diastolic congestive heart failure: He is a 74 year old male with chronic diastolic congestive heart failure, hypertension and hyperlipidemia, atrial fibrillation on chronic anticoagulation therapy, right bundle branch block, prior stroke. The overall volume status is elevated. We will restart spironolactone. We will repeat labs.    Hypertension: Currently, the average blood pressure is well controlled. We will continue with beta blockade therapy and angiotensin receptor blockade.  Hyperlipidemia: He ran out of his atorvastatin. We will repeat labs including fasting lipid profile.  Atrial fibrillation on anticoagulation therapy (Eliquis): He is doing well on anticoagulation without palpitations.   Right bundle branch block  History of stroke (08/14/24): (Managed by Shruti Russ)  Health maintenance: Spironolactone was discontinued as requested by his endocrinologist. He wishes to restart spironolactone and come off of testosterone.   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     We will follow up in 3 months.    CC Edilia Prescott                    [1]   Past Medical History:  Diagnosis Date    Allergy     Anesthesia 2017    Arthritis unk    Atrial fibrillation (HCC)     Back pain     Cancer (HCC) 2020    skin cancer squamous on the left cheek    Cataract 2022    lens implants    CHF (congestive heart failure) (HCC)     Chickenpox     Daytime sleepiness     Diabetes (HCC) 2023    pre-diabetic    Dizziness     Frequent urination     [place] measles     High cholesterol 1998    Hypercholesterolemia      Hypertension     Insomnia     Kidney stone     Migraine     Mumps     Neuropathy     Obesity     JUAN on CPAP 11/01/2024    PONV (postoperative nausea and vomiting) 2017    Rhinoplasti for deviated septon    Presence of implantable pulmonary artery pressure and heart rate monitoring system 11/21/2024    Presence of implantable pulmonary artery pressure and heart rate monitoring system 11/21/2024    Reactive depression 02/21/2023    Renal artery stenosis (HCC)     Ringing in ears     Shortness of breath     Sleep apnea 2019    tested    Stroke (HCC) 2023    stroke or contusion    Swelling of lower extremity     Tinea inguinalis     Tobacco use 10/30/2018    Urinary bladder disorder 1995    bph    Wears glasses     Wheezing    [2]   Past Surgical History:  Procedure Laterality Date    MN TRANSURETHRAL ELEC-SURG PROSTATECTOM N/A 08/17/2023    Procedure: BIPOLAR TRANSURETHRAL RESECTION OF PROSTATE;  Surgeon: Jacques Mcdowell M.D.;  Location: [facility]ERY [facility];  Service: Urology    MN CYSTOURETHROSCOPY N/A 08/17/2023    Procedure: CYSTOSCOPY;  Surgeon: Jacques Mcdowell M.D.;  Location: [facility];  Service: Urology    MN CYSTO/URETERO/PYELOSCOPY W/LITHOTRIPSY N/A 08/17/2023    Procedure: CYSTOLITHLOPAXY;  Surgeon: Jacques Mcdowell M.D.;  Location: SURGERY [facility];  Service: Urology    CATARACT PHACO WITH IOL      EYE SURGERY      OTHER  1992    cyst removal back of the neck    OTHER ABDOMINAL SURGERY      gallbladder    MN REMV 2ND CATARACT,CORN-SCLER SECTN      PROSTATECTOMY, RADICAL RETRO      RHINOPLASTY      SINUSCOPE     [3]   Allergies  Allergen Reactions    Losartan Cough    Gabapentin Unspecified     Dizziness, confusion, falling    Hyzaar [Losartan Potassium-Hctz] Cough   [4]   Outpatient Encounter Medications as of 6/13/2025   Medication Sig Dispense Refill    furosemide (LASIX) 20 MG Tab Take 1 Tablet by mouth 1 time a day as needed (leg swelling). 30 Tablet 3    Testosterone 1.62 % Gel        olmesartan (BENICAR) 20 MG Tab Take 1 Tablet by mouth every day. 90 Tablet 2    Empagliflozin (JARDIANCE) 10 MG Tab tablet Take 1 Tablet by mouth every day. 90 Tablet 3    apixaban (ELIQUIS) 5mg Tab Take 1 Tablet by mouth 2 times a day. 180 Tablet 3    metoprolol SR (TOPROL XL) 25 MG TABLET SR 24 HR Take 1 Tablet by mouth every morning. 90 Tablet 3    cetirizine (ZYRTEC) 10 MG Tab Take 10 mg by mouth every morning.      [DISCONTINUED] eplerenone (INSPRA) 25 MG Tab Take 1 Tablet by mouth every day. 100 Tablet 3    [DISCONTINUED] atorvastatin (LIPITOR) 40 MG Tab Take 1 Tablet by mouth every evening. 30 Tablet 3     No facility-administered encounter medications on file as of 6/13/2025.

## 2025-06-16 ENCOUNTER — APPOINTMENT (OUTPATIENT)
Dept: URBAN - METROPOLITAN AREA CLINIC 4 | Facility: CLINIC | Age: 75
Setting detail: DERMATOLOGY
End: 2025-06-16

## 2025-06-16 ENCOUNTER — PRE-ADMISSION TESTING (OUTPATIENT)
Dept: ADMISSIONS | Facility: MEDICAL CENTER | Age: 75
End: 2025-06-16
Attending: INTERNAL MEDICINE
Payer: MEDICARE

## 2025-06-16 ENCOUNTER — NON-PROVIDER VISIT (OUTPATIENT)
Dept: CARDIOLOGY | Facility: MEDICAL CENTER | Age: 75
End: 2025-06-16
Payer: MEDICARE

## 2025-06-16 VITALS — BODY MASS INDEX: 35.7 KG/M2 | HEIGHT: 71 IN

## 2025-06-16 DIAGNOSIS — L85.3 XEROSIS CUTIS: ICD-10-CM

## 2025-06-16 DIAGNOSIS — Z01.810 PRE-OPERATIVE CARDIOVASCULAR EXAMINATION: ICD-10-CM

## 2025-06-16 DIAGNOSIS — L82.1 OTHER SEBORRHEIC KERATOSIS: ICD-10-CM

## 2025-06-16 DIAGNOSIS — Z71.89 OTHER SPECIFIED COUNSELING: ICD-10-CM

## 2025-06-16 DIAGNOSIS — Z85.828 PERSONAL HISTORY OF OTHER MALIGNANT NEOPLASM OF SKIN: ICD-10-CM

## 2025-06-16 DIAGNOSIS — Z01.812 PRE-OPERATIVE LABORATORY EXAMINATION: Primary | ICD-10-CM

## 2025-06-16 DIAGNOSIS — L21.8 OTHER SEBORRHEIC DERMATITIS: ICD-10-CM

## 2025-06-16 DIAGNOSIS — D18.0 HEMANGIOMA: ICD-10-CM

## 2025-06-16 PROBLEM — D18.01 HEMANGIOMA OF SKIN AND SUBCUTANEOUS TISSUE: Status: ACTIVE | Noted: 2025-06-16

## 2025-06-16 PROCEDURE — 93298 REM INTERROG DEV EVAL SCRMS: CPT | Mod: 26 | Performed by: STUDENT IN AN ORGANIZED HEALTH CARE EDUCATION/TRAINING PROGRAM

## 2025-06-16 PROCEDURE — ? COUNSELING

## 2025-06-16 PROCEDURE — ? PRESCRIPTION

## 2025-06-16 PROCEDURE — ? MEDICATION COUNSELING

## 2025-06-16 PROCEDURE — ? SUNSCREEN RECOMMENDATIONS

## 2025-06-16 RX ORDER — KETOCONAZOLE 20 MG/G
CREAM TOPICAL QD
Qty: 15 | Refills: 1 | Status: ERX | COMMUNITY
Start: 2025-06-16

## 2025-06-16 RX ORDER — ASPIRIN 81 MG/1
81 TABLET ORAL DAILY
COMMUNITY

## 2025-06-16 RX ORDER — HYDROCORTISONE 25 MG/G
CREAM TOPICAL QD
Qty: 20 | Refills: 1 | Status: ERX

## 2025-06-16 RX ORDER — AMMONIUM LACTATE 120 MG/G
CREAM TOPICAL QD
Qty: 385 | Refills: 2 | Status: ERX | COMMUNITY
Start: 2025-06-16

## 2025-06-16 RX ADMIN — KETOCONAZOLE: 20 CREAM TOPICAL at 00:00

## 2025-06-16 RX ADMIN — AMMONIUM LACTATE: 120 CREAM TOPICAL at 00:00

## 2025-06-16 ASSESSMENT — LOCATION DETAILED DESCRIPTION DERM
LOCATION DETAILED: RIGHT RADIAL DORSAL HAND
LOCATION DETAILED: INFERIOR MID FOREHEAD
LOCATION DETAILED: LEFT PROXIMAL ULNAR DORSAL FOREARM
LOCATION DETAILED: RIGHT SUPERIOR LATERAL MALAR CHEEK
LOCATION DETAILED: RIGHT PROXIMAL DORSAL FOREARM
LOCATION DETAILED: LEFT ULNAR DORSAL HAND
LOCATION DETAILED: INFERIOR THORACIC SPINE
LOCATION DETAILED: LEFT LATERAL TEMPLE
LOCATION DETAILED: RIGHT INFERIOR MEDIAL UPPER BACK
LOCATION DETAILED: LEFT SUPERIOR LATERAL MALAR CHEEK

## 2025-06-16 ASSESSMENT — LOCATION SIMPLE DESCRIPTION DERM
LOCATION SIMPLE: LEFT CHEEK
LOCATION SIMPLE: LEFT FOREARM
LOCATION SIMPLE: RIGHT HAND
LOCATION SIMPLE: RIGHT UPPER BACK
LOCATION SIMPLE: RIGHT FOREARM
LOCATION SIMPLE: LEFT TEMPLE
LOCATION SIMPLE: RIGHT CHEEK
LOCATION SIMPLE: UPPER BACK
LOCATION SIMPLE: INFERIOR FOREHEAD
LOCATION SIMPLE: LEFT HAND

## 2025-06-16 ASSESSMENT — LOCATION ZONE DERM
LOCATION ZONE: ARM
LOCATION ZONE: TRUNK
LOCATION ZONE: FACE
LOCATION ZONE: HAND

## 2025-06-16 NOTE — CARDIAC REMOTE MONITOR - SCAN
Device transmission reviewed. Device demonstrated appropriate function.       Electronically Signed by: Bhavin Mcgowan MD, PhD    6/19/2025  5:21 PM

## 2025-06-16 NOTE — PREADMIT AVS NOTE
Current Medications   Medication Instructions    aspirin 81 MG EC tablet Pt already stopped as of 6/16    Specialty Vitamins Products (NERVIVE NERVE RELIEF PO) Stop 7 days before surgery    Empagliflozin (JARDIANCE) 10 MG Tab tablet Stop 4 days before surgery    spironolactone (ALDACTONE) 25 MG Tab Stop 24 hours before surgery    furosemide (LASIX) 40 MG Tab Stop 24 hours before surgery    atorvastatin (LIPITOR) 40 MG Tab Continue taking as prescribed.    olmesartan (BENICAR) 20 MG Tab Stop 24 hours before surgery    apixaban (ELIQUIS) 5mg Tab Follow instructions from surgeon or specialist.    metoprolol SR (TOPROL XL) 25 MG TABLET SR 24 HR Take morning of surgery with sip of water    cetirizine (ZYRTEC) 10 MG Tab Hold medication day of procedure

## 2025-06-16 NOTE — PREPROCEDURE INSTRUCTIONS
Preadmit appointment completed with pt including all anesthesia instructions and directions. Surgical site verified with pt.  Medication reconcilation and instructions given per anesthesia protocol. Handouts/Map sent to pt via [employer].  JUAN- No device.  Pt advised to stop jardiance (took this am).  Fax to Dr. Jimenez re: pt took jardiance 6/16 not the 4 days prior as anesthesia guidelines recommend.

## 2025-06-16 NOTE — PROCEDURE: MEDICATION COUNSELING
Protopic Counseling: Patient may experience a mild burning sensation during topical application. Protopic is not approved in children less than 2 years of age. There have been case reports of hematologic and skin malignancies in patients using topical calcineurin inhibitors although causality is questionable.
Erivedge Counseling- I discussed with the patient the risks of Erivedge including but not limited to nausea, vomiting, diarrhea, constipation, weight loss, changes in the sense of taste, decreased appetite, muscle spasms, and hair loss.  The patient verbalized understanding of the proper use and possible adverse effects of Erivedge.  All of the patient's questions and concerns were addressed.
Cantharidin Counseling:  I discussed with the patient the risks of Cantharidin including but not limited to pain, redness, burning, itching, and blistering.
Cephalexin Counseling: I counseled the patient regarding use of cephalexin as an antibiotic for prophylactic and/or therapeutic purposes. Cephalexin (commonly prescribed under brand name Keflex) is a cephalosporin antibiotic which is active against numerous classes of bacteria, including most skin bacteria. Side effects may include nausea, diarrhea, gastrointestinal upset, rash, hives, yeast infections, and in rare cases, hepatitis, kidney disease, seizures, fever, confusion, neurologic symptoms, and others. Patients with severe allergies to penicillin medications are cautioned that there is about a 10% incidence of cross-reactivity with cephalosporins. When possible, patients with penicillin allergies should use alternatives to cephalosporins for antibiotic therapy.
Dutasteride Pregnancy And Lactation Text: This medication is absolutely contraindicated in women, especially during pregnancy and breast feeding. Feminization of male fetuses is possible if taking while pregnant.
Topical Ketoconazole Counseling: Patient counseled that this medication may cause skin irritation or allergic reactions.  In the event of skin irritation, the patient was advised to reduce the amount of the drug applied or use it less frequently.   The patient verbalized understanding of the proper use and possible adverse effects of ketoconazole.  All of the patient's questions and concerns were addressed.
Tetracycline Counseling: Patient counseled regarding possible photosensitivity and increased risk for sunburn.  Patient instructed to avoid sunlight, if possible.  When exposed to sunlight, patients should wear protective clothing, sunglasses, and sunscreen.  The patient was instructed to call the office immediately if the following severe adverse effects occur:  hearing changes, easy bruising/bleeding, severe headache, or vision changes.  The patient verbalized understanding of the proper use and possible adverse effects of tetracycline.  All of the patient's questions and concerns were addressed. Patient understands to avoid pregnancy while on therapy due to potential birth defects.
Ketoconazole Counseling:   Patient counseled regarding improving absorption with orange juice.  Adverse effects include but are not limited to breast enlargement, headache, diarrhea, nausea, upset stomach, liver function test abnormalities, taste disturbance, and stomach pain.  There is a rare possibility of liver failure that can occur when taking ketoconazole. The patient understands that monitoring of LFTs may be required, especially at baseline. The patient verbalized understanding of the proper use and possible adverse effects of ketoconazole.  All of the patient's questions and concerns were addressed.
Stelara Counseling:  I discussed with the patient the risks of ustekinumab including but not limited to immunosuppression, malignancy, posterior leukoencephalopathy syndrome, and serious infections.  The patient understands that monitoring is required including a PPD at baseline and must alert us or the primary physician if symptoms of infection or other concerning signs are noted.
Hydroxyzine Pregnancy And Lactation Text: This medication is not safe during pregnancy and should not be taken. It is also excreted in breast milk and breast feeding isn't recommended.
Oxybutynin Counseling:  I discussed with the patient the risks of oxybutynin including but not limited to skin rash, drowsiness, dry mouth, difficulty urinating, and blurred vision.
Xelvirginiaz Pregnancy And Lactation Text: This medication is Pregnancy Category D and is not considered safe during pregnancy.  The risk during breast feeding is also uncertain.
Finasteride Male Counseling: Finasteride Counseling:  I discussed with the patient the risks of use of finasteride including but not limited to decreased libido, decreased ejaculate volume, gynecomastia, and depression. Women should not handle medication.  All of the patient's questions and concerns were addressed.
Ilumya Counseling: I discussed with the patient the risks of tildrakizumab including but not limited to immunosuppression, malignancy, posterior leukoencephalopathy syndrome, and serious infections.  The patient understands that monitoring is required including a PPD at baseline and must alert us or the primary physician if symptoms of infection or other concerning signs are noted.
Azathioprine Pregnancy And Lactation Text: This medication is Pregnancy Category D and isn't considered safe during pregnancy. It is unknown if this medication is excreted in breast milk.
Cephalexin Pregnancy And Lactation Text: This medication is Pregnancy Category B and considered safe during pregnancy.  It is also excreted in breast milk but can be used safely for shorter doses.
Adbry Pregnancy And Lactation Text: It is unknown if this medication will adversely affect pregnancy or breast feeding.
Cellcept Counseling:  I discussed with the patient the risks of mycophenolate mofetil including but not limited to infection/immunosuppression, GI upset, hypokalemia, hypercholesterolemia, bone marrow suppression, lymphoproliferative disorders, malignancy, GI ulceration/bleed/perforation, colitis, interstitial lung disease, kidney failure, progressive multifocal leukoencephalopathy, and birth defects.  The patient understands that monitoring is required including a baseline creatinine and regular CBC testing. In addition, patient must alert us immediately if symptoms of infection or other concerning signs are noted.
Erivedge Pregnancy And Lactation Text: This medication is Pregnancy Category X and is absolutely contraindicated during pregnancy. It is unknown if it is excreted in breast milk.
Tetracycline Pregnancy And Lactation Text: This medication is Pregnancy Category D and not consider safe during pregnancy. It is also excreted in breast milk.
Topical Ketoconazole Pregnancy And Lactation Text: This medication is Pregnancy Category B and is considered safe during pregnancy. It is unknown if it is excreted in breast milk.
Ketoconazole Pregnancy And Lactation Text: This medication is Pregnancy Category C and it isn't know if it is safe during pregnancy. It is also excreted in breast milk and breast feeding isn't recommended.
Protopic Pregnancy And Lactation Text: This medication is Pregnancy Category C. It is unknown if this medication is excreted in breast milk when applied topically.
5-Fu Counseling: 5-Fluorouracil Counseling:  I discussed with the patient the risks of 5-fluorouracil including but not limited to erythema, scaling, itching, weeping, crusting, and pain.
Topical Metronidazole Counseling: Metronidazole is a topical antibiotic medication. You may experience burning, stinging, redness, or allergic reactions.  Please call our office if you develop any problems from using this medication.
Ilumya Pregnancy And Lactation Text: The risk during pregnancy and breastfeeding is uncertain with this medication.
Clindamycin Counseling: I counseled the patient regarding use of clindamycin as an antibiotic for prophylactic and/or therapeutic purposes. Clindamycin is active against numerous classes of bacteria, including skin bacteria. Side effects may include nausea, diarrhea, gastrointestinal upset, rash, hives, yeast infections, and in rare cases, colitis.
Stelara Pregnancy And Lactation Text: This medication is Pregnancy Category B and is considered safe during pregnancy. It is unknown if this medication is excreted in breast milk.
Finasteride Female Counseling: Finasteride Counseling:  I discussed with the patient the risks of use of finasteride including but not limited to decreased libido and sexual dysfunction. Explained the teratogenic nature of the medication and stressed the importance of not getting pregnant during treatment. All of the patient's questions and concerns were addressed.
Infliximab Counseling:  I discussed with the patient the risks of infliximab including but not limited to myelosuppression, immunosuppression, autoimmune hepatitis, demyelinating diseases, lymphoma, and serious infections.  The patient understands that monitoring is required including a PPD at baseline and must alert us or the primary physician if symptoms of infection or other concerning signs are noted.
Taltz Counseling: I discussed with the patient the risks of ixekizumab including but not limited to immunosuppression, serious infections, worsening of inflammatory bowel disease and drug reactions.  The patient understands that monitoring is required including a PPD at baseline and must alert us or the primary physician if symptoms of infection or other concerning signs are noted.
Terbinafine Counseling: Patient counseling regarding adverse effects of terbinafine including but not limited to headache, diarrhea, rash, upset stomach, liver function test abnormalities, itching, taste/smell disturbance, nausea, abdominal pain, and flatulence.  There is a rare possibility of liver failure that can occur when taking terbinafine.  The patient understands that a baseline LFT and kidney function test may be required. The patient verbalized understanding of the proper use and possible adverse effects of terbinafine.  All of the patient's questions and concerns were addressed.
5-Fu Pregnancy And Lactation Text: This medication is Pregnancy Category X and contraindicated in pregnancy and in women who may become pregnant. It is unknown if this medication is excreted in breast milk.
Libtayo Counseling- I discussed with the patient the risks of Libtayo including but not limited to nausea, vomiting, diarrhea, and bone or muscle pain.  The patient verbalized understanding of the proper use and possible adverse effects of Libtayo.  All of the patient's questions and concerns were addressed.
Bimzelx Counseling:  I discussed with the patient the risks of Bimzelx including but not limited to depression, immunosuppression, allergic reactions and infections.  The patient understands that monitoring is required including a PPD at baseline and must alert us or the primary physician if symptoms of infection or other concerning signs are noted.
Propranolol Counseling:  I discussed with the patient the risks of propranolol including but not limited to low heart rate, low blood pressure, low blood sugar, restlessness and increased cold sensitivity. They should call the office if they experience any of these side effects.
Qbrexza Counseling:  I discussed with the patient the risks of Qbrexza including but not limited to headache, mydriasis, blurred vision, dry eyes, nasal dryness, dry mouth, dry throat, dry skin, urinary hesitation, and constipation.  Local skin reactions including erythema, burning, stinging, and itching can also occur.
Glycopyrrolate Counseling:  I discussed with the patient the risks of glycopyrrolate including but not limited to skin rash, drowsiness, dry mouth, difficulty urinating, and blurred vision.
Terbinafine Pregnancy And Lactation Text: This medication is Pregnancy Category B and is considered safe during pregnancy. It is also excreted in breast milk and breast feeding isn't recommended.
Topical Metronidazole Pregnancy And Lactation Text: This medication is Pregnancy Category B and considered safe during pregnancy.  It is also considered safe to use while breastfeeding.
Finasteride Pregnancy And Lactation Text: This medication is absolutely contraindicated during pregnancy. It is unknown if it is excreted in breast milk.
Clindamycin Pregnancy And Lactation Text: This medication can be used in pregnancy if certain situations. Clindamycin is also present in breast milk.
Glycopyrrolate Pregnancy And Lactation Text: This medication is Pregnancy Category B and is considered safe during pregnancy. It is unknown if it is excreted breast milk.
Qbrexza Pregnancy And Lactation Text: There is no available data on Qbrexza use in pregnant women.  There is no available data on Qbrexza use in lactation.
Libtayo Pregnancy And Lactation Text: This medication is contraindicated in pregnancy and when breast feeding.
Drysol Counseling:  I discussed with the patient the risks of drysol/aluminum chloride including but not limited to skin rash, itching, irritation, burning.
Propranolol Pregnancy And Lactation Text: This medication is Pregnancy Category C and it isn't known if it is safe during pregnancy. It is excreted in breast milk.
Bimzelx Pregnancy And Lactation Text: This medication crosses the placenta and the safety is uncertain during pregnancy. It is unknown if this medication is present in breast milk.
Hydroquinone Pregnancy And Lactation Text: This medication has not been assigned a Pregnancy Risk Category but animal studies failed to show danger with the topical medication. It is unknown if the medication is excreted in breast milk.
Arava Counseling:  Patient counseled regarding adverse effects of Arava including but not limited to nausea, vomiting, abnormalities in liver function tests. Patients may develop mouth sores, rash, diarrhea, and abnormalities in blood counts. The patient understands that monitoring is required including LFTs and blood counts.  There is a rare possibility of scarring of the liver and lung problems that can occur when taking methotrexate. Persistent nausea, loss of appetite, pale stools, dark urine, cough, and shortness of breath should be reported immediately. Patient advised to discontinue Arava treatment and consult with a physician prior to attempting conception. The patient will have to undergo a treatment to eliminate Arava from the body prior to conception.
Topical Steroids Counseling: I discussed with the patient that prolonged use of topical steroids can result in the increased appearance of superficial blood vessels (telangiectasias), lightening (hypopigmentation) and thinning of the skin (atrophy).  Patient understands to avoid using high potency steroids in skin folds, the groin or the face.  The patient verbalized understanding of the proper use and possible adverse effects of topical steroids.  All of the patient's questions and concerns were addressed.
Rhofade Counseling: Rhofade is a topical medication which can decrease superficial blood flow where applied. Side effects are uncommon and include stinging, redness and allergic reactions.
Odomzo Counseling- I discussed with the patient the risks of Odomzo including but not limited to nausea, vomiting, diarrhea, constipation, weight loss, changes in the sense of taste, decreased appetite, muscle spasms, and hair loss.  The patient verbalized understanding of the proper use and possible adverse effects of Odomzo.  All of the patient's questions and concerns were addressed.
Doxycycline Counseling:  Patient counseled regarding possible photosensitivity and increased risk for sunburn.  Patient instructed to avoid sunlight, if possible.  When exposed to sunlight, patients should wear protective clothing, sunglasses, and sunscreen.  The patient was instructed to call the office immediately if the following severe adverse effects occur:  hearing changes, easy bruising/bleeding, severe headache, or vision changes.  The patient verbalized understanding of the proper use and possible adverse effects of doxycycline.  All of the patient's questions and concerns were addressed.
Ozempic Counseling: I reviewed the possible side effects including: thyroid tumors, kidney disease, gallbladder disease, abdominal pain, constipation, diarrhea, nausea, vomiting and pancreatitis. Do not take this medication if you have a history or family history of multiple endocrine neoplasia syndrome type 2. Side effects reviewed, pt to contact office should one occur.
Drysol Pregnancy And Lactation Text: This medication is considered safe during pregnancy and breast feeding.
Cimzia Counseling:  I discussed with the patient the risks of Cimzia including but not limited to immunosuppression, allergic reactions and infections.  The patient understands that monitoring is required including a PPD at baseline and must alert us or the primary physician if symptoms of infection or other concerning signs are noted.
Birth Control Pills Counseling: Birth Control Pill Counseling: I discussed with the patient the potential side effects of OCPs including but not limited to increased risk of stroke, heart attack, thrombophlebitis, deep venous thrombosis, hepatic adenomas, breast changes, GI upset, headaches, and depression.  The patient verbalized understanding of the proper use and possible adverse effects of OCPs. All of the patient's questions and concerns were addressed.
Cimetidine Counseling:  I discussed with the patient the risks of Cimetidine including but not limited to gynecomastia, headache, diarrhea, nausea, drowsiness, arrhythmias, pancreatitis, skin rashes, psychosis, bone marrow suppression and kidney toxicity.
Olanzapine Pregnancy And Lactation Text: This medication is pregnancy category C.   There are no adequate and well controlled trials with olanzapine in pregnant females.  Olanzapine should be used during pregnancy only if the potential benefit justifies the potential risk to the fetus.   In a study in lactating healthy women, olanzapine was excreted in breast milk.  It is recommended that women taking olanzapine should not breast feed.
Olumiant Counseling: I discussed with the patient the risks of Olumiant therapy including but not limited to upper respiratory tract infections, shingles, cold sores, and nausea. Live vaccines should be avoided.  This medication has been linked to serious infections; higher rate of mortality; malignancy and lymphoproliferative disorders; major adverse cardiovascular events; thrombosis; gastrointestinal perforations; neutropenia; lymphopenia; anemia; liver enzyme elevations; and lipid elevations.
Mirvaso Pregnancy And Lactation Text: This medication has not been assigned a Pregnancy Risk Category. It is unknown if the medication is excreted in breast milk.
Griseofulvin Counseling:  I discussed with the patient the risks of griseofulvin including but not limited to photosensitivity, cytopenia, liver damage, nausea/vomiting and severe allergy.  The patient understands that this medication is best absorbed when taken with a fatty meal (e.g., ice cream or french fries).
Benzoyl Peroxide Pregnancy And Lactation Text: This medication is Pregnancy Category C. It is unknown if benzoyl peroxide is excreted in breast milk.
Azithromycin Counseling:  I discussed with the patient the risks of azithromycin including but not limited to GI upset, allergic reaction, drug rash, diarrhea, and yeast infections.
Methotrexate Counseling:  Patient counseled regarding adverse effects of methotrexate including but not limited to nausea, vomiting, abnormalities in liver function tests. Patients may develop mouth sores, rash, diarrhea, and abnormalities in blood counts. The patient understands that monitoring is required including LFT's and blood counts.  There is a rare possibility of scarring of the liver and lung problems that can occur when taking methotrexate. Persistent nausea, loss of appetite, pale stools, dark urine, cough, and shortness of breath should be reported immediately. Patient advised to discontinue methotrexate treatment at least three months before attempting to become pregnant.  I discussed the need for folate supplements while taking methotrexate.  These supplements can decrease side effects during methotrexate treatment. The patient verbalized understanding of the proper use and possible adverse effects of methotrexate.  All of the patient's questions and concerns were addressed.
Rifampin Counseling: I discussed with the patient the risks of rifampin including but not limited to liver damage, kidney damage, red-orange body fluids, nausea/vomiting and severe allergy.
Opzelura Counseling:  I discussed with the patient the risks of Opzelura including but not limited to nasopharngitis, bronchitis, ear infection, eosinophila, hives, diarrhea, folliculitis, tonsillitis, and rhinorrhea.  Taken orally, this medication has been linked to serious infections; higher rate of mortality; malignancy and lymphoproliferative disorders; major adverse cardiovascular events; thrombosis; thrombocytopenia, anemia, and neutropenia; and lipid elevations.
Zoryve Counseling:  I discussed with the patient that Zoryve is not for use in the eyes, mouth or vagina. The most commonly reported side effects include diarrhea, headache, insomnia, application site pain, upper respiratory tract infections, and urinary tract infections.  All of the patient's questions and concerns were addressed.
Carac Counseling:  I discussed with the patient the risks of Carac including but not limited to erythema, scaling, itching, weeping, crusting, and pain.
Ivermectin Pregnancy And Lactation Text: This medication is Pregnancy Category C and it isn't known if it is safe during pregnancy. It is also excreted in breast milk.
Tazorac Counseling:  Patient advised that medication is irritating and drying.  Patient may need to apply sparingly and wash off after an hour before eventually leaving it on overnight.  The patient verbalized understanding of the proper use and possible adverse effects of tazorac.  All of the patient's questions and concerns were addressed.
Oral Minoxidil Counseling- I discussed with the patient the risks of oral minoxidil including but not limited to shortness of breath, swelling of the feet or ankles, dizziness, lightheadedness, unwanted hair growth and allergic reaction.  The patient verbalized understanding of the proper use and possible adverse effects of oral minoxidil.  All of the patient's questions and concerns were addressed.
Olumiant Pregnancy And Lactation Text: Based on animal studies, Olumiant may cause embryo-fetal harm when administered to pregnant women.  The medication should not be used in pregnancy.  Breastfeeding is not recommended during treatment.
Humira Counseling:  I discussed with the patient the risks of adalimumab including but not limited to myelosuppression, immunosuppression, autoimmune hepatitis, demyelinating diseases, lymphoma, and serious infections.  The patient understands that monitoring is required including a PPD at baseline and must alert us or the primary physician if symptoms of infection or other concerning signs are noted.
Skyrizi Counseling: I discussed with the patient the risks of risankizumab-rzaa including but not limited to immunosuppression, and serious infections.  The patient understands that monitoring is required including a PPD at baseline and must alert us or the primary physician if symptoms of infection or other concerning signs are noted.
Azithromycin Pregnancy And Lactation Text: This medication is considered safe during pregnancy and is also secreted in breast milk.
Griseofulvin Pregnancy And Lactation Text: This medication is Pregnancy Category X and is known to cause serious birth defects. It is unknown if this medication is excreted in breast milk but breast feeding should be avoided.
Rifampin Pregnancy And Lactation Text: This medication is Pregnancy Category C and it isn't know if it is safe during pregnancy. It is also excreted in breast milk and should not be used if you are breast feeding.
Methotrexate Pregnancy And Lactation Text: This medication is Pregnancy Category X and is known to cause fetal harm. This medication is excreted in breast milk.
Rinvoq Counseling: I discussed with the patient the risks of Rinvoq therapy including but not limited to upper respiratory tract infections, shingles, cold sores, bronchitis, nausea, cough, fever, acne, and headache. Live vaccines should be avoided.  This medication has been linked to serious infections; higher rate of mortality; malignancy and lymphoproliferative disorders; major adverse cardiovascular events; thrombosis; thrombocytopenia, anemia, and neutropenia; lipid elevations; liver enzyme elevations; and gastrointestinal perforations.
Tazorac Pregnancy And Lactation Text: This medication is not safe during pregnancy. It is unknown if this medication is excreted in breast milk.
Opzelura Pregnancy And Lactation Text: There is insufficient data to evaluate drug-associated risk for major birth defects, miscarriage, or other adverse maternal or fetal outcomes.  There is a pregnancy registry that monitors pregnancy outcomes in pregnant persons exposed to the medication during pregnancy.  It is unknown if this medication is excreted in breast milk.  Do not breastfeed during treatment and for about 4 weeks after the last dose.
Zoryve Pregnancy And Lactation Text: It is unknown if this medication can cause problems during pregnancy and breastfeeding.
Bactrim Counseling:  I discussed with the patient the risks of sulfa antibiotics including but not limited to GI upset, allergic reaction, drug rash, diarrhea, dizziness, photosensitivity, and yeast infections.  Rarely, more serious reactions can occur including but not limited to aplastic anemia, agranulocytosis, methemoglobinemia, blood dyscrasias, liver or kidney failure, lung infiltrates or desquamative/blistering drug rashes.
Itraconazole Counseling:  I discussed with the patient the risks of itraconazole including but not limited to liver damage, nausea/vomiting, neuropathy, and severe allergy.  The patient understands that this medication is best absorbed when taken with acidic beverages such as non-diet cola or ginger ale.  The patient understands that monitoring is required including baseline LFTs and repeat LFTs at intervals.  The patient understands that they are to contact us or the primary physician if concerning signs are noted.
Topical Clindamycin Counseling: Patient counseled that this medication may cause skin irritation or allergic reactions.  In the event of skin irritation, the patient was advised to reduce the amount of the drug applied or use it less frequently.   The patient verbalized understanding of the proper use and possible adverse effects of clindamycin.  All of the patient's questions and concerns were addressed.
Oral Minoxidil Pregnancy And Lactation Text: This medication should only be used when clearly needed if you are pregnant, attempting to become pregnant or breast feeding.
Prednisone Counseling:  I discussed with the patient the risks of prolonged use of prednisone including but not limited to weight gain, insomnia, osteoporosis, mood changes, diabetes, susceptibility to infection, glaucoma and high blood pressure.  In cases where prednisone use is prolonged, patients should be monitored with blood pressure checks, serum glucose levels and an eye exam.  Additionally, the patient may need to be placed on GI prophylaxis, PCP prophylaxis, and calcium and vitamin D supplementation and/or a bisphosphonate.  The patient verbalized understanding of the proper use and the possible adverse effects of prednisone.  All of the patient's questions and concerns were addressed.
Doxepin Counseling:  Patient advised that the medication is sedating and not to drive a car after taking this medication. Patient informed of potential adverse effects including but not limited to dry mouth, urinary retention, and blurry vision.  The patient verbalized understanding of the proper use and possible adverse effects of doxepin.  All of the patient's questions and concerns were addressed.
Dutasteride Male Counseling: Dustasteride Counseling:  I discussed with the patient the risks of use of dutasteride including but not limited to decreased libido, decreased ejaculate volume, and gynecomastia. Women who can become pregnant should not handle medication.  All of the patient's questions and concerns were addressed.
Hyrimoz Counseling:  I discussed with the patient the risks of adalimumab including but not limited to myelosuppression, immunosuppression, autoimmune hepatitis, demyelinating diseases, lymphoma, and serious infections.  The patient understands that monitoring is required including a PPD at baseline and must alert us or the primary physician if symptoms of infection or other concerning signs are noted.
Doxepin Pregnancy And Lactation Text: This medication is Pregnancy Category C and it isn't known if it is safe during pregnancy. It is also excreted in breast milk and breast feeding isn't recommended.
Spevigo Counseling: I discussed with the patient the risks of Spevigo including but not limited to fatigue, nasuea, vomiting, headache, pruritus, urinary tract infection, an infusion related reactions.  The patient understands that monitoring is required including screening for tuberculosis at baseline and yearly screening thereafter while continuing Spevigo therapy. They should contact us if symptoms of infection or other concerning signs are noted.
Sarecycline Counseling: Patient advised regarding possible photosensitivity and discoloration of the teeth, skin, lips, tongue and gums.  Patient instructed to avoid sunlight, if possible.  When exposed to sunlight, patients should wear protective clothing, sunglasses, and sunscreen.  The patient was instructed to call the office immediately if the following severe adverse effects occur:  hearing changes, easy bruising/bleeding, severe headache, or vision changes.  The patient verbalized understanding of the proper use and possible adverse effects of sarecycline.  All of the patient's questions and concerns were addressed.
Calcipotriene Counseling:  I discussed with the patient the risks of calcipotriene including but not limited to erythema, scaling, itching, and irritation.
Zyclara Counseling:  I discussed with the patient the risks of imiquimod including but not limited to erythema, scaling, itching, weeping, crusting, and pain.  Patient understands that the inflammatory response to imiquimod is variable from person to person and was educated regarded proper titration schedule.  If flu-like symptoms develop, patient knows to discontinue the medication and contact us.
Otezla Counseling: The side effects of Otezla were discussed with the patient, including but not limited to worsening or new depression, weight loss, diarrhea, nausea, upper respiratory tract infection, and headache. Patient instructed to call the office should any adverse effect occur.  The patient verbalized understanding of the proper use and possible adverse effects of Otezla.  All the patient's questions and concerns were addressed.
Picato Counseling:  I discussed with the patient the risks of Picato including but not limited to erythema, scaling, itching, weeping, crusting, and pain.
Rinvoq Pregnancy And Lactation Text: Based on animal studies, Rinvoq may cause embryo-fetal harm when administered to pregnant women.  The medication should not be used in pregnancy.  Breastfeeding is not recommended during treatment and for 6 days after the last dose.
Itraconazole Pregnancy And Lactation Text: This medication is Pregnancy Category C and it isn't know if it is safe during pregnancy. It is also excreted in breast milk.
Zyclara Pregnancy And Lactation Text: This medication is Pregnancy Category C. It is unknown if this medication is excreted in breast milk.
Bactrim Pregnancy And Lactation Text: This medication is Pregnancy Category D and is known to cause fetal risk.  It is also excreted in breast milk.
Azathioprine Counseling:  I discussed with the patient the risks of azathioprine including but not limited to myelosuppression, immunosuppression, hepatotoxicity, lymphoma, and infections.  The patient understands that monitoring is required including baseline LFTs, Creatinine, possible TPMP genotyping and weekly CBCs for the first month and then every 2 weeks thereafter.  The patient verbalized understanding of the proper use and possible adverse effects of azathioprine.  All of the patient's questions and concerns were addressed.
Dutasteride Female Counseling: Dutasteride Counseling:  I discussed with the patient the risks of use of dutasteride including but not limited to decreased libido and sexual dysfunction. Explained the teratogenic nature of the medication and stressed the importance of not getting pregnant during treatment. All of the patient's questions and concerns were addressed.
Prednisone Pregnancy And Lactation Text: This medication is Pregnancy Category C and it isn't know if it is safe during pregnancy. This medication is excreted in breast milk.
Spevigo Pregnancy And Lactation Text: The risk during pregnancy and breastfeeding is uncertain with this medication. This medication does cross the placenta. It is unknown if this medication is found in breast milk.
Otezla Pregnancy And Lactation Text: This medication is Pregnancy Category C and it isn't known if it is safe during pregnancy. It is unknown if it is excreted in breast milk.
Xeljanz Counseling: I discussed with the patient the risks of Xeljanz therapy including increased risk of infection, liver issues, headache, diarrhea, or cold symptoms. Live vaccines should be avoided. They were instructed to call if they have any problems.
Hydroxyzine Counseling: Patient advised that the medication is sedating and not to drive a car after taking this medication.  Patient informed of potential adverse effects including but not limited to dry mouth, urinary retention, and blurry vision.  The patient verbalized understanding of the proper use and possible adverse effects of hydroxyzine.  All of the patient's questions and concerns were addressed.
Calcipotriene Pregnancy And Lactation Text: The use of this medication during pregnancy or lactation is not recommended as there is insufficient data.
Hydroquinone Counseling:  Patient advised that medication may result in skin irritation, lightening (hypopigmentation), dryness, and burning.  In the event of skin irritation, the patient was advised to reduce the amount of the drug applied or use it less frequently.  Rarely, spots that are treated with hydroquinone can become darker (pseudoochronosis).  Should this occur, patient instructed to stop medication and call the office. The patient verbalized understanding of the proper use and possible adverse effects of hydroquinone.  All of the patient's questions and concerns were addressed.
Semaglutide Pregnancy And Lactation Text: The fetal risk of this medication is unknown and taking while pregnant is not recommended. It is unknown if this medication is present in breast milk.
Amzeeq Pregnancy And Lactation Text: It is not recommended to use the product if you are pregnant.
Metronidazole Pregnancy And Lactation Text: This medication is Pregnancy Category B and considered safe during pregnancy.  It is also excreted in breast milk.
Dupixent Pregnancy And Lactation Text: This medication likely crosses the placenta but the risk for the fetus is uncertain. This medication is excreted in breast milk.
Isotretinoin Pregnancy And Lactation Text: This medication is Pregnancy Category X and is considered extremely dangerous during pregnancy. It is unknown if it is excreted in breast milk.
Niacinamide Pregnancy And Lactation Text: These medications are considered safe during pregnancy.
Solaraze Pregnancy And Lactation Text: This medication is Pregnancy Category B and is considered safe. There is some data to suggest avoiding during the third trimester. It is unknown if this medication is excreted in breast milk.
Tranexamic Acid Pregnancy And Lactation Text: It is unknown if this medication is safe during pregnancy or breast feeding.
Detail Level: Simple
Cantharidin Pregnancy And Lactation Text: This medication has not been proven safe during pregnancy. It is unknown if this medication is excreted in breast milk.
Opioid Counseling: I discussed with the patient the potential side effects of opioids including but not limited to addiction, altered mental status, and depression. I stressed avoiding alcohol, benzodiazepines, muscle relaxants and sleep aids unless specifically okayed by a physician. The patient verbalized understanding of the proper use and possible adverse effects of opioids. All of the patient's questions and concerns were addressed. They were instructed to flush the remaining pills down the toilet if they did not need them for pain.
Cibinqo Counseling: I discussed with the patient the risks of Cibinqo therapy including but not limited to common cold, nausea, headache, cold sores, increased blood CPK levels, dizziness, UTIs, fatigue, acne, and vomitting. Live vaccines should be avoided.  This medication has been linked to serious infections; higher rate of mortality; malignancy and lymphoproliferative disorders; major adverse cardiovascular events; thrombosis; thrombocytopenia and lymphopenia; lipid elevations; and retinal detachment.
Latisse Pregnancy And Lactation Text: It is not recommended to use Latisse if you are pregnant or trying to become pregnant.
Wegovy Counseling: I reviewed the possible side effects including: thyroid tumors, kidney disease, gallbladder disease, abdominal pain, constipation, diarrhea, nausea, vomiting and pancreatitis. Do not take this medication if you have a history or family history of multiple endocrine neoplasia syndrome type 2. Side effects reviewed, pt to contact office should one occur.
Dapsone Counseling: I discussed with the patient the risks of dapsone including but not limited to hemolytic anemia, agranulocytosis, rashes, methemoglobinemia, kidney failure, peripheral neuropathy, headaches, GI upset, and liver toxicity.  Patients who start dapsone require monitoring including baseline LFTs and weekly CBCs for the first month, then every month thereafter.  The patient verbalized understanding of the proper use and possible adverse effects of dapsone.  All of the patient's questions and concerns were addressed.
Ebglyss Counseling: I discussed with the patient the risks of lebrikizumab including but not limited to eye inflammation and irritation, cold sores, injection site reactions, allergic reactions and increased risk of parasitic infection. The patient understands that monitoring is required and they must alert us or the primary physician if symptoms of infection or other concerning signs are noted.
Cyclophosphamide Counseling:  I discussed with the patient the risks of cyclophosphamide including but not limited to hair loss, hormonal abnormalities, decreased fertility, abdominal pain, diarrhea, nausea and vomiting, bone marrow suppression and infection. The patient understands that monitoring is required while taking this medication.
Minocycline Counseling: Patient advised regarding possible photosensitivity and discoloration of the teeth, skin, lips, tongue and gums.  Patient instructed to avoid sunlight, if possible.  When exposed to sunlight, patients should wear protective clothing, sunglasses, and sunscreen.  The patient was instructed to call the office immediately if the following severe adverse effects occur:  hearing changes, easy bruising/bleeding, severe headache, or vision changes.  The patient verbalized understanding of the proper use and possible adverse effects of minocycline.  All of the patient's questions and concerns were addressed.
Minoxidil Counseling: Minoxidil is a topical medication which can increase blood flow where it is applied. It is uncertain how this medication increases hair growth. Side effects are uncommon and include stinging and allergic reactions.
Azelaic Acid Counseling: Patient counseled that medicine may cause skin irritation and to avoid applying near the eyes.  In the event of skin irritation, the patient was advised to reduce the amount of the drug applied or use it less frequently.   The patient verbalized understanding of the proper use and possible adverse effects of azelaic acid.  All of the patient's questions and concerns were addressed.
Soolantra Counseling: I discussed with the patients the risks of topial Soolantra. This is a medicine which decreases the number of mites and inflammation in the skin. You experience burning, stinging, eye irritation or allergic reactions.  Please call our office if you develop any problems from using this medication.
Nsaids Counseling: NSAID Counseling: I discussed with the patient that NSAIDs should be taken with food. Prolonged use of NSAIDs can result in the development of stomach ulcers.  Patient advised to stop taking NSAIDs if abdominal pain occurs.  The patient verbalized understanding of the proper use and possible adverse effects of NSAIDs.  All of the patient's questions and concerns were addressed.
Cibinqo Pregnancy And Lactation Text: It is unknown if this medication will adversely affect pregnancy or breast feeding.  You should not take this medication if you are currently pregnant or planning a pregnancy or while breastfeeding.
Winlevi Counseling:  I discussed with the patient the risks of topical clascoterone including but not limited to erythema, scaling, itching, and stinging. Patient voiced their understanding.
High Dose Vitamin A Counseling: Side effects reviewed, pt to contact office should one occur.
Valtrex Counseling: I discussed with the patient the risks of valacyclovir including but not limited to kidney damage, nausea, vomiting and severe allergy.  The patient understands that if the infection seems to be worsening or is not improving, they are to call.
Include Pregnancy/Lactation Warning?: Add Automatically Based on Childbearing Potential and Patient Age
Opioid Pregnancy And Lactation Text: These medications can lead to premature delivery and should be avoided during pregnancy. These medications are also present in breast milk in small amounts.
Dapsone Pregnancy And Lactation Text: This medication is Pregnancy Category C and is not considered safe during pregnancy or breast feeding.
Simlandi Counseling:  I discussed with the patient the risks of adalimumab including but not limited to myelosuppression, immunosuppression, autoimmune hepatitis, demyelinating diseases, lymphoma, and serious infections.  The patient understands that monitoring is required including a PPD at baseline and must alert us or the primary physician if symptoms of infection or other concerning signs are noted.
Sotyktu Counseling:  I discussed the most common side effects of Sotyktu including: common cold, sore throat, sinus infections, cold sores, canker sores, folliculitis, and acne.  I also discussed more serious side effects of Sotyktu including but not limited to: serious allergic reactions; increased risk for infections such as TB; cancers such as lymphomas; rhabdomyolysis and elevated CPK; and elevated triglycerides and liver enzymes. 
Albendazole Counseling:  I discussed with the patient the risks of albendazole including but not limited to cytopenia, kidney damage, nausea/vomiting and severe allergy.  The patient understands that this medication is being used in an off-label manner.
Ebglyss Pregnancy And Lactation Text: This medication likely crosses the placenta but the risk for the fetus is uncertain. It is unknown if this medication is excreted in breast milk.
Gabapentin Counseling: I discussed with the patient the risks of gabapentin including but not limited to dizziness, somnolence, fatigue and ataxia.
Cyclophosphamide Pregnancy And Lactation Text: This medication is Pregnancy Category D and it isn't considered safe during pregnancy. This medication is excreted in breast milk.
Soolantra Pregnancy And Lactation Text: This medication is Pregnancy Category C. This medication is considered safe during breast feeding.
Litfulo Counseling: I discussed with the patient the risks of Litfulo therapy including but not limited to upper respiratory tract infections, shingles, cold sores, and nausea. Live vaccines should be avoided.  This medication has been linked to serious infections; higher rate of mortality; malignancy and lymphoproliferative disorders; major adverse cardiovascular events; thrombosis; gastrointestinal perforations; neutropenia; lymphopenia; anemia; liver enzyme elevations; and lipid elevations.
High Dose Vitamin A Pregnancy And Lactation Text: High dose vitamin A therapy is contraindicated during pregnancy and breast feeding.
Winlevi Pregnancy And Lactation Text: This medication is considered safe during pregnancy and breastfeeding.
Nsaids Pregnancy And Lactation Text: These medications are considered safe up to 30 weeks gestation. It is excreted in breast milk.
Sotyktu Pregnancy And Lactation Text: There is insufficient data to evaluate whether or not Sotyktu is safe to use during pregnancy.   It is not known if Sotyktu passes into breast milk and whether or not it is safe to use when breastfeeding.  
Fluconazole Counseling:  Patient counseled regarding adverse effects of fluconazole including but not limited to headache, diarrhea, nausea, upset stomach, liver function test abnormalities, taste disturbance, and stomach pain.  There is a rare possibility of liver failure that can occur when taking fluconazole.  The patient understands that monitoring of LFTs and kidney function test may be required, especially at baseline. The patient verbalized understanding of the proper use and possible adverse effects of fluconazole.  All of the patient's questions and concerns were addressed.
Zepbound Counseling: I reviewed the possible side effects including: thyroid tumors, kidney disease, gallbladder disease, abdominal pain, constipation, diarrhea, nausea, vomiting and pancreatitis. Do not take this medication if you have a history or family history of multiple endocrine neoplasia syndrome type 2. Side effects reviewed, pt to contact office should one occur.
Topical Retinoid counseling:  Patient advised to apply a pea-sized amount only at bedtime and wait 30 minutes after washing their face before applying.  If too drying, patient may add a non-comedogenic moisturizer. The patient verbalized understanding of the proper use and possible adverse effects of retinoids.  All of the patient's questions and concerns were addressed.
Valtrex Pregnancy And Lactation Text: this medication is Pregnancy Category B and is considered safe during pregnancy. This medication is not directly found in breast milk but it's metabolite acyclovir is present.
VTAMA Counseling: I discussed with the patient that VTAMA is not for use in the eyes, mouth or mouth. They should call the office if they develop any signs of allergic reactions to VTAMA. The patient verbalized understanding of the proper use and possible adverse effects of VTAMA.  All of the patient's questions and concerns were addressed.
Cyclosporine Counseling:  I discussed with the patient the risks of cyclosporine including but not limited to hypertension, gingival hyperplasia,myelosuppression, immunosuppression, liver damage, kidney damage, neurotoxicity, lymphoma, and serious infections. The patient understands that monitoring is required including baseline blood pressure, CBC, CMP, lipid panel and uric acid, and then 1-2 times monthly CMP and blood pressure.
Quinolones Counseling:  I discussed with the patient the risks of fluoroquinolones including but not limited to GI upset, allergic reaction, drug rash, diarrhea, dizziness, photosensitivity, yeast infections, liver function test abnormalities, tendonitis/tendon rupture.
Simponi Counseling:  I discussed with the patient the risks of golimumab including but not limited to myelosuppression, immunosuppression, autoimmune hepatitis, demyelinating diseases, lymphoma, and serious infections.  The patient understands that monitoring is required including a PPD at baseline and must alert us or the primary physician if symptoms of infection or other concerning signs are noted.
Benzoyl Peroxide Counseling: Patient counseled that medicine may cause skin irritation and bleach clothing.  In the event of skin irritation, the patient was advised to reduce the amount of the drug applied or use it less frequently.   The patient verbalized understanding of the proper use and possible adverse effects of benzoyl peroxide.  All of the patient's questions and concerns were addressed.
Use Enhanced Medication Counseling?: No
Olanzapine Counseling- I discussed with the patient the common side effects of olanzapine including but are not limited to: lack of energy, dry mouth, increased appetite, sleepiness, tremor, constipation, dizziness, changes in behavior, or restlessness.  Explained that teenagers are more likely to experience headaches, abdominal pain, pain in the arms or legs, tiredness, and sleepiness.  Serious side effects include but are not limited: increased risk of death in elderly patients who are confused, have memory loss, or dementia-related psychosis; hyperglycemia; increased cholesterol and triglycerides; and weight gain.
Enbrel Counseling:  I discussed with the patient the risks of etanercept including but not limited to myelosuppression, immunosuppression, autoimmune hepatitis, demyelinating diseases, lymphoma, and infections.  The patient understands that monitoring is required including a PPD at baseline and must alert us or the primary physician if symptoms of infection or other concerning signs are noted.
Gabapentin Pregnancy And Lactation Text: This medication is Pregnancy Category C and isn't considered safe during pregnancy. It is excreted in breast milk.
Litfulo Pregnancy And Lactation Text: Based on animal studies, Lifulo may cause embryo-fetal harm when administered to pregnant women.  The medication should not be used in pregnancy.  Breastfeeding is not recommended during treatment.
Mirvaso Counseling: Mirvaso is a topical medication which can decrease superficial blood flow where applied. Side effects are uncommon and include stinging, redness and allergic reactions.
Ivermectin Counseling:  Patient instructed to take medication on an empty stomach with a full glass of water.  Patient informed of potential adverse effects including but not limited to nausea, diarrhea, dizziness, itching, and swelling of the extremities or lymph nodes.  The patient verbalized understanding of the proper use and possible adverse effects of ivermectin.  All of the patient's questions and concerns were addressed.
Tremfya Counseling: I discussed with the patient the risks of guselkumab including but not limited to immunosuppression, serious infections, and drug reactions.  The patient understands that monitoring is required including a PPD at baseline and must alert us or the primary physician if symptoms of infection or other concerning signs are noted.
SSKI Counseling:  I discussed with the patient the risks of SSKI including but not limited to thyroid abnormalities, metallic taste, GI upset, fever, headache, acne, arthralgias, paraesthesias, lymphadenopathy, easy bleeding, arrhythmias, and allergic reaction.
Acitretin Counseling:  I discussed with the patient the risks of acitretin including but not limited to hair loss, dry lips/skin/eyes, liver damage, hyperlipidemia, depression/suicidal ideation, photosensitivity.  Serious rare side effects can include but are not limited to pancreatitis, pseudotumor cerebri, bony changes, clot formation/stroke/heart attack.  Patient understands that alcohol is contraindicated since it can result in liver toxicity and significantly prolong the elimination of the drug by many years.
Hydroxychloroquine Counseling:  I discussed with the patient that a baseline ophthalmologic exam is needed at the start of therapy and every year thereafter while on therapy. A CBC may also be warranted for monitoring.  The side effects of this medication were discussed with the patient, including but not limited to agranulocytosis, aplastic anemia, seizures, rashes, retinopathy, and liver toxicity. Patient instructed to call the office should any adverse effect occur.  The patient verbalized understanding of the proper use and possible adverse effects of Plaquenil.  All the patient's questions and concerns were addressed.
Birth Control Pills Pregnancy And Lactation Text: This medication should be avoided if pregnant and for the first 30 days post-partum.
Imiquimod Counseling:  I discussed with the patient the risks of imiquimod including but not limited to erythema, scaling, itching, weeping, crusting, and pain.  Patient understands that the inflammatory response to imiquimod is variable from person to person and was educated regarded proper titration schedule.  If flu-like symptoms develop, patient knows to discontinue the medication and contact us.
Nemluvio Counseling: I discussed with the patient the risks of nemolizumab including but not limited to headache, gastrointestinal complaints, nasopharyngitis, musculoskeletal complaints, injection site reactions, and allergic reactions. The patient understands that monitoring is required and they must alert us or the primary physician if any side effects are noted.
Elidel Counseling: Patient may experience a mild burning sensation during topical application. Elidel is not approved in children less than 2 years of age. There have been case reports of hematologic and skin malignancies in patients using topical calcineurin inhibitors although causality is questionable.
Doxycycline Pregnancy And Lactation Text: This medication is Pregnancy Category D and not consider safe during pregnancy. It is also excreted in breast milk but is considered safe for shorter treatment courses.
Cimzia Pregnancy And Lactation Text: This medication crosses the placenta but can be considered safe in certain situations. Cimzia may be excreted in breast milk.
Acitretin Pregnancy And Lactation Text: This medication is Pregnancy Category X and should not be given to women who are pregnant or may become pregnant in the future. This medication is excreted in breast milk.
Hydroxychloroquine Pregnancy And Lactation Text: This medication has been shown to cause fetal harm but it isn't assigned a Pregnancy Risk Category. There are small amounts excreted in breast milk.
Topical Steroids Applications Pregnancy And Lactation Text: Most topical steroids are considered safe to use during pregnancy and lactation.  Any topical steroid applied to the breast or nipple should be washed off before breastfeeding.
Sski Pregnancy And Lactation Text: This medication is Pregnancy Category D and isn't considered safe during pregnancy. It is excreted in breast milk.
Spironolactone Counseling: Patient advised regarding risks of diarrhea, abdominal pain, hyperkalemia, birth defects (for female patients), liver toxicity and renal toxicity. The patient may need blood work to monitor liver and kidney function and potassium levels while on therapy. The patient verbalized understanding of the proper use and possible adverse effects of spironolactone.  All of the patient's questions and concerns were addressed.
Clofazimine Counseling:  I discussed with the patient the risks of clofazimine including but not limited to skin and eye pigmentation, liver damage, nausea/vomiting, gastrointestinal bleeding and allergy.
Nemluvio Pregnancy And Lactation Text: It is not known if Nemluvio causes fetal harm or is present in breast milk. Please proceed with caution if patients who are pregnant or breastfeeding.
Erythromycin Counseling:  I discussed with the patient the risks of erythromycin including but not limited to GI upset, allergic reaction, drug rash, diarrhea, increase in liver enzymes, and yeast infections.
Saxenda Counseling: I reviewed the possible side effects including: thyroid tumors, kidney disease, gallbladder disease, abdominal pain, constipation, diarrhea, nausea, vomiting and pancreatitis. Do not take this medication if you have a history or family history of multiple endocrine neoplasia syndrome type 2. Side effects reviewed, pt to contact office should one occur.
Cosentyx Counseling:  I discussed with the patient the risks of Cosentyx including but not limited to worsening of Crohn's disease, immunosuppression, allergic reactions and infections.  The patient understands that monitoring is required including a PPD at baseline and must alert us or the primary physician if symptoms of infection or other concerning signs are noted.
Klisyri Counseling:  I discussed with the patient the risks of Klisyri including but not limited to erythema, scaling, itching, weeping, crusting, and pain.
Topical Sulfur Applications Counseling: Topical Sulfur Counseling: Patient counseled that this medication may cause skin irritation or allergic reactions.  In the event of skin irritation, the patient was advised to reduce the amount of the drug applied or use it less frequently.   The patient verbalized understanding of the proper use and possible adverse effects of topical sulfur application.  All of the patient's questions and concerns were addressed.
Aklief counseling:  Patient advised to apply a pea-sized amount only at bedtime and wait 30 minutes after washing their face before applying.  If too drying, patient may add a non-comedogenic moisturizer.  The most commonly reported side effects including irritation, redness, scaling, dryness, stinging, burning, itching, and increased risk of sunburn.  The patient verbalized understanding of the proper use and possible adverse effects of retinoids.  All of the patient's questions and concerns were addressed.
Bexarotene Counseling:  I discussed with the patient the risks of bexarotene including but not limited to hair loss, dry lips/skin/eyes, liver abnormalities, hyperlipidemia, pancreatitis, depression/suicidal ideation, photosensitivity, drug rash/allergic reactions, hypothyroidism, anemia, leukopenia, infection, cataracts, and teratogenicity.  Patient understands that they will need regular blood tests to check lipid profile, liver function tests, white blood cell count, thyroid function tests and pregnancy test if applicable.
Over the Counter Salicylic Acid Counseling: Over the counter salicylic acid preparations can be used effectively to treat warts at home. There are two types of application: liquid and plaster. Liquid preparations are applied like nail polish and the plaster applications are applied like a bandage (you may need to apply duct tape over the plaster to keep it in place). Dead and macerated skin should be removed regularly with a nail file or nail clippers for best results.
Low Dose Naltrexone Counseling- I discussed with the patient the potential risks and side effects of low dose naltrexone including but not limited to: more vivid dreams, headaches, nausea, vomiting, abdominal pain, fatigue, dizziness, and anxiety.
Adbry Counseling: I discussed with the patient the risks of tralokinumab including but not limited to eye infection and irritation, cold sores, injection site reactions, worsening of asthma, allergic reactions and increased risk of parasitic infection.  Live vaccines should be avoided while taking tralokinumab. The patient understands that monitoring is required and they must alert us or the primary physician if symptoms of infection or other concerning signs are noted.
Topical Sulfur Applications Pregnancy And Lactation Text: This medication is considered safe during pregnancy and breast feeding secondary to limited systemic absorption.
Thalidomide Counseling: I discussed with the patient the risks of thalidomide including but not limited to birth defects, anxiety, weakness, chest pain, dizziness, cough and severe allergy.
Erythromycin Pregnancy And Lactation Text: This medication is Pregnancy Category B and is considered safe during pregnancy. It is also excreted in breast milk.
Rituxan Counseling:  I discussed with the patient the risks of Rituxan infusions. Side effects can include infusion reactions, severe drug rashes including mucocutaneous reactions, reactivation of latent hepatitis and other infections and rarely progressive multifocal leukoencephalopathy.  All of the patient's questions and concerns were addressed.
Xolair Counseling:  Patient informed of potential adverse effects including but not limited to fever, muscle aches, rash and allergic reactions.  The patient verbalized understanding of the proper use and possible adverse effects of Xolair.  All of the patient's questions and concerns were addressed.
Spironolactone Pregnancy And Lactation Text: This medication can cause feminization of the male fetus and should be avoided during pregnancy. The active metabolite is also found in breast milk.
Bexarotene Pregnancy And Lactation Text: This medication is Pregnancy Category X and should not be given to women who are pregnant or may become pregnant. This medication should not be used if you are breast feeding.
Rituxan Pregnancy And Lactation Text: This medication is Pregnancy Category C and it isn't know if it is safe during pregnancy. It is unknown if this medication is excreted in breast milk but similar antibodies are known to be excreted.
Colchicine Counseling:  Patient counseled regarding adverse effects including but not limited to stomach upset (nausea, vomiting, stomach pain, or diarrhea).  Patient instructed to limit alcohol consumption while taking this medication.  Colchicine may reduce blood counts especially with prolonged use.  The patient understands that monitoring of kidney function and blood counts may be required, especially at baseline. The patient verbalized understanding of the proper use and possible adverse effects of colchicine.  All of the patient's questions and concerns were addressed.
Xolair Pregnancy And Lactation Text: This medication is Pregnancy Category B and is considered safe during pregnancy. This medication is excreted in breast milk.
Over The Counter Salicylic Acid Pregnancy And Lactation Text: It is not recommended to use high dose OTC salicylic acid while pregnant. Lower dose topical preparations are considered safe.
Eucrisa Counseling: Patient may experience a mild burning sensation during topical application. Eucrisa is not approved in children less than 3 months of age.
Klisyri Pregnancy And Lactation Text: It is unknown if this medication can harm a developing fetus or if it is excreted in breast milk.
Low Dose Naltrexone Pregnancy And Lactation Text: Naltrexone is pregnancy category C.  There have been no adequate and well-controlled studies in pregnant women.  It should be used in pregnancy only if the potential benefit justifies the potential risk to the fetus.   Limited data indicates that naltrexone is minimally excreted into breastmilk.
Aklief Pregnancy And Lactation Text: It is unknown if this medication is safe to use during pregnancy.  It is unknown if this medication is excreted in breast milk.  Breastfeeding women should use the topical cream on the smallest area of the skin for the shortest time needed while breastfeeding.  Do not apply to nipple and areola.
Semaglutide Counseling: I reviewed the possible side effects including: thyroid tumors, kidney disease, gallbladder disease, abdominal pain, constipation, diarrhea, nausea, vomiting and pancreatitis. Do not take this medication if you have a history or family history of multiple endocrine neoplasia syndrome type 2. Side effects reviewed, pt to contact office should one occur.
Solaraze Counseling:  I discussed with the patient the risks of Solaraze including but not limited to erythema, scaling, itching, weeping, crusting, and pain.
Wartpeel Counseling:  I discussed with the patient the risks of Wartpeel including but not limited to erythema, scaling, itching, weeping, crusting, and pain.
Metronidazole Counseling:  I discussed with the patient the risks of metronidazole including but not limited to seizures, nausea/vomiting, a metallic taste in the mouth, nausea/vomiting and severe allergy.
Isotretinoin Counseling: Patient should get monthly blood tests, not donate blood, not drive at night if vision affected, not share medication, and not undergo elective surgery for 6 months after tx completed. Side effects reviewed, pt to contact office should one occur.
Niacinamide Counseling: I recommended taking niacin or niacinamide, also know as vitamin B3, twice daily. Recent evidence suggests that taking vitamin B3 (500 mg twice daily) can reduce the risk of actinic keratoses and non-melanoma skin cancers. Side effects of vitamin B3 include flushing and headache.
Amzeeq Counseling: Amzeeq is a topical antibiotic foam which contains minocycline.  The most commonly reported side effect of Amzeeq is headache.  The medication is flammable and should not be applied near a fire, flame, or while smoking.  Sun precautions is recommended to prevent sunburn.
Tranexamic Acid Counseling:  Patient advised of the small risk of bleeding problems with tranexamic acid. They were also instructed to call if they developed any nausea, vomiting or diarrhea. All of the patient's questions and concerns were addressed.
Dupixent Counseling: I discussed with the patient the risks of dupilumab including but not limited to eye infection and irritation, cold sores, injection site reactions, worsening of asthma, allergic reactions and increased risk of parasitic infection.  Live vaccines should be avoided while taking dupilumab. Dupilumab will also interact with certain medications such as warfarin and cyclosporine. The patient understands that monitoring is required and they must alert us or the primary physician if symptoms of infection or other concerning signs are noted.
Siliq Counseling:  I discussed with the patient the risks of Siliq including but not limited to new or worsening depression, suicidal thoughts and behavior, immunosuppression, malignancy, posterior leukoencephalopathy syndrome, and serious infections.  The patient understands that monitoring is required including a PPD at baseline and must alert us or the primary physician if symptoms of infection or other concerning signs are noted. There is also a special program designed to monitor depression which is required with Siliq.
Latisse Counseling: Lattise Counseling: I reviewed the possible side-effects of Latisse including itching, eye irritation, discoloration and exacerbating glaucoma. I also discussed application methods.

## 2025-06-17 ENCOUNTER — APPOINTMENT (OUTPATIENT)
Dept: ADMISSIONS | Facility: MEDICAL CENTER | Age: 75
End: 2025-06-17
Attending: INTERNAL MEDICINE
Payer: MEDICARE

## 2025-06-17 DIAGNOSIS — Z01.812 PRE-OPERATIVE LABORATORY EXAMINATION: ICD-10-CM

## 2025-06-17 DIAGNOSIS — Z01.810 PRE-OPERATIVE CARDIOVASCULAR EXAMINATION: ICD-10-CM

## 2025-06-17 LAB
ERYTHROCYTE [DISTWIDTH] IN BLOOD BY AUTOMATED COUNT: 41.7 FL (ref 35.9–50)
EST. AVERAGE GLUCOSE BLD GHB EST-MCNC: 120 MG/DL
HBA1C MFR BLD: 5.8 % (ref 4–5.6)
HCT VFR BLD AUTO: 48.8 % (ref 42–52)
HGB BLD-MCNC: 16 G/DL (ref 14–18)
MCH RBC QN AUTO: 30.5 PG (ref 27–33)
MCHC RBC AUTO-ENTMCNC: 32.8 G/DL (ref 32.3–36.5)
MCV RBC AUTO: 93.1 FL (ref 81.4–97.8)
PLATELET # BLD AUTO: 150 K/UL (ref 164–446)
PMV BLD AUTO: 11.5 FL (ref 9–12.9)
RBC # BLD AUTO: 5.24 M/UL (ref 4.7–6.1)
WBC # BLD AUTO: 8.6 K/UL (ref 4.8–10.8)

## 2025-06-17 PROCEDURE — 85027 COMPLETE CBC AUTOMATED: CPT | Mod: GA

## 2025-06-17 PROCEDURE — 93005 ELECTROCARDIOGRAM TRACING: CPT | Mod: TC

## 2025-06-17 PROCEDURE — 36415 COLL VENOUS BLD VENIPUNCTURE: CPT

## 2025-06-17 PROCEDURE — 83036 HEMOGLOBIN GLYCOSYLATED A1C: CPT | Mod: GA

## 2025-06-18 ENCOUNTER — ANESTHESIA (OUTPATIENT)
Dept: SURGERY | Facility: MEDICAL CENTER | Age: 75
End: 2025-06-18
Payer: MEDICARE

## 2025-06-18 ENCOUNTER — HOSPITAL ENCOUNTER (OUTPATIENT)
Facility: MEDICAL CENTER | Age: 75
End: 2025-06-18
Attending: INTERNAL MEDICINE | Admitting: INTERNAL MEDICINE
Payer: MEDICARE

## 2025-06-18 ENCOUNTER — ANESTHESIA EVENT (OUTPATIENT)
Dept: SURGERY | Facility: MEDICAL CENTER | Age: 75
End: 2025-06-18
Payer: MEDICARE

## 2025-06-18 VITALS
HEIGHT: 71 IN | BODY MASS INDEX: 36 KG/M2 | TEMPERATURE: 97.7 F | SYSTOLIC BLOOD PRESSURE: 120 MMHG | HEART RATE: 63 BPM | WEIGHT: 257.17 LBS | RESPIRATION RATE: 18 BRPM | DIASTOLIC BLOOD PRESSURE: 66 MMHG | OXYGEN SATURATION: 92 %

## 2025-06-18 PROBLEM — Z12.11 COLON CANCER SCREENING: Status: ACTIVE | Noted: 2025-06-18

## 2025-06-18 LAB
EKG IMPRESSION: NORMAL
GLUCOSE BLD STRIP.AUTO-MCNC: 109 MG/DL (ref 65–99)

## 2025-06-18 PROCEDURE — 160002 HCHG RECOVERY MINUTES (STAT): Performed by: INTERNAL MEDICINE

## 2025-06-18 PROCEDURE — 82962 GLUCOSE BLOOD TEST: CPT | Performed by: INTERNAL MEDICINE

## 2025-06-18 PROCEDURE — 160048 HCHG OR STATISTICAL LEVEL 1-5: Performed by: INTERNAL MEDICINE

## 2025-06-18 PROCEDURE — 160203 HCHG ENDO MINUTES - 1ST 30 MINS LEVEL 4: Performed by: INTERNAL MEDICINE

## 2025-06-18 PROCEDURE — 160046 HCHG PACU - 1ST 60 MINS PHASE II: Performed by: INTERNAL MEDICINE

## 2025-06-18 PROCEDURE — 93010 ELECTROCARDIOGRAM REPORT: CPT | Performed by: INTERNAL MEDICINE

## 2025-06-18 PROCEDURE — 160015 HCHG STAT PREOP MINUTES: Performed by: INTERNAL MEDICINE

## 2025-06-18 PROCEDURE — 160025 RECOVERY II MINUTES (STATS): Performed by: INTERNAL MEDICINE

## 2025-06-18 PROCEDURE — 160193 HCHG PACU STANDARD - 1ST 60 MINS: Performed by: INTERNAL MEDICINE

## 2025-06-18 PROCEDURE — 160192 HCHG ANESTHESIA COMPLEX: Performed by: INTERNAL MEDICINE

## 2025-06-18 PROCEDURE — 700111 HCHG RX REV CODE 636 W/ 250 OVERRIDE (IP): Performed by: ANESTHESIOLOGY

## 2025-06-18 PROCEDURE — 700105 HCHG RX REV CODE 258: Performed by: INTERNAL MEDICINE

## 2025-06-18 RX ORDER — ONDANSETRON 2 MG/ML
4 INJECTION INTRAMUSCULAR; INTRAVENOUS
Status: DISCONTINUED | OUTPATIENT
Start: 2025-06-18 | End: 2025-06-18 | Stop reason: HOSPADM

## 2025-06-18 RX ORDER — DIPHENHYDRAMINE HYDROCHLORIDE 50 MG/ML
12.5 INJECTION, SOLUTION INTRAMUSCULAR; INTRAVENOUS
Status: DISCONTINUED | OUTPATIENT
Start: 2025-06-18 | End: 2025-06-18 | Stop reason: HOSPADM

## 2025-06-18 RX ORDER — HALOPERIDOL 5 MG/ML
1 INJECTION INTRAMUSCULAR
Status: DISCONTINUED | OUTPATIENT
Start: 2025-06-18 | End: 2025-06-18 | Stop reason: HOSPADM

## 2025-06-18 RX ORDER — SODIUM CHLORIDE, SODIUM LACTATE, POTASSIUM CHLORIDE, CALCIUM CHLORIDE 600; 310; 30; 20 MG/100ML; MG/100ML; MG/100ML; MG/100ML
INJECTION, SOLUTION INTRAVENOUS CONTINUOUS
Status: DISCONTINUED | OUTPATIENT
Start: 2025-06-18 | End: 2025-06-18 | Stop reason: HOSPADM

## 2025-06-18 RX ADMIN — SODIUM CHLORIDE, POTASSIUM CHLORIDE, SODIUM LACTATE AND CALCIUM CHLORIDE: 600; 310; 30; 20 INJECTION, SOLUTION INTRAVENOUS at 10:23

## 2025-06-18 RX ADMIN — PROPOFOL 450 MG: 10 INJECTION, EMULSION INTRAVENOUS at 12:11

## 2025-06-18 ASSESSMENT — PAIN DESCRIPTION - PAIN TYPE
TYPE: ACUTE PAIN
TYPE: SURGICAL PAIN
TYPE: SURGICAL PAIN

## 2025-06-18 ASSESSMENT — FIBROSIS 4 INDEX: FIB4 SCORE: 3.13

## 2025-06-18 NOTE — ANESTHESIA POSTPROCEDURE EVALUATION
Patient: Laureen Munoz    Procedure Summary       Date: 06/18/25 Room / Location:  ENDOSCOPIC ULTRASOUND ROOM / SURGERY [facility]    Anesthesia Start: 1124 Anesthesia Stop:     Procedure: HIGH RISK SCREENING COLONOSCOPY (Abdomen) Diagnosis:       Normal exam      (Normal exam [646080])    Surgeons: Jacques Baltazar M.D. Responsible Provider: Andrew Ayers M.D.    Anesthesia Type: general ASA Status: 3            Final Anesthesia Type: general  Last vitals  BP   Blood Pressure : 118/62    Temp   36 °C (96.8 °F)    Pulse   60   Resp   (!) 22    SpO2   93 %      Anesthesia Post Evaluation    Patient location during evaluation: PACU  Patient participation: complete - patient participated  Level of consciousness: awake and alert    Airway patency: patent  Anesthetic complications: no  Cardiovascular status: hemodynamically stable  Respiratory status: acceptable  Hydration status: euvolemic    PONV: none          No notable events documented.     Nurse Pain Score: 0 (NPRS)

## 2025-06-18 NOTE — ANESTHESIA TIME REPORT
Anesthesia Start and Stop Event Times       Date Time Event    6/18/2025 1111 Ready for Procedure     1124 Anesthesia Start          Responsible Staff  06/18/25      Name Role Begin End    Andrew Ayers M.D. Anesth 1124           Overtime Reason:  no overtime (within assigned shift)    Comments:

## 2025-06-18 NOTE — DISCHARGE INSTRUCTIONS
ENDOSCOPY HOME CARE INSTRUCTIONS    COLONOSCOPY OR FLEXIBLE SIGMOIDOSCOPY  1. If you received a barium enema, take a mild laxative such as dulcolax, Vandana's M.O., or Milk of Magnesia to clean out the barium.  2. Drink plenty of fluids. Eat a diet high in fiber (whole-grain breads, fresh fruit and vegetables).  3. You may notice a few drops of blood with your first bowel movement. If you develop any large amount of bleeding, black stools, a fever, or abdominal pain, call your doctor right away.  4. Call your doctor for test results in 7 day(s).  5. Don't drive or drink alcohol for 24 hours. The medication you received will make you too drowsy.  6. No heavy lifting, no Aspirin products or Aspirin for 5 days   Dr. Baltazar [phone]    Findings:   Normal colonoscopy     Complications: None    You should call 911 if you develop problems with breathing or chest pain.  If any questions arise, call your doctor. If your doctor is not available, please feel free to call [phone].

## 2025-06-18 NOTE — OR NURSING
Pt in phase 2 recovery.    Discharge instructions given to pt and pt son. All questions answered. Both expressed understanding.     IV d/c'D.   Pt dressed and in possession of all person property.     Pt discharged to responsible adult in lobby via w/c.

## 2025-06-18 NOTE — OR NURSING
Patient allergies and NPO status verified, home medication reconciliation completed and belongings secured. Patient verbalizes understanding of pain scale and established a realistic pain goal. Expected course of stay and plan of care discussed. Surgical site verified, IV access established. VTE per provider preference in place. Medicated per mar. No further needs at this time.     1032 Notified Dr Ayers that preop hr was francisco and slightly irregular - from 54-58. Hx of a fib.

## 2025-06-18 NOTE — PROCEDURES
DATE OF PROCEDURE:  06/18/2025     PROCEDURE:  Screening colonoscopy.     :  Jacques Clifford MD     INDICATIONS:  The patient is a 74-year-old gentleman who presents for average   risk colon cancer screening.  Last colonoscopy 13 years ago.     INFORMED CONSENT:  Written informed consent was obtained from the patient   after thorough explanation of indications, benefits, and risks of the   procedure, which included but was not limited to bleeding, infection,   perforation, adverse reaction to medications and missed lesions.     MEDICATIONS GIVEN:  Monitored anesthesia care with propofol.     Continuous telemetry, BP, pulse oximetry, and capnography were performed by   the anesthesiologist throughout the procedure.     An adult colonoscope was used for the procedure.     FINDINGS:  The patient was placed in the left lateral decubitus position.    After anesthesia was achieved, a digital rectal exam was performed and found   to be normal.  The colonoscope was then inserted into the rectum and advanced   to the cecum, which was identified by the appendiceal orifice and the   ileocecal valve.  The terminal ileum was intubated and found to be normal.    The colonoscope was then withdrawn with careful inspecting mucosa.  The mucosa   throughout the colon was normal without any evidence of polyps or other   lesions.  Retroflexion of the rectum was performed and found to be normal.    The patient underwent a GoLYTELY prep, the results which were good.  The   patient tolerated the procedure well.     IMPRESSION: Normal colonoscopy.     PLAN:  No additional colon cancer screening given age.        ______________________________  JACQUES CLIFFORD MD    MARIA INES/AZM    DD:  06/18/2025 11:48  DT:  06/18/2025 12:14    Job#:  [id number]

## 2025-06-18 NOTE — ANESTHESIA PREPROCEDURE EVALUATION
Case: [id number] Date/Time: 06/18/25 1100    Procedure: HIGH RISK SCREENING COLONOSCOPY (Abdomen)    Anesthesia type: MAC    Pre-op diagnosis: SCREENING FOR COLONIC NEOPLASIA    Location:  ENDOSCOPIC ULTRASOUND ROOM / SURGERY [facility]    Surgeons: Jacques Baltazar M.D.            Relevant Problems   ANESTHESIA   (positive) Complex sleep apnea syndrome      NEURO   (positive) CVA (cerebrovascular accident due to intracerebral hemorrhage) (HCC)      CARDIAC   (positive) Atrial fibrillation (HCC)   (positive) Chronic diastolic congestive heart failure (HCC)   (positive) Essential hypertension   (positive) Pulmonary hypertension (HCC)   (positive) Renal artery stenosis (HCC)         (positive) Cyst of left kidney       Physical Exam    Airway   Mallampati: II  TM distance: >3 FB  Neck ROM: full       Cardiovascular - normal exam  Rhythm: regular  Rate: normal    (-) murmur     Dental - normal exam           Pulmonary - normal examBreath sounds clear to auscultation     Abdominal    Neurological - normal exam                   Anesthesia Plan    ASA 3   ASA physical status 3 criteria: other (comment), moderate reduction of ejection fraction and morbid obesity - BMI greater than or equal to 40    Plan - general               Induction: intravenous    Postoperative Plan: Postoperative administration of opioids is intended.    Pertinent diagnostic labs and testing reviewed    Informed Consent:    Anesthetic plan and risks discussed with patient.    Use of blood products discussed with: patient whom consented to blood products.

## 2025-06-18 NOTE — OR SURGEON
Immediate Post OP Note    PreOp Diagnosis: Colon cancer screen      PostOp Diagnosis: Normal colonosopy      Procedure(s):  SCREENING COLONOSCOPY - Wound Class: Clean Contaminated    Surgeon(s):  Jacques Baltazar M.D.    Anesthesiologist/Type of Anesthesia:  Anesthesiologist: Andrew Ayers M.D./BIANCA    Surgical Staff:  Circulator: Danish Jim R.N.  Endoscopy Technician: Malinda Jovel    Specimens removed if any:  * No specimens in log *    Estimated Blood Loss: None    Findings:   Normal colonoscopy    Complications: None        6/18/2025 11:44 AM Jacques Baltazar M.D.

## 2025-06-20 ENCOUNTER — PATIENT MESSAGE (OUTPATIENT)
Dept: CARDIOLOGY | Facility: MEDICAL CENTER | Age: 75
End: 2025-06-20

## 2025-06-20 ENCOUNTER — PATIENT MESSAGE (OUTPATIENT)
Dept: CARDIOLOGY | Facility: MEDICAL CENTER | Age: 75
End: 2025-06-20
Payer: MEDICARE

## 2025-06-26 ENCOUNTER — HOSPITAL ENCOUNTER (OUTPATIENT)
Dept: LAB | Facility: MEDICAL CENTER | Age: 75
End: 2025-06-26
Payer: MEDICARE

## 2025-06-26 DIAGNOSIS — M79.672 LEFT FOOT PAIN: ICD-10-CM

## 2025-06-26 DIAGNOSIS — E87.1 HYPONATREMIA: ICD-10-CM

## 2025-06-26 DIAGNOSIS — R22.43 LOCALIZED SWELLING OF BOTH FEET: ICD-10-CM

## 2025-06-26 DIAGNOSIS — M79.89 LEG SWELLING: ICD-10-CM

## 2025-06-26 LAB
ANION GAP SERPL CALC-SCNC: 14 MMOL/L (ref 7–16)
BUN SERPL-MCNC: 38 MG/DL (ref 8–22)
CALCIUM SERPL-MCNC: 9.2 MG/DL (ref 8.5–10.5)
CHLORIDE SERPL-SCNC: 102 MMOL/L (ref 96–112)
CO2 SERPL-SCNC: 22 MMOL/L (ref 20–33)
CREAT SERPL-MCNC: 1.51 MG/DL (ref 0.5–1.4)
CRP SERPL HS-MCNC: 0.46 MG/DL (ref 0–0.75)
ERYTHROCYTE [SEDIMENTATION RATE] IN BLOOD BY WESTERGREN METHOD: 2 MM/HOUR (ref 0–20)
GFR SERPLBLD CREATININE-BSD FMLA CKD-EPI: 48 ML/MIN/1.73 M 2
GLUCOSE SERPL-MCNC: 115 MG/DL (ref 65–99)
NT-PROBNP SERPL IA-MCNC: 167 PG/ML (ref 0–125)
POTASSIUM SERPL-SCNC: 4.3 MMOL/L (ref 3.6–5.5)
SODIUM SERPL-SCNC: 138 MMOL/L (ref 135–145)
URATE SERPL-MCNC: 8.7 MG/DL (ref 2.5–8.3)

## 2025-06-26 PROCEDURE — 86140 C-REACTIVE PROTEIN: CPT

## 2025-06-26 PROCEDURE — 85652 RBC SED RATE AUTOMATED: CPT

## 2025-06-26 PROCEDURE — 80048 BASIC METABOLIC PNL TOTAL CA: CPT

## 2025-06-26 PROCEDURE — 84550 ASSAY OF BLOOD/URIC ACID: CPT

## 2025-06-26 PROCEDURE — 36415 COLL VENOUS BLD VENIPUNCTURE: CPT

## 2025-06-26 PROCEDURE — 83880 ASSAY OF NATRIURETIC PEPTIDE: CPT | Mod: GA

## 2025-06-30 ENCOUNTER — RESULTS FOLLOW-UP (OUTPATIENT)
Dept: MEDICAL GROUP | Facility: IMAGING CENTER | Age: 75
End: 2025-06-30
Payer: MEDICARE

## 2025-07-02 ENCOUNTER — HOSPITAL ENCOUNTER (OUTPATIENT)
Dept: RADIOLOGY | Facility: MEDICAL CENTER | Age: 75
End: 2025-07-02
Payer: MEDICARE

## 2025-07-02 DIAGNOSIS — M79.89 LEG SWELLING: ICD-10-CM

## 2025-07-02 DIAGNOSIS — M79.672 LEFT FOOT PAIN: ICD-10-CM

## 2025-07-02 DIAGNOSIS — R22.43 LOCALIZED SWELLING OF BOTH FEET: ICD-10-CM

## 2025-07-02 PROCEDURE — 93922 UPR/L XTREMITY ART 2 LEVELS: CPT

## 2025-07-03 ENCOUNTER — RESULTS FOLLOW-UP (OUTPATIENT)
Dept: MEDICAL GROUP | Facility: IMAGING CENTER | Age: 75
End: 2025-07-03
Payer: MEDICARE

## 2025-07-08 DIAGNOSIS — E29.1 PRIMARY HYPOGONADISM IN MALE: Primary | ICD-10-CM

## 2025-07-08 RX ORDER — TESTOSTERONE 1.62 MG/G
40.5 GEL TRANSDERMAL DAILY
Qty: 75 G | Refills: 3 | Status: SHIPPED | OUTPATIENT
Start: 2025-07-08 | End: 2025-08-05

## 2025-07-09 DIAGNOSIS — Z00.6 CLINICAL TRIAL PARTICIPANT: ICD-10-CM

## 2025-07-16 ENCOUNTER — HOSPITAL ENCOUNTER (OUTPATIENT)
Dept: LAB | Facility: MEDICAL CENTER | Age: 75
End: 2025-07-16
Attending: FAMILY MEDICINE
Payer: MEDICARE

## 2025-07-16 DIAGNOSIS — Z00.6 CLINICAL TRIAL PARTICIPANT: ICD-10-CM

## 2025-07-19 ENCOUNTER — NON-PROVIDER VISIT (OUTPATIENT)
Dept: CARDIOLOGY | Facility: MEDICAL CENTER | Age: 75
End: 2025-07-19
Payer: MEDICARE

## 2025-07-19 PROCEDURE — 93298 REM INTERROG DEV EVAL SCRMS: CPT | Mod: 26 | Performed by: INTERNAL MEDICINE

## 2025-07-21 ENCOUNTER — TELEPHONE (OUTPATIENT)
Dept: CARDIOLOGY | Facility: MEDICAL CENTER | Age: 75
End: 2025-07-21
Payer: MEDICARE

## 2025-07-21 NOTE — TELEPHONE ENCOUNTER
Patients Loop Device transmitted 07/19/2025.  Jonah episode on 07/19/2025 at 05:58 in the morning. Likely during sleep hours.  Heart rate at 30 bpm.  Scanned for review.

## 2025-07-22 NOTE — TELEPHONE ENCOUNTER
Phone Number Called: [phone]    Call outcome: Spoke to patient regarding message below.    Message: Called to discuss. Pt states he was awake early to use the restroom on the hour (basically). Pt states he didn't feel any symptoms of low heart rate. He started a protein diet and hasn't had any dizziness since January. He is moving into a custodial community. He will keep us posted if he has any symptoms of low HR

## 2025-07-25 NOTE — TELEPHONE ENCOUNTER
Yonathan Warren M.D. to Me (Selected Message)      7/25/25  5:37 AM  Asymptomatic bradycardia noted.  Thank you.  AFIA

## 2025-07-28 ENCOUNTER — RESULTS FOLLOW-UP (OUTPATIENT)
Dept: MEDICAL GROUP | Facility: PHYSICIAN GROUP | Age: 75
End: 2025-07-28
Payer: MEDICARE

## 2025-07-28 ENCOUNTER — HOSPITAL ENCOUNTER (OUTPATIENT)
Dept: LAB | Facility: MEDICAL CENTER | Age: 75
End: 2025-07-28
Attending: INTERNAL MEDICINE
Payer: MEDICARE

## 2025-07-28 DIAGNOSIS — E78.00 HYPERCHOLESTEREMIA: ICD-10-CM

## 2025-07-28 LAB
ALBUMIN SERPL BCP-MCNC: 4.4 G/DL (ref 3.2–4.9)
ALBUMIN/GLOB SERPL: 1.6 G/DL
ALP SERPL-CCNC: 133 U/L (ref 30–99)
ALT SERPL-CCNC: 49 U/L (ref 2–50)
ANION GAP SERPL CALC-SCNC: 15 MMOL/L (ref 7–16)
APOB+LDLR+PCSK9 GENE MUT ANL BLD/T: NOT DETECTED
AST SERPL-CCNC: 55 U/L (ref 12–45)
BILIRUB SERPL-MCNC: 2.1 MG/DL (ref 0.1–1.5)
BRCA1+BRCA2 DEL+DUP + FULL MUT ANL BLD/T: NOT DETECTED
BUN SERPL-MCNC: 48 MG/DL (ref 8–22)
CALCIUM ALBUM COR SERPL-MCNC: 8.9 MG/DL (ref 8.5–10.5)
CALCIUM SERPL-MCNC: 9.2 MG/DL (ref 8.5–10.5)
CHLORIDE SERPL-SCNC: 103 MMOL/L (ref 96–112)
CHOLEST SERPL-MCNC: 121 MG/DL (ref 100–199)
CO2 SERPL-SCNC: 16 MMOL/L (ref 20–33)
CREAT SERPL-MCNC: 1.64 MG/DL (ref 0.5–1.4)
GFR SERPLBLD CREATININE-BSD FMLA CKD-EPI: 44 ML/MIN/1.73 M 2
GLOBULIN SER CALC-MCNC: 2.8 G/DL (ref 1.9–3.5)
GLUCOSE SERPL-MCNC: 96 MG/DL (ref 65–99)
HDLC SERPL-MCNC: 48 MG/DL
LDLC SERPL CALC-MCNC: 55 MG/DL
MLH1+MSH2+MSH6+PMS2 GN DEL+DUP+FUL M: NOT DETECTED
POTASSIUM SERPL-SCNC: 4.8 MMOL/L (ref 3.6–5.5)
PROT SERPL-MCNC: 7.2 G/DL (ref 6–8.2)
SODIUM SERPL-SCNC: 134 MMOL/L (ref 135–145)
TRIGL SERPL-MCNC: 89 MG/DL (ref 0–149)

## 2025-07-28 PROCEDURE — 80061 LIPID PANEL: CPT

## 2025-07-28 PROCEDURE — 36415 COLL VENOUS BLD VENIPUNCTURE: CPT

## 2025-07-28 PROCEDURE — 80053 COMPREHEN METABOLIC PANEL: CPT

## 2025-07-29 ENCOUNTER — APPOINTMENT (OUTPATIENT)
Dept: RADIOLOGY | Facility: MEDICAL CENTER | Age: 75
End: 2025-07-29
Attending: EMERGENCY MEDICINE
Payer: MEDICARE

## 2025-07-29 ENCOUNTER — HOSPITAL ENCOUNTER (OUTPATIENT)
Facility: MEDICAL CENTER | Age: 75
End: 2025-07-31
Attending: EMERGENCY MEDICINE | Admitting: HOSPITALIST
Payer: MEDICARE

## 2025-07-29 PROBLEM — E86.0 DEHYDRATION: Status: ACTIVE | Noted: 2025-07-29

## 2025-07-29 PROBLEM — N18.4 TYPE 2 DIABETES MELLITUS WITH STAGE 4 CHRONIC KIDNEY DISEASE, WITHOUT LONG-TERM CURRENT USE OF INSULIN (HCC): Status: ACTIVE | Noted: 2025-07-29

## 2025-07-29 PROBLEM — R79.89 ELEVATED TROPONIN: Status: ACTIVE | Noted: 2025-07-29

## 2025-07-29 PROBLEM — E87.20 METABOLIC ACIDOSIS: Status: ACTIVE | Noted: 2025-07-29

## 2025-07-29 PROBLEM — R19.7 DIARRHEA: Status: ACTIVE | Noted: 2025-07-29

## 2025-07-29 PROBLEM — R55 SYNCOPE AND COLLAPSE: Status: ACTIVE | Noted: 2025-07-29

## 2025-07-29 PROBLEM — I95.9 HYPOTENSION: Status: ACTIVE | Noted: 2025-07-29

## 2025-07-29 PROBLEM — E11.22 TYPE 2 DIABETES MELLITUS WITH STAGE 4 CHRONIC KIDNEY DISEASE, WITHOUT LONG-TERM CURRENT USE OF INSULIN (HCC): Status: ACTIVE | Noted: 2025-07-29

## 2025-07-29 PROCEDURE — 71045 X-RAY EXAM CHEST 1 VIEW: CPT

## 2025-07-29 ASSESSMENT — FIBROSIS 4 INDEX
FIB4 SCORE: 3.82
FIB4 SCORE: 3.74

## 2025-07-29 ASSESSMENT — ENCOUNTER SYMPTOMS
CHILLS: 0
COUGH: 0
FLANK PAIN: 0
STRIDOR: 0
SHORTNESS OF BREATH: 0
FEVER: 0
FOCAL WEAKNESS: 0
MYALGIAS: 0
DIARRHEA: 1
VOMITING: 0
EYE DISCHARGE: 0
BRUISES/BLEEDS EASILY: 0
EYE REDNESS: 0
ABDOMINAL PAIN: 0
NERVOUS/ANXIOUS: 0

## 2025-07-29 ASSESSMENT — LIFESTYLE VARIABLES
HAVE YOU EVER FELT YOU SHOULD CUT DOWN ON YOUR DRINKING: NO
EVER FELT BAD OR GUILTY ABOUT YOUR DRINKING: NO
EVER HAD A DRINK FIRST THING IN THE MORNING TO STEADY YOUR NERVES TO GET RID OF A HANGOVER: NO
HOW MANY TIMES IN THE PAST YEAR HAVE YOU HAD 5 OR MORE DRINKS IN A DAY: 0
DOES PATIENT WANT TO STOP DRINKING: NO
ALCOHOL_USE: YES
TOTAL SCORE: 0
TOTAL SCORE: 0
CONSUMPTION TOTAL: NEGATIVE
ON A TYPICAL DAY WHEN YOU DRINK ALCOHOL HOW MANY DRINKS DO YOU HAVE: 1
TOTAL SCORE: 0
HAVE PEOPLE ANNOYED YOU BY CRITICIZING YOUR DRINKING: NO
AVERAGE NUMBER OF DAYS PER WEEK YOU HAVE A DRINK CONTAINING ALCOHOL: 1

## 2025-07-29 ASSESSMENT — PAIN DESCRIPTION - PAIN TYPE
TYPE: ACUTE PAIN
TYPE: ACUTE PAIN

## 2025-07-29 ASSESSMENT — COGNITIVE AND FUNCTIONAL STATUS - GENERAL
SUGGESTED CMS G CODE MODIFIER MOBILITY: CI
MOBILITY SCORE: 23
MOVING TO AND FROM BED TO CHAIR: A LITTLE
DAILY ACTIVITIY SCORE: 24
SUGGESTED CMS G CODE MODIFIER DAILY ACTIVITY: CH

## 2025-07-29 ASSESSMENT — CHA2DS2 SCORE
SEX: MALE
VASCULAR DISEASE: NO
AGE 65 TO 74: YES
HYPERTENSION: YES
PRIOR STROKE OR TIA OR THROMBOEMBOLISM: NO
CHA2DS2 VASC SCORE: 4
CHF OR LEFT VENTRICULAR DYSFUNCTION: YES
AGE 75 OR GREATER: NO
DIABETES: YES

## 2025-07-29 NOTE — ED PROVIDER NOTES
ED Provider Note    CHIEF COMPLAINT  Chief Complaint   Patient presents with    Syncope     Passed out just after eating lunch denies injury      Other     Had somewhat incident happened last yr   has tumor in head and is thinking this is the cause   (adenoma)    Blurred Vision     Had not true issue prior to passing out    does have blurred vision however this is worse that it has been    Dizziness     C/o dizziness as well      Time seen 1500      EXTERNAL RECORDS REVIEWED  Reviewed previous labs, medications, previous imaging and previous workups.    HPI/ROS  LIMITATION TO HISTORY   Select: : None  OUTSIDE HISTORIAN(S):  .  None    Laureen Munoz is a 74 y.o. male who presents urgency department complaining of weakness and dizziness.  Patient was feeling fine earlier today but began feeling weak and dizzy.  No syncopal episode after eating lunch.  Denies hitting his head or falling to the ground.  Has been having blurry vision.  He denies any chest or abdominal pain.  Denies any shortness of breath.  Has been having diarrhea for a week which is described as yellowish but not bloody.  Denies any history of abdominal pain or AAA.  Any steroid use.  Denies any new or different medications.    PAST MEDICAL HISTORY   has a past medical history of Allergy, Anesthesia (2017), Arrhythmia, Arthritis (unk), Atrial fibrillation (HCC), Bowel habit changes, Cancer (HCC) (2020), Cataract (2022), CHF (congestive heart failure) (HCC), Chickenpox, Congestive heart failure (HCC), Daytime sleepiness, Dental disorder, Diabetes (HCC) (2023), Dizziness, Frequent urination, Bengali measles, Hemorrhagic disorder (HCC) (06/16/2025), High cholesterol (1998), Hypercholesterolemia, Hypertension, Insomnia, Kidney stone, Migraine, Mumps, Neuropathy, Obesity, JUAN (obstructive sleep apnea) (11/01/2024), PONV (postoperative nausea and vomiting) (2017), Presence of implantable pulmonary artery pressure and heart rate monitoring system  (2024), Presence of implantable pulmonary artery pressure and heart rate monitoring system (2024), Reactive depression (2023), Renal artery stenosis (), Ringing in ears, Sleep apnea (), Stroke (HCC) (2024), Swelling of lower extremity, Tinea inguinalis, Tobacco use (10/30/2018), Urinary bladder disorder (), Wears glasses, and Wheezing.    SURGICAL HISTORY   has a past surgical history that includes other abdominal surgery; other (); cataract phaco with iol; rhinoplasty; transurethral elec-surg prostatectom (N/A, 2023); cystourethroscopy (N/A, 2023); cysto/uretero/pyeloscopy w/lithotripsy (N/A, 2023); eye surgery; prostatectomy, radical retro (2023); sinuscope; remv 2nd cataract,corn-scler sectn; and colonoscopy (N/A, 2025).    FAMILY HISTORY  Family History   Problem Relation Age of Onset    Cancer Mother         Breast cancer    Heart Disease Mother          at 91yo/heart failure    Heart Attack Mother     Breast Cancer Mother     Heart Disease Father          at 59yo/massive heart attack    Heart Attack Father     Hypertension Father          coronary thrombosis    Cancer Sister          of leukemia    Breast Cancer Sister     Allergies Sister     Stroke Sister         living lost eye site left eye    Arthritis Sister     No Known Problems Maternal Grandmother     No Known Problems Maternal Grandfather     No Known Problems Paternal Grandmother     Dementia Paternal Grandfather     Alcohol/Drug Other         uncle    Allergies Sister     Hyperlipidemia Neg Hx     Diabetes Neg Hx        SOCIAL HISTORY  Social History     Tobacco Use    Smoking status: Former     Current packs/day: 0.00     Average packs/day: 1 pack/day for 15.0 years (15.0 ttl pk-yrs)     Types: Cigarettes, Cigars     Start date: 1970     Quit date: 1985     Years since quittin.6    Smokeless tobacco: Never    Tobacco comments:     occasional cigar started  "in 1996/ quit completely 2020   Vaping Use    Vaping status: Never Used   Substance and Sexual Activity    Alcohol use: Not Currently     Alcohol/week: 0.6 oz     Types: 1 Shots of liquor per week     Comment: occasional 1 beer    Drug use: Not Currently     Types: Inhaled     Comment: none for 30 years/ tried pot [id number] didn't care to much for    Sexual activity: Yes     Partners: Female     Birth control/protection: Condom     Comment: not sexually active last 3 years, when active male condom       CURRENT MEDICATIONS  Home Medications       Reviewed by Rossy Wild R.N. (Registered Nurse) on 07/29/25 at 1454  Med List Status: Partial     Medication Last Dose Status   apixaban (ELIQUIS) 5mg Tab  Active   aspirin 81 MG EC tablet  Active   atorvastatin (LIPITOR) 40 MG Tab  Active   cetirizine (ZYRTEC) 10 MG Tab  Active   Empagliflozin (JARDIANCE) 10 MG Tab tablet  Active   furosemide (LASIX) 40 MG Tab  Active   metoprolol SR (TOPROL XL) 25 MG TABLET SR 24 HR  Active   olmesartan (BENICAR) 20 MG Tab  Active   Specialty Vitamins Products (NERVIVE NERVE RELIEF PO)  Active   spironolactone (ALDACTONE) 25 MG Tab  Active   Testosterone (ANDROGEL) 20.25 MG/ACT (1.62%) Gel  Active                    ALLERGIES  Allergies[1]    PHYSICAL EXAM  VITAL SIGNS: BP (!) 42/24   Pulse (!) 57   Temp 36.2 °C (97.2 °F) (Temporal)   Resp 18   Ht 1.803 m (5' 11\")   Wt 111 kg (244 lb 4.3 oz)   SpO2 93%   BMI 34.07 kg/m²    Constitutional:, Ill-appearing, slow respond.  No acute cardiopulmonary distress  HENT: Normocephalic, Atraumatic, Bilateral external ears normal, Oropharynx moist,  Eyes: PERRL, EOMI, Conjunctiva normal, No discharge.   Neck: Normal range of motion, No tenderness, Supple, No stridor.   Cardiovascular: Normal heart rate, Normal rhythm, No murmurs, No rubs, No gallops.   Thorax & Lungs: Normal breath sounds, No respiratory distress,  Abdomen: Bowel sounds normal, Soft, No tenderness.  Mass  Skin: Warm, Dry, No " erythema, No rash.   Back: No tenderness, No CVA tenderness.   Musculoskeletal: Good range of motion in all major joints.  Mild symmetric edema  Neurologic: Alert, No focal deficits noted.   Psychiatric: Affect normal      EKG/LABS  Results for orders placed or performed during the hospital encounter of 25   EKG    Collection Time: 25  2:12 PM   Result Value Ref Range    Report       [facility] Emergency Dept.    Test Date:  2025  Pt Name:    HONG PALACIO                  Department: [facility]  MRN:        [id number]                      Room:  Gender:     Male                         Technician: [id number]  :        1950                   Requested By:ER TRIAGE PROTOCOL  Order #:    [id number]                    Reading MD:    Measurements  Intervals                                Axis  Rate:       61                           P:          -25  WV:         200                          QRS:        -15  QRSD:       154                          T:          12  QT:         443  QTc:        447    Interpretive Statements  Sinus rhythm  Atrial premature complexes  Right bundle branch block  Compared to ECG 2025 12:36:19  Sinus bradycardia no longer present  First degree AV block no longer present     Lactic Acid    Collection Time: 25  3:02 PM   Result Value Ref Range    Lactic Acid 1.7 0.5 - 2.0 mmol/L   CBC with Differential    Collection Time: 25  3:02 PM   Result Value Ref Range    WBC 8.7 4.8 - 10.8 K/uL    RBC 4.81 4.70 - 6.10 M/uL    Hemoglobin 14.9 14.0 - 18.0 g/dL    Hematocrit 43.6 42.0 - 52.0 %    MCV 90.6 81.4 - 97.8 fL    MCH 31.0 27.0 - 33.0 pg    MCHC 34.2 32.3 - 36.5 g/dL    RDW 43.2 35.9 - 50.0 fL    Platelet Count 159 (L) 164 - 446 K/uL    MPV 11.0 9.0 - 12.9 fL    Neutrophils-Polys 76.30 (H) 44.00 - 72.00 %    Lymphocytes 13.50 (L) 22.00 - 41.00 %    Monocytes 9.10 0.00 - 13.40 %    Eosinophils 0.30 0.00 - 6.90 %    Basophils 0.20 0.00 - 1.80 %     Immature Granulocytes 0.60 0.00 - 0.90 %    Nucleated RBC 0.00 0.00 - 0.20 /100 WBC    Neutrophils (Absolute) 6.59 1.82 - 7.42 K/uL    Lymphs (Absolute) 1.17 1.00 - 4.80 K/uL    Monos (Absolute) 0.79 0.00 - 0.85 K/uL    Eos (Absolute) 0.03 0.00 - 0.51 K/uL    Baso (Absolute) 0.02 0.00 - 0.12 K/uL    Immature Granulocytes (abs) 0.05 0.00 - 0.11 K/uL    NRBC (Absolute) 0.00 K/uL   Complete Metabolic Panel    Collection Time: 07/29/25  3:02 PM   Result Value Ref Range    Sodium 136 135 - 145 mmol/L    Potassium 4.3 3.6 - 5.5 mmol/L    Chloride 104 96 - 112 mmol/L    Co2 16 (L) 20 - 33 mmol/L    Anion Gap 16.0 7.0 - 16.0    Glucose 141 (H) 65 - 99 mg/dL    Bun 51 (H) 8 - 22 mg/dL    Creatinine 1.82 (H) 0.50 - 1.40 mg/dL    Calcium 9.1 8.5 - 10.5 mg/dL    Correct Calcium 8.9 8.5 - 10.5 mg/dL    AST(SGOT) 57 (H) 12 - 45 U/L    ALT(SGPT) 49 2 - 50 U/L    Alkaline Phosphatase 126 (H) 30 - 99 U/L    Total Bilirubin 1.7 (H) 0.1 - 1.5 mg/dL    Albumin 4.2 3.2 - 4.9 g/dL    Total Protein 6.9 6.0 - 8.2 g/dL    Globulin 2.7 1.9 - 3.5 g/dL    A-G Ratio 1.6 g/dL   Blood Culture - Draw one from central line and one from peripheral site    Collection Time: 07/29/25  3:02 PM    Specimen: Line; Blood   Result Value Ref Range    Significant Indicator NEG     Source BLD     Site Peripheral     Culture Result       No Growth  Note: Blood cultures are incubated for 5 days and  are monitored continuously.Positive blood cultures  are called to the RN and reported as soon as  they are identified.  Blood culture testing and Gram stain, if indicated, are  performed at [facility],  [id number] [place] [address].  Positive blood  cultures are sent to [facility],  [address] [place] [address], for organism identification  and susceptibility testing.     APTT    Collection Time: 07/29/25  3:02 PM   Result Value Ref Range    APTT 57.4 (H) 24.7 - 36.0 sec   PROTHROMBIN TIME (INR)    Collection Time:  07/29/25  3:02 PM   Result Value Ref Range    PT 17.8 (H) 12.0 - 14.6 sec    INR 1.42 (H) 0.87 - 1.13   TROPONIN    Collection Time: 07/29/25  3:02 PM   Result Value Ref Range    Troponin T 21 (H) 6 - 19 ng/L   proBrain Natriuretic Peptide, NT    Collection Time: 07/29/25  3:02 PM   Result Value Ref Range    NT-proBNP 515 (H) 0 - 125 pg/mL   TSH    Collection Time: 07/29/25  3:02 PM   Result Value Ref Range    TSH 1.090 0.380 - 5.330 uIU/mL   ESTIMATED GFR    Collection Time: 07/29/25  3:02 PM   Result Value Ref Range    GFR (CKD-EPI) 39 (A) >60 mL/min/1.73 m 2   Urinalysis    Collection Time: 07/29/25  4:32 PM    Specimen: Urine   Result Value Ref Range    Color Yellow     Character Clear     Specific Gravity 1.015 <1.035    Ph 5.0 5.0 - 8.0    Glucose >=1000 (A) Negative mg/dL    Ketones Negative Negative mg/dL    Protein Negative Negative mg/dL    Bilirubin Negative Negative    Urobilinogen, Urine 1.0 <=1.0 EU/dL    Nitrite Negative Negative    Leukocyte Esterase Negative Negative    Occult Blood Negative Negative    Micro Urine Req see below       I have independently interpreted this EKG    RADIOLOGY/PROCEDURES   I have independently interpreted the diagnostic imaging associated with this visit and am waiting the final reading from the radiologist.   My preliminary interpretation is as follows: Reviewed x-ray agree with radiology results.    Radiologist interpretation:  DX-CHEST-PORTABLE (1 VIEW)   Final Result      Mild interstitial infiltrates.      EC-ECHOCARDIOGRAM COMPLETE W/O CONT    (Results Pending)       COURSE & MEDICAL DECISION MAKING    ASSESSMENT, COURSE AND PLAN  Care Narrative:     74-year-old male presents to the ED for dizziness, syncope and weakness.  He is known to be hypotensive and pale.  He is awake denies any specific pain.  Denies fevers.  He is hypotensive but not tachycardic.    Including imaging and labs have been reviewed he does not have a AAA.  Denies history of GI bleed.  Fluid  losses from diarrhea.  Fluid resuscitation, and workup for sepsis and other causes of hypotension.      The patient responded to fluids.  His blood pressure is improved and he feels better.  His lungs are clear there is no cough, or hypoxia or infiltrate on x-ray I do not think he has pneumonia.  Urinalysis unremarkable with no signs of skin or soft tissue infection.  There is no leukocytosis or lactic acidosis.    7 KYLIE and dehydration.  He is on diuretics and metoprolol.  These may be contributing.    At this point I do not see indication for antibiotics.  The patient does require hospitalization for his hypotension.  Discussed case with Dr. Vieyra,, the hospitalist and care since at the time.  We hospitalis further workup and treatment.    Sepsis: Infection was suspected 1510 (Time). Sepsis pathway was initiated. 30cc/kg bolus given. Antibiotics were given per protocol.        The patient has had an outpatient cardiac monitor that showed some intermittent bradycardia.  Have discussed this with the patient.  He states he has been told he is a candidate for pacemaker.  He has no bradycardia or symptomatic bradycardia while here in the ED.  The patient remains well-appearing.  He is ambulatory and is feeling better.  Patient's dizziness, and other symptoms have resolved with resolution of his hypotension.      CRITICAL CARE  The very real possibilty of a deterioration of this patient's condition required the highest level of my preparedness for sudden, emergent intervention.  I provided critical care services, which included medication orders, frequent reevaluations of the patient's condition and response to treatment, ordering and reviewing test results, and discussing the case with various consultants.  The critical care time associated with the care of the patient was 40 minutes. Review chart for interventions. This time is exclusive of any other billable procedures.         ADDITIONAL PROBLEMS  MANAGED      Bradycardia.    DISPOSITION AND DISCUSSIONS  I have discussed management of the patient with the following physicians and SINA's: Hospitalist as above.        Escalation of care considered, and ultimately not performed: Antibiotics would not see a source of infection.      Decision tools and prescription drugs considered including, but not limited to: Sepsis protocol was initiated but no signs of sepsis..    FINAL DIAGNOSIS  1. Hypotension, unspecified hypotension type    2. Dehydration    3. KYLIE (acute kidney injury) (HCC)    4.  Critical care time 40 minutes no procedures     Electronically signed by: Bogdan Peacock M.D., 7/29/2025 3:11 PM           [1]   Allergies  Allergen Reactions    Losartan Cough    Gabapentin Unspecified     Dizziness, confusion, falling    Hyzaar [Losartan Potassium-Hctz] Cough

## 2025-07-29 NOTE — ED TRIAGE NOTES
"BP (!) 42/24   Pulse (!) 57   Temp 36.2 °C (97.2 °F) (Temporal)   Resp 18   Ht 1.803 m (5' 11\")   Wt 111 kg (244 lb 4.3 oz)   SpO2 93%   BMI 34.07 kg/m²   Chief Complaint   Patient presents with    Syncope     Passed out just after eating lunch denies injury      Other     Had somewhat incident happened last yr   has tumor in head and is thinking this is the cause   (adenoma)    Blurred Vision     Had not true issue prior to passing out    does have blurred vision however this is worse that it has been    Dizziness     C/o dizziness as well      Comes in w/ son   EKG done   noted very hypotensive and c/o severe dizziness   pt taken to [facility] 8A now   "

## 2025-07-29 NOTE — ED NOTES
Iv placed , labs drawn and taken to lab. poc update given to pt, results pending at this time  Fluid bolus given on pressure bag

## 2025-07-30 ENCOUNTER — RESULTS FOLLOW-UP (OUTPATIENT)
Dept: CARDIOLOGY | Facility: MEDICAL CENTER | Age: 75
End: 2025-07-30
Payer: MEDICARE

## 2025-07-30 ENCOUNTER — APPOINTMENT (OUTPATIENT)
Dept: CARDIOLOGY | Facility: MEDICAL CENTER | Age: 75
End: 2025-07-30
Attending: HOSPITALIST
Payer: MEDICARE

## 2025-07-30 PROBLEM — R79.89 ELEVATED TROPONIN: Status: RESOLVED | Noted: 2025-07-29 | Resolved: 2025-07-30

## 2025-07-30 PROBLEM — E86.0 DEHYDRATION: Status: RESOLVED | Noted: 2025-07-29 | Resolved: 2025-07-30

## 2025-07-30 PROBLEM — E87.20 METABOLIC ACIDOSIS: Status: RESOLVED | Noted: 2025-07-29 | Resolved: 2025-07-30

## 2025-07-30 ASSESSMENT — ENCOUNTER SYMPTOMS
ORTHOPNEA: 0
FATIGUE: 0
ABDOMINAL PAIN: 0
EYES NEGATIVE: 1
MYALGIAS: 1
DIZZINESS: 1
CONSTIPATION: 0
PSYCHIATRIC NEGATIVE: 1
ABDOMINAL DISTENTION: 0
FEVER: 0
NAUSEA: 0
HEARTBURN: 0
VOMITING: 0
CHEST TIGHTNESS: 0
HEADACHES: 1
DIAPHORESIS: 0
COUGH: 0
LIGHT-HEADEDNESS: 0
DIARRHEA: 1
BRUISES/BLEEDS EASILY: 1
NERVOUS/ANXIOUS: 1
DIZZINESS: 0
SHORTNESS OF BREATH: 0
CHILLS: 0
PALPITATIONS: 0
COLOR CHANGE: 0
MYALGIAS: 0
ENDOCRINE NEGATIVE: 1
ARTHRALGIAS: 0

## 2025-07-30 ASSESSMENT — PAIN DESCRIPTION - PAIN TYPE
TYPE: ACUTE PAIN
TYPE: ACUTE PAIN

## 2025-07-30 NOTE — PROGRESS NOTES
Telemetry Shift Summary     Rhythm: SR/SB/SA with BBB and First degree heart block  Rate: 54-61 with low of 40 (md De Leon Aware)  Measurements: 0.23/0.14/0.46  Ectopy (reported by Monitor Tech): roPVC     Normal Values  Rhythm: Sinus  HR:   Measurements: 0.12-0.20/0.06-0.10/0.30-0.52

## 2025-07-30 NOTE — PROGRESS NOTES
Hospital Medicine Daily Progress Note    Date of Service  7/30/2025    Chief Complaint  Laureen Munoz is a 74 y.o. 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  Patient stated having frequent loose bowel movement over the last 1 week ; patient denied any, nausea and vomiting associated with this,    Presentation, patient is noted to be hypertensive blood pressure 51/23; he has been aggressively assisted with IV fluid, his blood pressure improved, cortisol at a.m. was obtained, unremarkable.  Orthostatic vital signs are present and unremarkable    Work up including GI PCR panel and C. difficile has been unremarkable.  Troponin has been unremarkable    Interval events:  -- Patient is alert, awake, answer question properly, medicine has been reviewed, currently requiring 1 L of oxygen, will obtain orthostatic vital signs, continue aggressive IV fluid resuscitation, echocardiogram pending, cortisol repeat.  Patient blood pressure has been improving  --Patient has been taking metoprolol 25 mg p.o. daily olmesartan 20 mg p.o. daily and Aldactone 25 mg p.o. daily for his hypertension holding Benicar and Aldactone at this time we will continue metoprolol with holding parameter    Discussed with cardiology in regards  to interrogation of loop recorder  I have discussed this patient's plan of care and discharge plan at IDT rounds today with Case Management, Nursing, Nursing leadership, and other members of the IDT team.    Consultants/Specialty  cardiology    Code Status  Full Code    Disposition  <MEDICALLYCLEARED>  I have placed the appropriate orders for post-discharge needs.    Review of Systems  Review of Systems   Constitutional:  Positive for malaise/fatigue.   HENT:  Positive for congestion.    Respiratory:  Negative for cough.    Cardiovascular:  Negative for chest pain, palpitations and orthopnea.   Gastrointestinal:  Negative for heartburn and vomiting.   Genitourinary:  Negative for dysuria.   Musculoskeletal:  Positive for myalgias.   Neurological:  Positive for dizziness and headaches.   Psychiatric/Behavioral:  The patient is nervous/anxious.         Physical Exam  Temp:  [35.9 °C (96.6 °F)-36.8 °C (98.2 °F)] 36.4 °C (97.6 °F)  Pulse:  [55-79] 79  Resp:  [14-26] 17  BP: ()/(23-72) 109/72  SpO2:  [87 %-96 %] 91 %    Physical Exam  Vitals and nursing note reviewed.   Constitutional:       Appearance: He is obese.   HENT:      Head: Normocephalic and atraumatic.   Eyes:      Extraocular Movements: Extraocular movements intact.   Cardiovascular:      Rate and Rhythm: Normal rate.   Pulmonary:      Breath sounds: Normal breath sounds.   Abdominal:      General: There is no distension.      Palpations: Abdomen is soft.   Musculoskeletal:      Cervical back: Neck supple.      Right lower leg: No edema.   Skin:     General: Skin is warm.   Neurological:      Mental Status: He is alert and oriented to person, place, and time.      Cranial Nerves: No cranial nerve deficit.         Fluids    Intake/Output Summary (Last 24 hours) at 7/30/2025 1344  Last data filed at 7/30/2025 0800  Gross per 24 hour   Intake 160 ml   Output --   Net 160 ml        Laboratory  Recent Labs     07/29/25  1502 07/30/25  0335   WBC 8.7 6.1    RBC 4.81 4.65*   HEMOGLOBIN 14.9 14.1   HEMATOCRIT 43.6 42.7   MCV 90.6 91.8   MCH 31.0 30.3   MCHC 34.2 33.0   RDW 43.2 44.5   PLATELETCT 159* 135*   MPV 11.0 10.4     Recent Labs     07/28/25  0924 07/29/25  1502 07/30/25  0335   SODIUM 134* 136 139   POTASSIUM 4.8 4.3 4.3   CHLORIDE 103 104 108   CO2 16* 16* 18*   GLUCOSE 96 141* 95   BUN 48* 51* 41*   CREATININE 1.64* 1.82* 1.21   CALCIUM 9.2 9.1 8.6     Recent Labs     07/29/25  1502   APTT 57.4*   INR 1.42*         Recent Labs     07/28/25  0924   TRIGLYCERIDE 89   HDL 48   LDL 55       Imaging  EC-ECHOCARDIOGRAM COMPLETE W/O CONT   Final Result      DX-CHEST-PORTABLE (1 VIEW)   Final Result      Mild interstitial infiltrates.           Assessment/Plan  * Syncope and collapse- (present on admission)  Assessment & Plan  Likely secondary to clinical dehydration versus hypertensive medication.  Will hold Benicar and Aldactone at this time  Continue IV fluid  A.m. cortisol level normal  Orthostatic vital sign after IV fluid has been unremarkable  Echo pending  Cardiology consulted as patient had a loop recorder    Type 2 diabetes mellitus with stage 4 chronic kidney disease, without long-term current use of insulin (HCC)- (present on admission)  Assessment & Plan  Hemoglobin A1c 6.6, continue ISS, hypoglycemic to goal, Accu-Cheks ACH S    Hypotension- (present on admission)  Assessment & Plan  Secondary to dehydration  And hypertensive medication  Continue metoprolol with holding parameter, continue IV fluid    Diarrhea- (present on admission)  Assessment & Plan    's continue IV fluid for supportive management, C. difficile and GI PCR panel unremarkable    Atrial fibrillation (HCC)- (present on admission)  Assessment & Plan  Continue metoprolol and Eliquis    Chronic diastolic congestive heart failure (HCC)- (present on admission)  Assessment & Plan  Patient is currently dehydrated.   we will hold patient diuretics, continue IV fluid    Essential hypertension-  (present on admission)  Assessment & Plan  Will hold patient Benicar, Aldactone, Lasix  Start metoprolol with holding parameter         VTE prophylaxis: eliquis

## 2025-07-30 NOTE — HOSPITAL COURSE
This is a 74-year-old male with a past medical history significant for obesity, hypertension, dyslipidemia, CKD stage III, atrial fibrillation, secondary hypercoagulable state on Eliquis and does follow Dr Warren as an OP, presented to the ER on 7/29/2025 with a complaint of weakness, dizziness and passing out.  Patient stated having frequent loose bowel movement over the last 1 week ; patient denied any, nausea and vomiting associated with this,    Upon presentation, patient is noted to be hypotensive with blood pressure 51/23; he has been aggressively resuscitated with IV fluid, his blood pressure improved, cortisol at a.m. was obtained, unremarkable.  Orthostatic vital signs are present and unremarkable    Work up including GI PCR panel and C. difficile has been unremarkable.  Troponin has been unremarkable.  Apparently patient has a loop recorder which has been investigated by cardiology.  Cardiology recommended that patient can take as needed Lasix along with Aldactone.  Will continue Eliquis for atrial fibrillation. He will follow-up with cardiology as an outpatient    For other chronic medical condition, he will resume his home medication.    Interval events:  -- Patient is alert, awake, answer question properly, medicine has been reviewed, currently requiring 1 L of oxygen, will obtain orthostatic vital signs, continue aggressive IV fluid resuscitation, echocardiogram pending, cortisol repeat.  Patient blood pressure has been improving  --Patient has been taking metoprolol 25 mg p.o. daily olmesartan 20 mg p.o. daily and Aldactone 25 mg p.o. daily for his hypertension holding Benicar and Aldactone at this time we will continue metoprolol with holding parameter    Discussed with cardiology in regards to interrogation of loop recorder

## 2025-07-30 NOTE — ASSESSMENT & PLAN NOTE
Secondary to dehydration  And hypertensive medication  Continue metoprolol with holding parameter, continue IV fluid

## 2025-07-30 NOTE — CARE PLAN
The patient is Watcher - Medium risk of patient condition declining or worsening    Shift Goals  Clinical Goals: Admit profile, monitor labs/vitals, safety, sleep environment, IVF  Patient Goals: sleep  Family Goals: jayson    Progress made toward(s) clinical / shift goals:       Problem: Pain - Standard  Goal: Alleviation of pain or a reduction in pain to the patient’s comfort goal  Outcome: Progressing     Problem: Knowledge Deficit - Standard  Goal: Patient and family/care givers will demonstrate understanding of plan of care, disease process/condition, diagnostic tests and medications  Outcome: Progressing     Problem: Communication  Goal: The ability to communicate needs accurately and effectively will improve  Outcome: Progressing     Problem: Safety  Goal: Will remain free from injury  Outcome: Progressing     Problem: Psychosocial Needs:  Goal: Level of anxiety will decrease  Outcome: Progressing     Problem: Bowel/Gastric:  Goal: Normal bowel function is maintained or improved  Outcome: Progressing       Patient is not progressing towards the following goals:

## 2025-07-30 NOTE — ASSESSMENT & PLAN NOTE
Likely secondary to reduced oral intake, and increase in losses secondary to diarrhea.  Encourage oral intake as tolerated, antiemetics as needed.  Intravenous hydration until adequate oral intake is achieved.

## 2025-07-30 NOTE — ED NOTES
Medication history reviewed with PT at bedside    [facility] is complete per PT reporting    Allergies reviewed.     Patient denies taking any outpatient antibiotics in the last 30 days.     Patient IS using antimicrobials (antivirals, antifungals and antiparasitics) in the last 30 days.  PT is using Ketoconazole 2% cream as needed. Last application was 07/27/2025.    Patient is taking Eliquis 5mg. Last dose was 07/29/2025 at 0600.    Preferred pharmacy for this encounter - [facility] ([phone])

## 2025-07-30 NOTE — ASSESSMENT & PLAN NOTE
In the indeterminate range  Denies chest pain.  EKG which shows sinus rhythm with a rate of 64.  There is no ST elevation.  There is right bundle branch block with expected abnormal repolarization pattern.  QTc is 447.    Troponin elevation is likely secondary to demand ischemia and reduced renal clearance given the current acute kidney injury.  I will place on continuous cardiac monitoring    I will trend troponin levels  Stat EKG, troponin for chest pain or shortness of breath

## 2025-07-30 NOTE — CONSULTS
Cardiology Initial Consultation    Date of Service  7/30/2025    Referring Physician  Betsy Torres M.D.    Reason for Consultation  Syncope and collapse    History of Presenting Illness  Laureen Munoz is a 74 y.o. male with a past medical history of cryptogenic stroke s/p ILR with later evidence of atrial fibrillation on anticoagulation, HFpEF, hypertension, hyperlipidemia, CKD, JUAN and diabetes mellitus type 2 who presented 7/29/2025 with a syncopal event after eating lunch.    His primary cardiologist is Dr. Warren.  He was last seen in clinic 6/13/2025. At that exam, he was doing well from a cardiac standpoint, but he did express concerns of lower extremity edema.  He was recommended to resume spironolactone with repeat labs.  His other medications were continued at the current doses and he was to follow-up in 3 months.    Prior to his admission he had been having multiple frequent loose bowel movements over the past week and mild abdominal pain.  He reports that he has been actively moving and was straining himself to move things in a short period of time.  He did not stop regularly to drink fluids.  He states that he got in and out, ate it and then when he got up to move more boxes he felt dizzy and faint.  He did not completely syncopize and simply had his son helped him to the ground.  He then became light-sensitive and asked his son to transport him to the ER.  In the ER, he was markedly hypotensive with readings initially at 51/23 and 42/24.  An EKG was performed without any acute findings and was only notable for right bundle branch block which is unchanged from prior.  His laboratory workup was remarkable for KYLIE and hyponatremia.  Troponin was initially elevated at 21 and remained negative upon repeat.  NT proBNP was 515.  He was ultimately admitted for hypotension and dehydration.    Today, he reports he is feeling well overall.  He does not have any further symptoms and his vision has returned to  normal.  No chest pain or palpitations. No shortness of breath, dyspnea on exertion, orthopnea or PND. No lower extremity edema. No dizziness or lightheadedness. No syncope or presyncope.      He does not have any symptoms when he does it into atrial fibrillation and is unaware other than the notifications he receives on his device interrogations.    Cardiovascular Risk Factors:  1. Smoking status: Never  2. Type II Diabetes Mellitus: Yes  Lab Results   Component Value Date/Time    HBA1C 5.8 (H) 06/17/2025 12:29 PM    HBA1C 5.8 03/26/2025 10:24 AM     3. Hypertension: Yes  4. Dyslipidemia: Yes  Cholesterol,Tot   Date Value Ref Range Status   07/28/2025 121 100 - 199 mg/dL Final     LDL   Date Value Ref Range Status   07/28/2025 55 <100 mg/dL Final     HDL   Date Value Ref Range Status   07/28/2025 48 >=40 mg/dL Final     Triglycerides   Date Value Ref Range Status   07/28/2025 89 0 - 149 mg/dL Final     5. Family history of early Coronary Artery Disease in a first degree relative (Male less than 55 years of age; Female less than 65 years of age): No  6.  Obesity and/or Metabolic Syndrome: Yes  7. Sedentary lifestyle: Yes especially over the past 4 years.    Review of Systems  Review of Systems   Constitutional:  Negative for chills, diaphoresis, fatigue and fever.   HENT: Negative.     Eyes: Negative.    Respiratory:  Negative for cough, chest tightness and shortness of breath.    Cardiovascular:  Negative for chest pain, palpitations and leg swelling.   Gastrointestinal:  Positive for diarrhea. Negative for abdominal distention, abdominal pain, constipation, nausea and vomiting.   Endocrine: Negative.    Genitourinary:  Negative for decreased urine volume, difficulty urinating, dysuria, frequency and urgency.   Musculoskeletal:  Negative for arthralgias and myalgias.   Skin:  Negative for color change.   Neurological:  Negative for dizziness, syncope and light-headedness.        Neuropathy bilateral feet    Hematological:  Bruises/bleeds easily.   Psychiatric/Behavioral: Negative.         Past Medical History   has a past medical history of Allergy, Anesthesia (2017), Arrhythmia, Arthritis (unk), Atrial fibrillation (HCC), Bowel habit changes, Cancer ([facility]) (2020), Cataract (2022), CHF (congestive heart failure) (HCC), Chickenpox, Congestive heart failure (HCC), Daytime sleepiness, Dental disorder, Diabetes ([facility]) (2023), Dizziness, Frequent urination, Uzbek measles, Hemorrhagic disorder ([facility]) (06/16/2025), High cholesterol (1998), Hypercholesterolemia, Hypertension, Insomnia, Kidney stone, Migraine, Mumps, Neuropathy, Obesity, JUAN (obstructive sleep apnea) (11/01/2024), PONV (postoperative nausea and vomiting) (2017), Presence of implantable pulmonary artery pressure and heart rate monitoring system (11/21/2024), Presence of implantable pulmonary artery pressure and heart rate monitoring system (11/21/2024), Reactive depression (02/21/2023), Renal artery stenosis (HCC), Ringing in ears, Sleep apnea (2019), Stroke ([facility]) (08/2024), Swelling of lower extremity, Tinea inguinalis, Tobacco use (10/30/2018), Urinary bladder disorder (1995), Wears glasses, and Wheezing.    He has no past medical history of Acute nasopharyngitis, Anginal syndrome (HCC), Asthma, Blood clotting disorder (HCC), Bronchitis, Carcinoma in situ of respiratory system, Continuous ambulatory peritoneal dialysis status ([facility]), Coughing blood, Dialysis patient ([facility]), Disorder of thyroid, Emphysema of lung (HCC), Glaucoma, Gynecological disorder, Heart burn, Heart murmur, Heart valve disease, Hepatitis A, Hepatitis B, Hepatitis C, Hiatus hernia syndrome, Indigestion, Jaundice, Myocardial infarct (HCC), Pacemaker, Pain, Pneumonia, Pregnant, Renal disorder, Rheumatic fever, Seizure (HCC), Snoring, Tuberculosis, or Urinary incontinence.    Surgical History   has a past surgical history that includes other abdominal surgery; other (1992); cataract phaco with  iol; rhinoplasty; pr transurethral elec-surg prostatectom (N/A, 2023); pr cystourethroscopy (N/A, 2023); pr cysto/uretero/pyeloscopy w/lithotripsy (N/A, 2023); eye surgery; prostatectomy, radical retro (2023); sinuscope; pr remv 2nd cataract,corn-scler sectn; and colonoscopy (N/A, 2025).    Family History  Family History   Problem Relation Age of Onset    Cancer Mother         Breast cancer    Heart Disease Mother          at 91yo/heart failure    Heart Attack Mother     Breast Cancer Mother     Heart Disease Father          at 57yo/massive heart attack    Heart Attack Father     Hypertension Father          coronary thrombosis    Cancer Sister          of leukemia    Breast Cancer Sister     Allergies Sister     Stroke Sister         living lost eye site left eye    Arthritis Sister     No Known Problems Maternal Grandmother     No Known Problems Maternal Grandfather     No Known Problems Paternal Grandmother     Dementia Paternal Grandfather     Alcohol/Drug Other         uncle    Allergies Sister     Hyperlipidemia Neg Hx     Diabetes Neg Hx        Social History   reports that he quit smoking about 40 years ago. His smoking use included cigarettes and cigars. He started smoking about 55 years ago. He has a 15 pack-year smoking history. He has never used smokeless tobacco. He reports that he does not currently use alcohol after a past usage of about 0.6 oz of alcohol per week. He reports that he does not currently use drugs after having used the following drugs: Inhaled.    Medications  Prior to Admission Medications   Prescriptions Last Dose Informant Patient Reported? Taking?   Empagliflozin (JARDIANCE) 10 MG Tab tablet 2025 at  6:00 AM Patient No Yes   Sig: Take 1 Tablet by mouth every day.   Specialty Vitamins Products (NERVIVE NERVE RELIEF PO) 2025 at  6:00 AM Patient Yes Yes   Sig: Take 1 Capsule by mouth 2 times a day.   Testosterone (ANDROGEL) 20.25  MG/ACT (1.62%) Gel 7/29/2025 at  6:00 AM Patient No Yes   Sig: Place 40.5 mg on the skin every day for 28 days. Apply one pump to each shoulder daily   Patient taking differently: Place 40.5 mg on the skin every day. Apply one pump to shoulder and one pump to hip daily   acetaminophen (TYLENOL) 500 MG Tab 7/15/2025 Patient Yes Yes   Sig: Take 1,000 mg by mouth every 6 hours as needed for Moderate Pain.   ammonium lactate (AMLACTIN) 12 % cream 7/28/2025 Evening Patient Yes Yes   Sig: Apply 1 g topically 2 times a day as needed (rash to back of both hands). PT uses once daily and then a second time if needed for itch   apixaban (ELIQUIS) 5mg Tab 7/29/2025 at  6:00 AM Patient No Yes   Sig: Take 1 Tablet by mouth 2 times a day.   atorvastatin (LIPITOR) 40 MG Tab 7/28/2025 Evening Patient No Yes   Sig: Take 1 Tablet by mouth every evening.   cetirizine (ZYRTEC) 10 MG Tab 7/29/2025 at  6:00 AM Patient Yes Yes   Sig: Take 10 mg by mouth every morning.   diphenhydrAMINE-APAP, sleep, (TYLENOL PM EXTRA STRENGTH)  MG Tab 7/28/2025 Evening Patient Yes Yes   Sig: Take 2 Tablets by mouth every evening as needed (pain/sleep).   furosemide (LASIX) 40 MG Tab 7/29/2025 at  6:00 AM Patient No Yes   Sig: Take 1 Tablet by mouth every day.   hydrocortisone 2.5 % Cream topical cream 7/27/2025 Patient Yes Yes   Sig: Apply 1 Application topically 1 time a day as needed (apply to face). Mix with Ketoconazole 2% cream 50/50   ketoconazole (NIZORAL) 2 % Cream 7/27/2025 Patient Yes Yes   Sig: Apply 1 Application topically 1 time a day as needed (apply to face). Mix with hydrocortisone 2.5% 50/50  Indications: Seborrheic Dermatitis   metoprolol SR (TOPROL XL) 25 MG TABLET SR 24 HR 7/29/2025 at  6:00 AM Patient No Yes   Sig: Take 1 Tablet by mouth every morning.   olmesartan (BENICAR) 20 MG Tab 7/29/2025 at  6:00 AM Patient No Yes   Sig: Take 1 Tablet by mouth every day.   spironolactone (ALDACTONE) 25 MG Tab 7/28/2025 Evening Patient No  Yes   Sig: Take 1 Tablet by mouth every day.      Facility-Administered Medications: None       Allergies  Allergies[1] Losartan, Hyzaar and gabapentin    Vital signs in last 24 hours  Temp:  [35.9 °C (96.6 °F)-36.8 °C (98.2 °F)] 36.4 °C (97.6 °F)  Pulse:  [55-79] 79  Resp:  [14-26] 17  BP: ()/(23-72) 109/72  SpO2:  [87 %-96 %] 91 %    Physical Exam  Physical Exam  Vitals and nursing note reviewed.   Constitutional:       General: He is not in acute distress.     Appearance: Normal appearance. He is not toxic-appearing.   HENT:      Head: Normocephalic and atraumatic.      Right Ear: External ear normal.      Left Ear: External ear normal.      Nose: Nose normal.      Mouth/Throat:      Mouth: Mucous membranes are moist.      Pharynx: Oropharynx is clear.   Eyes:      General: No scleral icterus.     Extraocular Movements: Extraocular movements intact.      Conjunctiva/sclera: Conjunctivae normal.      Pupils: Pupils are equal, round, and reactive to light.   Neck:      Vascular: No JVD.   Cardiovascular:      Rate and Rhythm: Normal rate and regular rhythm.      Pulses: Normal pulses.      Heart sounds: Normal heart sounds. No murmur heard.     No friction rub. No gallop.   Pulmonary:      Effort: Pulmonary effort is normal.      Breath sounds: Normal breath sounds.   Abdominal:      General: Abdomen is flat. Bowel sounds are normal. There is no distension.      Palpations: Abdomen is soft.      Tenderness: There is no abdominal tenderness.   Musculoskeletal:         General: Normal range of motion.      Cervical back: Normal range of motion and neck supple.      Right lower leg: No edema.      Left lower leg: No edema.   Skin:     General: Skin is warm and dry.      Capillary Refill: Capillary refill takes less than 2 seconds.      Coloration: Skin is not jaundiced.   Neurological:      General: No focal deficit present.      Mental Status: He is alert and oriented to person, place, and time. Mental status  is at baseline.   Psychiatric:         Mood and Affect: Mood normal.         Behavior: Behavior normal.         Judgment: Judgment normal.         Lab Review  Lab Results   Component Value Date/Time    WBC 6.1 07/30/2025 03:35 AM    WBC 9.4 03/01/2010 04:54 PM    RBC 4.65 (L) 07/30/2025 03:35 AM    RBC 5.39 03/01/2010 04:54 PM    HEMOGLOBIN 14.1 07/30/2025 03:35 AM    HEMATOCRIT 42.7 07/30/2025 03:35 AM    MCV 91.8 07/30/2025 03:35 AM    MCV 89 03/01/2010 04:54 PM    MCH 30.3 07/30/2025 03:35 AM    MCH 29.3 03/01/2010 04:54 PM    MCHC 33.0 07/30/2025 03:35 AM    MPV 10.4 07/30/2025 03:35 AM      Lab Results   Component Value Date/Time    SODIUM 139 07/30/2025 03:35 AM    POTASSIUM 4.3 07/30/2025 03:35 AM    CHLORIDE 108 07/30/2025 03:35 AM    CO2 18 (L) 07/30/2025 03:35 AM    GLUCOSE 95 07/30/2025 03:35 AM    BUN 41 (H) 07/30/2025 03:35 AM    CREATININE 1.21 07/30/2025 03:35 AM    CREATININE 0.92 03/01/2010 04:54 PM    BUNCREATRAT 21 03/01/2010 04:54 PM    GLOMRATE >59 03/01/2010 04:54 PM      Lab Results   Component Value Date/Time    ASTSGOT 48 (H) 07/30/2025 03:35 AM    ALTSGPT 43 07/30/2025 03:35 AM     Lab Results   Component Value Date/Time    CHOLSTRLTOT 121 07/28/2025 09:24 AM    LDL 55 07/28/2025 09:24 AM    HDL 48 07/28/2025 09:24 AM    TRIGLYCERIDE 89 07/28/2025 09:24 AM    TROPONINT 17 07/30/2025 03:35 AM       Recent Labs     07/29/25  1502   NTPROBNP 515*       Cardiac Imaging and Procedures Review  EKG:  My personal interpretation of the EKG dated 7/29/2025 is sinus rhythm with PACs and RBBB unchanged from prior.     Echocardiogram:  7/30/2025  CONCLUSIONS  Compared to the prior study on 06/07/24, progression of tricuspid   regurgitation with increased noninvasive RV systolic pressure,   otherwise unchanged.   Normal left ventricular size, thickness and systolic function. Normal   regional wall motion. The ejection fraction is measured to be 57 % by   Avilez's biplane.  Mild mitral  regurgitation  Aortic valve sclerosis without significant stenosis  Moderate tricuspid regurgitation with estimated RV systolic pressure of   50 mmHg.    Imaging  Chest X-Ray:  7/29/2025   FINDINGS:  The cardiomediastinal silhouette is stable. Device overlying the left chest likely represents a loop recorder. There is mild interstitial infiltrate in the right midlung field and at the lung bases. There is no definite pleural effusion or pneumothorax.     IMPRESSION:     Mild interstitial infiltrates.    Stress Test:  6/20/2024  NUCLEAR IMAGING INTERPRETATION   Normal Lexiscan myocardial perfusion study.   No evidence of ischemia or infarct.   Diaphragmatic artifact.   SDS 1.   LVEF 50%, visually better.   Sinus rhythm with right bundle branch block at rest.    No ischemic changes with Regadenoson.   Occasional PACs.   No chest pain.   ECG INTERPRETATION   Negative stress ECG for ischemia.    Assessment/Plan  #Presyncope  #Atrial fibrillation   #Cryptogenic stroke s/p ILR  #HFpEF  #Hypertension/hypotension  #Hyperlipidemia,  #CKD  #JUAN not on CPAP  #Diabetes mellitus type 2    Recommendations:    #Presyncope  #Atrial fibrillation   #Cryptogenic stroke s/p ILR  #Hypertension/hypotension  - His symptoms have improved with IV hydration.  -No significant events on telemetry.  He sent in a transmission of his ILR which did reveal intermittent episodes of atrial fibrillation which were well rate controlled with average rates in the 60s and 70s and no significant bradycardia or other malignant heart rhythms.  - Continue metoprolol 25 mg daily.  - Syncope and collapse likely due to significant dehydration and resultant hypotension.  Encouraged him to stay hydrated with 64 ounces of fluid daily and to increase to maximum of 82 fluids daily with increased activity.  - Continue to hold olmesartan given recent hypotension.  This can be readdressed as an outpatient.  - Continue Eliquis 5 mg twice daily.  He does report significant  bruising which is bothersome to him.  Recommended considering Watchman device as an outpatient.    #HFpEF AHA C NYHA II not in acute decompensation  #Diabetes mellitus type 2  - He appears euvolemic on examination and his labs were suggestive of KYLIE in the setting of dehydration thought to be secondary to diarrhea.  -LVEF remains preserved at 57% on echocardiogram with mild worsening of TR and RVSP.  - Recommend changing outpatient dose of Lasix to 40 mg daily as needed for weight gain of 3 pounds in a day or 5 pounds in a week or for increased lower extremity edema.  - Continue spironolactone 25 mg daily.  - Resume Jardiance upon discharge.  - Continue to hold olmesartan given recent hypotension.  This can be readdressed as an outpatient.  - Continue strict I's/O's and daily standing weights while inpatient.  - Pneumonia and flu vaccine per pharmacy.    #Hyperlipidemia  - Continue atorvastatin 40mg daily.    #JUAN not on CPAP  #Moderate TR  -There has been progression of his tricuspid regurgitation which is now moderate on his echocardiogram today compared to his echocardiogram 6/7/2024 which was graded as mild.  -Also mild worsening of his RVSP is present.  -He has reported intolerance to CPAP in the past.  Discussed changes on echocardiogram and the correlation to untreated sleep apnea.  - He has established with sleep medicine and has agreed to schedule follow-up.  -Discussed that if he is unable to tolerate CPAP he could consider the inspire device.  -Continue to monitor TR as an outpatient and if there continues to be progression could consider TriClip.     Cardiology will sign off.     Future Appointments   Date Time Provider Department Center   8/8/2025 10:15 AM NATALEE Nickerson None   8/18/2025 11:40 AM NATACHA [place]   9/18/2025 10:00 AM [address]   9/24/2025  9:00 AM Yonathan Warren M.D. [facility] None   11/19/2025  9:40 AM Delvis Ordaz M.D.  [facility]        Please contact me with any questions.    Thank you for allowing me to participate in the care of Laureen Munoz .    Belen Gautam PA-C, Cardiology  [facility] Medicine, [facility]  [address], [place] [address]  [place] [address]  Phone: [phone]  Fax: [phone]    PLEASE NOTE: This note was created using voice recognition software. I have made every reasonable attempt to correct obvious errors, but I expect that there are errors of grammar and possibly content that I did not discover before finalizing the note.              [1]   Allergies  Allergen Reactions    Gabapentin Unspecified     Dizziness, confusion, falling    Hyzaar [Losartan Potassium-Hctz] Cough    Losartan Cough

## 2025-07-30 NOTE — H&P
Hospital Medicine History & Physical Note    Date of Service  7/29/2025    Primary Care Physician  NATACHA Hawthorne    Consultants  None     Code Status  Full Code    Chief Complaint  Chief Complaint   Patient presents with    Syncope     Passed out just after eating lunch denies injury      Other     Had somewhat incident happened last yr   has tumor in head and is thinking this is the cause   (adenoma)    Blurred Vision     Had not true issue prior to passing out    does have blurred vision however this is worse that it has been    Dizziness     C/o dizziness as well      History of Presenting Illness  Laureen Munoz is a 74 y.o. male with a past medical history of hyperlipidemia, diabetes, hypertension and chronic kidney disease who presented 7/29/2025 with generalized weakness, dizziness, easy fatigability and passing.  The patient reports that he has been having multiple frequent loose bowel movements over the past 1 week.  He has mild abdominal pain.  He does not have vomiting.  There is no blood in stool.    I discussed the plan of care with emergency physician, and the patient    Review of Systems  Review of Systems   Constitutional:  Positive for malaise/fatigue. Negative for chills and fever.   Eyes:  Negative for discharge and redness.   Respiratory:  Negative for cough, shortness of breath and stridor.    Cardiovascular:  Negative for chest pain and leg swelling.        Syncope   Gastrointestinal:  Positive for diarrhea. Negative for abdominal pain and vomiting.   Genitourinary:  Negative for flank pain.   Musculoskeletal:  Negative for myalgias.   Skin: Negative.    Neurological:  Negative for focal weakness.        Loss of consciousness   Endo/Heme/Allergies:  Does not bruise/bleed easily.   Psychiatric/Behavioral:  The patient is not nervous/anxious.      Past Medical History   has a past medical history of Allergy, Anesthesia (2017), Arrhythmia, Arthritis (unk), Atrial fibrillation (HCC),  Bowel habit changes, Cancer ([facility]) (), Cataract (), CHF (congestive heart failure) (), Chickenpox, Congestive heart failure (), Daytime sleepiness, Dental disorder, Diabetes ([facility]) (), Dizziness, Frequent urination, Gabonese measles, Hemorrhagic disorder ([facility]) (2025), High cholesterol (), Hypercholesterolemia, Hypertension, Insomnia, Kidney stone, Migraine, Mumps, Neuropathy, Obesity, JUAN (obstructive sleep apnea) (2024), PONV (postoperative nausea and vomiting) (), Presence of implantable pulmonary artery pressure and heart rate monitoring system (2024), Presence of implantable pulmonary artery pressure and heart rate monitoring system (2024), Reactive depression (2023), Renal artery stenosis (), Ringing in ears, Sleep apnea (), Stroke ([facility]) (2024), Swelling of lower extremity, Tinea inguinalis, Tobacco use (10/30/2018), Urinary bladder disorder (), Wears glasses, and Wheezing.    Surgical History   has a past surgical history that includes other abdominal surgery; other (); cataract phaco with iol; rhinoplasty; pr transurethral elec-surg prostatectom (N/A, 2023); pr cystourethroscopy (N/A, 2023); pr cysto/uretero/pyeloscopy w/lithotripsy (N/A, 2023); eye surgery; prostatectomy, radical retro (2023); sinuscope; pr remv 2nd cataract,corn-scler sectn; and colonoscopy (N/A, 2025).     Family History  Family History   Problem Relation Age of Onset    Cancer Mother         Breast cancer    Heart Disease Mother          at 89yo/heart failure    Heart Attack Mother     Breast Cancer Mother     Heart Disease Father          at 57yo/massive heart attack    Heart Attack Father     Hypertension Father          coronary thrombosis    Cancer Sister          of leukemia    Breast Cancer Sister     Allergies Sister     Stroke Sister         living lost eye site left eye    Arthritis Sister     No Known Problems  Maternal Grandmother     No Known Problems Maternal Grandfather     No Known Problems Paternal Grandmother     Dementia Paternal Grandfather     Alcohol/Drug Other         uncle    Allergies Sister     Hyperlipidemia Neg Hx     Diabetes Neg Hx       Social History   reports that he quit smoking about 40 years ago. His smoking use included cigarettes and cigars. He started smoking about 55 years ago. He has a 15 pack-year smoking history. He has never used smokeless tobacco. He reports that he does not currently use alcohol after a past usage of about 0.6 oz of alcohol per week. He reports that he does not currently use drugs after having used the following drugs: Inhaled.    Allergies  Allergies[1]    Medications  Prior to Admission Medications   Prescriptions Last Dose Informant Patient Reported? Taking?   Empagliflozin (JARDIANCE) 10 MG Tab tablet   No No   Sig: Take 1 Tablet by mouth every day.   Specialty Vitamins Products (NERVIVE NERVE RELIEF PO)   Yes No   Sig: Take 1 Capsule by mouth every day.   Testosterone (ANDROGEL) 20.25 MG/ACT (1.62%) Gel   No No   Sig: Place 40.5 mg on the skin every day for 28 days. Apply one pump to each shoulder daily   apixaban (ELIQUIS) 5mg Tab   No No   Sig: Take 1 Tablet by mouth 2 times a day.   atorvastatin (LIPITOR) 40 MG Tab   No No   Sig: Take 1 Tablet by mouth every evening.   cetirizine (ZYRTEC) 10 MG Tab  Patient Yes No   Sig: Take 10 mg by mouth every morning.   furosemide (LASIX) 40 MG Tab   No No   Sig: Take 1 Tablet by mouth every day.   metoprolol SR (TOPROL XL) 25 MG TABLET SR 24 HR   No No   Sig: Take 1 Tablet by mouth every morning.   olmesartan (BENICAR) 20 MG Tab   No No   Sig: Take 1 Tablet by mouth every day.   spironolactone (ALDACTONE) 25 MG Tab   No No   Sig: Take 1 Tablet by mouth every day.      Facility-Administered Medications: None     Physical Exam  Temp:  [36.2 °C (97.2 °F)] 36.2 °C (97.2 °F)  Pulse:  [57-79] 61  Resp:  [14-26] 20  BP:  ()/(23-58) 108/56  SpO2:  [92 %-96 %] 93 %  Blood Pressure : 106/58   Temperature: 36.2 °C (97.2 °F)   Pulse: 79   Respiration: 20   Pulse Oximetry: 94 %     Physical Exam  Constitutional:       General: He is not in acute distress.     Appearance: He is ill-appearing and diaphoretic.   HENT:      Head: Atraumatic.      Right Ear: External ear normal.      Left Ear: External ear normal.      Nose: No congestion or rhinorrhea.      Mouth/Throat:      Mouth: Mucous membranes are dry.   Eyes:      General: No scleral icterus.        Right eye: No discharge.         Left eye: No discharge.      Pupils: Pupils are equal, round, and reactive to light.   Cardiovascular:      Rate and Rhythm: Normal rate and regular rhythm.   Pulmonary:      Effort: Pulmonary effort is normal.   Abdominal:      General: There is no distension.   Musculoskeletal:      Cervical back: Neck supple. No rigidity. No muscular tenderness.      Right lower leg: No edema.      Left lower leg: No edema.   Skin:     Capillary Refill: Capillary refill takes 2 to 3 seconds.      Coloration: Skin is pale. Skin is not jaundiced.   Neurological:      Mental Status: He is alert and oriented to person, place, and time.      Comments: Fluent speech.  Power is 5/5 both upper and lower extremities   Psychiatric:         Mood and Affect: Mood normal.         Behavior: Behavior normal.     Laboratory:  Recent Labs     07/29/25  1502   WBC 8.7   RBC 4.81   HEMOGLOBIN 14.9   HEMATOCRIT 43.6   MCV 90.6   MCH 31.0   MCHC 34.2   RDW 43.2   PLATELETCT 159*   MPV 11.0     Recent Labs     07/28/25  0924 07/29/25  1502   SODIUM 134* 136   POTASSIUM 4.8 4.3   CHLORIDE 103 104   CO2 16* 16*   GLUCOSE 96 141*   BUN 48* 51*   CREATININE 1.64* 1.82*   CALCIUM 9.2 9.1     Recent Labs     07/28/25  0924 07/29/25  1502   ALTSGPT 49 49   ASTSGOT 55* 57*   ALKPHOSPHAT 133* 126*   TBILIRUBIN 2.1* 1.7*   GLUCOSE 96 141*     Recent Labs     07/29/25  1502   APTT 57.4*   INR 1.42*      Recent Labs     07/29/25  1502   NTPROBNP 515*     Recent Labs     07/28/25  0924   TRIGLYCERIDE 89   HDL 48   LDL 55     Recent Labs     07/29/25  1502   TROPONINT 21*     Imaging:  DX-CHEST-PORTABLE (1 VIEW)   Final Result      Mild interstitial infiltrates.      EC-ECHOCARDIOGRAM COMPLETE W/O CONT    (Results Pending)     I personally reviewed patient's EKG which shows sinus rhythm with a rate of 64.  There is no ST elevation.  There is right bundle branch block with expected abnormal repolarization pattern.  QTc is 447.     Assessment/Plan:  Justification for Admission Status  I anticipate this patient is appropriate for observation status at this time because patient has acute kidney injury, dehydration and syncope.    Patient will need a Telemetry bed on MEDICAL service.  The patient has syncope    * Syncope and collapse- (present on admission)  Assessment & Plan  EKG shows sinus rhythm with a rate of 64.  There is no ST elevation.  There is right bundle branch block with expected abnormal repolarization pattern.  QTc is 447.    Syncope is secondary to hypovolemia secondary to diarrhea and overdiuresis.  I will hold home olmesartan, spironolactone and furosemide for now, as the patient has hypotension, syncope and acute kidney injury   I will start a limited amount of intravenous fluids.  I will place on continuous cardiac monitoring  I will check random cortisol     Consider checking orthostatic blood pressures once the patient is well-hydrated.    Hypotension- (present on admission)  Assessment & Plan  Secondary to dehydration  I will hold home olmesartan, spironolactone and furosemide for now, as the patient has hypotension, syncope and acute kidney injury   I will start a limited amount of intravenous fluids.  I will monitor on telemetry    Elevated troponin- (present on admission)  Assessment & Plan  In the indeterminate range  Denies chest pain.  EKG which shows sinus rhythm with a rate of 64.  There  is no ST elevation.  There is right bundle branch block with expected abnormal repolarization pattern.  QTc is 447.    Troponin elevation is likely secondary to demand ischemia and reduced renal clearance given the current acute kidney injury.  I will place on continuous cardiac monitoring    I will trend troponin levels  Stat EKG, troponin for chest pain or shortness of breath     Metabolic acidosis- (present on admission)  Assessment & Plan  Likely due to reduced intravascular volume and increase bicarb losses through diarrhea and acute kidney injury  I will start intravenous fluids    Anticipate improvement with intravenous fluids  Continue to monitor, I will order follow-up bicarb level        Type 2 diabetes mellitus with stage 4 chronic kidney disease, without long-term current use of insulin (HCC)- (present on admission)  Assessment & Plan  With no significant hyperglycemia  Last glycated hemoglobin was 5.8%  I will start short acting insulin for now  I will order Accu-Checks, hypoglycemia protocol  Adjust according to blood sugars trend     Dehydration- (present on admission)  Assessment & Plan  Likely secondary to reduced oral intake, and increase in losses secondary to diarrhea.  Encourage oral intake as tolerated, antiemetics as needed.  Intravenous hydration until adequate oral intake is achieved.      Diarrhea- (present on admission)  Assessment & Plan  Differentials include viral infection, C. difficile colitis    I will check stool for C. difficile toxins.  I will check GI PCR panel  Supportive care with intravenous fluids monitor electrolytes and replace accordingly     Atrial fibrillation (HCC)- (present on admission)  Assessment & Plan  I will resume metoprolol with hold parameters.  I will resume home apixaban for stroke prophylaxis    Chronic diastolic congestive heart failure (HCC)- (present on admission)  Assessment & Plan  Patient is currently dehydrated.  I will hold home spironolactone and  furosemide for now  I will start a limited amount of intravenous fluids.  Monitor volume status closely    Essential hypertension- (present on admission)  Assessment & Plan  I will start metoprolol with parameters.  I will hold home olmesartan, spironolactone and furosemide for now, as the patient has hypotension, syncope and acute kidney injury   I will start as needed hydralazine for extreme hypertension       VTE prophylaxis: SCDs/TEDs and therapeutic anticoagulation with apixaban         [1]   Allergies  Allergen Reactions    Gabapentin Unspecified     Dizziness, confusion, falling    Hyzaar [Losartan Potassium-Hctz] Cough    Losartan Cough

## 2025-07-30 NOTE — CARE PLAN
The patient is Stable - Low risk of patient condition declining or worsening    Shift Goals  Clinical Goals: Monitor labs; oxygen needs  Patient Goals: Rest  Family Goals: NA    Progress made toward(s) clinical / shift goals:    Problem: Pain - Standard  Goal: Alleviation of pain or a reduction in pain to the patient’s comfort goal  Outcome: Progressing     Problem: Knowledge Deficit - Standard  Goal: Patient and family/care givers will demonstrate understanding of plan of care, disease process/condition, diagnostic tests and medications  Outcome: Progressing     Problem: Communication  Goal: The ability to communicate needs accurately and effectively will improve  Outcome: Progressing       Patient is not progressing towards the following goals:

## 2025-07-30 NOTE — ASSESSMENT & PLAN NOTE
Likely secondary to clinical dehydration versus hypertensive medication.  Will hold Benicar and Aldactone at this time  Continue IV fluid  A.m. cortisol level normal  Orthostatic vital sign after IV fluid has been unremarkable  Echo pending  Cardiology consulted as patient had a loop recorder

## 2025-07-30 NOTE — ASSESSMENT & PLAN NOTE
Likely due to reduced intravascular volume and increase bicarb losses through diarrhea and acute kidney injury  I will start intravenous fluids    Anticipate improvement with intravenous fluids  Continue to monitor, I will order follow-up bicarb level

## 2025-07-30 NOTE — PROGRESS NOTES
4 Eyes Skin Assessment Completed by VIOLETA East and VIOLETA Ashley.    Skin assessment is primarily focused on high risk bony prominences. Pay special attention to skin beneath and around medical devices, high risk bony prominences, skin to skin areas and areas where the patient lacks sensation to feel pain and areas where the patient previously had breakdown.     Head (Occipital):  Flaky; tiny scabs   Ears (Under Medical Devices): Pink and Blanching   Nose (Under Medical Devices): Pink and Blanching   Mouth:  WDL   Neck: WDL   Breast/Chest:  Pink and blanching   Shoulder Blades:  Pink and Blanching   Spine:   WDL   (R) Arm/Elbow/Hand: Bruising   (L) Arm/Elbow/Hand: Bruising   Abdomen: WDL   Pannus/Groin:  Rash, Red, and Moisture and yeast   Sacrum/Coccyx:   Pink, Red, and Blanching   (R) Ischial Tuberosity (Sit Bones):  WDL   (L) Ischial Tuberosity (Sit Bones):  WDL   (R) Leg:  WDL   (L) Leg:  WDL   (R) Heel:  WDL   (R) Foot/Toe: WDL   (L) Heel: WDL   (L) Foot/Toe:  Open wound, Pink, and Bleeding       DEVICES IN USE:   Respiratory Devices:  NA, patient on room air  Feeding Devices:  N/A   Lines & BP Monitoring Devices:  Peripheral IV and Pulse ox    Orthopedic Devices:  N/A  Miscellaneous Devices:  Telemetry monitor    PROTOCOL INTERVENTIONS:   Standard/Trauma Bed:  Already in place  Wound care toe - saline irrigation    WOUND PHOTOS:   Completed and in EPIC           WOUND CONSULT:   N/A, no advanced wound care needs identified

## 2025-07-31 ASSESSMENT — PAIN DESCRIPTION - PAIN TYPE: TYPE: ACUTE PAIN

## 2025-07-31 NOTE — DISCHARGE SUMMARY
Discharge Summary    CHIEF COMPLAINT ON ADMISSION  Chief Complaint   Patient presents with    Syncope     Passed out just after eating lunch denies injury      Other     Had somewhat incident happened last yr   has tumor in head and is thinking this is the cause   (adenoma)    Blurred Vision     Had not true issue prior to passing out    does have blurred vision however this is worse that it has been    Dizziness     C/o dizziness as well        Reason for Admission  Passed Out, Blurred Vision, Weak     Admission Date  7/29/2025    CODE STATUS  Full Code    HPI & HOSPITAL COURSE  This is a 74-year-old male with a past medical history significant for obesity, hypertension, dyslipidemia, CKD stage III, atrial fibrillation, secondary hypercoagulable state on Eliquis and does follow Dr Wraren as an OP, presented to the ER on 7/29/2025 with a complaint of weakness, dizziness and passing out.  Patient stated having frequent loose bowel movement over the last 1 week ; patient denied any, nausea and vomiting associated with this,    Upon presentation, patient is noted to be hypotensive with blood pressure 51/23; he has been aggressively resuscitated with IV fluid, his blood pressure improved, cortisol at a.m. was obtained, unremarkable.  Orthostatic vital signs are present and unremarkable    Work up including GI PCR panel and C. difficile has been unremarkable.  Troponin has been unremarkable.  Apparently patient has a loop recorder which has been investigated by cardiology.  Cardiology recommended that patient can take as needed Lasix along with Aldactone.  Will continue Eliquis for atrial fibrillation. He will follow-up with cardiology as an outpatient. His statin has been held considering patient being having low blood pressure.    Recommend to check his blood pressure  on a daily basis.    For other chronic medical condition, he will resume his home medication.    Therefore, he is discharged in good and stable  condition to home with close outpatient follow-up.        Discharge Date  7/31/2025      FOLLOW UP ITEMS POST DISCHARGE  NATACHA Hawthorne  Please follow up with cardiology as an op  DISCHARGE DIAGNOSES  Principal Problem:    Syncope and collapse (POA: Yes)  Active Problems:    Essential hypertension (Chronic) (POA: Yes)    Chronic diastolic congestive heart failure (HCC) (POA: Yes)    Atrial fibrillation (HCC) (POA: Yes)    Diarrhea (POA: Yes)    Hypotension (POA: Yes)    Type 2 diabetes mellitus with stage 4 chronic kidney disease, without long-term current use of insulin (HCC) (POA: Yes)  Resolved Problems:    Dehydration (POA: Yes)    Metabolic acidosis (POA: Yes)    Elevated troponin (POA: Yes)      FOLLOW UP  Future Appointments   Date Time Provider Department Center   8/8/2025 10:15 AM NATALEE Nickerson None   8/18/2025 11:40 AM NATACHA Hawthorne [facility]   9/18/2025 10:00 AM  [facility] [address]   9/24/2025  9:00 AM Yonathan Warren M.D. [facility] None   11/19/2025  9:40 AM MARLO Green     No follow-up provider specified.    MEDICATIONS ON DISCHARGE     Medication List        CHANGE how you take these medications        Instructions   furosemide 20 MG Tabs  What changed:   medication strength  how much to take  when to take this  reasons to take this  Commonly known as: Lasix   Take 1 Tablet by mouth 1 time a day as needed (if you gain weight more than 4 pound) for up to 30 days.  Dose: 20 mg     spironolactone 25 MG Tabs  What changed: how much to take  Commonly known as: Aldactone   Take 0.5 Tablets by mouth every day.  Dose: 12.5 mg            CONTINUE taking these medications        Instructions   acetaminophen 500 MG Tabs  Commonly known as: Tylenol   Take 1,000 mg by mouth every 6 hours as needed for Moderate Pain.  Dose: 1,000 mg     ammonium lactate 12 % cream  Commonly known as: Amlactin   Apply 1 g topically 2 times a day as  needed (rash to back of both hands). PT uses once daily and then a second time if needed for itch  Dose: 1 g     apixaban 5 MG Tabs  Commonly known as: Eliquis   Take 1 Tablet by mouth 2 times a day.  Dose: 5 mg     atorvastatin 40 MG Tabs  Commonly known as: Lipitor   Take 1 Tablet by mouth every evening.  Dose: 40 mg     cetirizine 10 MG Tabs  Commonly known as: ZyrTEC   Take 10 mg by mouth every morning.  Dose: 10 mg     Empagliflozin 10 MG Tabs tablet  Commonly known as: Jardiance   Take 1 Tablet by mouth every day.  Dose: 10 mg     hydrocortisone 2.5 % Crea topical cream   Apply 1 Application topically 1 time a day as needed (apply to face). Mix with Ketoconazole 2% cream 50/50  Dose: 1 Application     ketoconazole 2 % Crea  Commonly known as: Nizoral   Apply 1 Application topically 1 time a day as needed (apply to face). Mix with hydrocortisone 2.5% 50/50  Indications: Seborrheic Dermatitis  Dose: 1 Application     metoprolol SR 25 MG Tb24  Commonly known as: Toprol XL   Take 1 Tablet by mouth every morning.  Dose: 25 mg     NERVIVE NERVE RELIEF PO   Take 1 Capsule by mouth 2 times a day.  Dose: 1 Capsule     Testosterone 20.25 MG/ACT (1.62%) Gel  Commonly known as: Androgel   Place 40.5 mg on the skin every day for 28 days. Apply one pump to each shoulder daily  Dose: 40.5 mg            STOP taking these medications      olmesartan 20 MG Tabs  Commonly known as: Benicar     Tylenol PM Extra Strength 500-25 MG Tabs  Generic drug: diphenhydrAMINE-APAP (sleep)              Allergies  Allergies[1]    DIET  Orders Placed This Encounter   Procedures    Diet Order Diet: Cardiac; Second Modifier: (optional): Consistent CHO (Diabetic)     Standing Status:   Standing     Number of Occurrences:   1     Diet::   Cardiac [6]     Second Modifier: (optional):   Consistent CHO (Diabetic) [4]       ACTIVITY  As tolerated.  Weight bearing as tolerated    CONSULTATIONS  cardiology    PROCEDURES  [facility]   LABORATORY  Lab Results    Component Value Date    SODIUM 138 07/31/2025    POTASSIUM 4.0 07/31/2025    CHLORIDE 107 07/31/2025    CO2 18 (L) 07/31/2025    GLUCOSE 87 07/31/2025    BUN 26 (H) 07/31/2025    CREATININE 0.92 07/31/2025    CREATININE 0.92 03/01/2010    GLOMRATE >59 03/01/2010        Lab Results   Component Value Date    WBC 8.6 07/31/2025    WBC 9.4 03/01/2010    HEMOGLOBIN 14.0 07/31/2025    HEMATOCRIT 42.4 07/31/2025    PLATELETCT 141 (L) 07/31/2025        Total time of the discharge process exceeds 39 minutes.       [1]   Allergies  Allergen Reactions    Gabapentin Unspecified     Dizziness, confusion, falling    Hyzaar [Losartan Potassium-Hctz] Cough    Losartan Cough

## 2025-07-31 NOTE — PROGRESS NOTES
Telemetry Shift Summary    Rhythm SR-SA  HR Range 60-75  Ectopy r-o-fPVC, rCoup, rTrip, 5-7 bts  vtacg  Measurements 0.22/0.12/0.40        Normal Values  Rhythm SR  HR Range    Measurements 0.12-0.20 / 0.06-0.10  / 0.30-0.52

## 2025-07-31 NOTE — PROGRESS NOTES
Patient on room air and ambulatory.  Vital signs remain stable.  Patient is voiding and stooling WDL.

## 2025-07-31 NOTE — PROGRESS NOTES
Received report from Elsa. Pt reported no pain or discomfort at the time, POC discussed, pt agreeable. Call light and belongings within reach. Bed locked and in low position. Fall precautions in place, non-skid socks on.

## 2025-07-31 NOTE — CARE PLAN
The patient is Stable - Low risk of patient condition declining or worsening    Shift Goals  Clinical Goals: Monitor vital signs and labs, IV ABX  Patient Goals: Rest, Go home tomorrow  Family Goals: NA    Progress made toward(s) clinical / shift goals:    Problem: Knowledge Deficit - Standard  Goal: Patient and family/care givers will demonstrate understanding of plan of care, disease process/condition, diagnostic tests and medications  Outcome: Progressing  Note: Discussed life style modifications including diet, activity, and diabetes management. Encouraged small routine exercises to patient to assist in developing an active lifestyle. Pt reported understanding of education and reported willing to try once discharged. Diabetes educational booklet given to patient.      Problem: Communication  Goal: The ability to communicate needs accurately and effectively will improve  Outcome: Progressing  Note: Educated patient on fall risks and safety including when to utilize call light button, dangling legs over bed prior to ambulating, and calling for assistance if patient felt dizzy/lightheadedness.  Pt reported understanding of education provided and reported he would utilize his call light button if he felt uneasy/unsafe prior to ambulating.      Problem: Bowel/Gastric:  Goal: Will not experience complications related to bowel motility  Outcome: Progressing  Note: Pt reported bowel motility to have decreased compared to previously, pt still reports loose bowel movements and uncontrolled flatulence. Bowel sounds assessed, hyperactive bowel sounds auscultated.        Patient is not progressing towards the following goals:

## 2025-08-01 ENCOUNTER — PATIENT OUTREACH (OUTPATIENT)
Dept: MEDICAL GROUP | Facility: IMAGING CENTER | Age: 75
End: 2025-08-01
Payer: MEDICARE

## 2025-08-08 ENCOUNTER — OFFICE VISIT (OUTPATIENT)
Dept: CARDIOLOGY | Facility: MEDICAL CENTER | Age: 75
End: 2025-08-08
Attending: NURSE PRACTITIONER
Payer: MEDICARE

## 2025-08-08 VITALS
RESPIRATION RATE: 16 BRPM | SYSTOLIC BLOOD PRESSURE: 122 MMHG | HEART RATE: 53 BPM | WEIGHT: 241 LBS | OXYGEN SATURATION: 93 % | BODY MASS INDEX: 33.74 KG/M2 | HEIGHT: 71 IN | DIASTOLIC BLOOD PRESSURE: 70 MMHG

## 2025-08-08 DIAGNOSIS — I10 ESSENTIAL HYPERTENSION: ICD-10-CM

## 2025-08-08 DIAGNOSIS — I63.9 CRYPTOGENIC STROKE (HCC): ICD-10-CM

## 2025-08-08 DIAGNOSIS — G47.33 OSA (OBSTRUCTIVE SLEEP APNEA): ICD-10-CM

## 2025-08-08 DIAGNOSIS — E78.00 HYPERCHOLESTEREMIA: ICD-10-CM

## 2025-08-08 DIAGNOSIS — I61.9 CVA (CEREBROVASCULAR ACCIDENT DUE TO INTRACEREBRAL HEMORRHAGE) (HCC): ICD-10-CM

## 2025-08-08 DIAGNOSIS — I07.1 MODERATE TRICUSPID REGURGITATION: ICD-10-CM

## 2025-08-08 DIAGNOSIS — Z79.01 CHRONIC ANTICOAGULATION: Primary | ICD-10-CM

## 2025-08-08 DIAGNOSIS — I50.32 CHRONIC DIASTOLIC CONGESTIVE HEART FAILURE (HCC): ICD-10-CM

## 2025-08-08 PROCEDURE — 3078F DIAST BP <80 MM HG: CPT | Performed by: NURSE PRACTITIONER

## 2025-08-08 PROCEDURE — 1170F FXNL STATUS ASSESSED: CPT | Performed by: NURSE PRACTITIONER

## 2025-08-08 PROCEDURE — 3074F SYST BP LT 130 MM HG: CPT | Performed by: NURSE PRACTITIONER

## 2025-08-08 PROCEDURE — 99212 OFFICE O/P EST SF 10 MIN: CPT | Performed by: NURSE PRACTITIONER

## 2025-08-08 PROCEDURE — 99214 OFFICE O/P EST MOD 30 MIN: CPT | Performed by: NURSE PRACTITIONER

## 2025-08-08 RX ORDER — SPIRONOLACTONE 25 MG/1
25 TABLET ORAL DAILY
Qty: 90 TABLET | Refills: 1 | Status: SHIPPED | OUTPATIENT
Start: 2025-08-08

## 2025-08-08 RX ORDER — EPLERENONE 25 MG/1
TABLET ORAL
COMMUNITY
Start: 2025-07-30 | End: 2025-08-08

## 2025-08-08 ASSESSMENT — ENCOUNTER SYMPTOMS
DIZZINESS: 0
MUSCULOSKELETAL NEGATIVE: 1
WEAKNESS: 0
FOCAL WEAKNESS: 0
BLURRED VISION: 0
COUGH: 0
MYALGIAS: 0
NEUROLOGICAL NEGATIVE: 1
RESPIRATORY NEGATIVE: 1
CHILLS: 0
NERVOUS/ANXIOUS: 0
CONSTITUTIONAL NEGATIVE: 1
DOUBLE VISION: 0
EYES NEGATIVE: 1
SHORTNESS OF BREATH: 0
ABDOMINAL PAIN: 0
PALPITATIONS: 0
DEPRESSION: 0
CLAUDICATION: 0
FEVER: 0
NAUSEA: 0
BRUISES/BLEEDS EASILY: 0
WEIGHT LOSS: 0
VOMITING: 0
HEADACHES: 0
GASTROINTESTINAL NEGATIVE: 1
PSYCHIATRIC NEGATIVE: 1

## 2025-08-08 ASSESSMENT — FIBROSIS 4 INDEX: FIB4 SCORE: 3.79

## 2025-08-11 ENCOUNTER — APPOINTMENT (OUTPATIENT)
Dept: URBAN - METROPOLITAN AREA CLINIC 4 | Facility: CLINIC | Age: 75
Setting detail: DERMATOLOGY
End: 2025-08-11

## 2025-08-11 DIAGNOSIS — Z85.828 PERSONAL HISTORY OF OTHER MALIGNANT NEOPLASM OF SKIN: ICD-10-CM

## 2025-08-11 DIAGNOSIS — L57.0 ACTINIC KERATOSIS: ICD-10-CM

## 2025-08-11 DIAGNOSIS — D18.0 HEMANGIOMA: ICD-10-CM

## 2025-08-11 DIAGNOSIS — L85.3 XEROSIS CUTIS: ICD-10-CM

## 2025-08-11 DIAGNOSIS — L82.1 OTHER SEBORRHEIC KERATOSIS: ICD-10-CM

## 2025-08-11 DIAGNOSIS — L21.8 OTHER SEBORRHEIC DERMATITIS: ICD-10-CM

## 2025-08-11 DIAGNOSIS — Z71.89 OTHER SPECIFIED COUNSELING: ICD-10-CM

## 2025-08-11 PROBLEM — D18.01 HEMANGIOMA OF SKIN AND SUBCUTANEOUS TISSUE: Status: ACTIVE | Noted: 2025-08-11

## 2025-08-11 PROCEDURE — ? PRESCRIPTION

## 2025-08-11 PROCEDURE — ? LIQUID NITROGEN

## 2025-08-11 PROCEDURE — ? TREATMENT REGIMEN

## 2025-08-11 PROCEDURE — ? COUNSELING

## 2025-08-11 PROCEDURE — ? SUNSCREEN RECOMMENDATIONS

## 2025-08-11 RX ORDER — IMIQUIMOD 12.5 MG/.25G
CREAM TOPICAL QHS
Qty: 24 | Refills: 2 | Status: ERX | COMMUNITY
Start: 2025-08-11

## 2025-08-11 RX ADMIN — IMIQUIMOD: 12.5 CREAM TOPICAL at 00:00

## 2025-08-11 ASSESSMENT — LOCATION DETAILED DESCRIPTION DERM
LOCATION DETAILED: 1ST WEB SPACE RIGHT HAND
LOCATION DETAILED: RIGHT PROXIMAL DORSAL FOREARM
LOCATION DETAILED: 3RD WEB SPACE LEFT HAND
LOCATION DETAILED: LEFT LATERAL TEMPLE
LOCATION DETAILED: LEFT RADIAL DORSAL HAND
LOCATION DETAILED: INFERIOR MID FOREHEAD
LOCATION DETAILED: LEFT SUPERIOR LATERAL MALAR CHEEK
LOCATION DETAILED: INFERIOR THORACIC SPINE
LOCATION DETAILED: RIGHT INFERIOR MEDIAL UPPER BACK
LOCATION DETAILED: LEFT PROXIMAL ULNAR DORSAL FOREARM
LOCATION DETAILED: RIGHT RADIAL DORSAL HAND
LOCATION DETAILED: RIGHT SUPERIOR LATERAL MALAR CHEEK
LOCATION DETAILED: LEFT ULNAR DORSAL HAND

## 2025-08-11 ASSESSMENT — LOCATION SIMPLE DESCRIPTION DERM
LOCATION SIMPLE: RIGHT THUMB
LOCATION SIMPLE: LEFT HAND
LOCATION SIMPLE: RIGHT CHEEK
LOCATION SIMPLE: LEFT TEMPLE
LOCATION SIMPLE: UPPER BACK
LOCATION SIMPLE: RIGHT HAND
LOCATION SIMPLE: INFERIOR FOREHEAD
LOCATION SIMPLE: RIGHT FOREARM
LOCATION SIMPLE: RIGHT UPPER BACK
LOCATION SIMPLE: LEFT CHEEK
LOCATION SIMPLE: LEFT FOREARM

## 2025-08-11 ASSESSMENT — LOCATION ZONE DERM
LOCATION ZONE: ARM
LOCATION ZONE: HAND
LOCATION ZONE: TRUNK
LOCATION ZONE: FINGER
LOCATION ZONE: FACE

## 2025-08-14 ENCOUNTER — HOSPITAL ENCOUNTER (OUTPATIENT)
Facility: MEDICAL CENTER | Age: 75
End: 2025-08-14
Payer: MEDICARE

## 2025-08-14 ENCOUNTER — OFFICE VISIT (OUTPATIENT)
Dept: MEDICAL GROUP | Facility: IMAGING CENTER | Age: 75
End: 2025-08-14
Payer: MEDICARE

## 2025-08-14 VITALS
DIASTOLIC BLOOD PRESSURE: 70 MMHG | OXYGEN SATURATION: 94 % | HEIGHT: 71 IN | RESPIRATION RATE: 16 BRPM | SYSTOLIC BLOOD PRESSURE: 118 MMHG | BODY MASS INDEX: 33.77 KG/M2 | WEIGHT: 241.2 LBS | TEMPERATURE: 98 F | HEART RATE: 64 BPM

## 2025-08-14 DIAGNOSIS — E86.1 HYPOTENSION DUE TO HYPOVOLEMIA: ICD-10-CM

## 2025-08-14 DIAGNOSIS — N18.4 TYPE 2 DIABETES MELLITUS WITH STAGE 4 CHRONIC KIDNEY DISEASE, WITHOUT LONG-TERM CURRENT USE OF INSULIN (HCC): ICD-10-CM

## 2025-08-14 DIAGNOSIS — R00.1 BRADYCARDIA: ICD-10-CM

## 2025-08-14 DIAGNOSIS — R55 SYNCOPE, UNSPECIFIED SYNCOPE TYPE: ICD-10-CM

## 2025-08-14 DIAGNOSIS — E66.811 OBESITY (BMI 30.0-34.9): ICD-10-CM

## 2025-08-14 DIAGNOSIS — E11.22 TYPE 2 DIABETES MELLITUS WITH STAGE 4 CHRONIC KIDNEY DISEASE, WITHOUT LONG-TERM CURRENT USE OF INSULIN (HCC): ICD-10-CM

## 2025-08-14 DIAGNOSIS — Z09 HOSPITAL DISCHARGE FOLLOW-UP: Primary | ICD-10-CM

## 2025-08-14 PROCEDURE — 82043 UR ALBUMIN QUANTITATIVE: CPT

## 2025-08-14 PROCEDURE — 93000 ELECTROCARDIOGRAM COMPLETE: CPT

## 2025-08-14 PROCEDURE — 82570 ASSAY OF URINE CREATININE: CPT

## 2025-08-14 RX ORDER — IMIQUIMOD 12.5 MG/.25G
CREAM TOPICAL
COMMUNITY
Start: 2025-08-11

## 2025-08-14 ASSESSMENT — LIFESTYLE VARIABLES
SKIP TO QUESTIONS 9-10: 1
HOW OFTEN DO YOU HAVE SIX OR MORE DRINKS ON ONE OCCASION: NEVER
AUDIT-C TOTAL SCORE: 2
HOW MANY STANDARD DRINKS CONTAINING ALCOHOL DO YOU HAVE ON A TYPICAL DAY: 1 OR 2
HOW OFTEN DO YOU HAVE A DRINK CONTAINING ALCOHOL: 2-4 TIMES A MONTH

## 2025-08-14 ASSESSMENT — PAIN SCALES - GENERAL: PAINLEVEL_OUTOF10: NO PAIN

## 2025-08-14 ASSESSMENT — FIBROSIS 4 INDEX: FIB4 SCORE: 3.84

## 2025-08-15 LAB
CREAT UR-MCNC: 95.3 MG/DL
MICROALBUMIN UR-MCNC: 2.3 MG/DL
MICROALBUMIN/CREAT UR: 24 MG/G (ref 0–30)

## 2025-08-18 ENCOUNTER — APPOINTMENT (OUTPATIENT)
Dept: MEDICAL GROUP | Facility: IMAGING CENTER | Age: 75
End: 2025-08-18
Payer: MEDICARE

## 2025-08-21 ENCOUNTER — NON-PROVIDER VISIT (OUTPATIENT)
Dept: CARDIOLOGY | Facility: MEDICAL CENTER | Age: 75
End: 2025-08-21
Payer: MEDICARE

## 2025-08-21 PROCEDURE — 93298 REM INTERROG DEV EVAL SCRMS: CPT | Mod: 26 | Performed by: INTERNAL MEDICINE

## (undated) DEVICE — CANISTER SUCTION 3000ML MECHANICAL FILTER AUTO SHUTOFF MEDI-VAC NONSTERILE LF DISP  (40EA/CA)

## (undated) DEVICE — CANISTER SUCTION RIGID RED 1500CC (40EA/CA)

## (undated) DEVICE — GOWN WARMING STANDARD FLEX - (30/CA)

## (undated) DEVICE — CANNULA O2 COMFORT SOFT EAR ADULT 7 FT TUBING (50/CA)

## (undated) DEVICE — CATHETER FOLEY 22 FR 30 CC (12EA/CA)

## (undated) DEVICE — SYRINGE SAFETY 10 ML 18 GA X 1 1/2 BLUNT LL (100/BX 4BX/CA)

## (undated) DEVICE — CATHETER URETHRAL FOLEY SILICONE 22 FR 30 ML 3-WAY

## (undated) DEVICE — PAD PREP 24 X 48 CUFFED - (100/CA)

## (undated) DEVICE — CONNECTOR HOSE NEPTUNE FOR CYSTO ROOM

## (undated) DEVICE — GOWN SURGEONS LARGE - (32/CA)

## (undated) DEVICE — GLOVE BIOGEL SZ 7 SURGICAL PF LTX - (50PR/BX 4BX/CA)

## (undated) DEVICE — SENSOR OXIMETER ADULT SPO2 RD SET (20EA/BX)

## (undated) DEVICE — SLEEVE VASO CALF MED - (10PR/CA)

## (undated) DEVICE — COVER LIGHT HANDLE ALC PLUS DISP (18EA/BX)

## (undated) DEVICE — WATER IRRIGATION STERILE 1000ML (12EA/CA)

## (undated) DEVICE — SPONGE GAUZE NON-STERILE 4X4 - (2000/CA 10PK/CA)

## (undated) DEVICE — TOWEL STOP TIMEOUT SAFETY FLAG (40EA/CA)

## (undated) DEVICE — TUBE CONNECTING SUCTION - CLEAR PLASTIC STERILE 72 IN (50EA/CA)

## (undated) DEVICE — ELECTRODE BIPOLAR REGULAR CUTTING LOOP URO 24/26 FR (6EA/PK)

## (undated) DEVICE — SUCTION INSTRUMENT YANKAUER BULBOUS TIP W/O VENT (50EA/CA)

## (undated) DEVICE — COVER FOOT UNIVERSAL DISP. - (25EA/CA)

## (undated) DEVICE — SET EXTENSION WITH 2 PORTS (48EA/CA) ***PART #2C8610 IS A SUBSTITUTE*****

## (undated) DEVICE — FORCEP RADIAL JAW 4 STANDARD CAPACITY W/NEEDLE 240CM (40EA/BX)

## (undated) DEVICE — BAG DRAINAGE ANTIREFLUX TOWER SLIDE TAP 4000 ML (20EA/CA)

## (undated) DEVICE — LACTATED RINGERS INJ 1000 ML - (14EA/CA 60CA/PF)

## (undated) DEVICE — SYRINGE DISP. 60 CC LL - (30/BX, 12BX/CA)**WHEN THESE ARE GONE ORDER #500206**

## (undated) DEVICE — KIT CUSTOM PROCEDURE SINGLE FOR ENDO (15/CA)

## (undated) DEVICE — LASER FIBER HOLM 910 MICRON 100 WATT (5EA/BX)

## (undated) DEVICE — SODIUM CHL. IRRIGATION 0.9% 3000ML (4EA/CA 65CA/PF)

## (undated) DEVICE — BAG URODRAIN WITH TUBING - (20/CA)

## (undated) DEVICE — PACK SINGLE BASIN - (6/CA)

## (undated) DEVICE — KIT  I.V. START (100EA/CA)

## (undated) DEVICE — PACK CYSTO III (2EA/CA)

## (undated) DEVICE — EVACUATOR BLADDER ELLIK - (10/BX)

## (undated) DEVICE — SET LEADWIRE 5 LEAD BEDSIDE DISPOSABLE ECG (1SET OF 5/EA)

## (undated) DEVICE — TUBING CLEARLINK DUO-VENT - C-FLO (48EA/CA)

## (undated) DEVICE — SYRINGE 50ML CATHETER TIP (40EA/BX 4BX/CA)